# Patient Record
Sex: FEMALE | Race: WHITE | NOT HISPANIC OR LATINO | Employment: OTHER | URBAN - METROPOLITAN AREA
[De-identification: names, ages, dates, MRNs, and addresses within clinical notes are randomized per-mention and may not be internally consistent; named-entity substitution may affect disease eponyms.]

---

## 2018-03-21 PROBLEM — M70.72 BILATERAL HIP BURSITIS: Status: ACTIVE | Noted: 2018-03-21

## 2018-03-21 PROBLEM — M50.30 DDD (DEGENERATIVE DISC DISEASE), CERVICAL: Status: ACTIVE | Noted: 2018-03-21

## 2018-03-21 PROBLEM — M47.812 CERVICAL SPONDYLOSIS: Status: ACTIVE | Noted: 2018-03-21

## 2018-03-21 PROBLEM — M47.816 LUMBAR SPONDYLOSIS: Status: ACTIVE | Noted: 2018-03-21

## 2018-03-21 PROBLEM — M70.71 BILATERAL HIP BURSITIS: Status: ACTIVE | Noted: 2018-03-21

## 2018-03-21 PROBLEM — M54.16 LUMBAR RADICULOPATHY: Status: ACTIVE | Noted: 2018-03-21

## 2018-03-21 PROBLEM — M54.12 CERVICAL RADICULOPATHY: Status: ACTIVE | Noted: 2018-03-21

## 2018-03-21 PROBLEM — M75.52 BILATERAL SHOULDER BURSITIS: Status: ACTIVE | Noted: 2018-03-21

## 2018-03-21 PROBLEM — M51.36 DDD (DEGENERATIVE DISC DISEASE), LUMBAR: Status: ACTIVE | Noted: 2018-03-21

## 2018-03-21 PROBLEM — M75.51 BILATERAL SHOULDER BURSITIS: Status: ACTIVE | Noted: 2018-03-21

## 2018-03-21 PROBLEM — M48.061 LUMBAR SPINAL STENOSIS: Status: ACTIVE | Noted: 2018-03-21

## 2018-03-21 PROBLEM — M48.02 CERVICAL STENOSIS OF SPINAL CANAL: Status: ACTIVE | Noted: 2018-03-21

## 2018-03-21 PROBLEM — M51.26 LUMBAR HERNIATED DISC: Status: ACTIVE | Noted: 2018-03-21

## 2018-03-21 PROBLEM — M50.20 HERNIATED CERVICAL DISC: Status: ACTIVE | Noted: 2018-03-21

## 2018-11-12 LAB
ALBUMIN/GLOB SERPL ELPH: 0.6 {RATIO}
ALBUMIN: 2.6
ALP SERPL-CCNC: 195 U/L (ref 25–125)
ALT SERPL W P-5'-P-CCNC: 21 U/L (ref 7–35)
ANION GAP SERPL CALC-SCNC: 11 MMOL/L (ref ?–30)
AST SERPL-CCNC: 42 U/L (ref 13–35)
BILIRUB SERPL-MCNC: 0.7 MG/DL (ref 0.1–1.4)
BILIRUBIN DIRECT+TOT PNL SERPL-MCNC: 0.3 MG/DL (ref 0.01–0.4)
BUN SERPL-MCNC: 11 MG/DL
CALCIUM SERPL-MCNC: 9.7 MG/DL (ref 8.7–10.7)
CHLORIDE SERPL-SCNC: 96 MMOL/L (ref 99–108)
CO2 SERPL-SCNC: 28 MMOL/L (ref 13–22)
CREAT SERPL-MCNC: 0.7 MG/DL (ref 0.5–1.1)
EAG (MG/DL): 209
GFR MDRD AF AMER: >60
GFR MDRD NON AF AMER: >60
GLOBULIN: 4.2
GLUCOSE SERPL-MCNC: 312 MG/DL
HBA1C MFR BLD: 8.9 % (ref 4–6)
NT-PRO-BNP (RIVER PARISHES): 542
OSMOLALITY SERPL CALC.SUM OF ELEC: 280 MOSM/KG
POTASSIUM SERPL-SCNC: 4 MMOL/L (ref 3.4–5.3)
SODIUM BLD-SCNC: 134 MMOL/L (ref 137–147)
TOTAL PROTEIN: 6.8 G/DL (ref 6.4–8.2)

## 2018-11-29 ENCOUNTER — TELEPHONE (OUTPATIENT)
Dept: TRANSPLANT | Facility: CLINIC | Age: 68
End: 2018-11-29

## 2018-11-29 LAB
AFP-TUMOR MARKER: 3.1
ALBUMIN/GLOBULIN RATIO: 0.7
ALBUMIN: 2.6
ALP SERPL-CCNC: 161 U/L (ref 25–125)
ALT SERPL W P-5'-P-CCNC: 21 U/L (ref 7–35)
AST SERPL-CCNC: 50 U/L (ref 13–35)
BILIRUB SERPL-MCNC: 0.8 MG/DL (ref 0.1–1.4)
BUN SERPL-MCNC: 14 MG/DL
CALCIUM SERPL-MCNC: 8.6 MG/DL (ref 8.7–10.7)
CHLORIDE SERPL-SCNC: 100 MMOL/L (ref 99–108)
CO2 SERPL-SCNC: 28 MMOL/L (ref 13–22)
CREAT SERPL-MCNC: 0.7 MG/DL (ref 0.5–1.1)
EGFR IF AFRICAN AMERICAN: 100
ERYTHROCYTE [DISTWIDTH] IN BLOOD BY AUTOMATED COUNT: 14.1 %
EST. GFR  (NON AFRICAN AMERICAN): 86 MG/DL
GLOBULIN: 4
GLUCOSE SERPL-MCNC: 266 MG/DL
HCT VFR BLD AUTO: 33 % (ref 36–46)
HGB BLD-MCNC: 11.6 G/DL (ref 12–16)
MCH RBC QN AUTO: 33.6 PG
MCHC RBC AUTO-ENTMCNC: 35 G/DL
MCV RBC AUTO: 95.9 FL
PLATELET # BLD AUTO: 197 K/ΜL (ref 150–399)
PMV BLD AUTO: 11.3 FL (ref 7.5–11.5)
POTASSIUM SERPL-SCNC: 4.7 MMOL/L (ref 3.4–5.3)
RBC: 3.45
SODIUM BLD-SCNC: 138 MMOL/L (ref 137–147)
TOTAL PROTEIN: 6.6 G/DL (ref 6.4–8.2)
WBC # BLD: 5.4 10*3/UL

## 2018-11-29 NOTE — TELEPHONE ENCOUNTER
----- Message from Nany Arredondo sent at 11/29/2018  9:38 AM CST -----  Rec'jannet phone call from ref md office about ref saadia on pt. Told them we have the pt ref and will give the pt call today to [facility]. They asked that we call the pt daughter Danitza  at [phone] to [facility] the appt.

## 2018-11-29 NOTE — TELEPHONE ENCOUNTER
----- Message from Nany Arredondo sent at 11/29/2018  3:02 PM CST -----  Called pt daughter and tamy pt for 12/6 in gen hepat. Will e-mail appt info and map of [facility] [place]

## 2018-12-06 ENCOUNTER — LAB VISIT (OUTPATIENT)
Dept: LAB | Facility: HOSPITAL | Age: 68
End: 2018-12-06
Payer: MEDICARE

## 2018-12-06 ENCOUNTER — OFFICE VISIT (OUTPATIENT)
Dept: HEPATOLOGY | Facility: CLINIC | Age: 68
End: 2018-12-06
Payer: MEDICARE

## 2018-12-06 ENCOUNTER — TELEPHONE (OUTPATIENT)
Dept: HEPATOLOGY | Facility: CLINIC | Age: 68
End: 2018-12-06

## 2018-12-06 VITALS
DIASTOLIC BLOOD PRESSURE: 63 MMHG | SYSTOLIC BLOOD PRESSURE: 134 MMHG | HEIGHT: 62 IN | HEART RATE: 78 BPM | WEIGHT: 259.94 LBS | BODY MASS INDEX: 47.84 KG/M2 | OXYGEN SATURATION: 97 % | RESPIRATION RATE: 18 BRPM

## 2018-12-06 DIAGNOSIS — R18.8 CIRRHOSIS OF LIVER WITH ASCITES, UNSPECIFIED HEPATIC CIRRHOSIS TYPE: ICD-10-CM

## 2018-12-06 DIAGNOSIS — E78.2 MIXED HYPERLIPIDEMIA: ICD-10-CM

## 2018-12-06 DIAGNOSIS — K74.60 CIRRHOSIS OF LIVER WITH ASCITES, UNSPECIFIED HEPATIC CIRRHOSIS TYPE: Primary | ICD-10-CM

## 2018-12-06 DIAGNOSIS — E11.65 TYPE 2 DIABETES MELLITUS WITH HYPERGLYCEMIA, WITH LONG-TERM CURRENT USE OF INSULIN: ICD-10-CM

## 2018-12-06 DIAGNOSIS — K76.82 HEPATIC ENCEPHALOPATHY: ICD-10-CM

## 2018-12-06 DIAGNOSIS — R18.8 CIRRHOSIS OF LIVER WITH ASCITES, UNSPECIFIED HEPATIC CIRRHOSIS TYPE: Primary | ICD-10-CM

## 2018-12-06 DIAGNOSIS — I10 ESSENTIAL HYPERTENSION: ICD-10-CM

## 2018-12-06 DIAGNOSIS — Z79.4 TYPE 2 DIABETES MELLITUS WITH HYPERGLYCEMIA, WITH LONG-TERM CURRENT USE OF INSULIN: ICD-10-CM

## 2018-12-06 DIAGNOSIS — K74.60 CIRRHOSIS OF LIVER WITH ASCITES, UNSPECIFIED HEPATIC CIRRHOSIS TYPE: ICD-10-CM

## 2018-12-06 DIAGNOSIS — E66.01 MORBID OBESITY WITH BMI OF 45.0-49.9, ADULT: ICD-10-CM

## 2018-12-06 LAB
A1AT SERPL-MCNC: 195 MG/DL
AFP SERPL-MCNC: 3 NG/ML
ALBUMIN SERPL BCP-MCNC: 2.3 G/DL
ALP SERPL-CCNC: 177 U/L
ALT SERPL W/O P-5'-P-CCNC: 23 U/L
ANION GAP SERPL CALC-SCNC: 10 MMOL/L
AST SERPL-CCNC: 52 U/L
BASOPHILS # BLD AUTO: 0.04 K/UL
BASOPHILS NFR BLD: 0.7 %
BILIRUB SERPL-MCNC: 0.8 MG/DL
BUN SERPL-MCNC: 10 MG/DL
CALCIUM SERPL-MCNC: 9.1 MG/DL
CERULOPLASMIN SERPL-MCNC: 36 MG/DL
CHLORIDE SERPL-SCNC: 101 MMOL/L
CO2 SERPL-SCNC: 28 MMOL/L
CREAT SERPL-MCNC: 0.9 MG/DL
DIFFERENTIAL METHOD: ABNORMAL
EOSINOPHIL # BLD AUTO: 0.1 K/UL
EOSINOPHIL NFR BLD: 1.5 %
ERYTHROCYTE [DISTWIDTH] IN BLOOD BY AUTOMATED COUNT: 16 %
EST. GFR  (AFRICAN AMERICAN): >60 ML/MIN/1.73 M^2
EST. GFR  (NON AFRICAN AMERICAN): >60 ML/MIN/1.73 M^2
FERRITIN SERPL-MCNC: 49 NG/ML
GLUCOSE SERPL-MCNC: 80 MG/DL
HCT VFR BLD AUTO: 35.3 %
HGB BLD-MCNC: 11.5 G/DL
IGG SERPL-MCNC: 1778 MG/DL
IGM SERPL-MCNC: 480 MG/DL
IMM GRANULOCYTES # BLD AUTO: 0.01 K/UL
IMM GRANULOCYTES NFR BLD AUTO: 0.2 %
INR PPP: 1.2
IRON SERPL-MCNC: 51 UG/DL
LYMPHOCYTES # BLD AUTO: 2 K/UL
LYMPHOCYTES NFR BLD: 32.2 %
MCH RBC QN AUTO: 31.5 PG
MCHC RBC AUTO-ENTMCNC: 32.6 G/DL
MCV RBC AUTO: 97 FL
MONOCYTES # BLD AUTO: 0.5 K/UL
MONOCYTES NFR BLD: 8.1 %
NEUTROPHILS # BLD AUTO: 3.5 K/UL
NEUTROPHILS NFR BLD: 57.3 %
NRBC BLD-RTO: 0 /100 WBC
PLATELET # BLD AUTO: 246 K/UL
PMV BLD AUTO: 10.6 FL
POTASSIUM SERPL-SCNC: 3.8 MMOL/L
PROT SERPL-MCNC: 7.3 G/DL
PROTHROMBIN TIME: 12.5 SEC
RBC # BLD AUTO: 3.65 M/UL
SATURATED IRON: 15 %
SODIUM SERPL-SCNC: 139 MMOL/L
TOTAL IRON BINDING CAPACITY: 348 UG/DL
TRANSFERRIN SERPL-MCNC: 235 MG/DL
WBC # BLD AUTO: 6.08 K/UL

## 2018-12-06 PROCEDURE — 1101F PT FALLS ASSESS-DOCD LE1/YR: CPT | Mod: CPTII,S$GLB,, | Performed by: NURSE PRACTITIONER

## 2018-12-06 PROCEDURE — 86235 NUCLEAR ANTIGEN ANTIBODY: CPT

## 2018-12-06 PROCEDURE — 83540 ASSAY OF IRON: CPT

## 2018-12-06 PROCEDURE — 82105 ALPHA-FETOPROTEIN SERUM: CPT

## 2018-12-06 PROCEDURE — 99999 PR PBB SHADOW E&M-EST. PATIENT-LVL V: CPT | Mod: PBBFAC,,, | Performed by: NURSE PRACTITIONER

## 2018-12-06 PROCEDURE — 82784 ASSAY IGA/IGD/IGG/IGM EACH: CPT | Mod: 59

## 2018-12-06 PROCEDURE — 86706 HEP B SURFACE ANTIBODY: CPT

## 2018-12-06 PROCEDURE — 86038 ANTINUCLEAR ANTIBODIES: CPT

## 2018-12-06 PROCEDURE — 82103 ALPHA-1-ANTITRYPSIN TOTAL: CPT

## 2018-12-06 PROCEDURE — 36415 COLL VENOUS BLD VENIPUNCTURE: CPT

## 2018-12-06 PROCEDURE — 82784 ASSAY IGA/IGD/IGG/IGM EACH: CPT

## 2018-12-06 PROCEDURE — 85025 COMPLETE CBC W/AUTO DIFF WBC: CPT

## 2018-12-06 PROCEDURE — 80053 COMPREHEN METABOLIC PANEL: CPT

## 2018-12-06 PROCEDURE — 82390 ASSAY OF CERULOPLASMIN: CPT

## 2018-12-06 PROCEDURE — 86704 HEP B CORE ANTIBODY TOTAL: CPT

## 2018-12-06 PROCEDURE — 82728 ASSAY OF FERRITIN: CPT

## 2018-12-06 PROCEDURE — 80074 ACUTE HEPATITIS PANEL: CPT

## 2018-12-06 PROCEDURE — 86790 VIRUS ANTIBODY NOS: CPT

## 2018-12-06 PROCEDURE — 85610 PROTHROMBIN TIME: CPT

## 2018-12-06 PROCEDURE — 99205 OFFICE O/P NEW HI 60 MIN: CPT | Mod: S$GLB,,, | Performed by: NURSE PRACTITIONER

## 2018-12-06 PROCEDURE — 80321 ALCOHOLS BIOMARKERS 1OR 2: CPT

## 2018-12-06 PROCEDURE — 86256 FLUORESCENT ANTIBODY TITER: CPT | Mod: 91

## 2018-12-06 RX ORDER — OMEPRAZOLE 40 MG/1
40 CAPSULE, DELAYED RELEASE ORAL DAILY
COMMUNITY
Start: 2018-10-17

## 2018-12-06 RX ORDER — SPIRONOLACTONE 100 MG/1
100 TABLET, FILM COATED ORAL DAILY
Qty: 30 TABLET | Refills: 2 | Status: SHIPPED | OUTPATIENT
Start: 2018-12-06 | End: 2019-02-08 | Stop reason: SDUPTHER

## 2018-12-06 RX ORDER — RIVAROXABAN 20 MG/1
20 TABLET, FILM COATED ORAL DAILY
COMMUNITY
Start: 2018-11-30 | End: 2020-01-14 | Stop reason: CLARIF

## 2018-12-06 RX ORDER — FUROSEMIDE 40 MG/1
40 TABLET ORAL DAILY
COMMUNITY
Start: 2018-11-15 | End: 2018-12-26

## 2018-12-06 RX ORDER — GABAPENTIN 600 MG/1
300 TABLET ORAL 2 TIMES DAILY
COMMUNITY

## 2018-12-06 RX ORDER — ASPIRIN 81 MG/1
81 TABLET ORAL DAILY
Status: ON HOLD | COMMUNITY
End: 2021-01-01 | Stop reason: HOSPADM

## 2018-12-06 RX ORDER — ROSUVASTATIN CALCIUM 10 MG/1
10 TABLET, COATED ORAL DAILY
COMMUNITY
Start: 2018-08-13

## 2018-12-06 RX ORDER — ESCITALOPRAM OXALATE 20 MG/1
10 TABLET ORAL 2 TIMES DAILY
COMMUNITY
Start: 2018-10-31

## 2018-12-06 RX ORDER — METOPROLOL SUCCINATE 25 MG/1
25 TABLET, EXTENDED RELEASE ORAL
COMMUNITY
Start: 2018-10-03 | End: 2020-01-31

## 2018-12-06 RX ORDER — SPIRONOLACTONE 50 MG/1
50 TABLET, FILM COATED ORAL 2 TIMES DAILY
COMMUNITY
Start: 2018-11-18 | End: 2018-12-06

## 2018-12-06 NOTE — LETTER
December 7, 2018      Jim Jolley MD  [address]  [facility] [address]           [facility]at[facility]  [address]  [facility] [address]  Phone: [phone]  Fax: [phone]          Patient: Sejal Matamoros   MR Number: [id number]   YOB: 1950   Date of Visit: 12/6/2018       Dear Dr. Jim Jolley:    Thank you for referring Sejal Matamoros to me for evaluation. Attached you will find relevant portions of my assessment and plan of care.    If you have questions, please do not hesitate to call me. I look forward to following Sejal Matamoros along with you.    Sincerely,    Deepika Plunkett, TARUN    Enclosure  CC:  No Recipients    If you would like to receive this communication electronically, please contact [email or web address] or [phone] to request more information on [employer] access.    For providers and/or their staff who would like to refer a patient to [facility], please contact us through our one-stop-shop provider referral line, [facility], at [phone].    If you feel you have received this communication in error or would no longer like to receive these types of communications, please e-mail [email or web address]

## 2018-12-06 NOTE — PROGRESS NOTES
I have reviewed and concur with the TITO's history, physical, assessment, and plan.  I have personally interviewed and examined the patient at bedside.  See below addendum for my evaluation and additional findings.     68 y.o. female that presents for recent diagnosis of RHOADES cirrhosis.  Patient has risk factors for RHOADES.  Signs of portal hypertension.    Previously with significant LE edema but improving with low dose diuretics.    Provided information regarding low sodium diet.  Titrate diuretics based on labs.   No indication for fibroscan or liver biopsy based on obvious signs of cirrhosis with portal hypertension.    No indication for liver transplant evaluation based on low MELD       Patient will return to clinic with Deepika Plunkett NP

## 2018-12-06 NOTE — PATIENT INSTRUCTIONS
1. Start Xifaxan 550 mg twice daily , call [facility] mail order dalia to check on the prescription  [phone]    Because you have cirrhosis, it is important to attend clinic visits every 6 months with an Ultrasound and blood tests every 6 months to screen for liver cancer (you are at risk of developing liver cancer due to scar tissue in the liver)    Signs and symptoms of worsening liver disease include jaundice, fluid in the belly (ascites), and confusion/disorientation/slowed thought processes due to hepatic encephalopathy (toxins building up because of liver problems).   You should seek medical attention if any of these things occur.    Also, possible bleeding from esophageal varices (blood vessels in the stomach and foodpipe can burst and cause fatal bleeding).  Therefore, if you have symptoms of vomiting blood, blood in your stool, dark or black stools or vomiting coffee ground vomit, YOU SHOULD GO TO THE EMERGENCY ROOM IMMEDIATELY.     Cirrhosis can increase the risk of liver cancer, liver failure, and death. However, we will watch your liver function score (MELD score) closely with each clinic visit. A normal MELD score is 6, highest is 40. Your MELD score will be calculated today. We will check this with every clinic visit.  A MELD 15 or higher is when we start to consider transplant because MELD 15 or higher indicates that the liver is not functioning as well     Cirrhosis Counseling  - NO alcohol use (includes beer, wine, and/or liquor)  --  take your rifaximin twice daily for treatment of hepatic encephalopathy (confusion due to high ammonia level)  -- continue your Lasix 40 mg daily and your Spironolactone 50  mg daily to control swelling related to your liver disease  - avoid non-steroidal anti-inflammatory drugs (NSAIDs) such as ibuprofen, Motrin, naprosyn, Alleve due to the risk of kidney damage  - can take acetaminophen (Tylenol), no more than 2000 mg per day  - low sodium (salt) 2 gram per day  diet  - high protein diet: 150 grams per day to prevent muscle mass loss. Drink at least 1 protein shake daily (Premier Protein is best option because it is very high protein and low sugar). Ok to use this as nighttime snack to fit it in   - resistance exercises for muscle strength  - avoid raw seafoods due to the risk of fatal Vibrio vulnificus infection  - ultrasound of the liver every 6 months for liver cancer screening (you are at risk of developing liver cancer due to scar tissue in the liver)  - Upper endoscopy every 1-2 years to screen for varices in the stomach and foodpipe which can burst and cause fatal bleeding

## 2018-12-06 NOTE — Clinical Note
Can you please arrange for MsGisele Matamoros to see Mary in [place] for DM management? She would also benefit from a visit with diabetes education. Referrals placed. Thanks!Deepika

## 2018-12-06 NOTE — TELEPHONE ENCOUNTER
Called pt and daughter, kidney function stable, will increase Savanna to 100 mg daily. Continue Lasix 40 mg daily.     Please contact pt or daughter or son to schedule pt for CMP at [facility] next Thursday (12/13)    Thanks

## 2018-12-06 NOTE — PROGRESS NOTES
[facility] VISIT NEW PT NOTE    REFERRING PROVIDER:  Dr. Jim Jolley    CHIEF COMPLAINT: ascites, decompensated cirrhosis    HPI: This is a 68 y.o. White female with PMH noted below, presenting for evaluation of  of decompensated cryptogenic cirrhosis  with ascites, hepatic encephalopathy    No  history of variceal bleeding     + LE edema    Presents today with son and daugther    Diagnosis of cirrhosis per GI Dr. Muñiz approx ~ 9/2017, had ascites and BLE edema at that time.     Diagnosis of cirrhosis based on  Imaging, US done 11/12/18 shows hepatic coarse echotexture, liver shrunken with nodular contour. No focal lesions. Hepatopedal flow within the portal vein. Moderate amount of ascites    MRI abd done 8/23/18 (outside records) shows nodular hepatic parenchamal contour with atrophy of right love and hypertrophy of left lobe, suggseting cirrhosis. No PVT. No intrahepatic mass. No ductal dilation. Trace ascites. Splenorenal shunting and cysts small perigastric varices    No h/o liver biopsy    Outside labs done 11/12/18 show  Na 134  Creat 0.7  Alb 2.6  AST 42  ALT 21  Alk phos 195  Tbili 0.7  AFP 8/2018 3.6 (WNL)  Plts 153 8/2018      BG >300    Computed MELD-Na score unavailable. Necessary lab results were not found in the last year.  Computed MELD score unavailable. Necessary lab results were not found in the last year.    No Previous serologic w/u     Complications:   1. Ascites :  + continued ascites on exam, reports much improved since starting current diuretics: Lasix 40 mg daily, Lawrence 50 mg daily. NOT following  Low Na diet   2. HE : + Grade 1-2 with fatigue, altered sleep/wake cycle, + memory loss, intermittent disorientation. Denies confusion. Never tried Lactulose or Xifaxan. Currently having ~5 small, soft, non watery bowel movements daily    No h/o variceal bleeding     Cirrhosis Health Maintenance:   -- Last EGD : 9/2017 per pt report, will request report at next  "visit    -- HCC screening   U/S - outside report 11/2018 notes no lesions   AFP - WNL 8/2018, repeat due 2/2019  -- Immunity to Hep A and B - will check today     Risk factors for NAFLD include morbid obesity, HTN, HLD, uncontrolled T2DM. Reports poor Diet " I eat anything I want", family reports heavy carb intake. No Exercise      + family history of liver disease : RHOADES cirrhosis in niece and brother. Denies current alcohol consumption or history of significant alcohol consumption in past     Denies jaundice, dark urine, hematemesis, melena, slowed mentation. No abnormal skin rashes. No generalized joint or muscle pain. + abdominal distention     Review of patient's allergies indicates:   Allergen Reactions    Iodinated contrast- oral and iv dye Hives    Zoloft [sertraline]        Current Outpatient Medications on File Prior to Visit   Medication Sig Dispense Refill    GABAPENTIN ORAL Take by mouth.      insulin glargine (LANTUS) 100 unit/mL injection Inject into the skin every evening.      metformin (GLUCOPHAGE) 1000 MG tablet Take 1,000 mg by mouth 2 (two) times daily with meals.       No current facility-administered medications on file prior to visit.        PMHX:  has a past medical history of A-fib, Anxiety, Cirrhosis, DDD (degenerative disc disease), cervical (3/21/2018), DDD (degenerative disc disease), lumbar (3/21/2018), Depression, Diabetes mellitus, type 2, HLD (hyperlipidemia), HTN (hypertension), Morbid obesity with BMI of 45.0-49.9, adult (12/7/2018), Obesity, and Type 2 diabetes mellitus with hyperglycemia, with long-term current use of insulin (12/6/2018).    PSHX:  has a past surgical history that includes Rotator cuff repair; Total knee arthroplasty; Hysterectomy; Carpal tunnel release; arm surgery; and Cholecystectomy.    FAMILY HISTORY: + for liver disease, see above, reviewed in EPIC    SOCIAL HISTORY:   Social History     Tobacco Use   Smoking Status Never Smoker   Smokeless Tobacco " "Never Used       Social History     Substance and Sexual Activity   Alcohol Use No    Frequency: Never       Social History     Substance and Sexual Activity   Drug Use No       ROS:   GENERAL: Denies fever, chills, weight loss/gain, + fatigue  HEENT: Denies headaches, dizziness, vision/hearing changes  CARDIOVASCULAR: Denies chest pain, palpitations, + edema  RESPIRATORY: Denies dyspnea, cough  GI: Denies abdominal pain, rectal bleeding, nausea, vomiting. No change in bowel pattern or color  : Denies dysuria, hematuria   SKIN: Denies rash, itching   NEURO: + Grade 1-2 with fatigue, altered sleep/wake cycle, + memory loss, intermittent disorientation. Denies confusion.   PSYCH: Denies depression or anxiety  HEME/LYMPH: + easy bruising, denies bleeding    PHYSICAL EXAM:   Friendly White female, in no acute distress; alert and oriented to person, place and time  VITALS: /63 (BP Location: Left arm, Patient Position: Sitting)   Pulse 78   Resp 18   Ht 5' 2" (1.575 m)   Wt 117.9 kg (259 lb 14.8 oz)   SpO2 97%   BMI 47.54 kg/m²   HENT: Normocephalic, without obvious abnormality. Oral mucosa pink and moist. Dentition good.  EYES: Sclerae anicteric. No conjunctival pallor.   NECK: Supple. No masses or cervical adenopathy.  CARDIOVASCULAR: Regular rate and rhythm. No murmurs.  RESPIRATORY: Normal respiratory effort. BBS CTA. No wheezes or crackles.  GI: Soft, non-tender, + distended, Ascites with shifting dullness and fluid wave, not-tense.  + splenomegaly. No masses palpable.   EXTREMITIES:  No clubbing, cyanosis, + 1 BLE edema.  SKIN: Warm and dry. No jaundice. No rashes noted to exposed skin.+ telangectasias noted. + palmar erythema.  NEURO:  Normal gait. No asterixis.  PSYCH:  Memory intact. Thought and speech pattern appropriate. Behavior normal. No depression or anxiety noted.    RECENT LABS:    Hepatitis A and B immunity markers:    No results found for: HEPAIGG    No results found for: HEPBSAG  No " "results found for: HEPBCAB  No results found for: HEPBSAB    There is no immunization history on file for this patient.    Labs:  No results found for: WBC, HGB, HCT, PLT, CHOL, TRIG, HDL, LDLDIRECT, NA, K, CREATININE, ALT, AST, ALKPHOS, BILITOT, ALBUMIN, INR, AFP    DIAGNOSTIC STUDIES:  EGD-  Reports outside EGD done 9/2017, will request report at next visit   COLONOSCOPY-  ABD. U/S-  Outside records US done 11/12/18 shows hepatic coarse echotexture, liver shrunken with nodular contour. No focal lesions. Hepatopedal flow within the portal vein. Moderate amount of ascites    CT SCAN-  MRI-  LIVER BIOPSY-  None   FIBROSCAN -   None, not needed given imaging findings    ASSESSMENT:  68 y.o. White female with:  1.  Cirrhosis, decompensated, etiology unknown but likely due to RHOADES.   Cirrhosis illustrated on imaging.   -- MELD-Na score: 8 at 12/6/2018  4:08 PM  MELD score: 8 at 12/6/2018  4:08 PM  Calculated from:  Serum Creatinine: 0.9 mg/dL (Rounded to 1 mg/dL) at 12/6/2018  4:08 PM  Serum Sodium: 139 mmol/L (Rounded to 137 mmol/L) at 12/6/2018  4:08 PM  Total Bilirubin: 0.8 mg/dL (Rounded to 1 mg/dL) at 12/6/2018  4:08 PM  INR(ratio): 1.2 at 12/6/2018  4:08 PM  Age: 68 years  -- HCC screening: AFP and abd. U/S.. U/S showed no lesion, AFP WNL - both next due 2/2019  -- Immunity to Hep A and B - will check today   -- EGD in past : reports outside EGD 9/2017, will request report at next visit  ---No Serological workup, to be done today.      2. Morbid obesity, HTN, HLD, uncontrolled T2DM  -- Body mass index is 47.54 kg/m².   -- increases risk of NAFLD/RHOADES  -- Reports poor Diet " I eat anything I want", family reports heavy carb intake. No Exercise     3. Portal hypertension without bleeding varices   -- EGD on 9/2017, need outside report, pt recalls no varices  -- Ascites and ankle edema - diuretic dosing Lasix 40 mg daily, [place] 50 mg daily. NOT following Low Na diet   -- Splenomegaly, thrombocytopenia     4. Hepatic " encephalopathy   -- + Grade 1-2 with fatigue, altered sleep/wake cycle, + memory loss, intermittent disorientation. Denies confusion. Never tried Lactulose or Xifaxan. Currently having ~5 small, soft, non watery bowel movements daily  -- NOT driving     5. BLE edema, +1 on exam  -- diuretic dosing: Lasix 40 mg daily, Spironolactone 50 mg daily     6. Hypoalbuminemia   -- encouraged high protein diet       EDUCATION:     The disease process and manifestations of cirrhosis were discussed.    Discussed implications of a cirrhosis diagnosis with HCC screenings and EGD and why it is important for us to properly monitor for potential complications.     Signs and symptoms of hepatic decompensation were reviewed, including jaundice, ascites, and slowed mentation due to hepatic encephalopathy. The patient should seek medical attention if any of these things occur.  We discussed the potential for bleeding from esophageal varices with symptoms of hematemesis and melena. The patient should report to the Emergency Department for these symptoms.    We discussed the increased risk of hepatocellular carcinoma due to cirrhosis. Continued screening every six months with ultrasound and AFP is recommended, discussed with patient.     Cirrhosis Counseling  - strict abstinence of alcohol use (includes beer, wine, and/or liquor)  - compliance with rifaximin for treatment of hepatic encephalopathy  - avoid non-steroidal anti-inflammatory drugs (NSAIDs) such as ibuprofen, Motrin, naprosyn, Alleve due to the risk of kidney damage  - can take acetaminophen (Tylenol), no more than 2000 mg per day  - low sodium (salt) 2 gram per day diet  - high protein diet: 150 grams per day to prevent muscle mass loss. Recommended at least 1 protein shake daily using Premier Protein shakes    - resistance exercises for muscle strength  - avoid raw seafoods due to the risk of fatal Vibrio vulnificus infection  - ultrasound of the liver every 6 months for  liver cancer screening  - Upper endoscopy every 1-2 years to screen for varices in the stomach and esophagus    We discussed the manifestations of non-alcoholic fatty liver disease. At this time, there are no FDA approved therapy for non-alcoholic fatty liver disease.  The patient and I discussed the importance of diet, exercise, and weight loss for management of NAFLD. We discussed a low fat, low carb/sugar, high fiber diet and a goal of 30 minutes of exercise 5 times per week as tolerated     Discussed with pt need for significant lifestyle changes, including improvement in diabetes control and eating habits      PLAN:  1. Labs today for full serological workup and  MELD labs  Orders Placed This Encounter   Procedures    AFP tumor marker    Alpha-1-antitrypsin    TRACIE    Antimitochondrial antibody    Anti-smooth muscle antibody    CBC auto differential    Ceruloplasmin    Comprehensive metabolic panel    Ferritin    Hepatitis panel, acute    IgG    IgM    Iron and TIBC    Phosphatidylethanol (PETH)    Protime-INR    Hepatitis A antibody, IgG    Hepatitis B core antibody, total    Hepatitis B surface antibody    Ambulatory Referral to Endocrinology    Ambulatory Referral to Diabetes Education    Ambulatory Referral to Endocrinology    Ambulatory Referral to Diabetes Education     2. Based on today's labs: increase Lawrence to 100 mg daily, continue Lasix 40 mg daily. Can continue to increase at next visit if ascites remains. Stressed importance of low Na diet  3. Start Xifaxan 550 mg BID. Will consider starting low dose Lactulose at next visit if no frequent BM or diarrhea  . Instructed NOT to drive due to hepatic encephalopathy   4. HCC screening Q 6 months with AFP and abd. U/S - both next due 2/2019  5. EGD to screen for varices, need to request report from 9/2017  6. Will check immunity markers for HBV/HAV and arrange for vaccination if needed.  7. Cirrhosis counseling as noted above and  discussed with patient  8. Will refer for transplant evaluation when MELD score is consistently 13-15   9. Referral to endocrinology in [place] and diabetes education  10. Follow-up in about 2 weeks (around 12/20/2018). with me to reiterated above, titrate diuretics and possibly add lactulose     Thank you for allowing me to participate in the care of Sejal Matamoros    JONG Love    ADDENDUM 12/16/18: Serological workup was negative for Nigel's, alpha-1 antitrypsin deficiency, hemochromatosis,  and viral hepatitis.     Negative TRACIE, AMA. + IgM (480), IgG (1778), ASMA 1:40  Normal AFP      CC'ed note to:   Dr. Samreen Lundberg, NP

## 2018-12-07 PROBLEM — E66.01 MORBID OBESITY WITH BMI OF 45.0-49.9, ADULT: Status: ACTIVE | Noted: 2018-12-07

## 2018-12-07 LAB
ANA SER QL IF: NORMAL
HAV IGM SERPL QL IA: NEGATIVE
HBV CORE AB SERPL QL IA: NEGATIVE
HBV CORE IGM SERPL QL IA: NEGATIVE
HBV SURFACE AB SER-ACNC: POSITIVE M[IU]/ML
HBV SURFACE AG SERPL QL IA: NEGATIVE
HCV AB SERPL QL IA: NEGATIVE
HEPATITIS A ANTIBODY, IGG: NEGATIVE
MITOCHONDRIA AB TITR SER IF: NORMAL {TITER}

## 2018-12-07 NOTE — TELEPHONE ENCOUNTER
MA called patient spoke to patient daughter schedule her labs at [place] on 12/13. Mailed appt reminder to patient.

## 2018-12-10 LAB — SMOOTH MUSCLE AB TITR SER IF: ABNORMAL {TITER}

## 2018-12-13 ENCOUNTER — TELEPHONE (OUTPATIENT)
Dept: HEPATOLOGY | Facility: CLINIC | Age: 68
End: 2018-12-13

## 2018-12-13 NOTE — TELEPHONE ENCOUNTER
----- Message from Octavia Ferguson sent at 12/13/2018  3:50 PM CST -----  Contact: Danitza garry's daughter  Patient Returning Call from [facility]    Who Left Message for Patient: Unkn    Communication Preference: [phone]    Additional Information: N/A

## 2018-12-13 NOTE — TELEPHONE ENCOUNTER
----- Message from Octavia Ferguson sent at 12/13/2018  3:50 PM CST -----  Contact: Danitza pt's daughter  Patient Returning Call from [facility]     Who Left Message for Patient: Unkn     Communication Preference: [phone]     Additional Information: N/A    Spoke to pts daughter ( Danitza) & rescheduled lab appt..............

## 2018-12-14 ENCOUNTER — LAB VISIT (OUTPATIENT)
Dept: LAB | Facility: HOSPITAL | Age: 68
End: 2018-12-14
Attending: NURSE PRACTITIONER
Payer: MEDICARE

## 2018-12-14 DIAGNOSIS — R18.8 CIRRHOSIS OF LIVER WITH ASCITES, UNSPECIFIED HEPATIC CIRRHOSIS TYPE: ICD-10-CM

## 2018-12-14 DIAGNOSIS — K74.60 CIRRHOSIS OF LIVER WITH ASCITES, UNSPECIFIED HEPATIC CIRRHOSIS TYPE: ICD-10-CM

## 2018-12-14 LAB
ALBUMIN SERPL BCP-MCNC: 2.4 G/DL
ALP SERPL-CCNC: 184 U/L
ALT SERPL W/O P-5'-P-CCNC: 26 U/L
ANION GAP SERPL CALC-SCNC: 11 MMOL/L
AST SERPL-CCNC: 59 U/L
BILIRUB SERPL-MCNC: 0.7 MG/DL
BUN SERPL-MCNC: 15 MG/DL
CALCIUM SERPL-MCNC: 8.9 MG/DL
CHLORIDE SERPL-SCNC: 98 MMOL/L
CO2 SERPL-SCNC: 28 MMOL/L
CREAT SERPL-MCNC: 0.8 MG/DL
EST. GFR  (AFRICAN AMERICAN): >60 ML/MIN/1.73 M^2
EST. GFR  (NON AFRICAN AMERICAN): >60 ML/MIN/1.73 M^2
GLUCOSE SERPL-MCNC: 267 MG/DL
PHOSPHATIDYLETHANOL (PETH): NEGATIVE NG/ML
POTASSIUM SERPL-SCNC: 4.6 MMOL/L
PROT SERPL-MCNC: 7 G/DL
SODIUM SERPL-SCNC: 137 MMOL/L

## 2018-12-14 PROCEDURE — 80053 COMPREHEN METABOLIC PANEL: CPT

## 2018-12-14 PROCEDURE — 36415 COLL VENOUS BLD VENIPUNCTURE: CPT

## 2018-12-26 ENCOUNTER — TELEPHONE (OUTPATIENT)
Dept: HEPATOLOGY | Facility: CLINIC | Age: 68
End: 2018-12-26

## 2018-12-26 ENCOUNTER — OFFICE VISIT (OUTPATIENT)
Dept: HEPATOLOGY | Facility: CLINIC | Age: 68
End: 2018-12-26
Payer: MEDICARE

## 2018-12-26 ENCOUNTER — LAB VISIT (OUTPATIENT)
Dept: LAB | Facility: HOSPITAL | Age: 68
End: 2018-12-26
Payer: MEDICARE

## 2018-12-26 VITALS
HEART RATE: 71 BPM | RESPIRATION RATE: 18 BRPM | OXYGEN SATURATION: 97 % | WEIGHT: 258.38 LBS | HEIGHT: 62 IN | DIASTOLIC BLOOD PRESSURE: 69 MMHG | TEMPERATURE: 98 F | BODY MASS INDEX: 47.55 KG/M2 | SYSTOLIC BLOOD PRESSURE: 150 MMHG

## 2018-12-26 DIAGNOSIS — R18.8 CIRRHOSIS OF LIVER WITH ASCITES, UNSPECIFIED HEPATIC CIRRHOSIS TYPE: Primary | ICD-10-CM

## 2018-12-26 DIAGNOSIS — E11.649 HYPOGLYCEMIA ASSOCIATED WITH TYPE 2 DIABETES MELLITUS: ICD-10-CM

## 2018-12-26 DIAGNOSIS — K74.60 DECOMPENSATED HEPATIC CIRRHOSIS: ICD-10-CM

## 2018-12-26 DIAGNOSIS — E88.09 HYPOALBUMINEMIA: ICD-10-CM

## 2018-12-26 DIAGNOSIS — E66.01 MORBID OBESITY WITH BMI OF 45.0-49.9, ADULT: ICD-10-CM

## 2018-12-26 DIAGNOSIS — K76.82 HEPATIC ENCEPHALOPATHY: ICD-10-CM

## 2018-12-26 DIAGNOSIS — K72.90 DECOMPENSATED HEPATIC CIRRHOSIS: Primary | ICD-10-CM

## 2018-12-26 DIAGNOSIS — K72.90 DECOMPENSATED HEPATIC CIRRHOSIS: ICD-10-CM

## 2018-12-26 DIAGNOSIS — K76.6 PORTAL HYPERTENSION: ICD-10-CM

## 2018-12-26 DIAGNOSIS — K74.60 CIRRHOSIS OF LIVER WITH ASCITES, UNSPECIFIED HEPATIC CIRRHOSIS TYPE: ICD-10-CM

## 2018-12-26 DIAGNOSIS — K74.60 DECOMPENSATED HEPATIC CIRRHOSIS: Primary | ICD-10-CM

## 2018-12-26 DIAGNOSIS — R18.8 CIRRHOSIS OF LIVER WITH ASCITES, UNSPECIFIED HEPATIC CIRRHOSIS TYPE: ICD-10-CM

## 2018-12-26 DIAGNOSIS — K74.60 CIRRHOSIS OF LIVER WITH ASCITES, UNSPECIFIED HEPATIC CIRRHOSIS TYPE: Primary | ICD-10-CM

## 2018-12-26 LAB
ALBUMIN SERPL BCP-MCNC: 2.2 G/DL
ALP SERPL-CCNC: 173 U/L
ALT SERPL W/O P-5'-P-CCNC: 28 U/L
ANION GAP SERPL CALC-SCNC: 8 MMOL/L
AST SERPL-CCNC: 44 U/L
BILIRUB SERPL-MCNC: 1.1 MG/DL
BUN SERPL-MCNC: 14 MG/DL
CALCIUM SERPL-MCNC: 8.9 MG/DL
CHLORIDE SERPL-SCNC: 99 MMOL/L
CO2 SERPL-SCNC: 29 MMOL/L
CREAT SERPL-MCNC: 0.9 MG/DL
EST. GFR  (AFRICAN AMERICAN): >60 ML/MIN/1.73 M^2
EST. GFR  (NON AFRICAN AMERICAN): >60 ML/MIN/1.73 M^2
GLUCOSE SERPL-MCNC: 267 MG/DL
INR PPP: 1.3
POTASSIUM SERPL-SCNC: 4.9 MMOL/L
PROT SERPL-MCNC: 7.1 G/DL
PROTHROMBIN TIME: 13.3 SEC
SODIUM SERPL-SCNC: 136 MMOL/L

## 2018-12-26 PROCEDURE — 99999 PR PBB SHADOW E&M-EST. PATIENT-LVL V: CPT | Mod: PBBFAC,,, | Performed by: NURSE PRACTITIONER

## 2018-12-26 PROCEDURE — 99214 OFFICE O/P EST MOD 30 MIN: CPT | Mod: S$GLB,,, | Performed by: NURSE PRACTITIONER

## 2018-12-26 PROCEDURE — 85610 PROTHROMBIN TIME: CPT

## 2018-12-26 PROCEDURE — 3288F FALL RISK ASSESSMENT DOCD: CPT | Mod: CPTII,S$GLB,, | Performed by: NURSE PRACTITIONER

## 2018-12-26 PROCEDURE — 1100F PTFALLS ASSESS-DOCD GE2>/YR: CPT | Mod: CPTII,S$GLB,, | Performed by: NURSE PRACTITIONER

## 2018-12-26 PROCEDURE — 80053 COMPREHEN METABOLIC PANEL: CPT

## 2018-12-26 PROCEDURE — 3045F PR MOST RECENT HEMOGLOBIN A1C LEVEL 7.0-9.0%: CPT | Mod: CPTII,S$GLB,, | Performed by: NURSE PRACTITIONER

## 2018-12-26 PROCEDURE — 36415 COLL VENOUS BLD VENIPUNCTURE: CPT

## 2018-12-26 RX ORDER — FUROSEMIDE 40 MG/1
60 TABLET ORAL DAILY
Qty: 135 TABLET | Refills: 1 | Status: SHIPPED | OUTPATIENT
Start: 2018-12-26 | End: 2019-02-08

## 2018-12-26 NOTE — PATIENT INSTRUCTIONS
1. Go buy glucose tablet tubes  2. If your blood sugar is ever less than 70, eat 4 glucose tablets and check your blood sugar in 15 minutes. Repeat this every 15 minutes until you are >70  3. Decrease your Lantus to 100 units daily. Endocrinology appt asap   4. Check your blood sugar twice daily before you eat anything in the morning and before dinner OR before bedtime and write the readings down and bring it to your diabetes visits       Because you have cirrhosis, it is important to attend clinic visits every 6 months with an Ultrasound and blood tests every 6 months to screen for liver cancer (you are at risk of developing liver cancer due to scar tissue in the liver)    Signs and symptoms of worsening liver disease include jaundice, fluid in the belly (ascites), and confusion/disorientation/slowed thought processes due to hepatic encephalopathy (toxins building up because of liver problems).   You should seek medical attention if any of these things occur.    Also, possible bleeding from esophageal varices (blood vessels in the stomach and foodpipe can burst and cause fatal bleeding).  Therefore, if you have symptoms of vomiting blood, blood in your stool, dark or black stools or vomiting coffee ground vomit, YOU SHOULD GO TO THE EMERGENCY ROOM IMMEDIATELY.     Cirrhosis can increase the risk of liver cancer, liver failure, and death. However, we will watch your liver function score (MELD score) closely with each clinic visit. A normal MELD score is 6, highest is 40. Your last one was an 8. We will check this with every clinic visit.  A MELD 15 or higher is when we start to consider transplant because MELD 15 or higher indicates that the liver is not functioning as well     Cirrhosis Counseling  - NO alcohol use (includes beer, wine, and/or liquor)  --  take your rifaximin twice daily for treatment of hepatic encephalopathy (confusion due to high ammonia level)  -- continue your Lasix 40 mg daily and your  Spironolactone 100 mg daily to control swelling related to your liver disease. I will adjust these doses after your labs today  - avoid non-steroidal anti-inflammatory drugs (NSAIDs) such as ibuprofen, Motrin, naprosyn, Alleve due to the risk of kidney damage  - can take acetaminophen (Tylenol), no more than 2000 mg per day  - low sodium (salt) 2 gram per day diet  - high protein diet: 150 grams per day to prevent muscle mass loss. Drink at least 1 protein shake daily (Premier Protein is best option because it is very high protein and low sugar). Ok to use this as nighttime snack to fit it in   - resistance exercises for muscle strength  - avoid raw seafoods due to the risk of fatal Vibrio vulnificus infection  - ultrasound of the liver every 6 months for liver cancer screening (you are at risk of developing liver cancer due to scar tissue in the liver)  - Upper endoscopy every 1-2 years to screen for varices in the stomach and foodpipe which can burst and cause fatal bleeding

## 2018-12-26 NOTE — TELEPHONE ENCOUNTER
Called daughter, instructed to increase Lasix to 60 mg daily, continue Lawrence 100 mg daily.    Repeat labs as scheduled next week, can potentially increase Lasix to 80 mg daily at that time if labs stable    Also notified that pt needs Hep A vaccine, daughter reports pt will contact PCP to obtain through PCP office. Discussed outbreak of Hep A, highly recommend vaccine soon     Daughter verbalized understanding.

## 2018-12-26 NOTE — PROGRESS NOTES
[facility] Established Patient Visit    Reason for Visit:  Decompensated cryptogenic cirrhosis     PCP: Deandre Lundberg    HPI:  This is a 68 y.o. female with PMH noted below, here for follow up of decompensated cryptogenic cirrhosis  with ascites, hepatic encephalopathy     No history of variceal bleeding      + LE edema, improving since last visit     Interval HPI: Presents today with son. Last seen 12/6/18. Increased E[place] to 100 mg daily, Lasix 40 mg daily, repeat labs 12/14 with normal kidney function and Na. Also started Xifaxan at last visit, started taking Xifaxan ~4 days ago after insurance approval.      Diagnosis of cirrhosis per GI Dr. Jolley approx ~ 9/2017, had ascites and BLE edema at that time.      Diagnosis of cirrhosis based on  Imaging, US done 11/12/18 shows hepatic coarse echotexture, liver shrunken with nodular contour. No focal lesions. Hepatopedal flow within the portal vein. Moderate amount of ascites     MRI abd done 8/23/18 (outside records) shows nodular hepatic parenchamal contour with atrophy of right love and hypertrophy of left lobe, suggseting cirrhosis. No PVT. No intrahepatic mass. No ductal dilation. Trace ascites. Splenorenal shunting and cysts small perigastric varices     No h/o liver biopsy     Lab Results   Component Value Date    ALT 26 12/14/2018    AST 59 (H) 12/14/2018    ALKPHOS 184 (H) 12/14/2018    BILITOT 0.7 12/14/2018    ALBUMIN 2.4 (L) 12/14/2018    INR 1.2 12/06/2018     12/06/2018     MELD-Na score: 8 at 12/6/2018  4:08 PM  MELD score: 8 at 12/6/2018  4:08 PM  Calculated from:  Serum Creatinine: 0.9 mg/dL (Rounded to 1 mg/dL) at 12/6/2018  4:08 PM  Serum Sodium: 139 mmol/L (Rounded to 137 mmol/L) at 12/6/2018  4:08 PM  Total Bilirubin: 0.8 mg/dL (Rounded to 1 mg/dL) at 12/6/2018  4:08 PM  INR(ratio): 1.2 at 12/6/2018  4:08 PM  Age: 68 years  ** MELD low, indicating no benefit to transplant currently **     Serological workup was negative  for Nigel's, alpha-1 antitrypsin deficiency, hemochromatosis, and viral hepatitis. Negative AMA, TRACIE. Mildly + ASMA (1:40, can be seen in fatty liver), + IgM and IgG  PETH negative    Autoimmune markers:     12/6/2018 16:08   Smooth Muscle Ab Positive 1:40 (A)   Anti-Mitochon Ab IFA Negative 1:40   TRACIE Screen Negative <1:160   IgG - Serum 1778 (H)   IgM 480 (H)        Complications:   1. Ascites : on Lasix 40 mg daily, Truman 100 mg daily. Now is following Low Na diet (was not previously)  2. HE : + Grade 1-2 with fatigue, altered sleep/wake cycle, + memory loss, intermittent disorientation. Denies confusion. Taking Xifaxan as of ~4 days ago. Currently having ~5-6 small, soft, non watery bowel movements daily so unable to start Lactulose     No h/o variceal bleeding      Cirrhosis Health Maintenance:   -- Last EGD : 9/2017 per pt report, requested report today     -- HCC screening              U/S - outside report 11/2018 notes no lesions              AFP - WNL 8/2018, repeat due 2/2019  -- Immunity to Hep A and B - needs Hep A vaccine, + immunity to Hep B     Risk factors for NAFLD include morbid obesity, HTN, HLD, uncontrolled T2DM. Trying to improve diet and carb intake.  No exercise, sedentary lifestyle      + family history of liver disease : RHOADES cirrhosis in niece and brother. Denies current alcohol consumption or history of significant alcohol consumption in past      Denies jaundice, dark urine, hematemesis, melena, slowed mentation. No abnormal skin rashes. No generalized joint or muscle pain. + abdominal distention    PMHX:  has a past medical history of A-fib, Anxiety, Cirrhosis, DDD (degenerative disc disease), cervical (3/21/2018), DDD (degenerative disc disease), lumbar (3/21/2018), Decompensated hepatic cirrhosis (12/26/2018), Depression, Diabetes mellitus, type 2, Hepatic encephalopathy (12/26/2018), HLD (hyperlipidemia), HTN (hypertension), Hypoglycemia associated with type 2 diabetes mellitus  (12/26/2018), Morbid obesity with BMI of 45.0-49.9, adult (12/7/2018), Obesity, and Type 2 diabetes mellitus with hyperglycemia, with long-term current use of insulin (12/6/2018).    PSHX:  has a past surgical history that includes Rotator cuff repair; Total knee arthroplasty; Hysterectomy; Carpal tunnel release; arm surgery; and Cholecystectomy.    The patient's social and family histories were reviewed by me and updated in the appropriate section of the electronic medical record.    Review of patient's allergies indicates:   Allergen Reactions    Iodinated contrast- oral and iv dye Hives    Zoloft [sertraline]        Current Outpatient Medications on File Prior to Visit   Medication Sig Dispense Refill    aspirin (ECOTRIN) 81 MG EC tablet Take 81 mg by mouth once daily.      escitalopram oxalate (LEXAPRO) 20 MG tablet Take 20 mg by mouth once daily.      furosemide (LASIX) 40 MG tablet Take 40 mg by mouth once daily.      gabapentin (NEURONTIN) 600 MG tablet Take 600 mg by mouth 2 (two) times daily.      insulin glargine (LANTUS) 100 unit/mL injection Inject into the skin every evening.      metformin (GLUCOPHAGE) 1000 MG tablet Take 1,000 mg by mouth 2 (two) times daily with meals.      metoprolol succinate (TOPROL XL) 25 MG 24 hr tablet Take 25 mg by mouth once daily.      omeprazole (PRILOSEC) 40 MG capsule Take 40 mg by mouth once daily.      rifAXIMin (XIFAXAN) 550 mg Tab Take 1 tablet (550 mg total) by mouth 2 (two) times daily. 60 tablet 5    rosuvastatin (CRESTOR) 10 MG tablet Take 10 mg by mouth once daily.      spironolactone (ALDACTONE) 100 MG tablet Take 1 tablet (100 mg total) by mouth once daily. DOSE CHANGE 30 tablet 2    XARELTO 20 mg Tab Take 20 mg by mouth once daily.       No current facility-administered medications on file prior to visit.        SOCIAL HISTORY:   Social History     Tobacco Use   Smoking Status Never Smoker   Smokeless Tobacco Never Used       Social History  "    Substance and Sexual Activity   Alcohol Use No    Frequency: Never       Social History     Substance and Sexual Activity   Drug Use No       ROS:   GENERAL: Denies fever, chills, weight loss/gain, + fatigue  HEENT: Denies headaches, dizziness, vision/hearing changes  CARDIOVASCULAR: Denies chest pain, palpitations, + edema  RESPIRATORY: Denies dyspnea, cough  GI: Denies abdominal pain, rectal bleeding, nausea, vomiting. No change in bowel pattern or color  : Denies dysuria, hematuria   SKIN: Denies rash, itching   NEURO: + Grade 1-2 with fatigue, altered sleep/wake cycle, + memory loss, intermittent disorientation. Denies confusion.   PSYCH: Denies depression or anxiety  HEME/LYMPH: + easy bruising, denies bleeding  Objective Findings:    PHYSICAL EXAM:   Friendly White female, in no acute distress; alert and oriented to person, place and time  VITALS: BP (!) 150/69 (BP Location: Left arm, Patient Position: Sitting, BP Method: Large (Automatic))   Pulse 71   Temp 98 °F (36.7 °C) (Oral)   Resp 18   Ht 5' 2" (1.575 m)   Wt 117.2 kg (258 lb 6.1 oz)   SpO2 97%   BMI 47.26 kg/m²   HENT: Normocephalic, without obvious abnormality. Oral mucosa pink and moist. Dentition good.  EYES: Sclerae anicteric. No conjunctival pallor.   NECK: Supple. No masses or cervical adenopathy.  CARDIOVASCULAR: Regular rate and rhythm. No murmurs.  RESPIRATORY: Normal respiratory effort. BBS CTA. No wheezes or crackles.  GI: Soft, non-tender, + distended (improving but remains distended), Ascites with shifting dullness and fluid wave, not-tense. + splenomegaly. No masses palpable.   EXTREMITIES:  No clubbing, cyanosis, + 1 BLE edema.  SKIN: Warm and dry. No jaundice. No rashes noted to exposed skin.+ telangectasias noted. + palmar erythema.  NEURO:  Normal gait. No asterixis.  PSYCH:  Memory intact. Thought and speech pattern appropriate. Behavior normal. No depression or anxiety noted.      Labs:    Hepatitis A and B immunity " markers:    Hepatitis A Antibody IgG   Date Value Ref Range Status   12/06/2018 Negative  Final       Hepatitis B Surface Ag   Date Value Ref Range Status   12/06/2018 Negative  Final     Hep B Core Total Ab   Date Value Ref Range Status   12/06/2018 Negative  Final     Hep B S Ab   Date Value Ref Range Status   12/06/2018 Positive (A)  Final       There is no immunization history on file for this patient.      Lab Results   Component Value Date    WBC 6.08 12/06/2018    HGB 11.5 (L) 12/06/2018    HCT 35.3 (L) 12/06/2018     12/06/2018     12/14/2018    K 4.6 12/14/2018    CREATININE 0.8 12/14/2018    ALT 26 12/14/2018    AST 59 (H) 12/14/2018    ALKPHOS 184 (H) 12/14/2018    BILITOT 0.7 12/14/2018    ALBUMIN 2.4 (L) 12/14/2018    INR 1.2 12/06/2018    AFP 3.0 12/06/2018       DIAGNOSTIC STUDIES:  EGD-  Reports outside EGD done 9/2017, will request report at next visit     COLONOSCOPY-    ABD. U/S-  Outside records US done 11/12/18 shows hepatic coarse echotexture, liver shrunken with nodular contour. No focal lesions. Hepatopedal flow within the portal vein. Moderate amount of ascites     MRI- MRI abd done 8/23/18 (outside records) shows nodular hepatic parenchamal contour with atrophy of right love and hypertrophy of left lobe, suggseting cirrhosis. No PVT. No intrahepatic mass. No ductal dilation. Trace ascites. Splenorenal shunting and cysts small perigastric varices    LIVER BIOPSY-  None   FIBROSCAN -   None, not needed given imaging findings      ASSESSMENT:  68 y.o. White female with:  1.  Cirrhosis, decompensated, etiology unknown but likely due to RHOADES.   Cirrhosis illustrated on imaging.   MELD-Na score: 8 at 12/6/2018  4:08 PM  MELD score: 8 at 12/6/2018  4:08 PM  Calculated from:  Serum Creatinine: 0.9 mg/dL (Rounded to 1 mg/dL) at 12/6/2018  4:08 PM  Serum Sodium: 139 mmol/L (Rounded to 137 mmol/L) at 12/6/2018  4:08 PM  Total Bilirubin: 0.8 mg/dL (Rounded to 1 mg/dL) at 12/6/2018  4:08  PM  INR(ratio): 1.2 at 12/6/2018  4:08 PM  Age: 68 years  -- HCC screening: AFP and abd. U/S.. U/S showed no lesion, AFP WNL - both next due 2/2019  -- Immunity to Hep A and B - +  Hep B immunity, needs Hep A vaccine   -- EGD in past : reports outside EGD 9/2017, requested report today  ---Serological workup was negative for Nigel's, alpha-1 antitrypsin deficiency, hemochromatosis, and viral hepatitis. Negative AMA, TRACIE. Mildly + ASMA (1:40, can be seen in fatty liver), + IgM and IgG  PETH negative     2. Morbid obesity, HTN, HLD, uncontrolled T2DM  -- Body mass index is 47.26 kg/m².   -- increases risk of NAFLD/RHOADES  -- Trying to decrease carb intake, significant decreased food intake recently, No Exercise      3. Hypoglycemia  -- reports BG <50 multiple times since last visit. On Lantus 120 units daily. Not followed by endocrine. Likely needs much less basal insulin and possibly addition of prandial insulin   -- also, given decompensated cirrhosis, would likely benefit from elimination of Metformin. However, will defer to endocrine, as BG may rise with discontinuation and require frequent titration of insulin   -- has diabetes education appt upcoming, referral to endo provider for medication management    4. Portal hypertension without bleeding varices   -- EGD on 9/2017, need outside report, pt recalls no varices  -- Ascites and ankle edema - diuretic dosing Lasix 40 mg daily, Lawrence 100 mg daily. Now  following Low Na diet   -- Splenomegaly, thrombocytopenia      5. Hepatic encephalopathy   -- + Grade 1-2 with fatigue, altered sleep/wake cycle, + memory loss, intermittent disorientation. Denies confusion.  Currently having ~5-6 small, soft, non watery bowel movements daily  -- NOT driving   -- recently started Xifaxan ~4 days ago   -- will defer use of Lactulose due to frequent BM, can revisit at each visit      6. BLE edema, +1 on exam  -- diuretic dosing: Lasix 40 mg daily, Spironolactone 100 mg daily      7.  Hypoalbuminemia   -- encouraged high protein diet         EDUCATION:      The disease process and manifestations of cirrhosis were discussed.     Discussed implications of a cirrhosis diagnosis with HCC screenings and EGD and why it is important for us to properly monitor for potential complications.      Signs and symptoms of hepatic decompensation were reviewed, including jaundice, ascites, and slowed mentation due to hepatic encephalopathy. The patient should seek medical attention if any of these things occur.  We discussed the potential for bleeding from esophageal varices with symptoms of hematemesis and melena. The patient should report to the Emergency Department for these symptoms.     We discussed the increased risk of hepatocellular carcinoma due to cirrhosis. Continued screening every six months with ultrasound and AFP is recommended, discussed with patient.      Cirrhosis Counseling  - strict abstinence of alcohol use (includes beer, wine, and/or liquor)  - compliance with rifaximin for treatment of hepatic encephalopathy  - avoid non-steroidal anti-inflammatory drugs (NSAIDs) such as ibuprofen, Motrin, naprosyn, Alleve due to the risk of kidney damage  - can take acetaminophen (Tylenol), no more than 2000 mg per day  - low sodium (salt) 2 gram per day diet  - high protein diet: 150 grams per day to prevent muscle mass loss. Recommended at least 1 protein shake daily using Premier Protein shakes    - resistance exercises for muscle strength  - avoid raw seafoods due to the risk of fatal Vibrio vulnificus infection  - ultrasound of the liver every 6 months for liver cancer screening  - Upper endoscopy every 1-2 years to screen for varices in the stomach and esophagus     We discussed the manifestations of non-alcoholic fatty liver disease. At this time, there are no FDA approved therapy for non-alcoholic fatty liver disease.  The patient and I discussed the importance of diet, exercise, and weight loss  for management of NAFLD. We discussed a low fat, low carb/sugar, high fiber diet and a goal of 30 minutes of exercise 5 times per week as tolerated         PLAN:  1. STOP adding sugar to drinks (coffee)  2. Message sent to endo staff to schedule appt with RUSSELL Agudelo for chronic DM management  3. Decrease Lantus to 100 units daily to prevent repeat hypoglycemia until can be seen by endo. Instructed to check BG twice daily before breakfast and before bedtime. Given BG logs to document BG readings and instructed to bring to educator and provider visits  4. Labs today (CMP and INR)  5. For now, continue Lawrence 100 mg daily,  Lasix 40 mg daily. Will adjust doses after lab results. Stressed importance of low Na diet  6. Continue Xifaxan 550 mg BID. Will defer addition of Lactulose currently given frequent BM, can revisit at each f/u visit.  Instructed NOT to drive due to hepatic encephalopathy   7. HCC screening Q 6 months with AFP and abd. U/S - both next due 2/2019  8. EGD to screen for varices, requested report from 9/2017 Dr. Muñiz  9. Recommend Hep A vaccine   10. Cirrhosis counseling as noted above and discussed with patient and son   11. Will refer for transplant evaluation when MELD score is consistently 13-15   12. Referral to endocrinology in [place], already has  diabetes education appt in January 13. Follow-up in about 7 weeks (around 2/13/2019). with labs (CBC, CMP, INR, AFP, IgM, IgG) and US on same day as f/u     Thank you for allowing me to participate in the care of Sejal Puente JONG Delong    ADDENDUM 12/26/18: Labs stable, kidney function stable, increase lasix to 60 mg daily. Continue Lawrence 100 mg daily. Recheck labs next week, If remains stable, can increase to 80 mg daily. Will assess at that time. Pts daughter notified via phone     CC'ed note to:   Dr. Samreen Lundberg NP

## 2019-01-02 ENCOUNTER — LAB VISIT (OUTPATIENT)
Dept: LAB | Facility: HOSPITAL | Age: 69
End: 2019-01-02
Attending: NURSE PRACTITIONER
Payer: MEDICARE

## 2019-01-02 DIAGNOSIS — K74.60 CIRRHOSIS OF LIVER WITH ASCITES, UNSPECIFIED HEPATIC CIRRHOSIS TYPE: ICD-10-CM

## 2019-01-02 DIAGNOSIS — R18.8 CIRRHOSIS OF LIVER WITH ASCITES, UNSPECIFIED HEPATIC CIRRHOSIS TYPE: ICD-10-CM

## 2019-01-02 LAB
ALBUMIN SERPL BCP-MCNC: 2.4 G/DL
ALP SERPL-CCNC: 180 U/L
ALT SERPL W/O P-5'-P-CCNC: 27 U/L
ANION GAP SERPL CALC-SCNC: 10 MMOL/L
AST SERPL-CCNC: 42 U/L
BILIRUB SERPL-MCNC: 0.9 MG/DL
BUN SERPL-MCNC: 22 MG/DL
CALCIUM SERPL-MCNC: 9.3 MG/DL
CHLORIDE SERPL-SCNC: 98 MMOL/L
CO2 SERPL-SCNC: 28 MMOL/L
CREAT SERPL-MCNC: 0.9 MG/DL
EST. GFR  (AFRICAN AMERICAN): >60 ML/MIN/1.73 M^2
EST. GFR  (NON AFRICAN AMERICAN): >60 ML/MIN/1.73 M^2
GLUCOSE SERPL-MCNC: 224 MG/DL
POTASSIUM SERPL-SCNC: 4.3 MMOL/L
PROT SERPL-MCNC: 7.5 G/DL
SODIUM SERPL-SCNC: 136 MMOL/L

## 2019-01-02 PROCEDURE — 36415 COLL VENOUS BLD VENIPUNCTURE: CPT

## 2019-01-02 PROCEDURE — 80053 COMPREHEN METABOLIC PANEL: CPT

## 2019-01-04 ENCOUNTER — TELEPHONE (OUTPATIENT)
Dept: HEPATOLOGY | Facility: CLINIC | Age: 69
End: 2019-01-04

## 2019-01-04 NOTE — TELEPHONE ENCOUNTER
Daughter returned call, reports fluid retention much improved. Labs stable. Will continue Lasix 60 mg daily, Lawrence 100 mg daily. Will f/u with planned f/u visit in Feb. Daughter verbalized understanding

## 2019-01-04 NOTE — TELEPHONE ENCOUNTER
Attempted to call daughter to discuss lab results, potentially adjust diuretic dosing. Did not answer, left message for pt or daughter to return my call

## 2019-01-04 NOTE — TELEPHONE ENCOUNTER
Faxed request to Dr. Jolley and Dr. Lundberg requesting EGD and MRI MRCP report to be faxed to us for review.

## 2019-01-09 ENCOUNTER — CLINICAL SUPPORT (OUTPATIENT)
Dept: DIABETES | Facility: CLINIC | Age: 69
End: 2019-01-09
Payer: MEDICARE

## 2019-01-09 VITALS — HEIGHT: 62 IN | BODY MASS INDEX: 47.55 KG/M2 | WEIGHT: 258.38 LBS

## 2019-01-09 DIAGNOSIS — E11.65 TYPE 2 DIABETES MELLITUS WITH HYPERGLYCEMIA, WITH LONG-TERM CURRENT USE OF INSULIN: ICD-10-CM

## 2019-01-09 DIAGNOSIS — Z79.4 TYPE 2 DIABETES MELLITUS WITH HYPERGLYCEMIA, WITH LONG-TERM CURRENT USE OF INSULIN: ICD-10-CM

## 2019-01-09 PROCEDURE — 99999 PR PBB SHADOW E&M-EST. PATIENT-LVL III: CPT | Mod: PBBFAC,,,

## 2019-01-09 PROCEDURE — 99999 PR PBB SHADOW E&M-EST. PATIENT-LVL III: ICD-10-PCS | Mod: PBBFAC,,,

## 2019-01-09 PROCEDURE — G0108 PR DIAB MANAGE TRN  PER INDIV: ICD-10-PCS | Mod: S$GLB,,, | Performed by: DIETITIAN, REGISTERED

## 2019-01-09 PROCEDURE — G0108 DIAB MANAGE TRN  PER INDIV: HCPCS | Mod: S$GLB,,, | Performed by: DIETITIAN, REGISTERED

## 2019-01-09 NOTE — PROGRESS NOTES
Diabetes Education  Author: Cordelia Martinez RD, CDE  Date: 1/9/2019    Diabetes Care Management Summary  Diabetes Education Record Assessment/Progress: Initial  Current Diabetes Risk Level: Moderate     Last A1c:   Lab Results   Component Value Date    HGBA1C 8.9 (A) 11/12/2018     Last visit with Diabetes Educator: : 01/09/2019      Diabetes Type  Diabetes Type : Type II    Diabetes History  Diabetes Diagnosis: >10 years  Current Treatment: Oral Medication, Insulin(100 Units of Lantus at 9am with 1000mg Metformin BID)  Reviewed Problem List with Patient: Yes    Health Maintenance was reviewed today with patient. Discussed with patient importance of routine eye exams, foot exams/foot care, blood work (i.e.: A1c, microalbumin, and lipid), dental visits, yearly flu vaccine, and pneumonia vaccine as indicated by PCP. Patient verbalized understanding.     Health Maintenance Topics with due status: Not Due       Topic Last Completion Date    Hemoglobin A1c 11/12/2018    Mammogram 12/12/2018     Health Maintenance Due   Topic Date Due    Lipid Panel  1950    Foot Exam  08/05/1960    Eye Exam  08/05/1960    Urine Microalbumin  08/05/1960    TETANUS VACCINE  08/05/1968    DEXA SCAN  08/05/1990    Colonoscopy  08/05/2000    Zoster Vaccine  08/05/2010    Pneumococcal Vaccine (65+ Low/Medium Risk) (2 of 2 - PCV13) 11/06/2016    Influenza Vaccine  08/01/2018       Nutrition  Meal Planning: skipping meals, water, diet drinks, snacks between meal  What type of sweetener do you use?: none  What type of beverages do you drink?: diet soda/tea, water, juice  Meal Plan 24 Hour Recall - Breakfast: (This am she had PB and OJ because blood sugar 56.  She then consumed a sausage biscuit on way to clinic and did not take her Lantus this am.)  Meal Plan 24 Hour Recall - Lunch: (Skips often reports since being on new medicine for her liver she is nauseated and not as hungry)  Meal Plan 24 Hour Recall - Dinner: (Last night  she had red beans and rice with a deviled egg and water to drink)    Monitoring   Monitoring: Other  Self Monitoring : (Reports fasting 40-88 sometimes 150-170)  Blood Glucose Logs: No  Do you use a personal continuous glucose monitor?: No  In the last month, how often have you had a low blood sugar reaction?: more than once a week  What are your symptoms of low blood sugar?: (Shaky )  How do you treat low blood sugar?: (Orange juice and peanut butter, will not take Lantus shot)  Can you tell when your blood sugar is too high?: no    Exercise   Exercise Type: none    Current Diabetes Treatment   Current Treatment: Oral Medication, Insulin(100 Units of Lantus at 9am with 1000mg Metformin BID)    Social History  Preferred Learning Method: Face to Face  Primary Support: Self, Daughter(Daughter Danitza present at visit)  Smoking Status: Never a Smoker  Alcohol Use: Never    Barriers to Change  Barriers to Change: None  Learning Challenges : None    Readiness to Learn   Readiness to Learn : Eager    Cultural Influences  Cultural Influences: None    Diabetes Education Assessment/Progress  Diabetes Disease Process (diabetes disease process and treatment options): Discussion  Nutrition (Incorporating nutritional management into one's lifestyle): Discussion, Instructed, Demonstrates Understanding/Competency (verbalizes/demonstrates), Comprehends Key Points, Written Materials Provided(Educated patient and daugher on importance of 3 meals per day and timing of meals)  Physical Activity (incorporating physical activity into one's lifestyle): Discussion  Medications (states correct name, dose, onset, peak, duration, side effects & timing of meds): Discussion, Instructed, Demonstrates Understanding/Competency(verbalizes/demonstrates), Comprehends Key Points, Written Materials Provided(Per Mary Agudelo NP instructed pateint to decrease Lantus to 40 Units daily and continue with 1000mg BID Metformin)  Monitoring (monitoring blood  glucose/other parameters & using results): Discussion, Instructed, Demonstrates Understanding/Competency (verbalizes/demonstrates), Comprehends Key Points, Written Materials Provided(Provided log sheet for patient to test before breakfast and before dinner and report back in 2 days at patient may need pradial coverage per Mrs. Agudelo.)  Acute Complications (preventing, detecting, and treating acute complications): Discussion  Chronic Complications (preventing, detecting, and treating chronic complications): Discussion  Clinical (diabetes, other pertinent medical history, and relevant comorbidities reviewed during visit): Discussion  Cognitive (knowledge of self-management skills, functional health literacy): Discussion  Psychosocial (emotional response to diabetes): Discussion  Diabetes Distress and Support Systems: Discussion  Behavioral (readiness for change, lifestyle practices, self-care behaviors): Discussion    Goals  Patient has selected/evaluated goals during today's session: Yes, selected  Healthy Eating: Set  Start Date: 01/09/19  Target Date: 04/09/19  Physical Activity: In Progress  Monitoring: Set  Start Date: 01/09/19  Target Date: 04/09/19  Medications: Set  Start Date: 01/09/19  Target Date: 04/09/19  Problem Solving: In Progress  Healthy Coping: In Progress  Reducing Risks: In Progress    Diabetes Care Plan/Intervention  Education Plan/Intervention: Individual Follow-Up DSMT    Diabetes Meal Plan  Restrictions: Restricted Carbohydrate, Low Fat, Other  Calories: 1400  Carbohydrate Per Meal: 20-30g  Carbohydrate Per Snack : 7-15g  Fat: (Reduce intake of saturated fats)  Protein: (Lean protein with meals and snacks)    Today's Self-Management Care Plan was developed with the patient's input and is based on barriers identified during today's assessment.    The long and short-term goals in the care plan were written with the patient/caregiver's input. The patient has agreed to work toward these goals to  improve her overall diabetes control.      The patient received a copy of today's self-management plan and verbalized understanding of the care plan, goals, and all of today's instructions.      The patient was encouraged to communicate with her physician and care team regarding her condition(s) and treatment.  I provided the patient with my contact information today and encouraged her to contact me via phone or patient portal as needed.     Education Units of Time   Time Spent: 60 min

## 2019-01-11 ENCOUNTER — PATIENT OUTREACH (OUTPATIENT)
Dept: DIABETES | Facility: CLINIC | Age: 69
End: 2019-01-11

## 2019-01-11 NOTE — PROGRESS NOTES
Patient reports she decreased Lantus to 40 units at bedtime with 1000mg Metformin BID    1/10 fasting 169 and before dinner 159  1/11 fasting 130    Patient reports she is feeling better and hasn't had low blood sugar.  Instructed her to keep appointment with Mary Agudelo NP.

## 2019-01-18 ENCOUNTER — DOCUMENTATION ONLY (OUTPATIENT)
Dept: HEPATOLOGY | Facility: CLINIC | Age: 69
End: 2019-01-18

## 2019-01-18 NOTE — PROGRESS NOTES
Outside records received, see below:    Colonoscopy 8/27/18  -- polyps x5, diverticulosis   -- repeat in 3 years    EGD 10/25/2017  -- normal esophagus, no varices  -- scattered moderate inflammation characterized by erosions, erythema and linear erosions in gastric fundus    Documents sent for upload into [facility]

## 2019-02-06 ENCOUNTER — TELEPHONE (OUTPATIENT)
Dept: HEPATOLOGY | Facility: CLINIC | Age: 69
End: 2019-02-06

## 2019-02-06 ENCOUNTER — LAB VISIT (OUTPATIENT)
Dept: LAB | Facility: HOSPITAL | Age: 69
End: 2019-02-06
Attending: NURSE PRACTITIONER
Payer: MEDICARE

## 2019-02-06 ENCOUNTER — OFFICE VISIT (OUTPATIENT)
Dept: ENDOCRINOLOGY | Facility: CLINIC | Age: 69
End: 2019-02-06
Payer: MEDICARE

## 2019-02-06 VITALS
HEIGHT: 62 IN | DIASTOLIC BLOOD PRESSURE: 77 MMHG | BODY MASS INDEX: 47.87 KG/M2 | SYSTOLIC BLOOD PRESSURE: 133 MMHG | TEMPERATURE: 98 F | HEART RATE: 78 BPM | WEIGHT: 260.13 LBS

## 2019-02-06 DIAGNOSIS — K74.60 CIRRHOSIS OF LIVER WITH ASCITES, UNSPECIFIED HEPATIC CIRRHOSIS TYPE: ICD-10-CM

## 2019-02-06 DIAGNOSIS — K74.60 DECOMPENSATED HEPATIC CIRRHOSIS: Primary | ICD-10-CM

## 2019-02-06 DIAGNOSIS — K74.60 DECOMPENSATED HEPATIC CIRRHOSIS: ICD-10-CM

## 2019-02-06 DIAGNOSIS — K72.90 DECOMPENSATED HEPATIC CIRRHOSIS: ICD-10-CM

## 2019-02-06 DIAGNOSIS — Z79.4 TYPE 2 DIABETES MELLITUS WITH DIABETIC POLYNEUROPATHY, WITH LONG-TERM CURRENT USE OF INSULIN: Primary | ICD-10-CM

## 2019-02-06 DIAGNOSIS — I10 ESSENTIAL HYPERTENSION: ICD-10-CM

## 2019-02-06 DIAGNOSIS — E78.2 MIXED HYPERLIPIDEMIA: ICD-10-CM

## 2019-02-06 DIAGNOSIS — E66.01 MORBID OBESITY WITH BMI OF 45.0-49.9, ADULT: ICD-10-CM

## 2019-02-06 DIAGNOSIS — Z79.4 TYPE 2 DIABETES MELLITUS WITH HYPERGLYCEMIA, WITH LONG-TERM CURRENT USE OF INSULIN: ICD-10-CM

## 2019-02-06 DIAGNOSIS — E11.65 TYPE 2 DIABETES MELLITUS WITH HYPERGLYCEMIA, WITH LONG-TERM CURRENT USE OF INSULIN: ICD-10-CM

## 2019-02-06 DIAGNOSIS — K72.90 DECOMPENSATED HEPATIC CIRRHOSIS: Primary | ICD-10-CM

## 2019-02-06 DIAGNOSIS — E11.42 TYPE 2 DIABETES MELLITUS WITH DIABETIC POLYNEUROPATHY, WITH LONG-TERM CURRENT USE OF INSULIN: Primary | ICD-10-CM

## 2019-02-06 DIAGNOSIS — R18.8 CIRRHOSIS OF LIVER WITH ASCITES, UNSPECIFIED HEPATIC CIRRHOSIS TYPE: ICD-10-CM

## 2019-02-06 PROBLEM — E11.649 HYPOGLYCEMIA ASSOCIATED WITH TYPE 2 DIABETES MELLITUS: Status: RESOLVED | Noted: 2018-12-26 | Resolved: 2019-02-06

## 2019-02-06 LAB
ALBUMIN SERPL BCP-MCNC: 2.3 G/DL
ALP SERPL-CCNC: 174 U/L
ALT SERPL W/O P-5'-P-CCNC: 21 U/L
ANION GAP SERPL CALC-SCNC: 13 MMOL/L
AST SERPL-CCNC: 42 U/L
BASOPHILS # BLD AUTO: 0.04 K/UL
BASOPHILS NFR BLD: 0.8 %
BILIRUB SERPL-MCNC: 0.7 MG/DL
BUN SERPL-MCNC: 24 MG/DL
CALCIUM SERPL-MCNC: 9 MG/DL
CHLORIDE SERPL-SCNC: 98 MMOL/L
CO2 SERPL-SCNC: 24 MMOL/L
CREAT SERPL-MCNC: 0.9 MG/DL
DIFFERENTIAL METHOD: ABNORMAL
EOSINOPHIL # BLD AUTO: 0.1 K/UL
EOSINOPHIL NFR BLD: 1.1 %
ERYTHROCYTE [DISTWIDTH] IN BLOOD BY AUTOMATED COUNT: 16.1 %
EST. GFR  (AFRICAN AMERICAN): >60 ML/MIN/1.73 M^2
EST. GFR  (NON AFRICAN AMERICAN): >60 ML/MIN/1.73 M^2
GLUCOSE SERPL-MCNC: 196 MG/DL
HCT VFR BLD AUTO: 31.4 %
HGB BLD-MCNC: 10.3 G/DL
IGG SERPL-MCNC: 1514 MG/DL
IGM SERPL-MCNC: 394 MG/DL
IMM GRANULOCYTES # BLD AUTO: 0.01 K/UL
IMM GRANULOCYTES NFR BLD AUTO: 0.2 %
INR PPP: 1.2
LYMPHOCYTES # BLD AUTO: 1.7 K/UL
LYMPHOCYTES NFR BLD: 32.3 %
MCH RBC QN AUTO: 33.2 PG
MCHC RBC AUTO-ENTMCNC: 32.8 G/DL
MCV RBC AUTO: 101 FL
MONOCYTES # BLD AUTO: 0.5 K/UL
MONOCYTES NFR BLD: 9.4 %
NEUTROPHILS # BLD AUTO: 3 K/UL
NEUTROPHILS NFR BLD: 56.2 %
NRBC BLD-RTO: 0 /100 WBC
PLATELET # BLD AUTO: 199 K/UL
PMV BLD AUTO: 12.3 FL
POTASSIUM SERPL-SCNC: 4.3 MMOL/L
PROT SERPL-MCNC: 7.2 G/DL
PROTHROMBIN TIME: 11.9 SEC
RBC # BLD AUTO: 3.1 M/UL
SODIUM SERPL-SCNC: 135 MMOL/L
WBC # BLD AUTO: 5.33 K/UL

## 2019-02-06 PROCEDURE — 82784 ASSAY IGA/IGD/IGG/IGM EACH: CPT

## 2019-02-06 PROCEDURE — 36415 COLL VENOUS BLD VENIPUNCTURE: CPT | Mod: PO

## 2019-02-06 PROCEDURE — 99214 PR OFFICE/OUTPT VISIT, EST, LEVL IV, 30-39 MIN: ICD-10-PCS | Mod: S$GLB,,, | Performed by: NURSE PRACTITIONER

## 2019-02-06 PROCEDURE — 80053 COMPREHEN METABOLIC PANEL: CPT

## 2019-02-06 PROCEDURE — 82784 ASSAY IGA/IGD/IGG/IGM EACH: CPT | Mod: 59

## 2019-02-06 PROCEDURE — 99999 PR PBB SHADOW E&M-EST. PATIENT-LVL IV: ICD-10-PCS | Mod: PBBFAC,,, | Performed by: NURSE PRACTITIONER

## 2019-02-06 PROCEDURE — 85610 PROTHROMBIN TIME: CPT

## 2019-02-06 PROCEDURE — 99214 OFFICE O/P EST MOD 30 MIN: CPT | Mod: S$GLB,,, | Performed by: NURSE PRACTITIONER

## 2019-02-06 PROCEDURE — 82105 ALPHA-FETOPROTEIN SERUM: CPT

## 2019-02-06 PROCEDURE — 99999 PR PBB SHADOW E&M-EST. PATIENT-LVL IV: CPT | Mod: PBBFAC,,, | Performed by: NURSE PRACTITIONER

## 2019-02-06 PROCEDURE — 85025 COMPLETE CBC W/AUTO DIFF WBC: CPT

## 2019-02-06 RX ORDER — CLONAZEPAM 0.5 MG/1
TABLET ORAL
Status: ON HOLD | COMMUNITY
End: 2019-04-07 | Stop reason: SDUPTHER

## 2019-02-06 NOTE — PROGRESS NOTES
CC: This 68 y.o. female presents for management of diabetes and chronic conditions pending review including HTN, HLP, fatty liver w cirrhosis     HPI: She was diagnosed with gestational diabetes during her 4th pregnancy in 1975. Resolved and then in the 1980s was diagnosed with type 2 diabetes.  Has never been hospitalized r/t DM.  Family hx of DM: father, brothers x 3, sister, daughter    First visit with endocrine, was seen by DM educator, DAYO Martinez RD recently, frequent hypoglycemia- I reduced her insulin form 120 u qd to 40 u qd  Most hypos have now resolved  Brings log  monitoring BG at home:  Fasting   Dinner: 140-200    Following w WHIT Plunkett NP in hepatology for cirrhosis, MELD 8, not currently listed for liver TXP  C/o weight gain, bloating, poor appetite     Diet: Eats 1  Meals a day, snacks  Eating 2 eggs and protein shake in am  Otherwise no appetite, craving ice, no desire to eat candy (her favorite food)  Exercise: none but she is doing housework   CURRENT DM MEDS: 40 u lantus qam, 1000 mg metformin bid  Vial/pen:  Uses pen  Glucometer type:  Accu check Liz     Standards of Care:  Eye exam: Dr. Hadley > 1 year- wlil schedule     ROS:   Gen: Appetite good, + weight gain, + fatigue   Skin: Skin is intact and heals well, no rashes, no hair changes  Eyes: Denies visual disturbances  Resp: no SOB or SANTOS, no cough  Cardiac: No palpitations, chest pain, no edema   GI: +nausea, no vomiting, diarrhea, constipation, or +abdominal pain. + bloating  /GYN: 0-1+ nocturia, burning or pain.   MS/Neuro: +numbness/ tingling in BLE; Gait steady, speech clear  Psych: Denies drug/ETOH abuse, +depression.  Other systems: negative.    PE:  GENERAL: Well developed, well nourished.  PSYCH: AAOx3, appropriate mood and affect, pleasant expression, conversant, appears relaxed, well groomed.   EYES: Conjunctiva, corneas clear  NECK: Supple, trachea midline,no thyromegaly or nodules  CHEST: Resp even and unlabored, CTA  bilateral.  CARDIAC: RRR, S1, S2 heard, no murmurs  ABDOMEN: Soft, non-tender, non-distended   VASCULAR: DP pulses +2/4 bilaterally, no edema.  NEURO: Gait steady  SKIN: Skin warm and dry no acanthosis nigracans.  FOOT EXAMINATION: 2/6/19  No foot deformity, corns or callus formation, Onychomycosis, nails in good condition and well trimmed, no interspace maceration or ulceration noted.  Decreased hair growth present over toes/feet.  Positive vibratory response to 128 Hz tuning fork bilaterally.  Protective sensation intact with 10 gram monofilament.  +2 dorsalis pedis and posterior pulses noted.      No results found for: MICALBCREAT    Hemoglobin A1C   Date Value Ref Range Status   11/12/2018 8.9 (A) 4.0 - 6.0 % Final        ASSESSMENT and PLAN:    1. T2DM with hyperglycemia, DM PN-  Decrease lantus to 35 u qd; Trial metformin stop for next 3 days (want to see how much bg trend up)  Check bg bid- log in 1 week  May need prandial coverage  Discussed A1c and BG goals.  - takes ASA,  statin    2. HTN - controlled, continue meds as previously prescribed and monitor.     3. HLP -  on statin therapy, LFTs WNL    4. Cirrhosis A Scroggs, NP notified of patient weight gain, dark urine- new orders placed by her office     5. Morbid obesity- malnourished, craving ice... Pica? encouraged to drink protein shakes    Follow-up: in 3 months with lab prior

## 2019-02-06 NOTE — TELEPHONE ENCOUNTER
----- Message from Mary Agudelo, WINSTON, NP sent at 2/6/2019  1:51 PM CST -----  Saw patient today, stated weight up 12 lbs. Only urinating 3 times a day, dark yellow urine, small volume

## 2019-02-06 NOTE — TELEPHONE ENCOUNTER
Message received about pt weight gain and decreased urination.     Please contact pt, schedule/instruct pt to have labs done ASAP (CBC, CMP, INR), either today or tomorrow, at closest [facility] facility.     Also, if pt having any concerns from a fluid overload standpoint, please offer sooner appt with me     Thanks

## 2019-02-07 ENCOUNTER — HOSPITAL ENCOUNTER (OUTPATIENT)
Dept: RADIOLOGY | Facility: CLINIC | Age: 69
Discharge: HOME | End: 2019-02-07
Attending: NURSE PRACTITIONER
Payer: MEDICARE

## 2019-02-07 ENCOUNTER — TELEPHONE (OUTPATIENT)
Dept: HEPATOLOGY | Facility: CLINIC | Age: 69
End: 2019-02-07

## 2019-02-07 ENCOUNTER — OFFICE VISIT (OUTPATIENT)
Dept: PODIATRY | Facility: CLINIC | Age: 69
End: 2019-02-07
Payer: MEDICARE

## 2019-02-07 VITALS
BODY MASS INDEX: 47.47 KG/M2 | HEART RATE: 77 BPM | HEIGHT: 62 IN | WEIGHT: 257.94 LBS | SYSTOLIC BLOOD PRESSURE: 140 MMHG | DIASTOLIC BLOOD PRESSURE: 75 MMHG

## 2019-02-07 DIAGNOSIS — K74.60 DECOMPENSATED HEPATIC CIRRHOSIS: Primary | ICD-10-CM

## 2019-02-07 DIAGNOSIS — K72.90 DECOMPENSATED HEPATIC CIRRHOSIS: Primary | ICD-10-CM

## 2019-02-07 DIAGNOSIS — E11.51 TYPE II DIABETES MELLITUS WITH PERIPHERAL CIRCULATORY DISORDER: ICD-10-CM

## 2019-02-07 DIAGNOSIS — K72.90 DECOMPENSATED HEPATIC CIRRHOSIS: ICD-10-CM

## 2019-02-07 DIAGNOSIS — B35.3 TINEA PEDIS OF RIGHT FOOT: Primary | ICD-10-CM

## 2019-02-07 DIAGNOSIS — K74.60 DECOMPENSATED HEPATIC CIRRHOSIS: ICD-10-CM

## 2019-02-07 LAB — AFP SERPL-MCNC: 2.3 NG/ML

## 2019-02-07 PROCEDURE — 1101F PR PT FALLS ASSESS DOC 0-1 FALLS W/OUT INJ PAST YR: ICD-10-PCS | Mod: CPTII,S$GLB,, | Performed by: PODIATRIST

## 2019-02-07 PROCEDURE — 93975 US ABDOMEN COMP WITH DOPPLER (XPD): ICD-10-PCS | Mod: 26,,, | Performed by: RADIOLOGY

## 2019-02-07 PROCEDURE — 3045F PR MOST RECENT HEMOGLOBIN A1C LEVEL 7.0-9.0%: ICD-10-PCS | Mod: CPTII,S$GLB,, | Performed by: PODIATRIST

## 2019-02-07 PROCEDURE — 3078F PR MOST RECENT DIASTOLIC BLOOD PRESSURE < 80 MM HG: ICD-10-PCS | Mod: CPTII,S$GLB,, | Performed by: PODIATRIST

## 2019-02-07 PROCEDURE — 99999 PR PBB SHADOW E&M-EST. PATIENT-LVL III: ICD-10-PCS | Mod: PBBFAC,,, | Performed by: PODIATRIST

## 2019-02-07 PROCEDURE — 99203 PR OFFICE/OUTPT VISIT, NEW, LEVL III, 30-44 MIN: ICD-10-PCS | Mod: S$GLB,,, | Performed by: PODIATRIST

## 2019-02-07 PROCEDURE — 99999 PR PBB SHADOW E&M-EST. PATIENT-LVL III: CPT | Mod: PBBFAC,,, | Performed by: PODIATRIST

## 2019-02-07 PROCEDURE — 93975 VASCULAR STUDY: CPT | Mod: 26,,, | Performed by: RADIOLOGY

## 2019-02-07 PROCEDURE — 3077F PR MOST RECENT SYSTOLIC BLOOD PRESSURE >= 140 MM HG: ICD-10-PCS | Mod: CPTII,S$GLB,, | Performed by: PODIATRIST

## 2019-02-07 PROCEDURE — 3078F DIAST BP <80 MM HG: CPT | Mod: CPTII,S$GLB,, | Performed by: PODIATRIST

## 2019-02-07 PROCEDURE — 3045F PR MOST RECENT HEMOGLOBIN A1C LEVEL 7.0-9.0%: CPT | Mod: CPTII,S$GLB,, | Performed by: PODIATRIST

## 2019-02-07 PROCEDURE — 93975 VASCULAR STUDY: CPT | Mod: TC,PO

## 2019-02-07 PROCEDURE — 99203 OFFICE O/P NEW LOW 30 MIN: CPT | Mod: S$GLB,,, | Performed by: PODIATRIST

## 2019-02-07 PROCEDURE — 3077F SYST BP >= 140 MM HG: CPT | Mod: CPTII,S$GLB,, | Performed by: PODIATRIST

## 2019-02-07 PROCEDURE — 1101F PT FALLS ASSESS-DOCD LE1/YR: CPT | Mod: CPTII,S$GLB,, | Performed by: PODIATRIST

## 2019-02-07 RX ORDER — KETOCONAZOLE 20 MG/G
CREAM TOPICAL DAILY
Qty: 60 TUBE | Refills: 3 | Status: SHIPPED | OUTPATIENT
Start: 2019-02-07 | End: 2020-01-14 | Stop reason: CLARIF

## 2019-02-07 NOTE — TELEPHONE ENCOUNTER
Kidney function WNL, liver labs stable.     Called and spoke to daughter, reports abdomen distended, no worsening BLE edema but worsening abdominal distension.     Please contact daughter and move up US doppler to be done in [place] today or tomorrow (instead of on the 20th)    Thanks

## 2019-02-07 NOTE — LETTER
February 15, 2019      Mary Agudelo, DNP, NP  [facility] [address]           [facility]  [address]  [place] [address]  Phone: [phone]          Patient: Sejal Matamoros   MR Number: [id number]   YOB: 1950   Date of Visit: 2/7/2019       Dear Mary Agudelo:    Thank you for referring Sejal Matamoros to me for evaluation. Attached you will find relevant portions of my assessment and plan of care.    If you have questions, please do not hesitate to call me. I look forward to following Sejal Matamoros along with you.    Sincerely,    Eliecer Sung  CC:  No Recipients    If you would like to receive this communication electronically, please contact [email or web address] or [phone] to request more information on [facility] access.    For providers and/or their staff who would like to refer a patient to [facility], please contact us through our one-stop-shop provider referral line, Rajan Mae, at [phone].    If you feel you have received this communication in error or would no longer like to receive these types of communications, please e-mail [email or web address]

## 2019-02-07 NOTE — TELEPHONE ENCOUNTER
Please notify pt/daughter large amount of fluid in abdomen, needs paracentesis asap. Please contact to schedule wherever is its available ([facility] would be best if can get soon appt).    Please arrange for ascitic fluid to be sent for cell count and diff, albumin, total protein, culture, and cytology. Please send all bottles to be spun down for cytology. Orders in     Also, please arrange/schedule for labs (CMP and INR) to be done same day as para    Thanks

## 2019-02-08 ENCOUNTER — TELEPHONE (OUTPATIENT)
Dept: HEPATOLOGY | Facility: CLINIC | Age: 69
End: 2019-02-08

## 2019-02-08 DIAGNOSIS — R18.8 CIRRHOSIS OF LIVER WITH ASCITES, UNSPECIFIED HEPATIC CIRRHOSIS TYPE: ICD-10-CM

## 2019-02-08 DIAGNOSIS — K74.60 DECOMPENSATED HEPATIC CIRRHOSIS: ICD-10-CM

## 2019-02-08 DIAGNOSIS — K72.90 DECOMPENSATED HEPATIC CIRRHOSIS: ICD-10-CM

## 2019-02-08 DIAGNOSIS — K74.60 CIRRHOSIS OF LIVER WITH ASCITES, UNSPECIFIED HEPATIC CIRRHOSIS TYPE: ICD-10-CM

## 2019-02-08 LAB
25(OH)D3 SERPL-MCNC: 20 NG/ML (ref 30–100)
ALBUMIN SERPL-MCNC: 2.7 G/DL (ref 3.6–5.1)
ALBUMIN/CREAT UR: 2 MCG/MG CREAT
ALBUMIN/GLOB SERPL: 0.7 (CALC) (ref 1–2.5)
ALP SERPL-CCNC: 169 U/L (ref 33–130)
ALT SERPL-CCNC: 17 U/L (ref 6–29)
AST SERPL-CCNC: 37 U/L (ref 10–35)
BILIRUB SERPL-MCNC: 0.9 MG/DL (ref 0.2–1.2)
BUN SERPL-MCNC: 18 MG/DL (ref 7–25)
BUN/CREAT SERPL: ABNORMAL (CALC) (ref 6–22)
CALCIUM SERPL-MCNC: 8.7 MG/DL (ref 8.6–10.4)
CHLORIDE SERPL-SCNC: 100 MMOL/L (ref 98–110)
CO2 SERPL-SCNC: 30 MMOL/L (ref 20–32)
CREAT SERPL-MCNC: 0.71 MG/DL (ref 0.5–0.99)
CREAT UR-MCNC: 206 MG/DL (ref 20–275)
GFRSERPLBLD MDRD-ARVRAT: 88 ML/MIN/1.73M2
GLOBULIN SER CALC-MCNC: 3.9 G/DL (CALC) (ref 1.9–3.7)
GLUCOSE SERPL-MCNC: 142 MG/DL (ref 65–99)
HBA1C MFR BLD: 7.7 % OF TOTAL HGB
MICROALBUMIN UR-MCNC: 0.5 MG/DL
POTASSIUM SERPL-SCNC: 4.2 MMOL/L (ref 3.5–5.3)
PROT SERPL-MCNC: 6.6 G/DL (ref 6.1–8.1)
SODIUM SERPL-SCNC: 137 MMOL/L (ref 135–146)
TSH SERPL-ACNC: 1.37 MIU/L (ref 0.4–4.5)

## 2019-02-08 RX ORDER — SPIRONOLACTONE 100 MG/1
200 TABLET, FILM COATED ORAL DAILY
Qty: 180 TABLET | Refills: 1 | Status: SHIPPED | OUTPATIENT
Start: 2019-02-08 | End: 2019-02-20 | Stop reason: SDUPTHER

## 2019-02-08 RX ORDER — FUROSEMIDE 40 MG/1
80 TABLET ORAL DAILY
Qty: 180 TABLET | Refills: 1 | Status: SHIPPED | OUTPATIENT
Start: 2019-02-08 | End: 2019-05-16

## 2019-02-08 NOTE — TELEPHONE ENCOUNTER
Called and spoke with daughter, discussed US findings of significant ascites. Will increase Washburn to 200 mg daily    Also reports unintentional weight loss so will arrange for MRI scan of liver after para to assess for any liver lesions undetected by US, given worsening ascites also     Please contact daughter or pt, schedule her for para in the AM before f/u visit with me then MRI at some time either before or after visit with me (para needs to be attempted before MRI can be done)    Thanks

## 2019-02-08 NOTE — TELEPHONE ENCOUNTER
Call placed to Danitza, daughter of the patient.  Message relayed that the appt for the stomach tap (Para) is for Wed 3/13/19 at 11 am.  Deepika would like for her to get Labs done before the Para.   I have set the Labs up for 10 am on 3/13/19.  You will check in on the 2nd floor desk for the Labs and Para.  Voiced understanding. Stated I will write all of this down.

## 2019-02-12 ENCOUNTER — TELEPHONE (OUTPATIENT)
Dept: INTERVENTIONAL RADIOLOGY/VASCULAR | Facility: HOSPITAL | Age: 69
End: 2019-02-12

## 2019-02-13 ENCOUNTER — HOSPITAL ENCOUNTER (OUTPATIENT)
Dept: INTERVENTIONAL RADIOLOGY/VASCULAR | Facility: HOSPITAL | Age: 69
Discharge: HOME | End: 2019-02-13
Attending: NURSE PRACTITIONER
Payer: MEDICARE

## 2019-02-13 VITALS
SYSTOLIC BLOOD PRESSURE: 148 MMHG | HEART RATE: 73 BPM | DIASTOLIC BLOOD PRESSURE: 62 MMHG | OXYGEN SATURATION: 99 % | RESPIRATION RATE: 16 BRPM

## 2019-02-13 DIAGNOSIS — K74.60 DECOMPENSATED HEPATIC CIRRHOSIS: ICD-10-CM

## 2019-02-13 DIAGNOSIS — K74.60 DECOMPENSATED HEPATIC CIRRHOSIS: Primary | ICD-10-CM

## 2019-02-13 DIAGNOSIS — R93.2 ABNORMAL FINDINGS ON DIAGNOSTIC IMAGING OF LIVER AND BILIARY TRACT: ICD-10-CM

## 2019-02-13 DIAGNOSIS — K72.90 DECOMPENSATED HEPATIC CIRRHOSIS: Primary | ICD-10-CM

## 2019-02-13 DIAGNOSIS — K72.90 DECOMPENSATED HEPATIC CIRRHOSIS: ICD-10-CM

## 2019-02-13 LAB
ALBUMIN FLD-MCNC: 1.1 G/DL
APPEARANCE FLD: NORMAL
BODY FLD TYPE: NORMAL
COLOR FLD: YELLOW
LYMPHOCYTES NFR FLD MANUAL: 85 %
MESOTHL CELL NFR FLD MANUAL: 2 %
MONOS+MACROS NFR FLD MANUAL: 5 %
NEUTROPHILS NFR FLD MANUAL: 8 %
PROT FLD-MCNC: 2.4 G/DL
SPECIMEN SOURCE: NORMAL
SPECIMEN SOURCE: NORMAL
WBC # FLD: 217 /CU MM

## 2019-02-13 PROCEDURE — 88112 CYTOLOGY SPECIMEN- MEDICAL CYTOLOGY (FLUID/WASH/BRUSH): ICD-10-PCS | Mod: 26,,, | Performed by: PATHOLOGY

## 2019-02-13 PROCEDURE — 87070 CULTURE OTHR SPECIMN AEROBIC: CPT

## 2019-02-13 PROCEDURE — 88305 TISSUE EXAM BY PATHOLOGIST: CPT | Performed by: PATHOLOGY

## 2019-02-13 PROCEDURE — 49083 IR PARACENTESIS WITH IMAGING: ICD-10-PCS | Mod: ,,, | Performed by: RADIOLOGY

## 2019-02-13 PROCEDURE — 88305 CYTOLOGY SPECIMEN- MEDICAL CYTOLOGY (FLUID/WASH/BRUSH): ICD-10-PCS | Mod: 26,,, | Performed by: PATHOLOGY

## 2019-02-13 PROCEDURE — 82042 OTHER SOURCE ALBUMIN QUAN EA: CPT

## 2019-02-13 PROCEDURE — P9047 ALBUMIN (HUMAN), 25%, 50ML: HCPCS | Mod: JG | Performed by: NURSE PRACTITIONER

## 2019-02-13 PROCEDURE — 63600175 PHARM REV CODE 636 W HCPCS: Mod: JG | Performed by: NURSE PRACTITIONER

## 2019-02-13 PROCEDURE — 87075 CULTR BACTERIA EXCEPT BLOOD: CPT

## 2019-02-13 PROCEDURE — 84157 ASSAY OF PROTEIN OTHER: CPT

## 2019-02-13 PROCEDURE — 89051 BODY FLUID CELL COUNT: CPT

## 2019-02-13 PROCEDURE — 49083 ABD PARACENTESIS W/IMAGING: CPT

## 2019-02-13 PROCEDURE — 88112 CYTOPATH CELL ENHANCE TECH: CPT | Mod: 26,,, | Performed by: PATHOLOGY

## 2019-02-13 PROCEDURE — 88305 TISSUE EXAM BY PATHOLOGIST: CPT | Mod: 26,,, | Performed by: PATHOLOGY

## 2019-02-13 PROCEDURE — 49083 ABD PARACENTESIS W/IMAGING: CPT | Mod: ,,, | Performed by: RADIOLOGY

## 2019-02-13 RX ORDER — ALBUMIN HUMAN 250 G/1000ML
12.5 SOLUTION INTRAVENOUS ONCE
Status: COMPLETED | OUTPATIENT
Start: 2019-02-13 | End: 2019-02-13

## 2019-02-13 RX ORDER — ALBUMIN HUMAN 250 G/1000ML
25 SOLUTION INTRAVENOUS ONCE
Status: COMPLETED | OUTPATIENT
Start: 2019-02-13 | End: 2019-02-13

## 2019-02-13 RX ADMIN — ALBUMIN (HUMAN) 25 G: 25 SOLUTION INTRAVENOUS at 12:02

## 2019-02-13 RX ADMIN — ALBUMIN (HUMAN) 12.5 G: 25 SOLUTION INTRAVENOUS at 12:02

## 2019-02-13 NOTE — H&P
Radiology History & Physical      SUBJECTIVE:     Chief Complaint: abdominal distention    History of Present Illness:  Sejal Matamoros is a 68 y.o. female who presents for evaluation of ascites  Past Medical History:   Diagnosis Date    A-fib     Anxiety     Cirrhosis     DDD (degenerative disc disease), cervical 3/21/2018    DDD (degenerative disc disease), lumbar 3/21/2018    Decompensated hepatic cirrhosis 12/26/2018    Depression     Diabetes mellitus, type 2     Hepatic encephalopathy 12/26/2018    HLD (hyperlipidemia)     HTN (hypertension)     Hypoalbuminemia 12/26/2018    Hypoglycemia associated with type 2 diabetes mellitus 12/26/2018    Morbid obesity with BMI of 45.0-49.9, adult 12/7/2018    Obesity     Portal hypertension 12/26/2018    Type 2 diabetes mellitus with hyperglycemia, with long-term current use of insulin 12/6/2018     Past Surgical History:   Procedure Laterality Date    arm surgery      left    CARPAL TUNNEL RELEASE      left    CHOLECYSTECTOMY      HYSTERECTOMY      ROTATOR CUFF REPAIR      TOTAL KNEE ARTHROPLASTY         Home Meds:   Prior to Admission medications    Medication Sig Start Date End Date Taking? Authorizing Provider   ACCU-CHEK SOFTCLIX LANCETS MISC Accu-Chek Softclix Lancets    Historical Provider, MD   aspirin (ECOTRIN) 81 MG EC tablet Take 81 mg by mouth once daily.    Historical Provider, MD   clonazePAM (KLONOPIN) 0.5 MG tablet clonazepam 0.5 mg tablet   Take 1 tablet twice a day by oral route as needed for 30 days.    Historical Provider, MD   escitalopram oxalate (LEXAPRO) 20 MG tablet Take 20 mg by mouth once daily. 10/31/18   Historical Provider, MD   furosemide (LASIX) 40 MG tablet Take 2 tablets (80 mg total) by mouth once daily. DOSE CHANGE 2/8/19   Deepika Plunkett, TARUN   gabapentin (NEURONTIN) 600 MG tablet Take 600 mg by mouth 2 (two) times daily.    Historical Provider, MD   insulin glargine (LANTUS) 100 unit/mL injection Inject  35 Units into the skin once daily.    Historical Provider, MD   ketoconazole (NIZORAL) 2 % cream Apply topically once daily. 2/7/19   Carlos Eduardo Ley DPM   metformin (GLUCOPHAGE) 1000 MG tablet Take 1,000 mg by mouth 2 (two) times daily with meals.    Historical Provider, MD   metoprolol succinate (TOPROL XL) 25 MG 24 hr tablet Take 25 mg by mouth once daily. 10/3/18 10/3/19  Historical Provider, MD   omeprazole (PRILOSEC) 40 MG capsule Take 40 mg by mouth once daily. 10/17/18   Historical Provider, MD   rifAXIMin (XIFAXAN) 550 mg Tab Take 1 tablet (550 mg total) by mouth 2 (two) times daily. 12/6/18   Deepika Plunkett NP   rosuvastatin (CRESTOR) 10 MG tablet Take 10 mg by mouth once daily. 8/13/18   Historical Provider, MD   spironolactone (ALDACTONE) 100 MG tablet Take 2 tablets (200 mg total) by mouth once daily. DOSE CHANGE 2/8/19   Deepika Plunkett NP   XARELTO 20 mg Tab Take 20 mg by mouth once daily. 11/30/18   Historical Provider, MD     Anticoagulants/Antiplatelets: xeralto (has been held for 5 days prior to today)    Allergies:   Review of patient's allergies indicates:   Allergen Reactions    Iodinated contrast- oral and iv dye Hives    Zoloft [sertraline]      Sedation History:  no adverse reactions    Review of Systems:   Hematological: no known coagulopathies  Respiratory: no shortness of breath  Cardiovascular: no chest pain  Gastrointestinal: no abdominal pain  Genito-Urinary: no dysuria  Musculoskeletal: negative  Neurological: no TIA or stroke symptoms         OBJECTIVE:     Vital Signs (Most Recent)  Pulse: 63 (02/13/19 1152)  Resp: 16 (02/13/19 1152)  BP: (!) 148/62 (02/13/19 1152)  SpO2: 98 % (02/13/19 1152)    Physical Exam:  ASA: 2  Mallampati: na    General: no acute distress  Mental Status: alert and oriented to person, place and time  HEENT: normocephalic, atraumatic  Chest: unlabored breathing  Heart: regular heart rate  Abdomen: distended  Extremity: moves all  extremities    Laboratory  Lab Results   Component Value Date    INR 1.0 02/13/2019       Lab Results   Component Value Date    WBC 5.33 02/06/2019    HGB 10.3 (L) 02/06/2019    HCT 31.4 (L) 02/06/2019     (H) 02/06/2019     02/06/2019      Lab Results   Component Value Date     (H) 02/13/2019     02/13/2019    K 4.7 02/13/2019     02/13/2019    CO2 26 02/13/2019    BUN 22 02/13/2019    CREATININE 0.8 02/13/2019    CALCIUM 9.1 02/13/2019    ALT 19 02/13/2019    AST 40 02/13/2019    ALBUMIN 2.4 (L) 02/13/2019    BILITOT 0.8 02/13/2019    BILIDIR 0.3 11/12/2018       ASSESSMENT/PLAN:     Sedation Plan: local anesthesia  Patient will undergo US guided paracentesis      Patient to result xeralto within 24-48 hours post procedure

## 2019-02-13 NOTE — PROGRESS NOTES
Paracentesis complete. Pt tolerated well. 5000cc removed from left abdomen. 150cc of albumin given IV. Bandage applied to left abdomen clean, dry, and intact. Patient given discharge paperwork and verbalized understanding of at home care. Patient ambulated to lobby with son by side.

## 2019-02-13 NOTE — PROCEDURES
Radiology Post-Procedure Note    Pre Op Diagnosis: Ascites  Post Op Diagnosis: Same    Procedure: Ultrasound Guided Paracentesis    Procedure performed by: DAYO Quezada DNP  Written Informed Consent Obtained: Yes  Specimen Removed: YES clear yellow  Estimated Blood Loss: Minimal    Findings:   Successful paracentesis.  Albumin administered PRN per protocol.    Patient tolerated procedure well.

## 2019-02-13 NOTE — DISCHARGE INSTRUCTIONS
For scheduling: Call Sri at [phone]    For questions or concerns call: LIZZY MON-FRI 8 AM- 5PM [phone]. Radiology resident on call [phone].    For immediate concerns that are not emergent, you may call our radiology clinic at: [phone]

## 2019-02-16 LAB — BACTERIA SPEC AEROBE CULT: NO GROWTH

## 2019-02-18 NOTE — PROGRESS NOTES
Subjective:      Patient ID: Sejal Matamoros is a 68 y.o. female.    Chief Complaint: Diabetic Foot Exam (PCP Deandre Lundberg   12/27/18) and Diabetes Mellitus (A!C  8.9   11/12/18)    Sejal is a 68 y.o. female who presents to the clinic upon referral from Dr. Agudelo  for evaluation and treatment of diabetic feet. Sejal has a past medical history of A-fib, Anxiety, Cirrhosis, DDD (degenerative disc disease), cervical (3/21/2018), DDD (degenerative disc disease), lumbar (3/21/2018), Decompensated hepatic cirrhosis (12/26/2018), Depression, Diabetes mellitus, type 2, Hepatic encephalopathy (12/26/2018), HLD (hyperlipidemia), HTN (hypertension), Hypoalbuminemia (12/26/2018), Hypoglycemia associated with type 2 diabetes mellitus (12/26/2018), Morbid obesity with BMI of 45.0-49.9, adult (12/7/2018), Obesity, Portal hypertension (12/26/2018), and Type 2 diabetes mellitus with hyperglycemia, with long-term current use of insulin (12/6/2018). Patient relates no major problem with feet. Only complaints today consist of right foot rash that sometimes itches.    PCP: Deandre Lundberg NP    Date Last Seen by PCP: 12/27/18    Current shoe gear: Casual shoes    Hemoglobin A1C   Date Value Ref Range Status   11/12/2018 8.9 (A) 4.0 - 6.0 % Final           Review of Systems   Constitution: Negative for chills and fever.   Cardiovascular: Negative for claudication and leg swelling.   Respiratory: Negative for shortness of breath.    Skin: Positive for itching, nail changes and rash.   Musculoskeletal: Negative for muscle cramps, muscle weakness and myalgias.   Gastrointestinal: Negative for nausea and vomiting.   Neurological: Negative for focal weakness, loss of balance, numbness and paresthesias.           Objective:      Physical Exam   Constitutional: She is oriented to person, place, and time. She appears well-developed and well-nourished. No distress.   Cardiovascular:   Pulses:       Dorsalis pedis pulses  are 1+ on the right side, and 1+ on the left side.        Posterior tibial pulses are 2+ on the right side, and 2+ on the left side.   < 3 sec capillary refill time to toes 1-5 bilateral. Feet are warm to touch proximally with distal cooling b/l. There is no hair growth on the feet and toes b/l. There is edema b/l. No spider veins or varicosities present b/l.      Musculoskeletal:   Equinus noted b/l ankles with < 10 deg DF noted. MMT 5/5 in DF/PF/Inv/Ev resistance with no reproduction of pain in any direction. Passive range of motion of ankle and pedal joints is painless b/l.     Neurological: She is alert and oriented to person, place, and time. She has normal strength. She displays no atrophy and no tremor. A sensory deficit is present. She exhibits normal muscle tone.   Negative tinel sign bilateral. Diminished vibratory sensation bilateral   Skin: Skin is warm, dry and intact. No abrasion, no bruising, no burn, no ecchymosis, no laceration, no lesion, no petechiae and no rash noted. She is not diaphoretic. No cyanosis. No pallor. Nails show no clubbing.   Skin is thin and atrophic bilateral    Dry scale with superficial flakes over an erythematous base in a moccasin pattern without ulceration, drainage, pus, tracking, fluctuance, malodor, or cardinal signs infection.     Psychiatric: She has a normal mood and affect. Her behavior is normal.             Assessment:       Encounter Diagnosis   Name Primary?    Tinea pedis of right foot Yes         Plan:       Sejal was seen today for diabetic foot exam and diabetes mellitus.    Diagnoses and all orders for this visit:    Tinea pedis of right foot  -     ketoconazole (NIZORAL) 2 % cream; Apply topically once daily.        Shoe inspection. Diabetic Foot Education. Patient reminded of the importance of good nutrition and blood sugar control to help prevent podiatric complications of diabetes. Patient instructed on proper foot hygeine. We discussed wearing  proper shoe gear, daily foot inspections, never walking without protective shoe gear, never putting sharp instruments to feet      Ketoconazole cream once daily to the left foot to clear up the rash. Discussed wearing fresh socks daily and using lysol to the shoes to prevent re ulceration    Return in 6 weeks to ensure resolution of tinea    Carlos Eduardo Ley DPM

## 2019-02-19 ENCOUNTER — TELEPHONE (OUTPATIENT)
Dept: ENDOCRINOLOGY | Facility: CLINIC | Age: 69
End: 2019-02-19

## 2019-02-19 DIAGNOSIS — Z79.4 TYPE 2 DIABETES MELLITUS WITH HYPERGLYCEMIA, WITH LONG-TERM CURRENT USE OF INSULIN: Primary | ICD-10-CM

## 2019-02-19 DIAGNOSIS — E11.65 TYPE 2 DIABETES MELLITUS WITH HYPERGLYCEMIA, WITH LONG-TERM CURRENT USE OF INSULIN: Primary | ICD-10-CM

## 2019-02-19 RX ORDER — ERGOCALCIFEROL 1.25 MG/1
50000 CAPSULE ORAL
Qty: 12 CAPSULE | Refills: 4 | Status: SHIPPED | OUTPATIENT
Start: 2019-02-19 | End: 2019-05-22

## 2019-02-19 NOTE — TELEPHONE ENCOUNTER
Vitamin d level 20. Please notify patient to start ergocalciferol 84305 IU q week, will recheck w labs prior to RTC- please schedule   Also please ask patient to send bg log for review

## 2019-02-20 ENCOUNTER — HOSPITAL ENCOUNTER (OUTPATIENT)
Dept: RADIOLOGY | Facility: HOSPITAL | Age: 69
Discharge: HOME | End: 2019-02-20
Attending: NURSE PRACTITIONER
Payer: MEDICARE

## 2019-02-20 ENCOUNTER — HOSPITAL ENCOUNTER (OUTPATIENT)
Dept: INTERVENTIONAL RADIOLOGY/VASCULAR | Facility: HOSPITAL | Age: 69
Discharge: HOME | End: 2019-02-20
Attending: NURSE PRACTITIONER
Payer: MEDICARE

## 2019-02-20 ENCOUNTER — OFFICE VISIT (OUTPATIENT)
Dept: HEPATOLOGY | Facility: CLINIC | Age: 69
End: 2019-02-20
Payer: MEDICARE

## 2019-02-20 VITALS
SYSTOLIC BLOOD PRESSURE: 145 MMHG | TEMPERATURE: 96 F | OXYGEN SATURATION: 98 % | RESPIRATION RATE: 16 BRPM | HEART RATE: 67 BPM | DIASTOLIC BLOOD PRESSURE: 71 MMHG | BODY MASS INDEX: 42.84 KG/M2 | HEART RATE: 92 BPM | HEIGHT: 62 IN | WEIGHT: 232.81 LBS | DIASTOLIC BLOOD PRESSURE: 58 MMHG | OXYGEN SATURATION: 95 % | SYSTOLIC BLOOD PRESSURE: 120 MMHG

## 2019-02-20 DIAGNOSIS — E66.01 MORBID OBESITY WITH BMI OF 45.0-49.9, ADULT: ICD-10-CM

## 2019-02-20 DIAGNOSIS — R18.8 OTHER ASCITES: ICD-10-CM

## 2019-02-20 DIAGNOSIS — R18.8 CIRRHOSIS OF LIVER WITH ASCITES, UNSPECIFIED HEPATIC CIRRHOSIS TYPE: ICD-10-CM

## 2019-02-20 DIAGNOSIS — Z79.4 TYPE 2 DIABETES MELLITUS WITH HYPERGLYCEMIA, WITH LONG-TERM CURRENT USE OF INSULIN: ICD-10-CM

## 2019-02-20 DIAGNOSIS — K72.90 DECOMPENSATED HEPATIC CIRRHOSIS: Primary | ICD-10-CM

## 2019-02-20 DIAGNOSIS — K74.60 CIRRHOSIS OF LIVER WITH ASCITES, UNSPECIFIED HEPATIC CIRRHOSIS TYPE: ICD-10-CM

## 2019-02-20 DIAGNOSIS — K76.82 HEPATIC ENCEPHALOPATHY: ICD-10-CM

## 2019-02-20 DIAGNOSIS — E78.2 MIXED HYPERLIPIDEMIA: ICD-10-CM

## 2019-02-20 DIAGNOSIS — K74.60 DECOMPENSATED HEPATIC CIRRHOSIS: Primary | ICD-10-CM

## 2019-02-20 DIAGNOSIS — E11.65 TYPE 2 DIABETES MELLITUS WITH HYPERGLYCEMIA, WITH LONG-TERM CURRENT USE OF INSULIN: ICD-10-CM

## 2019-02-20 DIAGNOSIS — K76.6 PORTAL HYPERTENSION: ICD-10-CM

## 2019-02-20 DIAGNOSIS — E88.09 HYPOALBUMINEMIA: ICD-10-CM

## 2019-02-20 LAB
ALBUMIN FLD-MCNC: 1.2 G/DL
APPEARANCE FLD: NORMAL
BACTERIA SPEC ANAEROBE CULT: NORMAL
BODY FLD TYPE: NORMAL
COLOR FLD: YELLOW
LYMPHOCYTES NFR FLD MANUAL: 72 %
MONOS+MACROS NFR FLD MANUAL: 22 %
NEUTROPHILS NFR FLD MANUAL: 6 %
PROT FLD-MCNC: 2.6 G/DL
SPECIMEN SOURCE: NORMAL
SPECIMEN SOURCE: NORMAL
WBC # FLD: 319 /CU MM

## 2019-02-20 PROCEDURE — 49083 ABD PARACENTESIS W/IMAGING: CPT

## 2019-02-20 PROCEDURE — A9585 GADOBUTROL INJECTION: HCPCS | Performed by: NURSE PRACTITIONER

## 2019-02-20 PROCEDURE — 84157 ASSAY OF PROTEIN OTHER: CPT

## 2019-02-20 PROCEDURE — 3045F PR MOST RECENT HEMOGLOBIN A1C LEVEL 7.0-9.0%: CPT | Mod: CPTII,S$GLB,, | Performed by: NURSE PRACTITIONER

## 2019-02-20 PROCEDURE — 74183 MRI ABDOMEN W WO CONTRAST: ICD-10-PCS | Mod: 26,,, | Performed by: RADIOLOGY

## 2019-02-20 PROCEDURE — 87070 CULTURE OTHR SPECIMN AEROBIC: CPT

## 2019-02-20 PROCEDURE — 63600175 PHARM REV CODE 636 W HCPCS: Mod: JG | Performed by: NURSE PRACTITIONER

## 2019-02-20 PROCEDURE — 74183 MRI ABD W/O CNTR FLWD CNTR: CPT | Mod: 26,,, | Performed by: RADIOLOGY

## 2019-02-20 PROCEDURE — 1101F PT FALLS ASSESS-DOCD LE1/YR: CPT | Mod: CPTII,S$GLB,, | Performed by: NURSE PRACTITIONER

## 2019-02-20 PROCEDURE — 3078F DIAST BP <80 MM HG: CPT | Mod: CPTII,S$GLB,, | Performed by: NURSE PRACTITIONER

## 2019-02-20 PROCEDURE — 99999 PR PBB SHADOW E&M-EST. PATIENT-LVL V: ICD-10-PCS | Mod: PBBFAC,,, | Performed by: NURSE PRACTITIONER

## 2019-02-20 PROCEDURE — 49083 IR PARACENTESIS WITH IMAGING: ICD-10-PCS | Mod: ,,, | Performed by: NURSE PRACTITIONER

## 2019-02-20 PROCEDURE — 3074F SYST BP LT 130 MM HG: CPT | Mod: CPTII,S$GLB,, | Performed by: NURSE PRACTITIONER

## 2019-02-20 PROCEDURE — 25500020 PHARM REV CODE 255: Performed by: NURSE PRACTITIONER

## 2019-02-20 PROCEDURE — 88112 CYTOLOGY SPECIMEN- MEDICAL CYTOLOGY (FLUID/WASH/BRUSH): ICD-10-PCS | Mod: 26,,, | Performed by: PATHOLOGY

## 2019-02-20 PROCEDURE — 99214 PR OFFICE/OUTPT VISIT, EST, LEVL IV, 30-39 MIN: ICD-10-PCS | Mod: S$GLB,,, | Performed by: NURSE PRACTITIONER

## 2019-02-20 PROCEDURE — 88112 CYTOPATH CELL ENHANCE TECH: CPT | Mod: 26,,, | Performed by: PATHOLOGY

## 2019-02-20 PROCEDURE — 89051 BODY FLUID CELL COUNT: CPT

## 2019-02-20 PROCEDURE — 3078F PR MOST RECENT DIASTOLIC BLOOD PRESSURE < 80 MM HG: ICD-10-PCS | Mod: CPTII,S$GLB,, | Performed by: NURSE PRACTITIONER

## 2019-02-20 PROCEDURE — 82042 OTHER SOURCE ALBUMIN QUAN EA: CPT

## 2019-02-20 PROCEDURE — 49083 ABD PARACENTESIS W/IMAGING: CPT | Mod: ,,, | Performed by: NURSE PRACTITIONER

## 2019-02-20 PROCEDURE — 99214 OFFICE O/P EST MOD 30 MIN: CPT | Mod: S$GLB,,, | Performed by: NURSE PRACTITIONER

## 2019-02-20 PROCEDURE — 3045F PR MOST RECENT HEMOGLOBIN A1C LEVEL 7.0-9.0%: ICD-10-PCS | Mod: CPTII,S$GLB,, | Performed by: NURSE PRACTITIONER

## 2019-02-20 PROCEDURE — 3074F PR MOST RECENT SYSTOLIC BLOOD PRESSURE < 130 MM HG: ICD-10-PCS | Mod: CPTII,S$GLB,, | Performed by: NURSE PRACTITIONER

## 2019-02-20 PROCEDURE — 99999 PR PBB SHADOW E&M-EST. PATIENT-LVL V: CPT | Mod: PBBFAC,,, | Performed by: NURSE PRACTITIONER

## 2019-02-20 PROCEDURE — P9047 ALBUMIN (HUMAN), 25%, 50ML: HCPCS | Mod: JG | Performed by: NURSE PRACTITIONER

## 2019-02-20 PROCEDURE — 74183 MRI ABD W/O CNTR FLWD CNTR: CPT | Mod: TC

## 2019-02-20 PROCEDURE — 1101F PR PT FALLS ASSESS DOC 0-1 FALLS W/OUT INJ PAST YR: ICD-10-PCS | Mod: CPTII,S$GLB,, | Performed by: NURSE PRACTITIONER

## 2019-02-20 PROCEDURE — 87075 CULTR BACTERIA EXCEPT BLOOD: CPT

## 2019-02-20 PROCEDURE — 88112 CYTOPATH CELL ENHANCE TECH: CPT | Performed by: PATHOLOGY

## 2019-02-20 RX ORDER — GADOBUTROL 604.72 MG/ML
10 INJECTION INTRAVENOUS
Status: COMPLETED | OUTPATIENT
Start: 2019-02-20 | End: 2019-02-20

## 2019-02-20 RX ORDER — SPIRONOLACTONE 100 MG/1
300 TABLET, FILM COATED ORAL DAILY
Qty: 180 TABLET | Refills: 1 | Status: SHIPPED | OUTPATIENT
Start: 2019-02-20 | End: 2019-03-21

## 2019-02-20 RX ORDER — ALBUMIN HUMAN 250 G/1000ML
37.5 SOLUTION INTRAVENOUS ONCE
Status: COMPLETED | OUTPATIENT
Start: 2019-02-20 | End: 2019-02-20

## 2019-02-20 RX ADMIN — GADOBUTROL 10 ML: 604.72 INJECTION INTRAVENOUS at 01:02

## 2019-02-20 RX ADMIN — ALBUMIN (HUMAN) 37.5 G: 25 SOLUTION INTRAVENOUS at 11:02

## 2019-02-20 NOTE — Clinical Note
Nicholas Peterson,Do you think Ms. Matamoros may be able to use something in place of her Metformin for her DM? I worry about her being on Metformin since her cirrhosis is now decompensated and her symptoms worsening. Thanks!Deepika

## 2019-02-20 NOTE — PROGRESS NOTES
[facility] Established Patient Visit    Reason for Visit:  Decompensated cryptogenic cirrhosis     PCP: Deandre Lundberg    HPI:  This is a 68 y.o. female with PMH noted below, here for follow up of decompensated cryptogenic cirrhosis  with ascites, hepatic encephalopathy     No history of variceal bleeding      + LE edema, stable     Interval HPI: Presents today with daughter. Last seen 12/26/18.  Since last visit, was notified of pts abdominal distension worsening, US with worsening ascites requiring LVP despite previous increase in Lawrence and Lasix.   Increased further to Lawrence to 200 mg daily, Lasix 80 mg daily  Last para with ascitic analysis 2/13/19: SAAG 1.3, protein 2.4 - may be multifactorial   Para scheduled for this AM  MRI after para this AM to r/o malignancy as etiology of worsening ascites, cytology 2/13/19 negative for malignancy      Diagnosis of cirrhosis per GI Dr. Jolley approx ~ 9/2017, had ascites and BLE edema at that time. Diagnosis of cirrhosis based on imaging showing hepatic coarse echotexture, liver shrunken with nodular contour. Hepatopedal flow within the portal vein, ascites. No h/o liver biopsy     Lab Results   Component Value Date    ALT 19 02/13/2019    AST 40 02/13/2019    ALKPHOS 189 (H) 02/13/2019    BILITOT 0.8 02/13/2019    ALBUMIN 2.4 (L) 02/13/2019    INR 1.0 02/13/2019     02/06/2019     MELD-Na score: 6 at 2/13/2019 10:06 AM  MELD score: 6 at 2/13/2019 10:06 AM  Calculated from:  Serum Creatinine: 0.8 mg/dL (Rounded to 1 mg/dL) at 2/13/2019 10:06 AM  Serum Sodium: 137 mmol/L at 2/13/2019 10:06 AM  Total Bilirubin: 0.8 mg/dL (Rounded to 1 mg/dL) at 2/13/2019 10:06 AM  INR(ratio): 1 at 2/13/2019 10:06 AM  Age: 68 years  ** MELD low, no current indication for transplant, although may consider in future if ascites refractory and/or HE worsens **     Serological workup was negative for Nigel's, alpha-1 antitrypsin deficiency, hemochromatosis, and viral  hepatitis. Negative AMA, TRACIE, IgG. Mildly + ASMA (1:40, can be seen in fatty liver), + IgM. PETH negative      Complications:   1. Ascites : on Lasix 80 mg daily, [place] 200 mg daily. Following Low Na diet   2. HE : + Grade 1-2 with fatigue, altered sleep/wake cycle, + memory loss, intermittent disorientation. Denies confusion. Taking Xifaxan BID. Continues to have 3-4 + BM daily, so unable to start Lactulose     No h/o variceal bleeding      Cirrhosis Health Maintenance:   -- Last EGD : 10/2017, no varices, needs repeat 10/2019     -- HCC screening              U/S - 2/7/19  No focal hepatic lesions, MRI today               AFP - WNL 2/2019, repeat due 8/2019  -- Immunity to Hep A and B - needs Hep A vaccine, + immunity to Hep B     Risk factors for NAFLD include morbid obesity, HTN, HLD, uncontrolled T2DM.  No exercise, sedentary lifestyle      + family history of liver disease : RHOADES cirrhosis in niece and brother. Denies current alcohol consumption or history of significant alcohol consumption in past      Denies jaundice, dark urine, hematemesis, melena, slowed mentation. No abnormal skin rashes. No generalized joint or muscle pain. + abdominal distention    PMHX:  has a past medical history of A-fib, Anxiety, Cirrhosis, DDD (degenerative disc disease), cervical (3/21/2018), DDD (degenerative disc disease), lumbar (3/21/2018), Decompensated hepatic cirrhosis (12/26/2018), Depression, Diabetes mellitus, type 2, Hepatic encephalopathy (12/26/2018), HLD (hyperlipidemia), HTN (hypertension), Hypoalbuminemia (12/26/2018), Hypoglycemia associated with type 2 diabetes mellitus (12/26/2018), Morbid obesity with BMI of 45.0-49.9, adult (12/7/2018), Obesity, Other ascites (2/20/2019), Portal hypertension (12/26/2018), and Type 2 diabetes mellitus with hyperglycemia, with long-term current use of insulin (12/6/2018).    PSHX:  has a past surgical history that includes Rotator cuff repair; Total knee arthroplasty;  Hysterectomy; Carpal tunnel release; arm surgery; and Cholecystectomy.    The patient's social and family histories were reviewed by me and updated in the appropriate section of the electronic medical record.    Review of patient's allergies indicates:   Allergen Reactions    Iodinated contrast- oral and iv dye Hives    Zoloft [sertraline]        Current Outpatient Medications on File Prior to Visit   Medication Sig Dispense Refill    ACCU-CHEK SOFTCLIX LANCETS MISC Accu-Chek Softclix Lancets      aspirin (ECOTRIN) 81 MG EC tablet Take 81 mg by mouth once daily.      clonazePAM (KLONOPIN) 0.5 MG tablet clonazepam 0.5 mg tablet   Take 1 tablet twice a day by oral route as needed for 30 days.      ergocalciferol (ERGOCALCIFEROL) 50,000 unit Cap Take 1 capsule (50,000 Units total) by mouth every 7 days. 12 capsule 4    escitalopram oxalate (LEXAPRO) 20 MG tablet Take 20 mg by mouth once daily.      furosemide (LASIX) 40 MG tablet Take 2 tablets (80 mg total) by mouth once daily. DOSE CHANGE 180 tablet 1    gabapentin (NEURONTIN) 600 MG tablet Take 600 mg by mouth 2 (two) times daily.      insulin glargine (LANTUS) 100 unit/mL injection Inject 35 Units into the skin once daily.      ketoconazole (NIZORAL) 2 % cream Apply topically once daily. 60 Tube 3    metformin (GLUCOPHAGE) 1000 MG tablet Take 1,000 mg by mouth 2 (two) times daily with meals.      metoprolol succinate (TOPROL XL) 25 MG 24 hr tablet Take 25 mg by mouth once daily.      omeprazole (PRILOSEC) 40 MG capsule Take 40 mg by mouth once daily.      rifAXIMin (XIFAXAN) 550 mg Tab Take 1 tablet (550 mg total) by mouth 2 (two) times daily. 60 tablet 5    rosuvastatin (CRESTOR) 10 MG tablet Take 10 mg by mouth once daily.      spironolactone (ALDACTONE) 100 MG tablet Take 2 tablets (200 mg total) by mouth once daily. DOSE CHANGE 180 tablet 1    XARELTO 20 mg Tab Take 20 mg by mouth once daily.       No current facility-administered medications  "on file prior to visit.        SOCIAL HISTORY:   Social History     Tobacco Use   Smoking Status Never Smoker   Smokeless Tobacco Never Used       Social History     Substance and Sexual Activity   Alcohol Use No    Frequency: Never       Social History     Substance and Sexual Activity   Drug Use No       ROS:   GENERAL: Denies fever, chills, weight loss/gain, + fatigue  HEENT: Denies headaches, dizziness, vision/hearing changes  CARDIOVASCULAR: Denies chest pain, palpitations, + edema  RESPIRATORY: Denies dyspnea, cough  GI: Denies abdominal pain, rectal bleeding, nausea, vomiting. No change in bowel pattern or color  : Denies dysuria, hematuria   SKIN: Denies rash, itching   NEURO: + Grade 1-2 with fatigue, altered sleep/wake cycle, + memory loss, intermittent disorientation. Denies confusion.   PSYCH: Denies depression or anxiety  HEME/LYMPH: + easy bruising, denies bleeding    Objective Findings:    PHYSICAL EXAM:   Friendly White female, in no acute distress; alert and oriented to person, place and time  VITALS: BP (!) 145/71 (BP Location: Right arm, Patient Position: Sitting, BP Method: Large (Automatic))   Pulse 92   Temp 96.4 °F (35.8 °C) (Oral)   Ht 5' 2" (1.575 m)   Wt 105.6 kg (232 lb 12.9 oz)   SpO2 95%   BMI 42.58 kg/m²   HENT: Normocephalic, without obvious abnormality. Oral mucosa pink and moist. Dentition good.  EYES: Sclerae anicteric. No conjunctival pallor.   NECK: Supple. No masses or cervical adenopathy.  CARDIOVASCULAR: Regular rate and rhythm. No murmurs.  RESPIRATORY: Normal respiratory effort. BBS CTA. No wheezes or crackles.  GI: Soft, non-tender, + distended (improving after para today), mild Ascites, not-tense. + splenomegaly. No masses palpable.   EXTREMITIES:  No clubbing, cyanosis, + 1 BLE edema.  SKIN: Warm and dry. No jaundice. No rashes noted to exposed skin.+ telangectasias noted. + palmar erythema.  NEURO:  Normal gait. No asterixis.  PSYCH:  Memory intact. Thought and " speech pattern appropriate. Behavior normal. No depression or anxiety noted.      Labs:    Hepatitis A and B immunity markers:    Hepatitis A Antibody IgG   Date Value Ref Range Status   12/06/2018 Negative  Final       Hepatitis B Surface Ag   Date Value Ref Range Status   12/06/2018 Negative  Final     Hep B Core Total Ab   Date Value Ref Range Status   12/06/2018 Negative  Final     Hep B S Ab   Date Value Ref Range Status   12/06/2018 Positive (A)  Final       There is no immunization history on file for this patient.      Lab Results   Component Value Date    WBC 5.33 02/06/2019    HGB 10.3 (L) 02/06/2019    HCT 31.4 (L) 02/06/2019     02/06/2019     02/13/2019    K 4.7 02/13/2019    CREATININE 0.8 02/13/2019    ALT 19 02/13/2019    AST 40 02/13/2019    ALKPHOS 189 (H) 02/13/2019    BILITOT 0.8 02/13/2019    ALBUMIN 2.4 (L) 02/13/2019    INR 1.0 02/13/2019    AFP 2.3 02/06/2019       DIAGNOSTIC STUDIES:  EGD-   outside reports 10/25/2017  -- normal esophagus, no varices  -- scattered moderate inflammation characterized by erosions, erythema and linear erosions in gastric fundus     COLONOSCOPY-   Outside records Colonoscopy 8/27/18, repeat 2021  -- polyps x5, diverticulosis   -- repeat in 3 years    ABD. U/S-  2/7/19  FINDINGS:  Liver: Atrophic with a nodular contour consistent with chronic cirrhosis.  Portal vein is patent.  No focal hepatic lesion.    Gallbladder: Previous cholecystectomy.    Biliary system: The common duct is not dilated, measuring 5.0 mm.  No intrahepatic ductal dilatation.    Spleen: Mildly enlarged measuring 14.1 cm.    Pancreas: Visualized portions of the pancreas are normal in size and echogenicity.    Right kidney: Normal in size with no hydronephrosis, measuring 10.3 cm.    Left kidney: Normal in size with no hydronephrosis, measuring 10.8 cm.    Aorta: Obscured by overlying bowel gas.    Inferior vena cava: Normal in appearance.    Miscellaneous: Extensive ascites  throughout the abdomen.    Main hepatic artery: Patent.    Left hepatic artery: Patent.    Anterior and posterior right hepatic artery: Patent.    Main portal vein: Patent with proper directional flow.    Left portal vein:  Patent with proper directional flow.    Right portal vein:  Patent with proper directional flow.    SMV:  Patent with proper directional flow.    IVC: Patent    Left, middle, and right hepatic veins: Patent.    Umbilical vein: Not patent.    Celiac artery: Normal on expiration.      Impression       1. Chronic cirrhosis.  2. Extensive ascites.  3. Previous cholecystectomy.  4. Mild splenomegaly.  5. Normal abdominal Doppler exam.       MRI- to be done today 2/20/19  FINDINGS:  The liver demonstrates morphologic changes of cirrhosis.  Hepatic parenchyma demonstrates homogeneous enhancement without focal lesions.  No intrahepatic bile duct dilatation.  Portal vasculature is patent.  No intrahepatic bile duct dilatation.    Gallbladder is surgically absent.  Common bile duct is normal in caliber.    Stomach, duodenum and adrenal glands are normal.    Spleen is enlarged.  No focal splenic abnormalities.    Kidneys are normal in size and location.  No cortical enhancing lesions.  No hydronephrosis or hydroureter.    There is a small volume of abdominal ascites.  There is mild diffuse mesenteric edema.    The abdominal aorta tapers normally.  Celiac artery, SMA and bilateral renal arteries demonstrate appropriate contrast opacification.    Visualized bowel is normal in caliber.  No obstruction.    No marrow signal abnormality to suggest an infiltrative process.      Impression       1. Cirrhosis without focal parenchymal lesions.  2. Small volume of ascites and splenomegaly, presumably related to portal hypertension.  3. Right renal cyst.       LIVER BIOPSY-  None   FIBROSCAN -   None, not needed given imaging findings      ASSESSMENT:  68 y.o. White female with:  1.  Cirrhosis, decompensated, etiology  unknown but likely due to RHOADES.   Cirrhosis illustrated on imaging.   MELD-Na score: 6 at 2/13/2019 10:06 AM  MELD score: 6 at 2/13/2019 10:06 AM  Calculated from:  Serum Creatinine: 0.8 mg/dL (Rounded to 1 mg/dL) at 2/13/2019 10:06 AM  Serum Sodium: 137 mmol/L at 2/13/2019 10:06 AM  Total Bilirubin: 0.8 mg/dL (Rounded to 1 mg/dL) at 2/13/2019 10:06 AM  INR(ratio): 1 at 2/13/2019 10:06 AM  Age: 68 years  -- HCC screening: AFP and abd. U/S.. U/S showed no lesion, AFP WNL - MRI today given worsening ascites, then next 8/2019  -- Immunity to Hep A and B - +  Hep B immunity, needs Hep A vaccine   -- EGD in past : reports outside EGD 10/2017, repeat 10/2019  ---Serological workup was negative for Nigel's, alpha-1 antitrypsin deficiency, hemochromatosis, and viral hepatitis. Negative AMA, TRACIE, IgG. Mildly + ASMA (1:40, can be seen in fatty liver), + IgM   PETH negative     2. Morbid obesity, HTN, HLD, uncontrolled T2DM  -- Body mass index is 42.58 kg/m².   -- increases risk of NAFLD/RHOADES  -- Trying to decrease carb intake, significant decreased food intake recently, No Exercise      3. T2DM, use of Metformin   -- message sent to endocrine RUSSELL Agudelo NP to consider other medications, given decompensated cirrhosis    4. Portal hypertension without bleeding varices   -- EGD on 10/2017, no varices, repeat 10/2019  -- Ascites and ankle edema - diuretic dosing Lasix 80 mg daily, [place] 200 mg daily. Now  following Low Na diet   -- Splenomegaly, thrombocytopenia      5. Hepatic encephalopathy   -- + Grade 1-2 with fatigue, altered sleep/wake cycle, + memory loss, intermittent disorientation. Denies confusion.  Currently having ~3-4+ BM daily, will defer Lactulose use at this time   -- NOT driving   -- On Xifaxan 550 mg BID  -- will defer use of Lactulose due to frequent BM, can revisit at each visit      6. BLE edema, +1 on exam  -- diuretic dosing: Lasix 80 mg daily, Spironolactone 200 mg daily      7. Hypoalbuminemia   --  encouraged high protein diet         EDUCATION:      The disease process and manifestations of cirrhosis were discussed.     Discussed implications of a cirrhosis diagnosis with HCC screenings and EGD and why it is important for us to properly monitor for potential complications.      Signs and symptoms of hepatic decompensation were reviewed, including jaundice, ascites, and slowed mentation due to hepatic encephalopathy. The patient should seek medical attention if any of these things occur.  We discussed the potential for bleeding from esophageal varices with symptoms of hematemesis and melena. The patient should report to the Emergency Department for these symptoms.     We discussed the increased risk of hepatocellular carcinoma due to cirrhosis. Continued screening every six months with ultrasound and AFP is recommended, discussed with patient.      Cirrhosis Counseling  - strict abstinence of alcohol use (includes beer, wine, and/or liquor)  - compliance with rifaximin for treatment of hepatic encephalopathy  - avoid non-steroidal anti-inflammatory drugs (NSAIDs) such as ibuprofen, Motrin, naprosyn, Alleve due to the risk of kidney damage  - can take acetaminophen (Tylenol), no more than 2000 mg per day  - low sodium (salt) 2 gram per day diet  - high protein diet: 150 grams per day to prevent muscle mass loss. Recommended at least 1 protein shake daily using Premier Protein shakes    - resistance exercises for muscle strength  - avoid raw seafoods due to the risk of fatal Vibrio vulnificus infection  - ultrasound of the liver every 6 months for liver cancer screening  - Upper endoscopy every 1-2 years to screen for varices in the stomach and esophagus     We discussed the manifestations of non-alcoholic fatty liver disease. At this time, there are no FDA approved therapy for non-alcoholic fatty liver disease.  The patient and I discussed the importance of diet, exercise, and weight loss for management of  NAFLD. We discussed a low fat, low carb/sugar, high fiber diet and a goal of 30 minutes of exercise 5 times per week as tolerated         PLAN:  1. Message sent to RUSSELL Jammie to discuss stopping Metformin given decompensated cirrhosis  2. 2D echo to assess for HF given elevated protein on ascitic fluid   3. Paracentesis as often as needed, every week if needed. Given contact phone number to IR to call to schedule   4. Increase Lawrence to 300 mg daily,  Continue Lasix 80 mg daily. Labs in 1 week after dose adjustments. Stressed importance of low Na diet (currently following)  5. Continue Xifaxan 550 mg BID. Will defer addition of Lactulose currently given frequent BM, can revisit at each f/u visit.  Instructed NOT to drive due to hepatic encephalopathy. Add Zinc  mg daily  6. HCC screening Q 6 months with AFP and abd. U/S - both next due 8/2019  7. EGD to screen for varices, next due 10/2019  8. Recommend Hep A vaccine   9. Cirrhosis counseling as noted above and discussed with patient  10. Will refer for transplant evaluation when MELD score is consistently 13-15   11. Follow-up in about 4 weeks (around 3/20/2019). with labs (CBC, CMP, INR, AFP, IgM, IgG) and US on same day as f/u     ADDENDUM 2/20/18 520pm: Called pt, discussed results of MRI, including no concerns for HCC. F/u in 1 month as above    Thank you for allowing me to participate in the care of Sejal Puente JONG Delong    CC'ed note to:   Dr. Samreen Lundberg NP

## 2019-02-20 NOTE — PATIENT INSTRUCTIONS
1. MRI today to rule out any liver lesions not seen on ultrasound  2. Paracentesis as often as needed, every week if needed  3. Increase Spironolactone to 300 mg daily, continue Lasix 80 mg daily  4. Labs in 1 week after above dose changes  5. Low salt diet and high protein diet is VERY important  6. Discuss with RUSSELL Agudelo NP about eliminating Metformin. I will discuss with her also    7. Start the Hepatitis A vaccine series at local pharmacy if you did not start already  8. Add Zinc 220 mg daily (or as close to this dose as you can get)  9. To schedule next paracentesis phone number [phone] in about 10 days    Because you have cirrhosis, it is important to attend clinic visits every 6 months with an Ultrasound and blood tests every 6 months to screen for liver cancer (you are at risk of developing liver cancer due to scar tissue in the liver)    Signs and symptoms of worsening liver disease include jaundice, fluid in the belly (ascites), and confusion/disorientation/slowed thought processes due to hepatic encephalopathy (toxins building up because of liver problems).   You should seek medical attention if any of these things occur.    Also, possible bleeding from esophageal varices (blood vessels in the stomach and foodpipe can burst and cause fatal bleeding).  Therefore, if you have symptoms of vomiting blood, blood in your stool, dark or black stools or vomiting coffee ground vomit, YOU SHOULD GO TO THE EMERGENCY ROOM IMMEDIATELY.     Cirrhosis can increase the risk of liver cancer, liver failure, and death. However, we will watch your liver function score (MELD score) closely with each clinic visit. A normal MELD score is 6, highest is 40. Your last one was an 6. We will check this with every clinic visit. A MELD 15 or higher is when we start to consider transplant because MELD 15 or higher indicates that the liver is not functioning as well     Cirrhosis Counseling  - NO alcohol use (includes beer, wine,  and/or liquor)  --  take your rifaximin twice daily for treatment of hepatic encephalopathy (confusion due to high ammonia level)  - avoid non-steroidal anti-inflammatory drugs (NSAIDs) such as ibuprofen, Motrin, naprosyn, Alleve due to the risk of kidney damage  - can take acetaminophen (Tylenol), no more than 2000 mg per day  - low sodium (salt) 2 gram per day diet  - high protein diet: 150 grams per day to prevent muscle mass loss. Drink at least 1 protein shake daily (Premier Protein is best option because it is very high protein and low sugar). Ok to use this as nighttime snack to fit it in   - resistance exercises for muscle strength  - avoid raw seafoods due to the risk of fatal Vibrio vulnificus infection  - ultrasound of the liver every 6 months for liver cancer screening (you are at risk of developing liver cancer due to scar tissue in the liver)  - Upper endoscopy every 1-2 years to screen for varices in the stomach and foodpipe which can burst and cause fatal bleeding, next due 10/2019

## 2019-02-20 NOTE — H&P
Radiology History & Physical      SUBJECTIVE:     Chief Complaint: abdominal distentino    History of Present Illness:  Sejal Matamoros is a 68 y.o. female who presents for evaluation of ascites  Past Medical History:   Diagnosis Date    A-fib     Anxiety     Cirrhosis     DDD (degenerative disc disease), cervical 3/21/2018    DDD (degenerative disc disease), lumbar 3/21/2018    Decompensated hepatic cirrhosis 12/26/2018    Depression     Diabetes mellitus, type 2     Hepatic encephalopathy 12/26/2018    HLD (hyperlipidemia)     HTN (hypertension)     Hypoalbuminemia 12/26/2018    Hypoglycemia associated with type 2 diabetes mellitus 12/26/2018    Morbid obesity with BMI of 45.0-49.9, adult 12/7/2018    Obesity     Other ascites 2/20/2019    Portal hypertension 12/26/2018    Type 2 diabetes mellitus with hyperglycemia, with long-term current use of insulin 12/6/2018     Past Surgical History:   Procedure Laterality Date    arm surgery      left    CARPAL TUNNEL RELEASE      left    CHOLECYSTECTOMY      HYSTERECTOMY      ROTATOR CUFF REPAIR      TOTAL KNEE ARTHROPLASTY         Home Meds:   Prior to Admission medications    Medication Sig Start Date End Date Taking? Authorizing Provider   ACCU-CHEK SOFTCLIX LANCETS MISC Accu-Chek Softclix Lancets    Historical Provider, MD   aspirin (ECOTRIN) 81 MG EC tablet Take 81 mg by mouth once daily.    Historical Provider, MD   clonazePAM (KLONOPIN) 0.5 MG tablet clonazepam 0.5 mg tablet   Take 1 tablet twice a day by oral route as needed for 30 days.    Historical Provider, MD   ergocalciferol (ERGOCALCIFEROL) 50,000 unit Cap Take 1 capsule (50,000 Units total) by mouth every 7 days. 2/19/19   Mary Agudelo, WINSTON, NP   escitalopram oxalate (LEXAPRO) 20 MG tablet Take 20 mg by mouth once daily. 10/31/18   Historical Provider, MD   furosemide (LASIX) 40 MG tablet Take 2 tablets (80 mg total) by mouth once daily. DOSE CHANGE 2/8/19   Deepika WELLS  Scroggs, NP   gabapentin (NEURONTIN) 600 MG tablet Take 600 mg by mouth 2 (two) times daily.    Historical Provider, MD   insulin glargine (LANTUS) 100 unit/mL injection Inject 35 Units into the skin once daily.    Historical Provider, MD   ketoconazole (NIZORAL) 2 % cream Apply topically once daily. 2/7/19   Carlos Eduardo Ley DPM   metformin (GLUCOPHAGE) 1000 MG tablet Take 1,000 mg by mouth 2 (two) times daily with meals.    Historical Provider, MD   metoprolol succinate (TOPROL XL) 25 MG 24 hr tablet Take 25 mg by mouth once daily. 10/3/18 10/3/19  Historical Provider, MD   omeprazole (PRILOSEC) 40 MG capsule Take 40 mg by mouth once daily. 10/17/18   Historical Provider, MD   rifAXIMin (XIFAXAN) 550 mg Tab Take 1 tablet (550 mg total) by mouth 2 (two) times daily. 12/6/18   Deepika Plunkett NP   rosuvastatin (CRESTOR) 10 MG tablet Take 10 mg by mouth once daily. 8/13/18   Historical Provider, MD   spironolactone (ALDACTONE) 100 MG tablet Take 2 tablets (200 mg total) by mouth once daily. DOSE CHANGE 2/8/19   Deepika Plunkett NP   XARELTO 20 mg Tab Take 20 mg by mouth once daily. 11/30/18   Historical Provider, MD     Anticoagulants/Antiplatelets: aspirin and xeralto    Allergies:   Review of patient's allergies indicates:   Allergen Reactions    Iodinated contrast- oral and iv dye Hives    Zoloft [sertraline]      Sedation History:  no adverse reactions    Review of Systems:   Hematological: no known coagulopathies  Respiratory: no shortness of breath  Cardiovascular: no chest pain  Gastrointestinal: no abdominal pain  Genito-Urinary: no dysuria  Musculoskeletal: negative  Neurological: no TIA or stroke symptoms         OBJECTIVE:     Vital Signs (Most Recent)  Pulse: 96 (02/20/19 0952)  Resp: 16 (02/20/19 0952)  BP: (!) 143/67 (02/20/19 0952)  SpO2: 97 % (02/20/19 0952)    Physical Exam:  ASA: 2  Mallampati: na    General: no acute distress  Mental Status: alert and oriented to person, place and  time  HEENT: normocephalic, atraumatic  Chest: unlabored breathing  Heart: regular heart rate  Abdomen: distended  Extremity: moves all extremities    Laboratory  Lab Results   Component Value Date    INR 1.0 02/20/2019       Lab Results   Component Value Date    WBC 4.91 02/20/2019    HGB 10.9 (L) 02/20/2019    HCT 33.2 (L) 02/20/2019    MCV 93 02/20/2019     02/20/2019      Lab Results   Component Value Date     (H) 02/20/2019     (L) 02/20/2019    K 3.6 02/20/2019    CL 93 (L) 02/20/2019    CO2 32 (H) 02/20/2019    BUN 31 (H) 02/20/2019    CREATININE 1.0 02/20/2019    CALCIUM 9.5 02/20/2019    ALT 23 02/20/2019    AST 41 (H) 02/20/2019    ALBUMIN 2.6 (L) 02/20/2019    BILITOT 0.9 02/20/2019    BILIDIR 0.3 11/12/2018       ASSESSMENT/PLAN:     Sedation Plan: local anesthesia  Patient will undergo US guided paracentesis

## 2019-02-20 NOTE — DISCHARGE INSTRUCTIONS
[facility] [phone] (MON-FRI 8 AM- 5PM). Radiology Resident on call [phone].

## 2019-02-20 NOTE — PROGRESS NOTES
Para completed, pt tolerated well. No apparent distress noted. 5.9 Liters removed from right abd, mepore applied CDI. Labs collected and sent. Albumin 150 ml given per protocol.  Discharge instructions reviewed and acknowledged. Pt discharged via ambulation, steady gait observed and private vehicle.

## 2019-02-21 ENCOUNTER — TELEPHONE (OUTPATIENT)
Dept: ENDOCRINOLOGY | Facility: CLINIC | Age: 69
End: 2019-02-21

## 2019-02-21 RX ORDER — GLIMEPIRIDE 2 MG/1
2 TABLET ORAL
Qty: 90 TABLET | Refills: 3 | Status: SHIPPED | OUTPATIENT
Start: 2019-02-21 | End: 2019-02-27

## 2019-02-21 NOTE — DISCHARGE SUMMARY
Radiology Discharge Summary      Hospital Course: No complications    Admit Date: 2/20/2019  Discharge Date: 02/21/2019     Instructions Given to Patient: Yes  Diet: Resume prior diet  Activity: activity as tolerated    Description of Condition on Discharge: Stable  Vital Signs (Most Recent): Pulse: 67 (02/20/19 1118)  Resp: 16 (02/20/19 1118)  BP: (!) 120/58 (02/20/19 1118)  SpO2: 98 % (02/20/19 1118)    Discharge Disposition: Home    Discharge Diagnosis: Ascites s/p paracentesis. Follow up as scheduled.    Sam Asif M.D.  Diagnostic and Interventional Radiologist  Department of Radiology  Pager: [phone]

## 2019-02-21 NOTE — TELEPHONE ENCOUNTER
Called and spoke to Danitza benson and informed her of the message from Jammie MONTERO and scheduled patient follow up appointment with Jammie MONTERO. Danitza verbalized understanding.

## 2019-02-21 NOTE — TELEPHONE ENCOUNTER
----- Message from Fermin Pederson sent at 2/21/2019  3:51 PM CST -----  Type:  Patient Returning Call    Who Called:  Patient  Who Left Message for Patient:  Na  Does the patient know what this is regarding?:  No  Best Call Back Number:  [phone]

## 2019-02-21 NOTE — TELEPHONE ENCOUNTER
D/c metformin  Start glimeperide 2 mg with breakfast, hold if not eating  F/u in 1-2 weeks w me w log prior

## 2019-02-23 ENCOUNTER — HOSPITAL ENCOUNTER (EMERGENCY)
Facility: HOSPITAL | Age: 69
Discharge: HOME | End: 2019-02-23
Attending: EMERGENCY MEDICINE
Payer: MEDICARE

## 2019-02-23 VITALS
RESPIRATION RATE: 16 BRPM | HEIGHT: 62 IN | SYSTOLIC BLOOD PRESSURE: 117 MMHG | OXYGEN SATURATION: 98 % | HEART RATE: 71 BPM | TEMPERATURE: 98 F | WEIGHT: 231 LBS | BODY MASS INDEX: 42.51 KG/M2 | DIASTOLIC BLOOD PRESSURE: 58 MMHG

## 2019-02-23 DIAGNOSIS — E86.0 DEHYDRATION: Primary | ICD-10-CM

## 2019-02-23 LAB
ALBUMIN SERPL BCP-MCNC: 2.7 G/DL
ALP SERPL-CCNC: 209 U/L
ALT SERPL W/O P-5'-P-CCNC: 28 U/L
ANION GAP SERPL CALC-SCNC: 13 MMOL/L
AST SERPL-CCNC: 58 U/L
BACTERIA SPEC AEROBE CULT: NO GROWTH
BASOPHILS # BLD AUTO: 0 K/UL
BASOPHILS NFR BLD: 0.4 %
BILIRUB SERPL-MCNC: 0.8 MG/DL
BUN SERPL-MCNC: 28 MG/DL
CALCIUM SERPL-MCNC: 9 MG/DL
CHLORIDE SERPL-SCNC: 92 MMOL/L
CO2 SERPL-SCNC: 30 MMOL/L
CREAT SERPL-MCNC: 1.1 MG/DL
DIFFERENTIAL METHOD: ABNORMAL
EOSINOPHIL # BLD AUTO: 0.1 K/UL
EOSINOPHIL NFR BLD: 1.3 %
ERYTHROCYTE [DISTWIDTH] IN BLOOD BY AUTOMATED COUNT: 15.4 %
EST. GFR  (AFRICAN AMERICAN): 60 ML/MIN/1.73 M^2
EST. GFR  (NON AFRICAN AMERICAN): 52 ML/MIN/1.73 M^2
GLUCOSE SERPL-MCNC: 250 MG/DL
HCT VFR BLD AUTO: 32.3 %
HGB BLD-MCNC: 10.4 G/DL
LYMPHOCYTES # BLD AUTO: 1.7 K/UL
LYMPHOCYTES NFR BLD: 31.2 %
MCH RBC QN AUTO: 29.3 PG
MCHC RBC AUTO-ENTMCNC: 32.3 G/DL
MCV RBC AUTO: 91 FL
MONOCYTES # BLD AUTO: 0.6 K/UL
MONOCYTES NFR BLD: 10.1 %
NEUTROPHILS # BLD AUTO: 3.2 K/UL
NEUTROPHILS NFR BLD: 57 %
PLATELET # BLD AUTO: 296 K/UL
PMV BLD AUTO: 8.8 FL
POCT GLUCOSE: 319 MG/DL (ref 70–110)
POTASSIUM SERPL-SCNC: 3 MMOL/L
PROT SERPL-MCNC: 7.1 G/DL
RBC # BLD AUTO: 3.56 M/UL
SODIUM SERPL-SCNC: 135 MMOL/L
WBC # BLD AUTO: 5.6 K/UL

## 2019-02-23 PROCEDURE — 36415 COLL VENOUS BLD VENIPUNCTURE: CPT

## 2019-02-23 PROCEDURE — 85025 COMPLETE CBC W/AUTO DIFF WBC: CPT

## 2019-02-23 PROCEDURE — 93005 ELECTROCARDIOGRAM TRACING: CPT

## 2019-02-23 PROCEDURE — 82962 GLUCOSE BLOOD TEST: CPT

## 2019-02-23 PROCEDURE — 99285 EMERGENCY DEPT VISIT HI MDM: CPT | Mod: 25

## 2019-02-23 PROCEDURE — 80053 COMPREHEN METABOLIC PANEL: CPT

## 2019-02-23 PROCEDURE — 25000003 PHARM REV CODE 250: Performed by: EMERGENCY MEDICINE

## 2019-02-23 RX ORDER — POTASSIUM CHLORIDE 750 MG/1
50 TABLET, EXTENDED RELEASE ORAL
Status: COMPLETED | OUTPATIENT
Start: 2019-02-23 | End: 2019-02-23

## 2019-02-23 RX ADMIN — SODIUM CHLORIDE, SODIUM LACTATE, POTASSIUM CHLORIDE, AND CALCIUM CHLORIDE 500 ML: .6; .31; .03; .02 INJECTION, SOLUTION INTRAVENOUS at 09:02

## 2019-02-23 RX ADMIN — POTASSIUM CHLORIDE 50 MEQ: 750 TABLET, EXTENDED RELEASE ORAL at 09:02

## 2019-02-24 NOTE — ED PROVIDER NOTES
"Encounter Date: 2/23/2019    SCRIBE #1 NOTE: I, Eliecer Johnson, am scribing for, and in the presence of, Dr. Fraser.       History     Chief Complaint   Patient presents with    Weakness       Time seen by provider: 8:30 PM on 02/23/2019    Sejal Matamoros is a 68 y.o. female with a hx of decompensating hepatic cirrhosis, A-fib (on Xarelto) who presents to the ED with c/o worsened chronic weakness since yesterday. She was "fine" yesterday and was weaker than usual today. Pt also reports feeling lightheaded when standing. Relative notes pt "did not eat much today." She reportedly ate an apple and a biscuit. Pt also has not been drinking today, drank a glass of water only. She also c/o dry mouth. Pt has a paracentesis 3 days ago. Pt usually drinks a supplemental protein shake but did not yesterday due to running out. Relative also notes pt's medications were recently altered. Pt denies blood in stool, fever and confusion. Allergens include Iodinated contrast and Zoloft.       The history is provided by a relative.     Review of patient's allergies indicates:   Allergen Reactions    Iodinated contrast- oral and iv dye Hives    Zoloft [sertraline]      Past Medical History:   Diagnosis Date    A-fib     Anxiety     Cirrhosis     DDD (degenerative disc disease), cervical 3/21/2018    DDD (degenerative disc disease), lumbar 3/21/2018    Decompensated hepatic cirrhosis 12/26/2018    Depression     Diabetes mellitus, type 2     Hepatic encephalopathy 12/26/2018    HLD (hyperlipidemia)     HTN (hypertension)     Hypoalbuminemia 12/26/2018    Hypoglycemia associated with type 2 diabetes mellitus 12/26/2018    Morbid obesity with BMI of 45.0-49.9, adult 12/7/2018    Obesity     Other ascites 2/20/2019    Portal hypertension 12/26/2018    Type 2 diabetes mellitus with hyperglycemia, with long-term current use of insulin 12/6/2018     Past Surgical History:   Procedure Laterality Date    arm " surgery      left    CARPAL TUNNEL RELEASE      left    CHOLECYSTECTOMY      HYSTERECTOMY      ROTATOR CUFF REPAIR      TOTAL KNEE ARTHROPLASTY       Family History   Problem Relation Age of Onset    Cirrhosis Brother         RHOADES cirrhosis     Social History     Tobacco Use    Smoking status: Never Smoker    Smokeless tobacco: Never Used   Substance Use Topics    Alcohol use: No     Frequency: Never    Drug use: No     Review of Systems   Constitutional: Positive for appetite change. Negative for fever.   HENT: Negative for sore throat.    Eyes: Negative for redness.   Respiratory: Negative for shortness of breath.    Cardiovascular: Negative for chest pain.   Gastrointestinal: Negative for blood in stool and nausea.   Genitourinary: Negative for dysuria.   Musculoskeletal: Negative for back pain.   Skin: Negative for rash.   Neurological: Positive for weakness (chronic, worsened) and light-headedness.   Hematological: Does not bruise/bleed easily.   Psychiatric/Behavioral: Negative for confusion.       Physical Exam     Initial Vitals [02/23/19 1920]   BP Pulse Resp Temp SpO2   132/72 71 16 97.6 °F (36.4 °C) 99 %      MAP       --         Physical Exam    Nursing note and vitals reviewed.  Constitutional: She appears well-developed and well-nourished. She is not diaphoretic. No distress.   Large body habitus.   HENT:   Head: Normocephalic and atraumatic.   Mouth/Throat: Oropharynx is clear and moist.   Eyes: Conjunctivae are normal.   Neck: Neck supple.   Cardiovascular: Normal rate, regular rhythm, normal heart sounds and intact distal pulses. Exam reveals no gallop and no friction rub.    No murmur heard.  Pulmonary/Chest: Breath sounds normal. She has no wheezes. She has no rhonchi. She has no rales.   Abdominal: Soft. She exhibits no distension. There is no tenderness.   Musculoskeletal: Normal range of motion.   1+ pitting pedal edema.   Neurological: She is alert and oriented to person, place, and  time.   Cranial nerves III-XII intact. 5/5 strength and sensation.    Skin: No rash noted. No erythema.   Psychiatric: Her speech is normal.         ED Course   Procedures  Labs Reviewed   CBC W/ AUTO DIFFERENTIAL - Abnormal; Notable for the following components:       Result Value    RBC 3.56 (*)     Hemoglobin 10.4 (*)     Hematocrit 32.3 (*)     RDW 15.4 (*)     MPV 8.8 (*)     All other components within normal limits   COMPREHENSIVE METABOLIC PANEL - Abnormal; Notable for the following components:    Sodium 135 (*)     Potassium 3.0 (*)     Chloride 92 (*)     CO2 30 (*)     Glucose 250 (*)     BUN, Bld 28 (*)     Albumin 2.7 (*)     Alkaline Phosphatase 209 (*)     AST 58 (*)     eGFR if non  52 (*)     All other components within normal limits   POCT GLUCOSE - Abnormal; Notable for the following components:    POCT Glucose 319 (*)     All other components within normal limits          Imaging Results          X-Ray Chest 1 View (Final result)  Result time 02/23/19 21:41:56    Final result by Mervin Payan MD (02/23/19 21:41:56)                 Impression:      No airspace opacity.    Increased attenuation of the pulmonary interstitium and mild prominence of the pulmonary interstitial markings.  The findings are suggestive of early pulmonary edema.      Electronically signed by: Mervin Payan MD  Date:    02/23/2019  Time:    21:41             Narrative:    EXAMINATION:  XR CHEST 1 VIEW    CLINICAL HISTORY:  Weakness, r/o pna/pulmonary edema;    TECHNIQUE:  Single frontal view of the chest was performed.    COMPARISON:  None    FINDINGS:  Monitoring EKG leads are present.  The trachea is unremarkable.  The cardiomediastinal silhouette is within normal limits.  The hemidiaphragms are unremarkable.  There is no evidence of free air beneath the hemidiaphragms.  There are no pleural effusions.  There is no evidence of a pneumothorax.  There is no evidence of pneumomediastinum.  No airspace opacities  are present.  There is mild increased attenuation of the pulmonary interstitium.  There is prominence of the pulmonary interstitial markings.  There are degenerative changes in the osseous structures.                                 Medical Decision Making:   History:   Old Medical Records: I decided to obtain old medical records.  Clinical Tests:   Lab Tests: Ordered and Reviewed  Radiological Study: Ordered and Reviewed  Medical Tests: Ordered and Reviewed            Scribe Attestation:   Scribe #1: I performed the above scribed service and the documentation accurately describes the services I performed. I attest to the accuracy of the note.    I, Dr. Kayode Fraser, personally performed the services described in this documentation. All medical record entries made by the scribe were at my direction and in my presence.  I have reviewed the chart and agree that the record reflects my personal performance and is accurate and complete. aKyode Fraser MD.  2:16 AM 02/24/2019    Sejal Matamoros is a 68 y.o. female presenting with acute on chronic feeling of weakness.  Patient adds mild lightheadedness positional when standing and son as very poor oral intake over the last 1-2 days.  This is consistent with likely mild hypovolemia secondary to poor intake.  I doubt acute cardiac process.  EKG reviewed.  I do not think further cardiac biomarker is indicated.  I doubt ACS.  I have very low suspicion for emergent, life-threatening intracranial process such as CVA.  She has a nonfocal neurological exam with normal mental status.  I do not think further brain imaging is indicated.  No sign of other infectious or metabolic etiology of her symptoms.  Hypokalemia initiated or repletion here with extensive instructions for adequate oral diet and fluid intake.  Chest x-ray reviewed showing no sign of pneumonia.  I do not think this is representative of pulmonary edema or volume overload, nor do I think she  warrants admission for respiratory monitoring.  She is appropriate for outpatient PCP follow-up.  Mental status is normal and I have very low suspicion for panic encephalopathy.  Detailed return precautions reviewed with patient and son present.        ED Course as of Feb 24 0218   Sat Feb 23, 2019 2112 EKG:  Atrial fibrillation, rate of 87, normal intervals and axis.  Mild motion artifact.  No significant ST elevation or depression or consecutive pathologic T-wave inversions.  No sign of acute ischemia or infarction.    [MR]   2135 CXR:  Borderline cardmiomegaly, NAD. (my read)  [MR]      ED Course User Index  [MR] Kayode Fraser MD     Clinical Impression:     1. Dehydration                                 Kayode Fraser MD  02/24/19 0218

## 2019-02-27 ENCOUNTER — OFFICE VISIT (OUTPATIENT)
Dept: ENDOCRINOLOGY | Facility: CLINIC | Age: 69
End: 2019-02-27
Payer: MEDICARE

## 2019-02-27 ENCOUNTER — LAB VISIT (OUTPATIENT)
Dept: LAB | Facility: HOSPITAL | Age: 69
End: 2019-02-27
Attending: PHYSICIAN ASSISTANT
Payer: MEDICARE

## 2019-02-27 ENCOUNTER — HOSPITAL ENCOUNTER (OUTPATIENT)
Dept: CARDIOLOGY | Facility: HOSPITAL | Age: 69
Discharge: HOME | End: 2019-02-27
Attending: NURSE PRACTITIONER
Payer: MEDICARE

## 2019-02-27 VITALS
WEIGHT: 241.06 LBS | DIASTOLIC BLOOD PRESSURE: 62 MMHG | RESPIRATION RATE: 18 BRPM | HEIGHT: 62 IN | HEART RATE: 68 BPM | BODY MASS INDEX: 44.36 KG/M2 | TEMPERATURE: 98 F | SYSTOLIC BLOOD PRESSURE: 117 MMHG

## 2019-02-27 VITALS
HEIGHT: 62 IN | SYSTOLIC BLOOD PRESSURE: 127 MMHG | BODY MASS INDEX: 44.35 KG/M2 | WEIGHT: 241 LBS | DIASTOLIC BLOOD PRESSURE: 49 MMHG

## 2019-02-27 DIAGNOSIS — Z79.4 TYPE 2 DIABETES MELLITUS WITH HYPERGLYCEMIA, WITH LONG-TERM CURRENT USE OF INSULIN: ICD-10-CM

## 2019-02-27 DIAGNOSIS — E66.01 MORBID OBESITY WITH BMI OF 45.0-49.9, ADULT: ICD-10-CM

## 2019-02-27 DIAGNOSIS — E11.65 TYPE 2 DIABETES MELLITUS WITH HYPERGLYCEMIA, WITH LONG-TERM CURRENT USE OF INSULIN: Primary | ICD-10-CM

## 2019-02-27 DIAGNOSIS — I10 ESSENTIAL HYPERTENSION: ICD-10-CM

## 2019-02-27 DIAGNOSIS — R18.8 OTHER ASCITES: ICD-10-CM

## 2019-02-27 DIAGNOSIS — E11.42 TYPE 2 DIABETES MELLITUS WITH DIABETIC POLYNEUROPATHY, WITH LONG-TERM CURRENT USE OF INSULIN: ICD-10-CM

## 2019-02-27 DIAGNOSIS — E78.2 MIXED HYPERLIPIDEMIA: ICD-10-CM

## 2019-02-27 DIAGNOSIS — Z79.4 TYPE 2 DIABETES MELLITUS WITH DIABETIC POLYNEUROPATHY, WITH LONG-TERM CURRENT USE OF INSULIN: ICD-10-CM

## 2019-02-27 DIAGNOSIS — Z79.4 TYPE 2 DIABETES MELLITUS WITH HYPERGLYCEMIA, WITH LONG-TERM CURRENT USE OF INSULIN: Primary | ICD-10-CM

## 2019-02-27 DIAGNOSIS — E11.65 TYPE 2 DIABETES MELLITUS WITH HYPERGLYCEMIA, WITH LONG-TERM CURRENT USE OF INSULIN: ICD-10-CM

## 2019-02-27 DIAGNOSIS — K74.60 CIRRHOSIS OF LIVER WITH ASCITES, UNSPECIFIED HEPATIC CIRRHOSIS TYPE: ICD-10-CM

## 2019-02-27 DIAGNOSIS — R18.8 CIRRHOSIS OF LIVER WITH ASCITES, UNSPECIFIED HEPATIC CIRRHOSIS TYPE: ICD-10-CM

## 2019-02-27 LAB
25(OH)D3+25(OH)D2 SERPL-MCNC: 13 NG/ML
ALBUMIN SERPL BCP-MCNC: 2.5 G/DL
ALP SERPL-CCNC: 224 U/L
ALT SERPL W/O P-5'-P-CCNC: 35 U/L
ANION GAP SERPL CALC-SCNC: 7 MMOL/L
AST SERPL-CCNC: 72 U/L
BACTERIA SPEC ANAEROBE CULT: NORMAL
BILIRUB SERPL-MCNC: 1 MG/DL
BUN SERPL-MCNC: 25 MG/DL
CALCIUM SERPL-MCNC: 8.8 MG/DL
CHLORIDE SERPL-SCNC: 97 MMOL/L
CO2 SERPL-SCNC: 29 MMOL/L
CREAT SERPL-MCNC: 1 MG/DL
EST. GFR  (AFRICAN AMERICAN): >60 ML/MIN/1.73 M^2
EST. GFR  (NON AFRICAN AMERICAN): 58 ML/MIN/1.73 M^2
GLUCOSE SERPL-MCNC: 384 MG/DL
POTASSIUM SERPL-SCNC: 4.5 MMOL/L
PROT SERPL-MCNC: 6.8 G/DL
SODIUM SERPL-SCNC: 133 MMOL/L

## 2019-02-27 PROCEDURE — 80053 COMPREHEN METABOLIC PANEL: CPT

## 2019-02-27 PROCEDURE — 99214 OFFICE O/P EST MOD 30 MIN: CPT | Mod: S$GLB,,, | Performed by: NURSE PRACTITIONER

## 2019-02-27 PROCEDURE — 82306 VITAMIN D 25 HYDROXY: CPT

## 2019-02-27 PROCEDURE — 36415 COLL VENOUS BLD VENIPUNCTURE: CPT | Mod: PO

## 2019-02-27 PROCEDURE — 99999 PR PBB SHADOW E&M-EST. PATIENT-LVL V: CPT | Mod: PBBFAC,,, | Performed by: NURSE PRACTITIONER

## 2019-02-27 PROCEDURE — 99999 PR PBB SHADOW E&M-EST. PATIENT-LVL V: ICD-10-PCS | Mod: PBBFAC,,, | Performed by: NURSE PRACTITIONER

## 2019-02-27 PROCEDURE — 93306 TTE W/DOPPLER COMPLETE: CPT

## 2019-02-27 PROCEDURE — 99214 PR OFFICE/OUTPT VISIT, EST, LEVL IV, 30-39 MIN: ICD-10-PCS | Mod: S$GLB,,, | Performed by: NURSE PRACTITIONER

## 2019-02-27 RX ORDER — INSULIN ASPART 100 [IU]/ML
INJECTION, SOLUTION INTRAVENOUS; SUBCUTANEOUS
Qty: 45 ML | Refills: 3 | Status: SHIPPED | OUTPATIENT
Start: 2019-02-27 | End: 2019-03-13

## 2019-02-27 NOTE — PATIENT INSTRUCTIONS
Per Primary on B12 injection:  - This is a muscular injection:  Take 1000 mcg everyday x1week, then every week  [id number], then monthly thereafter.      Vitamin D:  - Take 1 tablet weekly of ergocalciferol

## 2019-02-27 NOTE — PROGRESS NOTES
CC: This 68 y.o. female presents for management of diabetes and chronic conditions pending review including HTN, HLP, fatty liver w cirrhosis     HPI: She was diagnosed with gestational diabetes during her 4th pregnancy in 1975. Resolved and then in the 1980s was diagnosed with type 2 diabetes.  Has never been hospitalized r/t DM.  Family hx of DM: father, brothers x 3, sister, daughter    First visit with endocrine, was seen by DM educator, DAYO Martinez RD recently, frequent hypoglycemia- I reduced her insulin form 120 u qd to 40 u qd  Most hypos have now resolved  Metformin stopped r/t worsening, symptomatic  Bg now grossly elevated      Brings log  monitoring BG at home:              Diet: Eats 1-2  Meals a day, snacks- rarely, drinking 1-2 Premier Protein shakes daily  Exercise: none but she is doing housework   CURRENT DM MEDS: 40 u lantus qam, glimerperide 2 mg qam  Vial/pen:  Uses pen  Glucometer type:  Accu check Liz     Standards of Care:  Eye exam: Dr. Hadley > 1 year- will schedule     Following w WHIT Plunkett NP in hepatology        ROS:   Gen: poor appetite, + weight loss 21lbs, + fatigue   Skin: Skin is intact and heals well, no rashes, no hair changes  Eyes: Denies visual disturbances  Resp: no SOB or SANTOS, no cough  Cardiac: No palpitations, chest pain, no edema   GI: nonausea, no vomiting, diarrhea, constipation, or   + bloating  /GYN: 0-1+ nocturia, burning or pain.   MS/Neuro: +numbness/ tingling in BLE; Gait steady, speech clear  Psych: Denies drug/ETOH abuse, +depression.  Other systems: negative.    PE:  GENERAL: Well developed, well nourished.  PSYCH: AAOx3, appropriate mood and affect, pleasant expression, conversant, appears relaxed, well groomed.   EYES: Conjunctiva, corneas clear  NECK: Supple, trachea midline,no thyromegaly or nodules  VASCULAR: DP pulses +2/4 bilaterally, no edema.  NEURO: Gait steady  SKIN: Skin warm and dry no acanthosis nigracans.  FOOT EXAMINATION: 2/6/19  No foot  deformity, corns or callus formation, Onychomycosis, nails in good condition and well trimmed, no interspace maceration or ulceration noted.  Decreased hair growth present over toes/feet.  Positive vibratory response to 128 Hz tuning fork bilaterally.  Protective sensation intact with 10 gram monofilament.  +2 dorsalis pedis and posterior pulses noted.      Lab Results   Component Value Date    MICALBCREAT 2 02/07/2019       Hemoglobin A1C   Date Value Ref Range Status   02/07/2019 7.7 (H) <5.7 % of total Hgb Final     Comment:     For someone without known diabetes, a hemoglobin A1c  value of 6.5% or greater indicates that they may have   diabetes and this should be confirmed with a follow-up   test.     For someone with known diabetes, a value <7% indicates   that their diabetes is well controlled and a value   greater than or equal to 7% indicates suboptimal   control. A1c targets should be individualized based on   duration of diabetes, age, comorbid conditions, and   other considerations.     Currently, no consensus exists regarding use of  hemoglobin A1c for diagnosis of diabetes for children.         11/12/2018 8.9 (A) 4.0 - 6.0 % Final        ASSESSMENT and PLAN:    1. T2DM with hyperglycemia, DM PN-  Change lantus to 40 u qd;  Start Novolog 10 u AC + correction 150/25  D/c glimeperide   Check bg times a day- log in 1 week  Discussed A1c and BG goals.  - takes ASA,  statin    2. HTN - controlled, continue meds as previously prescribed and monitor.     3. HLP -  on statin therapy, LFTs WNL    4. Cirrhosis- following closely brody Plunkett NP      5. Morbid obesity-  Body mass index is 44.09 kg/m².   encouraged to drink protein shakes    6. Vitamin D deficiency - continue ergo weekly    Follow-up: in 3 months with lab prior

## 2019-02-28 ENCOUNTER — TELEPHONE (OUTPATIENT)
Dept: HEPATOLOGY | Facility: CLINIC | Age: 69
End: 2019-02-28

## 2019-02-28 LAB
AORTIC ROOT ANNULUS: 2.69 CM
AORTIC VALVE CUSP SEPERATION: 2 CM
AV INDEX (PROSTH): 0.9
AV MEAN GRADIENT: 4.9 MMHG
AV PEAK GRADIENT: 8.07 MMHG
AV VALVE AREA: 2.84 CM2
AV VELOCITY RATIO: 0.97
BSA FOR ECHO PROCEDURE: 2.19 M2
CV ECHO LV RWT: 0.28 CM
DOP CALC AO PEAK VEL: 1.42 M/S
DOP CALC AO VTI: 32.62 CM
DOP CALC LVOT AREA: 3.14 CM2
DOP CALC LVOT DIAMETER: 2 CM
DOP CALC LVOT PEAK VEL: 1.38 M/S
DOP CALC LVOT STROKE VOLUME: 92.69 CM3
DOP CALCLVOT PEAK VEL VTI: 29.52 CM
E WAVE DECELERATION TIME: 151.28 MSEC
E/A RATIO: 2.34
E/E' RATIO: 13
ECHO LV POSTERIOR WALL: 0.78 CM (ref 0.6–1.1)
FRACTIONAL SHORTENING: 36 % (ref 28–44)
INTERVENTRICULAR SEPTUM: 0.62 CM (ref 0.6–1.1)
IVRT: 0.05 MSEC
LEFT ATRIUM SIZE: 4.64 CM
LEFT INTERNAL DIMENSION IN SYSTOLE: 3.51 CM (ref 2.1–4)
LEFT VENTRICLE DIASTOLIC VOLUME INDEX: 71.41 ML/M2
LEFT VENTRICLE DIASTOLIC VOLUME: 147.76 ML
LEFT VENTRICLE MASS INDEX: 65.7 G/M2
LEFT VENTRICLE SYSTOLIC VOLUME INDEX: 24.8 ML/M2
LEFT VENTRICLE SYSTOLIC VOLUME: 51.36 ML
LEFT VENTRICULAR INTERNAL DIMENSION IN DIASTOLE: 5.51 CM (ref 3.5–6)
LEFT VENTRICULAR MASS: 135.93 G
LV LATERAL E/E' RATIO: 20.43
LV SEPTAL E/E' RATIO: 9.53
MV PEAK A VEL: 0.61 M/S
MV PEAK E VEL: 1.43 M/S
PISA TR MAX VEL: 2.45 M/S
PULM VEIN A" WAVE DURATION": 88 MSEC
PV PEAK VELOCITY: 0.85 CM/S
RA PRESSURE: 3 MMHG
RIGHT VENTRICULAR END-DIASTOLIC DIMENSION: 2.56 CM
TDI LATERAL: 0.07
TDI SEPTAL: 0.15
TDI: 0.11
TR MAX PG: 24.01 MMHG
TRICUSPID ANNULAR PLANE SYSTOLIC EXCURSION: 3.52 CM
TV REST PULMONARY ARTERY PRESSURE: 27 MMHG

## 2019-02-28 NOTE — TELEPHONE ENCOUNTER
Please notify pt or daugther labs stable (kidney function stable, potassium normal). BG markedly elevated so remind pt to send BG logs to endocrine NP next week as discussed at endo visit per note    Thanks

## 2019-03-01 ENCOUNTER — HOSPITAL ENCOUNTER (EMERGENCY)
Facility: HOSPITAL | Age: 69
Discharge: HOME | End: 2019-03-01
Attending: EMERGENCY MEDICINE
Payer: MEDICARE

## 2019-03-01 VITALS
BODY MASS INDEX: 44.34 KG/M2 | DIASTOLIC BLOOD PRESSURE: 66 MMHG | OXYGEN SATURATION: 96 % | HEIGHT: 62 IN | TEMPERATURE: 98 F | SYSTOLIC BLOOD PRESSURE: 142 MMHG | HEART RATE: 70 BPM | RESPIRATION RATE: 16 BRPM | WEIGHT: 240.94 LBS

## 2019-03-01 DIAGNOSIS — L29.9 ITCHING: Primary | ICD-10-CM

## 2019-03-01 PROCEDURE — 96372 THER/PROPH/DIAG INJ SC/IM: CPT

## 2019-03-01 PROCEDURE — 63600175 PHARM REV CODE 636 W HCPCS: Performed by: EMERGENCY MEDICINE

## 2019-03-01 PROCEDURE — 99284 EMERGENCY DEPT VISIT MOD MDM: CPT

## 2019-03-01 RX ORDER — HYDROXYZINE PAMOATE 100 MG/1
100 CAPSULE ORAL 3 TIMES DAILY PRN
Qty: 30 CAPSULE | Refills: 0 | Status: SHIPPED | OUTPATIENT
Start: 2019-03-01 | End: 2019-04-26

## 2019-03-01 RX ORDER — HYDROXYZINE 50 MG/ML
100 INJECTION, SOLUTION INTRAMUSCULAR
Status: COMPLETED | OUTPATIENT
Start: 2019-03-01 | End: 2019-03-01

## 2019-03-01 RX ADMIN — HYDROXYZINE HYDROCHLORIDE 100 MG: 50 INJECTION, SOLUTION INTRAMUSCULAR at 12:03

## 2019-03-01 NOTE — TELEPHONE ENCOUNTER
Patient daughter called stated that patient been to ED again twice due to Dehydration, low vit D, low potassium, low vit B12 and itching. They gave her medication for these.     Patient daughter stated that the itching medications doesn't seem to help. She want Ms. Poe know and she wants to know the results of her echo.

## 2019-03-01 NOTE — ED PROVIDER NOTES
"Encounter Date: 3/1/2019    SCRIBE #1 NOTE: I, Savannah Taylor, am scribing for, and in the presence of, Fabrizio Rodriguez MD.       History     Chief Complaint   Patient presents with    Itching     all over        Time seen by provider: 11:51 AM on 03/01/2019    Sejal Matamoros is a 68 y.o. female with pmhx of HTN, A-fib, DM2, Cirrhosis, Portal HTN, Hepatic Encephalopathy, DDD,  who presents to the ED for evaluation of generalized itching. The patient has been experiencing intermittent itching for the past year. Last night, the patient began feeling itching "worse than ever before." She itches all over, but it is worse along her back, arms, and abdomen. She reports starting Zinc Oxide a few days ago for her liver. She has not taken anything to relieve her symptoms.   The patient denies any other symptoms at this time. SHx: Rotator Cuff Repair, Total Knee Arthroplasty, Hysterectomy, Carpal Tunnel Release, Cholecystectomy. Never Smoker. Allergies: Iodinated Contrast, Zoloft.      The history is provided by the patient.     Review of patient's allergies indicates:   Allergen Reactions    Iodinated contrast- oral and iv dye Hives    Zoloft [sertraline]      Past Medical History:   Diagnosis Date    A-fib     Anxiety     Cirrhosis     DDD (degenerative disc disease), cervical 3/21/2018    DDD (degenerative disc disease), lumbar 3/21/2018    Decompensated hepatic cirrhosis 12/26/2018    Depression     Diabetes mellitus, type 2     Hepatic encephalopathy 12/26/2018    HLD (hyperlipidemia)     HTN (hypertension)     Hypoalbuminemia 12/26/2018    Hypoglycemia associated with type 2 diabetes mellitus 12/26/2018    Morbid obesity with BMI of 45.0-49.9, adult 12/7/2018    Obesity     Other ascites 2/20/2019    Portal hypertension 12/26/2018    Type 2 diabetes mellitus with hyperglycemia, with long-term current use of insulin 12/6/2018     Past Surgical History:   Procedure Laterality Date    arm " surgery      left    CARPAL TUNNEL RELEASE      left    CHOLECYSTECTOMY      HYSTERECTOMY      ROTATOR CUFF REPAIR      TOTAL KNEE ARTHROPLASTY       Family History   Problem Relation Age of Onset    Cirrhosis Brother         RHOADES cirrhosis     Social History     Tobacco Use    Smoking status: Never Smoker    Smokeless tobacco: Never Used   Substance Use Topics    Alcohol use: No     Frequency: Never    Drug use: No     Review of Systems   Constitutional: Negative for fever.   HENT: Negative for sore throat.    Respiratory: Negative for shortness of breath.    Cardiovascular: Negative for chest pain.   Gastrointestinal: Negative for nausea.   Genitourinary: Negative for dysuria.   Musculoskeletal: Negative for back pain.   Skin: Negative for rash.        Endorses generalized itching.    Neurological: Negative for weakness.   Hematological: Does not bruise/bleed easily.       Physical Exam     Initial Vitals [03/01/19 0945]   BP Pulse Resp Temp SpO2   (!) 142/66 70 16 98 °F (36.7 °C) 96 %      MAP       --         Physical Exam    Nursing note and vitals reviewed.  Constitutional: She appears well-developed and well-nourished.  Non-toxic appearance. No distress.   HENT:   Head: Normocephalic and atraumatic.   Eyes: EOM are normal. Pupils are equal, round, and reactive to light.   Neck: Normal range of motion. Neck supple. No neck rigidity. No JVD present.   Cardiovascular: Normal rate, regular rhythm, normal heart sounds and intact distal pulses. Exam reveals no gallop and no friction rub.    No murmur heard.  Pulmonary/Chest: Breath sounds normal. She has no wheezes. She has no rhonchi. She has no rales.   Abdominal: Soft. Bowel sounds are normal. She exhibits distension. There is no tenderness. There is no rigidity, no rebound and no guarding.   Distended abdomen, non-tender.    Musculoskeletal: Normal range of motion.   Neurological: She is alert and oriented to person, place, and time. She has normal  strength and normal reflexes. No cranial nerve deficit or sensory deficit. She exhibits normal muscle tone. Coordination normal. GCS eye subscore is 4. GCS verbal subscore is 5. GCS motor subscore is 6.   Skin: Skin is warm and dry. Bruising noted.   Diffuse bruising noted.    Psychiatric: She has a normal mood and affect. Her speech is normal and behavior is normal. She is not actively hallucinating.         ED Course   Procedures  Labs Reviewed - No data to display       Imaging Results    None          Medical Decision Making:   History:   Old Medical Records: I decided to obtain old medical records.  Initial Assessment:   68-year-old woman who presents to the ER for evaluation of pruritus for the past year, worse over the past few days.  Patient has a history of chronic liver disease.  There are no hives/urticaria noted on examination. I doubt this is secondary to anaphylactic reaction.  She has no wheezing or evidence of respiratory distress. I see no rash on her body.  Evaluation recent labs done 2 days ago showed normal bilirubin mildly elevated liver enzymes.  Patient given injection of Vistaril.  She experienced significant improvement.  She will be given oral Vistaril for home use and is to follow up with her hepatologist and primary care physician.  Return precautions discussed.  She is discharged improved in no acute distress.            Scribe Attestation:   Scribe #1: I performed the above scribed service and the documentation accurately describes the services I performed. I attest to the accuracy of the note.     I, Michael Wolf, personally performed the services described in this documentation. All medical record entries made by the scribe were at my direction and in my presence.  I have reviewed the chart and agree that the record reflects my personal performance and is accurate and complete. Fabrizio Rodriguez MD.           Clinical Impression:     1. Itching                                  Fabrizio Rodriguez MD  03/01/19 1948

## 2019-03-01 NOTE — ED NOTES
"C/o all over itching x1 year "after I started with this liver problem" on and off itching that lasts a week at a time pt does not know anything that has made it better. Even non labored respirations spouse at bedside aware to notify nurse of needs or concerns.   "

## 2019-03-04 NOTE — TELEPHONE ENCOUNTER
"Please contact pt/family to schedule pt for 1st available appt with Dr. Cerrato for "decompensated cirrhosis, f/u [facility]"    Also, please have pt repeat labs this week (CMP, INR)    Please notify pt/family that I return to the office on Wednesday and will call with echo results once I get her repeat lab results this week     "

## 2019-03-06 DIAGNOSIS — K74.60 DECOMPENSATED HEPATIC CIRRHOSIS: Primary | ICD-10-CM

## 2019-03-06 DIAGNOSIS — K72.90 DECOMPENSATED HEPATIC CIRRHOSIS: Primary | ICD-10-CM

## 2019-03-06 NOTE — TELEPHONE ENCOUNTER
MA called patient daughter schedule her labs tomorrow 3/7 in [place] and schedule follow up visit to see Dr. Cerrato on 4/26. Mailed appt reminder to patient.

## 2019-03-07 ENCOUNTER — TELEPHONE (OUTPATIENT)
Dept: ENDOCRINOLOGY | Facility: CLINIC | Age: 69
End: 2019-03-07

## 2019-03-07 ENCOUNTER — TELEPHONE (OUTPATIENT)
Dept: HEPATOLOGY | Facility: CLINIC | Age: 69
End: 2019-03-07

## 2019-03-07 ENCOUNTER — HOSPITAL ENCOUNTER (EMERGENCY)
Facility: HOSPITAL | Age: 69
Discharge: HOME | End: 2019-03-07
Attending: FAMILY MEDICINE
Payer: MEDICARE

## 2019-03-07 VITALS
DIASTOLIC BLOOD PRESSURE: 59 MMHG | OXYGEN SATURATION: 99 % | BODY MASS INDEX: 43.06 KG/M2 | RESPIRATION RATE: 20 BRPM | WEIGHT: 234 LBS | HEART RATE: 74 BPM | HEIGHT: 62 IN | SYSTOLIC BLOOD PRESSURE: 101 MMHG | TEMPERATURE: 99 F

## 2019-03-07 DIAGNOSIS — K72.90 DECOMPENSATED HEPATIC CIRRHOSIS: Primary | ICD-10-CM

## 2019-03-07 DIAGNOSIS — K76.9 HEPATIC DISEASE: ICD-10-CM

## 2019-03-07 DIAGNOSIS — K74.60 DECOMPENSATED HEPATIC CIRRHOSIS: Primary | ICD-10-CM

## 2019-03-07 DIAGNOSIS — M79.10 MYALGIA: Primary | ICD-10-CM

## 2019-03-07 LAB
AMMONIA PLAS-SCNC: 38 UMOL/L
CK SERPL-CCNC: 68 U/L
EXT A-SMA: NORMAL
EXT A1AT LEVEL: NORMAL
EXT A1AT PHENOTYPE: NORMAL
EXT AFP: NORMAL
EXT ALBUMIN: NORMAL
EXT ALP: NORMAL
EXT ALT: NORMAL
EXT AMA: NORMAL
EXT AMYLASE: NORMAL
EXT ANA: NORMAL
EXT ANCA/MPO/PR3 ANTIBODIES: NORMAL
EXT ANTI THYROID AB: NORMAL
EXT ANTI-E.HISTOLYTICA AB: NORMAL
EXT AST: NORMAL
EXT BASOPHIL%: NORMAL
EXT BILIRUBIN DIRECT: NORMAL MG/DL
EXT BILIRUBIN DIRECT: NORMAL MG/DL
EXT BILIRUBIN TOTAL: NORMAL
EXT BILIRUBIN TOTAL: NORMAL
EXT BUN: NORMAL
EXT C-REACTIVE PROTEIN: NORMAL
EXT CA 125: NORMAL
EXT CA 19-9: NORMAL
EXT CALCIUM: NORMAL
EXT CEA: NORMAL
EXT CERULOPLASMIN: NORMAL
EXT CHLORIDE: NORMAL
EXT CHOLESTEROL: NORMAL
EXT CO2: NORMAL
EXT CREATININE: NORMAL MG/DL
EXT CRYOGLOBULIN: NORMAL
EXT EBV DNA BY PCR: NORMAL
EXT ECHINOCOCCUS AB: NORMAL
EXT EOSINOPHIL%: NORMAL
EXT FERRITIN: NORMAL
EXT FOLATE: NORMAL
EXT GGT: NORMAL
EXT GLUCOSE UA: NORMAL
EXT GLUCOSE: NORMAL
EXT HA AB: NORMAL
EXT HBC AB: NORMAL
EXT HBE AB: NORMAL
EXT HBS AB: NORMAL
EXT HBS AG: NORMAL
EXT HBV DNA PCR QUANT: NORMAL
EXT HCV AB: NORMAL
EXT HCV FIBROSURE: NORMAL
EXT HCV GENOTYPE: NORMAL
EXT HCV QUALITATIVE: NORMAL
EXT HCV RNA QUANT PCR: NORMAL
EXT HDL: NORMAL
EXT HEMATOCRIT: NORMAL
EXT HEMOCHROMATOSIS GENE ANALYSIS: NORMAL
EXT HEMOGLOBIN A1C: NORMAL
EXT HEMOGLOBIN A1C: NORMAL
EXT HEMOGLOBIN: NORMAL
EXT HEP B MUTATION, ANALYSIS: NORMAL
EXT HIV: NORMAL
EXT IGG: NORMAL
EXT IGM: NORMAL
EXT INR: 1.8
EXT INSULIN: NORMAL
EXT IRON SATURATION: NORMAL
EXT LDH, TOTAL: NORMAL
EXT LDH, TOTAL: NORMAL
EXT LDL CHOLESTEROL: NORMAL
EXT LIPASE: NORMAL
EXT LYMPH%: NORMAL
EXT MCH: NORMAL
EXT MCHC: NORMAL
EXT MCV: NORMAL
EXT MONOCYTES%: NORMAL
EXT MPV: NORMAL
EXT NEUT%: NORMAL
EXT PLATELETS: NORMAL
EXT POTASSIUM: NORMAL
EXT PROTEIN TOTAL: NORMAL
EXT PROTEIN TOTAL: NORMAL
EXT PROTHROMBIN TIME, INR (MECH HEART VALVE/ANTIPHOSPHOLIPID): NORMAL
EXT PT: NORMAL
EXT PT: NORMAL
EXT RBC UA: NORMAL
EXT RDW: NORMAL
EXT RETICULOCYTE COUNT: NORMAL
EXT RIBA RESULT: NORMAL
EXT SODIUM: NORMAL MMOL/L
EXT TACROLIMUS LVL: NORMAL
EXT TPMT ENZYME: NORMAL
EXT TPMT GENETICS: NORMAL
EXT TRANSGLUTAMINASE: NORMAL
EXT TRIGLYCERIDES: NORMAL
EXT TRIGLYCERIDES: NORMAL
EXT TSH: NORMAL
EXT VITAMIN B12: NORMAL
EXT WBC: NORMAL
HBE AG: NORMAL

## 2019-03-07 PROCEDURE — 99283 EMERGENCY DEPT VISIT LOW MDM: CPT

## 2019-03-07 PROCEDURE — 82550 ASSAY OF CK (CPK): CPT

## 2019-03-07 PROCEDURE — 82140 ASSAY OF AMMONIA: CPT

## 2019-03-07 PROCEDURE — 25000003 PHARM REV CODE 250: Performed by: FAMILY MEDICINE

## 2019-03-07 RX ORDER — HYDROCODONE BITARTRATE AND ACETAMINOPHEN 5; 325 MG/1; MG/1
1 TABLET ORAL
Status: COMPLETED | OUTPATIENT
Start: 2019-03-07 | End: 2019-03-07

## 2019-03-07 RX ORDER — HYDROCODONE BITARTRATE AND ACETAMINOPHEN 5; 325 MG/1; MG/1
1 TABLET ORAL EVERY 8 HOURS PRN
Qty: 12 TABLET | Refills: 0 | Status: SHIPPED | OUTPATIENT
Start: 2019-03-07 | End: 2019-07-08 | Stop reason: CLARIF

## 2019-03-07 RX ADMIN — HYDROCODONE BITARTRATE AND ACETAMINOPHEN 1 TABLET: 5; 325 TABLET ORAL at 02:03

## 2019-03-07 NOTE — TELEPHONE ENCOUNTER
Called pt post ED visit, spoke to daughter. Pt c/o myalgias in upper body. Denies fever.   Denies weight gain, PO intake improving since last ED visit of reported dehydration. Has not needed paracentesis in last couple of weeks since diuretic increase    Daughter reports that pt was discharged from ED and given prescription pain medication (Norco) for generalized upper body myopathy.    Instructed daughter NOT to take prescription pain medication, as it could worsen encephalopathy and unclear etiology of myopathy. Recommended for pt to f/u with local PCP today for evaluation to determine etiology. Recommended to daughter for pt not to take narcotic pain medication, muscle relaxants, or anything that has potential for sedating effects because of encephalopathy and high potential for worsening encephalopathy that could require hospital admission. Daughter verbalized understanding    Also discussed lab results, including stable CMP. However, INR elevated (uncharacteristic based on previous labs, stable CMP, suspect lab error)    Pt to repeat INR at local lab near her home this afternoon, gave daughter personal fax number for repeat INR to be faxed to office after repeat.     Reiterated high protein, low Na intake from a liver disease standpoint.     Also, BG markedly elevated on labs, recommended contacting [facility] today for further med dosing instructions

## 2019-03-07 NOTE — TELEPHONE ENCOUNTER
Called patient and received her blood sugar logs verbally over the phone. Blood sugar numbers reviewed by Jammie MONTERO. Called and spoke to Danitza and informed her to have the patient increase her lantus to 45 units daily and increase her Novolog to 15 units as instructed by Jammie MONTERO. Danitza verbalized understanding, patient is to call next Wednesday with logs and update.

## 2019-03-07 NOTE — TELEPHONE ENCOUNTER
Called daughter to discuss repeat lab, INR remains elevated on outside lab done locally (INR 1.8). Increase in INR increases MELD to 17 and also with other s/s decompensation in past few months (ascites, HE). Discussed results of Echo, which are stable.     Discussed case with Dr. Cerrato, will continue to follow closely and will consider transplant evaluation if MELD remains >15. . Will repeat labs Wednesday (date chosen by daughter). Discussed with daughter if INR remains elevated or increases, may be discussing transplant evaluation, daughter verbalized understanding.     Reiterated, as in past visits, to proceed to local ER for any s/s of bleeding, currently none.

## 2019-03-07 NOTE — LETTER
March 7, 2019        Deandre Lundberg NP  [address]  [place] [address]             [facility]  [address]  [facility] [address]  Phone: [phone]  Fax: [phone]   Patient: Sejal Matamoros   MR Number: [id number]   YOB: 1950   Date of Visit: 3/7/2019     Thanks for sending the labs. See attached note. May consider transplant eval if INR remains elevated or worsens, MELD now 17 because of increase in INR.     Sincerely,      Deepika Plunkett, TARUN            CC  No Recipients    Enclosure

## 2019-03-07 NOTE — ED TRIAGE NOTES
PT TO ED WITH C/O GENERALIZED PAIN, WORSE IN NECK & LEFT SHOULDER. PT STATES PAIN WOKE HER UP FROM SLEEP X 2 HOURS PTA, PT ALSO C/O NAUSEA. ALMA,RN

## 2019-03-07 NOTE — TELEPHONE ENCOUNTER
Danitza called and says that her liver provider Coral Plunkett NP called and says that she did labs yesterday and the patients blood sugars were in the 400's and informed Danitza to contact Jammie MONTERO and see what can be done to get the patients blood sugar down. If we need to call them back with instructions Danitza would like us to call her.

## 2019-03-07 NOTE — ED PROVIDER NOTES
Encounter Date: 3/7/2019       History     Chief Complaint   Patient presents with    Neck Pain    Generalized Body Aches     pt c/o pain all over but worse in neck & left shoulder     68-year-old female presents complaining feeling achy and sore all over she is visiting her daughter and [place], the patient lives in [place], [place], she is currently under active evaluation for liver disease by hepatologist at [facility], she is scheduled for routine lab work today          Review of patient's allergies indicates:   Allergen Reactions    Iodinated contrast- oral and iv dye Hives    Zoloft [sertraline]      Past Medical History:   Diagnosis Date    A-fib     Anxiety     Cirrhosis     DDD (degenerative disc disease), cervical 3/21/2018    DDD (degenerative disc disease), lumbar 3/21/2018    Decompensated hepatic cirrhosis 12/26/2018    Depression     Diabetes mellitus, type 2     Hepatic encephalopathy 12/26/2018    HLD (hyperlipidemia)     HTN (hypertension)     Hypoalbuminemia 12/26/2018    Hypoglycemia associated with type 2 diabetes mellitus 12/26/2018    Morbid obesity with BMI of 45.0-49.9, adult 12/7/2018    Obesity     Other ascites 2/20/2019    Portal hypertension 12/26/2018    Type 2 diabetes mellitus with hyperglycemia, with long-term current use of insulin 12/6/2018     Past Surgical History:   Procedure Laterality Date    arm surgery      left    CARPAL TUNNEL RELEASE      left    CHOLECYSTECTOMY      HYSTERECTOMY      ROTATOR CUFF REPAIR      TOTAL KNEE ARTHROPLASTY       Family History   Problem Relation Age of Onset    Cirrhosis Brother         RHOADES cirrhosis     Social History     Tobacco Use    Smoking status: Never Smoker    Smokeless tobacco: Never Used   Substance Use Topics    Alcohol use: No     Frequency: Never    Drug use: No     Review of Systems   Constitutional: Negative for  fever.   HENT: Negative for sore throat.    Respiratory: Negative for shortness of breath.    Cardiovascular: Negative for chest pain.   Gastrointestinal: Negative for nausea.   Genitourinary: Negative for dysuria.   Musculoskeletal: Positive for arthralgias and myalgias. Negative for back pain, gait problem and joint swelling.   Skin: Negative for rash.   Neurological: Positive for weakness. Negative for seizures, numbness and headaches.   Hematological: Does not bruise/bleed easily.       Physical Exam     Initial Vitals [03/07/19 0143]   BP Pulse Resp Temp SpO2   (!) 133/52 74 20 98.5 °F (36.9 °C) 99 %      MAP       --         Physical Exam    Nursing note and vitals reviewed.  Constitutional: She appears well-developed and well-nourished. She is not diaphoretic. No distress.   HENT:   Head: Normocephalic and atraumatic.   Right Ear: External ear normal.   Left Ear: External ear normal.   Eyes: Pupils are equal, round, and reactive to light. Right eye exhibits no discharge. Left eye exhibits no discharge.   Neck: No tracheal deviation present. No JVD present.   Cardiovascular: Exam reveals no friction rub.    No murmur heard.  Pulmonary/Chest: No stridor. No respiratory distress. She has no wheezes. She has no rales.   Abdominal: Bowel sounds are normal. She exhibits no distension.   Musculoskeletal: Normal range of motion. She exhibits tenderness.   Positive tenderness to her muscles generally and her legs back arm and neck   Neurological: She is alert.   Skin: Skin is warm.   Psychiatric: She has a normal mood and affect.         ED Course   Procedures  Labs Reviewed - No data to display       Imaging Results    None                               Clinical Impression:       ICD-10-CM ICD-9-CM   1. Myalgia M79.10 729.1   2. Hepatic disease K76.9 573.9                                Catracho Dawson MD  03/07/19 0336

## 2019-03-13 ENCOUNTER — LAB VISIT (OUTPATIENT)
Dept: LAB | Facility: HOSPITAL | Age: 69
End: 2019-03-13
Attending: NURSE PRACTITIONER
Payer: MEDICARE

## 2019-03-13 ENCOUNTER — TELEPHONE (OUTPATIENT)
Dept: HEPATOLOGY | Facility: CLINIC | Age: 69
End: 2019-03-13

## 2019-03-13 ENCOUNTER — TELEPHONE (OUTPATIENT)
Dept: ENDOCRINOLOGY | Facility: CLINIC | Age: 69
End: 2019-03-13

## 2019-03-13 DIAGNOSIS — K74.60 DECOMPENSATED HEPATIC CIRRHOSIS: ICD-10-CM

## 2019-03-13 DIAGNOSIS — K72.90 DECOMPENSATED HEPATIC CIRRHOSIS: ICD-10-CM

## 2019-03-13 LAB
ALBUMIN SERPL BCP-MCNC: 2.4 G/DL
ALP SERPL-CCNC: 277 U/L
ALT SERPL W/O P-5'-P-CCNC: 32 U/L
ANION GAP SERPL CALC-SCNC: 4 MMOL/L
AST SERPL-CCNC: 54 U/L
BASOPHILS # BLD AUTO: 0.04 K/UL
BASOPHILS NFR BLD: 0.9 %
BILIRUB SERPL-MCNC: 0.9 MG/DL
BUN SERPL-MCNC: 21 MG/DL
CALCIUM SERPL-MCNC: 9.2 MG/DL
CHLORIDE SERPL-SCNC: 99 MMOL/L
CO2 SERPL-SCNC: 30 MMOL/L
CREAT SERPL-MCNC: 1 MG/DL
DIFFERENTIAL METHOD: ABNORMAL
EOSINOPHIL # BLD AUTO: 0.1 K/UL
EOSINOPHIL NFR BLD: 1.3 %
ERYTHROCYTE [DISTWIDTH] IN BLOOD BY AUTOMATED COUNT: 15.9 %
EST. GFR  (AFRICAN AMERICAN): >60 ML/MIN/1.73 M^2
EST. GFR  (NON AFRICAN AMERICAN): 58 ML/MIN/1.73 M^2
GLUCOSE SERPL-MCNC: 304 MG/DL
HCT VFR BLD AUTO: 30.4 %
HGB BLD-MCNC: 9.9 G/DL
IMM GRANULOCYTES # BLD AUTO: 0.01 K/UL
IMM GRANULOCYTES NFR BLD AUTO: 0.2 %
INR PPP: 1.2
LYMPHOCYTES # BLD AUTO: 1.3 K/UL
LYMPHOCYTES NFR BLD: 29.1 %
MCH RBC QN AUTO: 31.1 PG
MCHC RBC AUTO-ENTMCNC: 32.6 G/DL
MCV RBC AUTO: 96 FL
MONOCYTES # BLD AUTO: 0.5 K/UL
MONOCYTES NFR BLD: 10.2 %
NEUTROPHILS # BLD AUTO: 2.7 K/UL
NEUTROPHILS NFR BLD: 58.3 %
NRBC BLD-RTO: 0 /100 WBC
PLATELET # BLD AUTO: 218 K/UL
PMV BLD AUTO: 11.7 FL
POTASSIUM SERPL-SCNC: 4.2 MMOL/L
PROT SERPL-MCNC: 7.1 G/DL
PROTHROMBIN TIME: 11.8 SEC
RBC # BLD AUTO: 3.18 M/UL
SODIUM SERPL-SCNC: 133 MMOL/L
WBC # BLD AUTO: 4.61 K/UL

## 2019-03-13 PROCEDURE — 80053 COMPREHEN METABOLIC PANEL: CPT

## 2019-03-13 PROCEDURE — 36415 COLL VENOUS BLD VENIPUNCTURE: CPT | Mod: PO

## 2019-03-13 PROCEDURE — 85610 PROTHROMBIN TIME: CPT

## 2019-03-13 PROCEDURE — 85025 COMPLETE CBC W/AUTO DIFF WBC: CPT

## 2019-03-13 RX ORDER — INSULIN ASPART 100 [IU]/ML
INJECTION, SOLUTION INTRAVENOUS; SUBCUTANEOUS
Qty: 45 ML | Refills: 3 | Status: ON HOLD | OUTPATIENT
Start: 2019-03-13 | End: 2019-04-07 | Stop reason: SDUPTHER

## 2019-03-13 NOTE — TELEPHONE ENCOUNTER
MELD-Na score: 8 at 3/13/2019  9:30 AM  MELD score: 8 at 3/13/2019  9:30 AM  Calculated from:  Serum Creatinine: 1 mg/dL at 3/13/2019  9:30 AM  Serum Sodium: 133 mmol/L at 3/13/2019  9:30 AM  Total Bilirubin: 0.9 mg/dL (Rounded to 1 mg/dL) at 3/13/2019  9:30 AM  INR(ratio): 1.2 at 3/13/2019  9:30 AM  Age: 68 years    Called daughter Danitza, notified repeat labs stable, MELD improved back to 8. F/u as scheduled next week with labs and visit, daughter verbalized understanding

## 2019-03-13 NOTE — TELEPHONE ENCOUNTER
Bg log for review    Increase Lantus to 50 u qam, Increase Novolog to 15 u AC  Check bg ac/hs  Log in 1 week!

## 2019-03-13 NOTE — TELEPHONE ENCOUNTER
Called and spoke to the patient and informed her of the instructions from Jammie MONTERO regarding the patients blood sugar logs. Patient verbalized understanding.

## 2019-03-21 ENCOUNTER — TELEPHONE (OUTPATIENT)
Dept: HEPATOLOGY | Facility: CLINIC | Age: 69
End: 2019-03-21

## 2019-03-21 ENCOUNTER — OFFICE VISIT (OUTPATIENT)
Dept: HEPATOLOGY | Facility: CLINIC | Age: 69
End: 2019-03-21
Payer: MEDICARE

## 2019-03-21 VITALS
DIASTOLIC BLOOD PRESSURE: 63 MMHG | BODY MASS INDEX: 44.83 KG/M2 | SYSTOLIC BLOOD PRESSURE: 134 MMHG | RESPIRATION RATE: 20 BRPM | HEIGHT: 62 IN | HEART RATE: 78 BPM | WEIGHT: 243.63 LBS | OXYGEN SATURATION: 97 % | TEMPERATURE: 97 F

## 2019-03-21 DIAGNOSIS — E88.09 HYPOALBUMINEMIA: ICD-10-CM

## 2019-03-21 DIAGNOSIS — L29.9 ITCHING: ICD-10-CM

## 2019-03-21 DIAGNOSIS — Z79.4 TYPE 2 DIABETES MELLITUS WITH HYPERGLYCEMIA, WITH LONG-TERM CURRENT USE OF INSULIN: ICD-10-CM

## 2019-03-21 DIAGNOSIS — K72.90 DECOMPENSATED HEPATIC CIRRHOSIS: Primary | ICD-10-CM

## 2019-03-21 DIAGNOSIS — E11.65 TYPE 2 DIABETES MELLITUS WITH HYPERGLYCEMIA, WITH LONG-TERM CURRENT USE OF INSULIN: ICD-10-CM

## 2019-03-21 DIAGNOSIS — R93.2 ABNORMAL FINDINGS ON DIAGNOSTIC IMAGING OF LIVER AND BILIARY TRACT: ICD-10-CM

## 2019-03-21 DIAGNOSIS — K76.82 HEPATIC ENCEPHALOPATHY: ICD-10-CM

## 2019-03-21 DIAGNOSIS — R10.11 RUQ PAIN: ICD-10-CM

## 2019-03-21 DIAGNOSIS — R18.8 CIRRHOSIS OF LIVER WITH ASCITES, UNSPECIFIED HEPATIC CIRRHOSIS TYPE: ICD-10-CM

## 2019-03-21 DIAGNOSIS — K74.60 DECOMPENSATED HEPATIC CIRRHOSIS: Primary | ICD-10-CM

## 2019-03-21 DIAGNOSIS — R18.8 OTHER ASCITES: ICD-10-CM

## 2019-03-21 DIAGNOSIS — I10 ESSENTIAL HYPERTENSION: ICD-10-CM

## 2019-03-21 DIAGNOSIS — R74.8 ELEVATED ALKALINE PHOSPHATASE LEVEL: ICD-10-CM

## 2019-03-21 DIAGNOSIS — K76.6 PORTAL HYPERTENSION: ICD-10-CM

## 2019-03-21 DIAGNOSIS — E66.01 MORBID OBESITY WITH BMI OF 40.0-44.9, ADULT: ICD-10-CM

## 2019-03-21 DIAGNOSIS — K74.60 CIRRHOSIS OF LIVER WITH ASCITES, UNSPECIFIED HEPATIC CIRRHOSIS TYPE: ICD-10-CM

## 2019-03-21 DIAGNOSIS — E78.2 MIXED HYPERLIPIDEMIA: ICD-10-CM

## 2019-03-21 PROCEDURE — 99999 PR PBB SHADOW E&M-EST. PATIENT-LVL V: ICD-10-PCS | Mod: PBBFAC,,, | Performed by: NURSE PRACTITIONER

## 2019-03-21 PROCEDURE — 3045F PR MOST RECENT HEMOGLOBIN A1C LEVEL 7.0-9.0%: CPT | Mod: CPTII,S$GLB,, | Performed by: NURSE PRACTITIONER

## 2019-03-21 PROCEDURE — 3045F PR MOST RECENT HEMOGLOBIN A1C LEVEL 7.0-9.0%: ICD-10-PCS | Mod: CPTII,S$GLB,, | Performed by: NURSE PRACTITIONER

## 2019-03-21 PROCEDURE — 3075F SYST BP GE 130 - 139MM HG: CPT | Mod: CPTII,S$GLB,, | Performed by: NURSE PRACTITIONER

## 2019-03-21 PROCEDURE — 99999 PR PBB SHADOW E&M-EST. PATIENT-LVL V: CPT | Mod: PBBFAC,,, | Performed by: NURSE PRACTITIONER

## 2019-03-21 PROCEDURE — 3078F DIAST BP <80 MM HG: CPT | Mod: CPTII,S$GLB,, | Performed by: NURSE PRACTITIONER

## 2019-03-21 PROCEDURE — 3075F PR MOST RECENT SYSTOLIC BLOOD PRESS GE 130-139MM HG: ICD-10-PCS | Mod: CPTII,S$GLB,, | Performed by: NURSE PRACTITIONER

## 2019-03-21 PROCEDURE — 99214 OFFICE O/P EST MOD 30 MIN: CPT | Mod: S$GLB,,, | Performed by: NURSE PRACTITIONER

## 2019-03-21 PROCEDURE — 1101F PR PT FALLS ASSESS DOC 0-1 FALLS W/OUT INJ PAST YR: ICD-10-PCS | Mod: CPTII,S$GLB,, | Performed by: NURSE PRACTITIONER

## 2019-03-21 PROCEDURE — 99214 PR OFFICE/OUTPT VISIT, EST, LEVL IV, 30-39 MIN: ICD-10-PCS | Mod: S$GLB,,, | Performed by: NURSE PRACTITIONER

## 2019-03-21 PROCEDURE — 3078F PR MOST RECENT DIASTOLIC BLOOD PRESSURE < 80 MM HG: ICD-10-PCS | Mod: CPTII,S$GLB,, | Performed by: NURSE PRACTITIONER

## 2019-03-21 PROCEDURE — 1101F PT FALLS ASSESS-DOCD LE1/YR: CPT | Mod: CPTII,S$GLB,, | Performed by: NURSE PRACTITIONER

## 2019-03-21 RX ORDER — LACTULOSE 10 G/15ML
20 SOLUTION ORAL 3 TIMES DAILY
Qty: 5000 ML | Refills: 5 | Status: SHIPPED | OUTPATIENT
Start: 2019-03-21 | End: 2020-01-14 | Stop reason: SDUPTHER

## 2019-03-21 RX ORDER — SPIRONOLACTONE 100 MG/1
200 TABLET, FILM COATED ORAL DAILY
Qty: 180 TABLET | Refills: 2 | Status: SHIPPED | OUTPATIENT
Start: 2019-03-21 | End: 2019-05-16

## 2019-03-21 NOTE — Clinical Note
Bety,If ok with you, I scheduled Ms. Matamoros for f/u with you in ~6 weeks. We [facility] her in [facility]. Continues to decompensate (ascites, HE) in past few months, MELD acutely increased to 17 few weeks ago but today back down to 10. Had previously set up f/u when MELD higher but wonder long term if transplant may be in her future given her continued decompensation without much improvement with treatment. Michael

## 2019-03-21 NOTE — PROGRESS NOTES
[facility] Established Patient Visit    Reason for Visit:  Decompensated cryptogenic cirrhosis     PCP: Deandre Lundberg    HPI:  This is a 68 y.o. female with PMH noted below, here for follow up of decompensated cryptogenic cirrhosis  with ascites, hepatic encephalopathy     No history of variceal bleeding      + LE edema, stable     Interval HPI: Presents today with daughter. Last seen 2/20/19.  MELD significantly increased a couple of weeks ago to 17, now 10 today (previously baseline 8)  HE worsening, + intermittent somnolence, worsening memory loss, intermittent disorientation. Reports significant insomnia, does not sleep much at all   Also reports significant pruritis, started approx 3 weeks ago, attempted Hydrozyzine 100 mg prescribed by PCP without much relief  Ascites stable since last visit, has not required para since 2/20, on Lawrence 200 mg daily, Lasix 80 mg daily with some mild abd distension   Last para with ascitic analysis 2/20/19: SAAG 1.4, protein 2.6 - may be multifactorial, protein continues to increase. 2D echo without HF  MRI to r/o malignancy as etiology of worsening ascites, negative for malignancy, cytology 2/13/19 negative for malignancy. Abundant lymphocytes present     Diagnosis of cirrhosis per GI Dr. Jolley approx ~ 9/2017, had ascites and BLE edema at that time. Diagnosis of cirrhosis based on imaging showing hepatic coarse echotexture, liver shrunken with nodular contour. Hepatopedal flow within the portal vein, ascites. No h/o liver biopsy    Lab Results   Component Value Date    ALT 38 03/21/2019    AST 59 (H) 03/21/2019    ALKPHOS 300 (H) 03/21/2019    BILITOT 1.2 (H) 03/21/2019    ALBUMIN 2.5 (L) 03/21/2019    INR 1.3 (H) 03/21/2019     03/21/2019     MELD-Na score: 10 at 3/21/2019  8:55 AM  MELD score: 10 at 3/21/2019  8:55 AM  Calculated from:  Serum Creatinine: 1 mg/dL at 3/21/2019  8:55 AM  Serum Sodium: 132 mmol/L at 3/21/2019  8:55 AM  Total Bilirubin:  1.2 mg/dL at 3/21/2019  8:55 AM  INR(ratio): 1.3 at 3/21/2019  8:55 AM  Age: 68 years  ** MELD low, no current indication for transplant, although may consider in future if ascites refractory and/or HE continues to worsen **     Serological workup was negative for Nigel's, alpha-1 antitrypsin deficiency, hemochromatosis, and viral hepatitis. Negative AMA, TRACIE, IgG. Mildly + ASMA (1:40, can be seen in fatty liver), + IgM. PETH negative    Complications:   1. Ascites : on Lasix 80 mg daily, Lawrence 200 mg daily. Following Low Na diet   2. HE : + Grade 1-2 with fatigue, altered sleep/wake cycle, + memory loss, intermittent disorientation. Denies confusion. Taking Xifaxan BID. Continues to have 3-4 + BM daily, but will try low dose Lactulose due to worsening HE symptoms. Has never tried Lactulose before. On Zinc 220 mg daily      No h/o variceal bleeding      Cirrhosis Health Maintenance:   -- Last EGD : 10/2017, no varices, needs repeat 10/2019     -- HCC screening              U/S - 2/7/19  No focal hepatic lesions, MRI today               AFP - WNL 2/2019, repeat due 8/2019  -- Immunity to Hep A and B - started Hep A vaccine with PCP, + immunity to Hep B     Risk factors for NAFLD include morbid obesity, HTN, HLD, uncontrolled T2DM.       + family history of liver disease : RHOADES cirrhosis in niece and brother. Denies current alcohol consumption or history of significant alcohol consumption in past      Denies jaundice, dark urine, hematemesis, melena. No abnormal skin rashes. No generalized joint or muscle pain. + abdominal distention    PMHX:  has a past medical history of A-fib, Anxiety, Cirrhosis, DDD (degenerative disc disease), cervical (3/21/2018), DDD (degenerative disc disease), lumbar (3/21/2018), Decompensated hepatic cirrhosis (12/26/2018), Depression, Diabetes mellitus, type 2, Hepatic encephalopathy (12/26/2018), HLD (hyperlipidemia), HTN (hypertension), Hypoalbuminemia (12/26/2018), Hypoglycemia  associated with type 2 diabetes mellitus (12/26/2018), Morbid obesity with BMI of 45.0-49.9, adult (12/7/2018), Obesity, Other ascites (2/20/2019), Portal hypertension (12/26/2018), and Type 2 diabetes mellitus with hyperglycemia, with long-term current use of insulin (12/6/2018).    PSHX:  has a past surgical history that includes Rotator cuff repair; Total knee arthroplasty; Hysterectomy; Carpal tunnel release; arm surgery; and Cholecystectomy.    The patient's social and family histories were reviewed by me and updated in the appropriate section of the electronic medical record.    Review of patient's allergies indicates:   Allergen Reactions    Iodinated contrast- oral and iv dye Hives    Zoloft [sertraline]        Current Outpatient Medications on File Prior to Visit   Medication Sig Dispense Refill    ACCU-CHEK SOFTCLIX LANCETS MISC Accu-Chek Softclix Lancets      aspirin (ECOTRIN) 81 MG EC tablet Take 81 mg by mouth once daily.      clonazePAM (KLONOPIN) 0.5 MG tablet clonazepam 0.5 mg tablet   Take 1 tablet twice a day by oral route as needed for 30 days.      ergocalciferol (ERGOCALCIFEROL) 50,000 unit Cap Take 1 capsule (50,000 Units total) by mouth every 7 days. 12 capsule 4    escitalopram oxalate (LEXAPRO) 20 MG tablet Take 20 mg by mouth once daily.      furosemide (LASIX) 40 MG tablet Take 2 tablets (80 mg total) by mouth once daily. DOSE CHANGE 180 tablet 1    gabapentin (NEURONTIN) 600 MG tablet Take 600 mg by mouth 2 (two) times daily.      HYDROcodone-acetaminophen (NORCO) 5-325 mg per tablet Take 1 tablet by mouth every 8 (eight) hours as needed for Pain. 12 tablet 0    hydrOXYzine (VISTARIL) 100 MG capsule Take 1 capsule (100 mg total) by mouth 3 (three) times daily as needed for Itching. 30 capsule 0    insulin aspart U-100 (NOVOLOG FLEXPEN U-100 INSULIN) 100 unit/mL InPn pen 15 units AC + correction Max TDD 60 u 45 mL 3    insulin glargine (LANTUS) 100 unit/mL injection Inject 50  Units into the skin once daily.      ketoconazole (NIZORAL) 2 % cream Apply topically once daily. 60 Tube 3    metoprolol succinate (TOPROL XL) 25 MG 24 hr tablet Take 25 mg by mouth once daily.      omeprazole (PRILOSEC) 40 MG capsule Take 40 mg by mouth once daily.      rifAXIMin (XIFAXAN) 550 mg Tab Take 1 tablet (550 mg total) by mouth 2 (two) times daily. 60 tablet 5    rosuvastatin (CRESTOR) 10 MG tablet Take 10 mg by mouth once daily.      spironolactone (ALDACTONE) 100 MG tablet Take 3 tablets (300 mg total) by mouth once daily. DOSE INCREASE 180 tablet 1    XARELTO 20 mg Tab Take 20 mg by mouth once daily.      zinc 50 mg Tab Take 200 mg by mouth once daily.  0     No current facility-administered medications on file prior to visit.        SOCIAL HISTORY:   Social History     Tobacco Use   Smoking Status Never Smoker   Smokeless Tobacco Never Used       Social History     Substance and Sexual Activity   Alcohol Use No    Frequency: Never       Social History     Substance and Sexual Activity   Drug Use No       ROS:   GENERAL: Denies fever, chills, weight loss/gain, + fatigue  HEENT: Denies headaches, dizziness, vision/hearing changes  CARDIOVASCULAR: Denies chest pain, palpitations, + edema  RESPIRATORY: Denies dyspnea, cough  GI: intermittent RUQ abdominal pain, denies rectal bleeding, nausea, vomiting. No change in bowel pattern or color  : Denies dysuria, hematuria   SKIN: Denies rash, + itching   NEURO: + Grade 1-2 with fatigue, altered sleep/wake cycle, + memory loss, intermittent disorientation. Denies confusion.   PSYCH: Denies depression or anxiety  HEME/LYMPH: + easy bruising, denies bleeding    Objective Findings:    PHYSICAL EXAM:   Friendly White female, Chronically ill-appearing. in no acute distress; alert and oriented to person, place and time  VITALS: /63 (BP Location: Right arm, Patient Position: Sitting, BP Method: Large (Automatic))   Pulse 78   Temp 97.1 °F (36.2 °C)  "(Oral)   Resp 20   Ht 5' 2" (1.575 m)   Wt 110.5 kg (243 lb 9.7 oz)   SpO2 97%   BMI 44.56 kg/m²   HENT: Normocephalic, without obvious abnormality. Oral mucosa pink and moist. Dentition good.  EYES: Sclerae anicteric. No conjunctival pallor.   NECK: Supple. No masses or cervical adenopathy.  CARDIOVASCULAR: Regular rate and rhythm. No murmurs.  RESPIRATORY: Normal respiratory effort. BBS CTA. No wheezes or crackles.  GI: Soft, non-tender, + mildly distended, Ascites, not-tense. + splenomegaly. No masses palpable.   EXTREMITIES:  No clubbing, cyanosis, + 1 BLE edema.  SKIN: Warm and dry. No jaundice. No rashes noted to exposed skin.+ telangectasias noted. + palmar erythema.  NEURO:  Normal gait. No asterixis.  PSYCH:  Memory intact. Thought and speech pattern appropriate. Behavior normal. No depression or anxiety noted.      Labs:    Hepatitis A and B immunity markers:    Hepatitis A Antibody IgG   Date Value Ref Range Status   12/06/2018 Negative  Final       Hepatitis B Surface Ag   Date Value Ref Range Status   12/06/2018 Negative  Final     Hep B Core Total Ab   Date Value Ref Range Status   12/06/2018 Negative  Final     Hep B S Ab   Date Value Ref Range Status   12/06/2018 Positive (A)  Final       There is no immunization history on file for this patient.      Lab Results   Component Value Date    WBC 4.61 03/13/2019    HGB 9.9 (L) 03/13/2019    HCT 30.4 (L) 03/13/2019     03/13/2019     (L) 03/13/2019    K 4.2 03/13/2019    CREATININE 1.0 03/13/2019    ALT 32 03/13/2019    AST 54 (H) 03/13/2019    ALKPHOS 277 (H) 03/13/2019    BILITOT 0.9 03/13/2019    ALBUMIN 2.4 (L) 03/13/2019    INR 1.2 03/13/2019    AFP 2.3 02/20/2019       DIAGNOSTIC STUDIES:  EGD-   outside reports 10/25/2017, repeat due 10/2019  -- normal esophagus, no varices  -- scattered moderate inflammation characterized by erosions, erythema and linear erosions in gastric fundus     COLONOSCOPY-   Outside records Colonoscopy " 8/27/18, repeat 2021  -- polyps x5, diverticulosis   -- repeat in 3 years    ABD. U/S-  2/7/19  FINDINGS:  Liver: Atrophic with a nodular contour consistent with chronic cirrhosis.  Portal vein is patent.  No focal hepatic lesion.    Gallbladder: Previous cholecystectomy.    Biliary system: The common duct is not dilated, measuring 5.0 mm.  No intrahepatic ductal dilatation.    Spleen: Mildly enlarged measuring 14.1 cm.    Pancreas: Visualized portions of the pancreas are normal in size and echogenicity.    Right kidney: Normal in size with no hydronephrosis, measuring 10.3 cm.    Left kidney: Normal in size with no hydronephrosis, measuring 10.8 cm.    Aorta: Obscured by overlying bowel gas.    Inferior vena cava: Normal in appearance.    Miscellaneous: Extensive ascites throughout the abdomen.    Main hepatic artery: Patent.    Left hepatic artery: Patent.    Anterior and posterior right hepatic artery: Patent.    Main portal vein: Patent with proper directional flow.    Left portal vein:  Patent with proper directional flow.    Right portal vein:  Patent with proper directional flow.    SMV:  Patent with proper directional flow.    IVC: Patent    Left, middle, and right hepatic veins: Patent.    Umbilical vein: Not patent.    Celiac artery: Normal on expiration.      Impression       1. Chronic cirrhosis.  2. Extensive ascites.  3. Previous cholecystectomy.  4. Mild splenomegaly.  5. Normal abdominal Doppler exam.       MRI- to be done today 2/20/19  FINDINGS:  The liver demonstrates morphologic changes of cirrhosis.  Hepatic parenchyma demonstrates homogeneous enhancement without focal lesions.  No intrahepatic bile duct dilatation.  Portal vasculature is patent.  No intrahepatic bile duct dilatation.    Gallbladder is surgically absent.  Common bile duct is normal in caliber.    Stomach, duodenum and adrenal glands are normal.    Spleen is enlarged.  No focal splenic abnormalities.    Kidneys are normal in size  and location.  No cortical enhancing lesions.  No hydronephrosis or hydroureter.    There is a small volume of abdominal ascites.  There is mild diffuse mesenteric edema.    The abdominal aorta tapers normally.  Celiac artery, SMA and bilateral renal arteries demonstrate appropriate contrast opacification.    Visualized bowel is normal in caliber.  No obstruction.    No marrow signal abnormality to suggest an infiltrative process.      Impression       1. Cirrhosis without focal parenchymal lesions.  2. Small volume of ascites and splenomegaly, presumably related to portal hypertension.  3. Right renal cyst.     MRI -  Done 2/20/19  FINDINGS:  The liver demonstrates morphologic changes of cirrhosis.  Hepatic parenchyma demonstrates homogeneous enhancement without focal lesions.  No intrahepatic bile duct dilatation.  Portal vasculature is patent.  No intrahepatic bile duct dilatation.    Gallbladder is surgically absent.  Common bile duct is normal in caliber.    Stomach, duodenum and adrenal glands are normal.    Spleen is enlarged.  No focal splenic abnormalities.    Kidneys are normal in size and location.  No cortical enhancing lesions.  No hydronephrosis or hydroureter.    There is a small volume of abdominal ascites.  There is mild diffuse mesenteric edema.    The abdominal aorta tapers normally.  Celiac artery, SMA and bilateral renal arteries demonstrate appropriate contrast opacification.    Visualized bowel is normal in caliber.  No obstruction.    No marrow signal abnormality to suggest an infiltrative process.      Impression       1. Cirrhosis without focal parenchymal lesions.  2. Small volume of ascites and splenomegaly, presumably related to portal hypertension.  3. Right renal cyst.         ASSESSMENT:  68 y.o. White female with:  1.  Cirrhosis, decompensated, etiology unknown but may be due to RHOADES (multiple risk factors).   Cirrhosis illustrated on imaging.   MELD-Na score: 8 at 3/13/2019  9:30  AM  MELD score: 8 at 3/13/2019  9:30 AM  Calculated from:  Serum Creatinine: 1 mg/dL at 3/13/2019  9:30 AM  Serum Sodium: 133 mmol/L at 3/13/2019  9:30 AM  Total Bilirubin: 0.9 mg/dL (Rounded to 1 mg/dL) at 3/13/2019  9:30 AM  INR(ratio): 1.2 at 3/13/2019  9:30 AM  Age: 68 years  -- HCC screening: AFP and abd. U/S.. U/S showed no lesion, AFP WNL - MRI negative. Next HCC screening 8/2019  -- Immunity to Hep A and B - +  Hep B immunity, started Hep A vaccine with PCP  -- EGD in past : reports outside EGD 10/2017, repeat 10/2019  ---Serological workup was negative for Nigel's, alpha-1 antitrypsin deficiency, hemochromatosis, and viral hepatitis. Negative AMA, TRACIE, IgG. Mildly + ASMA (1:40, can be seen in fatty liver), + IgM   PETH negative  -- MELD significantly increased ~ 2 weeks ago to 17. Improved to 10 today (baseline ~8)    2. Morbid obesity, HTN, HLD, uncontrolled T2DM  -- Body mass index is 44.56 kg/m².   -- increases risk of NAFLD/RHOADES     3. Portal hypertension without bleeding varices   -- EGD on 10/2017, no varices, repeat 10/2019  -- Ascites and ankle edema - diuretic dosing Lasix 80 mg daily, Lawrence 200 mg daily. Following Low Na diet   -- Splenomegaly, thrombocytopenia      4. Hepatic encephalopathy   -- + Grade 1-2 with fatigue, altered sleep/wake cycle, + memory loss, intermittent disorientation. Denies confusion.  Currently having ~3-4+ BM daily, will attempt Lactulose starting with low dose given worsening hE  -- NOT driving   -- On Xifaxan 550 mg BID     5. BLE edema, +1 on exam  -- diuretic dosing: Lasix 80 mg daily, Spironolactone 200 mg daily      6. Hypoalbuminemia   -- encouraged high protein diet    7. Pruritis, RUQ pain, elevated alk phos  -- significant, started ~3 weeks ago, attempted Hydroxyzine 100 mg given by PCP, reports minimal relief  -- rising alk phos on labs, pt does report intermittent vague RUQ (unchanged)        EDUCATION:      The disease process and manifestations of cirrhosis  were discussed.     Discussed implications of a cirrhosis diagnosis with HCC screenings and EGD and why it is important for us to properly monitor for potential complications.      Signs and symptoms of hepatic decompensation were reviewed, including jaundice, ascites, and slowed mentation due to hepatic encephalopathy. The patient should seek medical attention if any of these things occur.  We discussed the potential for bleeding from esophageal varices with symptoms of hematemesis and melena. The patient should report to the Emergency Department for these symptoms.     We discussed the increased risk of hepatocellular carcinoma due to cirrhosis. Continued screening every six months with ultrasound and AFP is recommended, discussed with patient.      Cirrhosis Counseling  - strict abstinence of alcohol use (includes beer, wine, and/or liquor)  - compliance with rifaximin for treatment of hepatic encephalopathy  - avoid non-steroidal anti-inflammatory drugs (NSAIDs) such as ibuprofen, Motrin, naprosyn, Alleve due to the risk of kidney damage  - can take acetaminophen (Tylenol), no more than 2000 mg per day  - low sodium (salt) 2 gram per day diet  - high protein diet: 150 grams per day to prevent muscle mass loss. Recommended at least 1 protein shake daily using Premier Protein shakes    - resistance exercises for muscle strength  - avoid raw seafoods due to the risk of fatal Vibrio vulnificus infection  - ultrasound of the liver every 6 months for liver cancer screening  - Upper endoscopy every 1-2 years to screen for varices in the stomach and esophagus     We discussed the manifestations of non-alcoholic fatty liver disease. At this time, there are no FDA approved therapy for non-alcoholic fatty liver disease.  The patient and I discussed the importance of diet, exercise, and weight loss for management of NAFLD. We discussed a low fat, low carb/sugar, high fiber diet and a goal of 30 minutes of exercise 5 times  per week as tolerated         PLAN:  1. Paracentesis prn, currently no indication for para  2. MRCP to assess biliary system, given sudden significant pruritis, elevated alk phos, intermittent RUQ pain   3. Continue [place] 200 mg daily, Lasix 80 mg daily. Continue low Na diet  4. Continue Xifaxan 550 mg BID. Start Lactulose, will start with low dose 10 ml BID, instructed to then adjust dose to obtain 3-4 BM daily. Continue Zinc 220 mg daily   5. HCC screening Q 6 months with AFP and abd. U/S - both next due 8/2019  6. EGD to screen for varices, next due 10/2019  7. Continue with Hep A vaccine per PCP (last dose due ~6/2019 per pt report)  8. Cirrhosis counseling as noted above and discussed with patient  9. Will refer for transplant evaluation when MELD score is consistently 13-15. However, given continued decompensation with ascites, HE, MELD increased above baseline (with recent significant increase), will establish care with staff hepatologist for assessment and possible chronic care  10. Follow-up in 5 weeks (on 4/26/2019). with Dr. Cerrato as scheduled     Thank you for allowing me to participate in the care of Sejal Matamoros    Deepika Plunkett NP-C    CC'ed note to:   Dr. Samreen Lundberg, NP   Dr. Cerrato

## 2019-03-21 NOTE — TELEPHONE ENCOUNTER
Worsening pruritis, alk phos increasing    Does report some intermittent RUQ abd pain (chronic, unchanged, but continues)    Alk phos continues to increase    Added Dbili, GGT to labs today. Will do MRCP to r/o biliary abnormality causing pruritis and increased alk phos. Called and discussed above with daughter, verbalized understanding    Please call daughter Danitza, schedule MRCP (in [place] or as close to [place] as possible)

## 2019-03-21 NOTE — TELEPHONE ENCOUNTER
MA called patient daughter schedule patient MRI MRCP 3/27 slide location. Mailed appt reminder to patient.

## 2019-03-27 ENCOUNTER — HOSPITAL ENCOUNTER (OUTPATIENT)
Dept: RADIOLOGY | Facility: HOSPITAL | Age: 69
Discharge: HOME | End: 2019-03-27
Attending: NURSE PRACTITIONER
Payer: MEDICARE

## 2019-03-27 ENCOUNTER — HOSPITAL ENCOUNTER (EMERGENCY)
Facility: HOSPITAL | Age: 69
Discharge: HOME | End: 2019-03-27
Attending: EMERGENCY MEDICINE
Payer: MEDICARE

## 2019-03-27 VITALS
HEIGHT: 62 IN | HEART RATE: 71 BPM | BODY MASS INDEX: 44.16 KG/M2 | WEIGHT: 240 LBS | TEMPERATURE: 99 F | SYSTOLIC BLOOD PRESSURE: 106 MMHG | OXYGEN SATURATION: 96 % | RESPIRATION RATE: 20 BRPM | DIASTOLIC BLOOD PRESSURE: 59 MMHG

## 2019-03-27 DIAGNOSIS — R10.9 ABDOMINAL PAIN: ICD-10-CM

## 2019-03-27 DIAGNOSIS — R10.11 RUQ PAIN: ICD-10-CM

## 2019-03-27 DIAGNOSIS — N39.0 URINARY TRACT INFECTION WITHOUT HEMATURIA, SITE UNSPECIFIED: ICD-10-CM

## 2019-03-27 DIAGNOSIS — M62.830 BACK SPASM: Primary | ICD-10-CM

## 2019-03-27 DIAGNOSIS — R74.8 ELEVATED ALKALINE PHOSPHATASE LEVEL: ICD-10-CM

## 2019-03-27 DIAGNOSIS — R93.2 ABNORMAL FINDINGS ON DIAGNOSTIC IMAGING OF LIVER AND BILIARY TRACT: ICD-10-CM

## 2019-03-27 LAB
ALBUMIN SERPL BCP-MCNC: 2.3 G/DL (ref 3.5–5.2)
ALP SERPL-CCNC: 285 U/L (ref 55–135)
ALT SERPL W/O P-5'-P-CCNC: 37 U/L (ref 10–44)
AMMONIA PLAS-SCNC: 45 UMOL/L (ref 10–50)
ANION GAP SERPL CALC-SCNC: 4 MMOL/L (ref 8–16)
AST SERPL-CCNC: 60 U/L (ref 10–40)
BACTERIA #/AREA URNS HPF: ABNORMAL /HPF
BASOPHILS # BLD AUTO: 0 K/UL (ref 0–0.2)
BASOPHILS NFR BLD: 0.5 % (ref 0–1.9)
BILIRUB SERPL-MCNC: 0.8 MG/DL (ref 0.1–1)
BILIRUB UR QL STRIP: NEGATIVE
BUN SERPL-MCNC: 23 MG/DL (ref 8–23)
CALCIUM SERPL-MCNC: 8.9 MG/DL (ref 8.7–10.5)
CHLORIDE SERPL-SCNC: 98 MMOL/L (ref 95–110)
CLARITY UR: CLEAR
CO2 SERPL-SCNC: 30 MMOL/L (ref 23–29)
COLOR UR: YELLOW
CREAT SERPL-MCNC: 1 MG/DL (ref 0.5–1.4)
DIFFERENTIAL METHOD: ABNORMAL
EOSINOPHIL # BLD AUTO: 0 K/UL (ref 0–0.5)
EOSINOPHIL NFR BLD: 0.8 % (ref 0–8)
ERYTHROCYTE [DISTWIDTH] IN BLOOD BY AUTOMATED COUNT: 15.7 % (ref 11.5–14.5)
EST. GFR  (AFRICAN AMERICAN): >60 ML/MIN/1.73 M^2
EST. GFR  (NON AFRICAN AMERICAN): 58 ML/MIN/1.73 M^2
GLUCOSE SERPL-MCNC: 263 MG/DL (ref 70–110)
GLUCOSE UR QL STRIP: NEGATIVE
HCT VFR BLD AUTO: 29.5 % (ref 37–48.5)
HGB BLD-MCNC: 9.5 G/DL (ref 12–16)
HGB UR QL STRIP: NEGATIVE
INR PPP: 1.1 (ref 0.8–1.2)
KETONES UR QL STRIP: NEGATIVE
LEUKOCYTE ESTERASE UR QL STRIP: ABNORMAL
LIPASE SERPL-CCNC: 12 U/L (ref 4–60)
LYMPHOCYTES # BLD AUTO: 1.4 K/UL (ref 1–4.8)
LYMPHOCYTES NFR BLD: 30 % (ref 18–48)
MAGNESIUM SERPL-MCNC: 1.7 MG/DL (ref 1.6–2.6)
MCH RBC QN AUTO: 28.7 PG (ref 27–31)
MCHC RBC AUTO-ENTMCNC: 32.3 G/DL (ref 32–36)
MCV RBC AUTO: 89 FL (ref 82–98)
MICROSCOPIC COMMENT: ABNORMAL
MONOCYTES # BLD AUTO: 0.4 K/UL (ref 0.3–1)
MONOCYTES NFR BLD: 9.3 % (ref 4–15)
NEUTROPHILS # BLD AUTO: 2.8 K/UL (ref 1.8–7.7)
NEUTROPHILS NFR BLD: 59.4 % (ref 38–73)
NITRITE UR QL STRIP: NEGATIVE
PH UR STRIP: 6 [PH] (ref 5–8)
PHOSPHATE SERPL-MCNC: 2.8 MG/DL (ref 2.7–4.5)
PLATELET # BLD AUTO: 263 K/UL (ref 150–350)
PMV BLD AUTO: 8.4 FL (ref 9.2–12.9)
POTASSIUM SERPL-SCNC: 3.9 MMOL/L (ref 3.5–5.1)
PROT SERPL-MCNC: 7 G/DL (ref 6–8.4)
PROT UR QL STRIP: NEGATIVE
PROTHROMBIN TIME: 11.6 SEC (ref 9–12.5)
RBC # BLD AUTO: 3.31 M/UL (ref 4–5.4)
RBC #/AREA URNS HPF: 4 /HPF (ref 0–4)
SODIUM SERPL-SCNC: 132 MMOL/L (ref 136–145)
SP GR UR STRIP: 1.01 (ref 1–1.03)
SQUAMOUS #/AREA URNS HPF: 7 /HPF
URATE CRY URNS QL MICRO: ABNORMAL
URN SPEC COLLECT METH UR: ABNORMAL
UROBILINOGEN UR STRIP-ACNC: NEGATIVE EU/DL
WBC # BLD AUTO: 4.8 K/UL (ref 3.9–12.7)
WBC #/AREA URNS HPF: 22 /HPF (ref 0–5)

## 2019-03-27 PROCEDURE — 80053 COMPREHEN METABOLIC PANEL: CPT

## 2019-03-27 PROCEDURE — 76376 MRI ABDOMEN WITHOUT CONTRAST MRCP: ICD-10-PCS | Mod: 26,,, | Performed by: RADIOLOGY

## 2019-03-27 PROCEDURE — 83735 ASSAY OF MAGNESIUM: CPT

## 2019-03-27 PROCEDURE — 82140 ASSAY OF AMMONIA: CPT

## 2019-03-27 PROCEDURE — 36415 COLL VENOUS BLD VENIPUNCTURE: CPT

## 2019-03-27 PROCEDURE — 87086 URINE CULTURE/COLONY COUNT: CPT

## 2019-03-27 PROCEDURE — 85025 COMPLETE CBC W/AUTO DIFF WBC: CPT

## 2019-03-27 PROCEDURE — 76376 3D RENDER W/INTRP POSTPROCES: CPT | Mod: 26,,, | Performed by: RADIOLOGY

## 2019-03-27 PROCEDURE — 81000 URINALYSIS NONAUTO W/SCOPE: CPT

## 2019-03-27 PROCEDURE — 63600175 PHARM REV CODE 636 W HCPCS: Performed by: EMERGENCY MEDICINE

## 2019-03-27 PROCEDURE — 83690 ASSAY OF LIPASE: CPT

## 2019-03-27 PROCEDURE — 84100 ASSAY OF PHOSPHORUS: CPT

## 2019-03-27 PROCEDURE — 76376 3D RENDER W/INTRP POSTPROCES: CPT | Mod: TC

## 2019-03-27 PROCEDURE — 96374 THER/PROPH/DIAG INJ IV PUSH: CPT

## 2019-03-27 PROCEDURE — 85610 PROTHROMBIN TIME: CPT

## 2019-03-27 PROCEDURE — 74181 MRI ABDOMEN W/O CONTRAST: CPT | Mod: TC

## 2019-03-27 PROCEDURE — 74181 MRI ABDOMEN W/O CONTRAST: CPT | Mod: 26,,, | Performed by: RADIOLOGY

## 2019-03-27 PROCEDURE — 93005 ELECTROCARDIOGRAM TRACING: CPT

## 2019-03-27 PROCEDURE — 99285 EMERGENCY DEPT VISIT HI MDM: CPT | Mod: 25

## 2019-03-27 PROCEDURE — 74181 MRI ABDOMEN WITHOUT CONTRAST MRCP: ICD-10-PCS | Mod: 26,,, | Performed by: RADIOLOGY

## 2019-03-27 PROCEDURE — 96375 TX/PRO/DX INJ NEW DRUG ADDON: CPT

## 2019-03-27 RX ORDER — CEPHALEXIN 500 MG/1
500 CAPSULE ORAL 4 TIMES DAILY
Qty: 20 CAPSULE | Refills: 0 | Status: SHIPPED | OUTPATIENT
Start: 2019-03-27 | End: 2019-04-01

## 2019-03-27 RX ORDER — KETOROLAC TROMETHAMINE 30 MG/ML
10 INJECTION, SOLUTION INTRAMUSCULAR; INTRAVENOUS
Status: COMPLETED | OUTPATIENT
Start: 2019-03-27 | End: 2019-03-27

## 2019-03-27 RX ORDER — METHOCARBAMOL 500 MG/1
1000 TABLET, FILM COATED ORAL 2 TIMES DAILY
Qty: 8 TABLET | Refills: 0 | Status: SHIPPED | OUTPATIENT
Start: 2019-03-27 | End: 2019-03-29

## 2019-03-27 RX ORDER — ONDANSETRON 4 MG/1
4 TABLET, FILM COATED ORAL EVERY 6 HOURS
Qty: 12 TABLET | Refills: 0 | Status: SHIPPED | OUTPATIENT
Start: 2019-03-27 | End: 2019-07-08 | Stop reason: CLARIF

## 2019-03-27 RX ORDER — ONDANSETRON 2 MG/ML
4 INJECTION INTRAMUSCULAR; INTRAVENOUS
Status: COMPLETED | OUTPATIENT
Start: 2019-03-27 | End: 2019-03-27

## 2019-03-27 RX ADMIN — KETOROLAC TROMETHAMINE 10 MG: 30 INJECTION, SOLUTION INTRAMUSCULAR at 08:03

## 2019-03-27 RX ADMIN — ONDANSETRON 4 MG: 2 INJECTION INTRAMUSCULAR; INTRAVENOUS at 09:03

## 2019-03-27 NOTE — PROGRESS NOTES
I have evaluated and performed a medical screening assessment on this patient while awaiting a thorough evaluation and treatment. All of the emergency department beds are occupied at this time. When appropriate, laboratory studies will be ordered from triage. The patient has been advised of this process and care plan. Patient complains of right sided abdominal pain and chest pain worsening today.  She's on liver transplant list.     Orders pending:  CBC, CMP, Lipase, UA, EKG  Disposition: stable

## 2019-03-28 ENCOUNTER — TELEPHONE (OUTPATIENT)
Dept: HEPATOLOGY | Facility: CLINIC | Age: 69
End: 2019-03-28

## 2019-03-28 DIAGNOSIS — R10.11 RUQ PAIN: Primary | ICD-10-CM

## 2019-03-28 NOTE — TELEPHONE ENCOUNTER
Please notify pt no bile duct disorder or abnormality of the liver on MRI to explain her abdominal pain.     Please offer GI referral/ appt to pt since abd pain continues and no bile duct cause     Thanks

## 2019-03-28 NOTE — ED NOTES
Assumed care:  Patient awake, alert and oriented x 3, skin warm and dry, in NAD with family at bedside.    Patient identifiers for Sejal Matamoros checked and correct.  LOC:  Patient is awake, alert, and aware of environment with an appropriate affect. Patient is oriented x 3 and speaking appropriately.  APPEARANCE:  Patient resting comfortably and in no acute distress. Patient is clean and well groomed, patient's clothing is properly fastened.  SKIN:  The skin is warm and dry. Patient has normal skin turgor and moist mucus membranes. Bruises and psoriasis noted to BUE  MUSCULOSKELETAL:  Patient is moving all extremities well, no obvious deformities noted. Pulses intact.   RESPIRATORY:  Airway is open and patent. Respirations are spontaneous and non-labored with normal effort and rate.  CARDIAC:  Patient has a normal rate and rhythm. No peripheral edema noted. Capillary refill < 3 seconds.  ABDOMEN:  No distention noted.  Soft and non-tender upon palpation.  RUQ and RLQ pain  NEUROLOGICAL:  PERRL. Facial expression is symmetrical. Hand grasps are equal bilaterally. Normal sensation in all extremities when touched with finger.  Allergies reported:    Review of patient's allergies indicates:   Allergen Reactions    Iodinated contrast- oral and iv dye Hives    Zoloft [sertraline]      OTHER NOTES:  Patient CO RUQ pain radiating to right flank and RLQ pain x 1 week progressively getting worse.  Also CO weakness, states that she is unable to walk.

## 2019-03-28 NOTE — ED PROVIDER NOTES
Encounter Date: 3/27/2019    SCRIBE #1 NOTE: Cassidy SMITH, veda scribing for, and in the presence of, Dr. Rodriguez.       History     Chief Complaint   Patient presents with    Abdominal Pain     rt. side     Nausea       Time seen by provider: 7:42 PM on 03/27/2019    Sejal Matamoros is a 68 y.o. female with Atrial fibrillation, HLD, obesity, cirrhosis and portal hypertension, who presents to the ED with back pain. Patient states she is having pain to her right side insinuating her back more so than her flank. Patient complains of generalized weakness for the past few days and right sided back pain onset after the MRI she received today. She has not started taking lactulose prescribed by . She has started Klonopin 0.5 mg. Her family reports that she seems more confused and is talking slower than usual. Patient denies any nausea, vomiting, diarrhea, fever, changes in vision, numbness, weakness, or urinary issues. Hepatologist is Dr. Plunkett.    The history is provided by the patient. No  was used.     Review of patient's allergies indicates:   Allergen Reactions    Iodinated contrast- oral and iv dye Hives    Zoloft [sertraline]      Past Medical History:   Diagnosis Date    A-fib     Anxiety     Cirrhosis     DDD (degenerative disc disease), cervical 3/21/2018    DDD (degenerative disc disease), lumbar 3/21/2018    Decompensated hepatic cirrhosis 12/26/2018    Depression     Diabetes mellitus, type 2     Hepatic encephalopathy 12/26/2018    HLD (hyperlipidemia)     HTN (hypertension)     Hypoalbuminemia 12/26/2018    Hypoglycemia associated with type 2 diabetes mellitus 12/26/2018    Morbid obesity with BMI of 45.0-49.9, adult 12/7/2018    Obesity     Other ascites 2/20/2019    Portal hypertension 12/26/2018    Type 2 diabetes mellitus with hyperglycemia, with long-term current use of insulin 12/6/2018     Past Surgical History:   Procedure Laterality Date     arm surgery      left    CARPAL TUNNEL RELEASE      left    CHOLECYSTECTOMY      HYSTERECTOMY      ROTATOR CUFF REPAIR      TOTAL KNEE ARTHROPLASTY       Family History   Problem Relation Age of Onset    Cirrhosis Brother         RHOADES cirrhosis     Social History     Tobacco Use    Smoking status: Never Smoker    Smokeless tobacco: Never Used   Substance Use Topics    Alcohol use: No     Frequency: Never    Drug use: No     Review of Systems   Constitutional: Negative for chills and fever.   HENT: Negative for congestion, drooling and sore throat.    Eyes: Negative for photophobia and visual disturbance.   Respiratory: Negative for cough, shortness of breath and wheezing.    Cardiovascular: Negative for chest pain.   Gastrointestinal: Negative for abdominal pain, constipation, diarrhea and vomiting.   Genitourinary: Negative for dysuria and hematuria.   Musculoskeletal: Positive for back pain. Negative for joint swelling and neck pain.   Skin: Negative for rash.   Neurological: Negative for syncope, weakness and numbness.   Hematological: Does not bruise/bleed easily.   Psychiatric/Behavioral: Positive for confusion (per family ).       Physical Exam     Initial Vitals [03/27/19 1750]   BP Pulse Resp Temp SpO2   130/63 71 20 99.2 °F (37.3 °C) 95 %      MAP       --         Physical Exam    Nursing note and vitals reviewed.  Constitutional: She appears well-developed and well-nourished.  Non-toxic appearance. No distress.   HENT:   Head: Normocephalic and atraumatic.   Eyes: EOM are normal. Pupils are equal, round, and reactive to light.   Neck: Normal range of motion. Neck supple. No neck rigidity. No JVD present.   Cardiovascular: Normal rate, regular rhythm, normal heart sounds and intact distal pulses. Exam reveals no gallop and no friction rub.    No murmur heard.  Pulmonary/Chest: Breath sounds normal. She has no wheezes. She has no rhonchi. She has no rales.   Abdominal: Soft. Bowel sounds are  normal. She exhibits distension. There is no tenderness. There is no rigidity, no rebound and no guarding.   Musculoskeletal: Normal range of motion. She exhibits tenderness.        Lumbar back: She exhibits spasm.   No midline spinal tenderness.    Neurological: She is alert and oriented to person, place, and time. She has normal strength and normal reflexes. No cranial nerve deficit or sensory deficit. She exhibits normal muscle tone. Coordination normal. GCS eye subscore is 4. GCS verbal subscore is 5. GCS motor subscore is 6.   Skin: Skin is warm and dry.   Psychiatric: She has a normal mood and affect. Her speech is normal and behavior is normal. She is not actively hallucinating.         ED Course   Procedures  Labs Reviewed   CBC W/ AUTO DIFFERENTIAL - Abnormal; Notable for the following components:       Result Value    RBC 3.31 (*)     Hemoglobin 9.5 (*)     Hematocrit 29.5 (*)     RDW 15.7 (*)     MPV 8.4 (*)     All other components within normal limits   COMPREHENSIVE METABOLIC PANEL - Abnormal; Notable for the following components:    Sodium 132 (*)     CO2 30 (*)     Glucose 263 (*)     Albumin 2.3 (*)     Alkaline Phosphatase 285 (*)     AST 60 (*)     Anion Gap 4 (*)     eGFR if non  58 (*)     All other components within normal limits   URINALYSIS, REFLEX TO URINE CULTURE - Abnormal; Notable for the following components:    Leukocytes, UA Trace (*)     All other components within normal limits    Narrative:     Preferred Collection Type->Urine, Clean Catch   URINALYSIS MICROSCOPIC - Abnormal; Notable for the following components:    WBC, UA 22 (*)     Bacteria, UA Few (*)     All other components within normal limits    Narrative:     Preferred Collection Type->Urine, Clean Catch   CULTURE, URINE   LIPASE   PROTIME-INR   AMMONIA   MAGNESIUM   PHOSPHORUS          Imaging Results    None       MELD-Na score: 7 at 3/27/2019  8:09 PM  MELD score: 7 at 3/27/2019  8:09 PM  Calculated  from:  Serum Creatinine: 1.0 mg/dL at 3/27/2019  6:10 PM  Serum Sodium: 132 mmol/L at 3/27/2019  6:10 PM  Total Bilirubin: 0.8 mg/dL (Rounded to 1 mg/dL) at 3/27/2019  6:10 PM  INR(ratio): 1.1 at 3/27/2019  8:09 PM  Age: 68 years         Medical Decision Making:   History:   Old Medical Records: I decided to obtain old medical records.  Initial Assessment:   68-year-old woman with a history of cirrhosis presents to the ER for evaluation of right lower back pain and nausea.  Patient had outpatient MRI today and developed back pain while laying down in the MRI.  She has tenderness over the lumbar paraspinal muscles.  Abdomen is mildly distended but soft and has no tenderness. I doubt spontaneous bacterial peritonitis or need for emergent paracentesis.  Patient has no fever.  Family members report recent intermittent altered mental status and concern for hepatic encephalopathy.  Her mental status is not altered and her ammonia level is 45.  No evidence of hepatic encephalopathy.  Meld score is 7, down from 10.  Urinalysis has few bacteria with 22 white blood cells but 7 squamous epithelia nitrite negative.  Patient will be prophylactically placed on antibiotics while pending urine culture.  Low back pain is likely secondary to laying down in the MRI machine although may be secondary to UTI as well. No CVA tenderness. I doubt pyelonephritis.  I believe she is appropriate for outpatient follow-up with hepatology in her PCP.  Return precautions discussed.  Symptoms are improved.  She is discharged in no acute distress.  Clinical Tests:   Lab Tests: Ordered and Reviewed  Medical Tests: Ordered and Reviewed            Scribe Attestation:   Scribe #1: I performed the above scribed service and the documentation accurately describes the services I performed. I attest to the accuracy of the note.     I, Michael Wolf, personally performed the services described in this documentation. All medical record entries made by the  edna were at my direction and in my presence.  I have reviewed the chart and agree that the record reflects my personal performance and is accurate and complete. Fabrizio Rodriguez MD.           Clinical Impression:       ICD-10-CM ICD-9-CM   1. Back spasm M62.830 724.8   2. Abdominal pain R10.9 789.00   3. Urinary tract infection without hematuria, site unspecified N39.0 599.0         Disposition:   Disposition: Discharged  Condition: Stable                        Fabrizio Rodriguez MD  03/28/19 0347

## 2019-03-29 LAB — BACTERIA UR CULT: NORMAL

## 2019-03-29 NOTE — TELEPHONE ENCOUNTER
Called patient and spoke with patients daughter told her the results and also got her paracentesis scheduled for her.

## 2019-03-29 NOTE — TELEPHONE ENCOUNTER
Called talked to patients daughter and explained to her that there were no abnormality of the liver or no bile duct disorder. Scheduled patient for the next GI appt available. Patient stated that she needs a paracentesis.

## 2019-04-02 ENCOUNTER — TELEPHONE (OUTPATIENT)
Dept: INTERVENTIONAL RADIOLOGY/VASCULAR | Facility: HOSPITAL | Age: 69
End: 2019-04-02

## 2019-04-03 ENCOUNTER — TELEPHONE (OUTPATIENT)
Dept: HEPATOLOGY | Facility: CLINIC | Age: 69
End: 2019-04-03

## 2019-04-03 ENCOUNTER — HOSPITAL ENCOUNTER (OUTPATIENT)
Dept: INTERVENTIONAL RADIOLOGY/VASCULAR | Facility: HOSPITAL | Age: 69
Discharge: HOME | End: 2019-04-03
Attending: NURSE PRACTITIONER
Payer: MEDICARE

## 2019-04-03 VITALS
OXYGEN SATURATION: 100 % | HEART RATE: 73 BPM | SYSTOLIC BLOOD PRESSURE: 126 MMHG | RESPIRATION RATE: 16 BRPM | DIASTOLIC BLOOD PRESSURE: 70 MMHG

## 2019-04-03 DIAGNOSIS — K72.90 DECOMPENSATED HEPATIC CIRRHOSIS: ICD-10-CM

## 2019-04-03 DIAGNOSIS — K74.60 DECOMPENSATED HEPATIC CIRRHOSIS: ICD-10-CM

## 2019-04-03 LAB
ALBUMIN FLD-MCNC: 1.1 G/DL
APPEARANCE FLD: NORMAL
BODY FLD TYPE: NORMAL
COLOR FLD: YELLOW
LYMPHOCYTES NFR FLD MANUAL: 90 %
MESOTHL CELL NFR FLD MANUAL: 8 %
MONOS+MACROS NFR FLD MANUAL: 1 %
NEUTROPHILS NFR FLD MANUAL: 1 %
PROT FLD-MCNC: 2.6 G/DL
SPECIMEN SOURCE: NORMAL
SPECIMEN SOURCE: NORMAL
WBC # FLD: 337 /CU MM

## 2019-04-03 PROCEDURE — 88305 CYTOLOGY SPECIMEN- MEDICAL CYTOLOGY (FLUID/WASH/BRUSH): ICD-10-PCS | Mod: 26,,, | Performed by: PATHOLOGY

## 2019-04-03 PROCEDURE — 89051 BODY FLUID CELL COUNT: CPT

## 2019-04-03 PROCEDURE — 87075 CULTR BACTERIA EXCEPT BLOOD: CPT

## 2019-04-03 PROCEDURE — 88112 CYTOLOGY SPECIMEN- MEDICAL CYTOLOGY (FLUID/WASH/BRUSH): ICD-10-PCS | Mod: 26,,, | Performed by: PATHOLOGY

## 2019-04-03 PROCEDURE — 88305 TISSUE EXAM BY PATHOLOGIST: CPT | Performed by: PATHOLOGY

## 2019-04-03 PROCEDURE — 87070 CULTURE OTHR SPECIMN AEROBIC: CPT

## 2019-04-03 PROCEDURE — 63600175 PHARM REV CODE 636 W HCPCS: Mod: JG | Performed by: NURSE PRACTITIONER

## 2019-04-03 PROCEDURE — 88112 CYTOPATH CELL ENHANCE TECH: CPT | Mod: 26,,, | Performed by: PATHOLOGY

## 2019-04-03 PROCEDURE — P9047 ALBUMIN (HUMAN), 25%, 50ML: HCPCS | Mod: JG | Performed by: NURSE PRACTITIONER

## 2019-04-03 PROCEDURE — 84157 ASSAY OF PROTEIN OTHER: CPT

## 2019-04-03 PROCEDURE — 49083 IR PARACENTESIS WITH IMAGING: ICD-10-PCS | Mod: ,,, | Performed by: RADIOLOGY

## 2019-04-03 PROCEDURE — 82042 OTHER SOURCE ALBUMIN QUAN EA: CPT

## 2019-04-03 PROCEDURE — 49083 ABD PARACENTESIS W/IMAGING: CPT

## 2019-04-03 PROCEDURE — 49083 ABD PARACENTESIS W/IMAGING: CPT | Mod: ,,, | Performed by: RADIOLOGY

## 2019-04-03 RX ORDER — ALBUMIN HUMAN 250 G/1000ML
12.5 SOLUTION INTRAVENOUS ONCE
Status: COMPLETED | OUTPATIENT
Start: 2019-04-03 | End: 2019-04-03

## 2019-04-03 RX ADMIN — ALBUMIN (HUMAN) 12.5 G: 25 SOLUTION INTRAVENOUS at 02:04

## 2019-04-03 NOTE — DISCHARGE INSTRUCTIONS
"For scheduling: Call Sri at [phone]    For questions or concerns call: LIZZY MON-FRI 8 AM- 5PM: [phone].   **After hours and weekends: Call [phone] and ask for "Radiology Resident on call".    For immediate concerns that are not emergent, you may call our radiology clinic at: [phone]    "

## 2019-04-03 NOTE — H&P
Radiology History & Physical      SUBJECTIVE:     Chief Complaint: abdominal distention    History of Present Illness:  Sejal Matamoros is a 68 y.o. female who presents for evaluation of ascites  Past Medical History:   Diagnosis Date    A-fib     Anxiety     Cirrhosis     DDD (degenerative disc disease), cervical 3/21/2018    DDD (degenerative disc disease), lumbar 3/21/2018    Decompensated hepatic cirrhosis 12/26/2018    Depression     Diabetes mellitus, type 2     Hepatic encephalopathy 12/26/2018    HLD (hyperlipidemia)     HTN (hypertension)     Hypoalbuminemia 12/26/2018    Hypoglycemia associated with type 2 diabetes mellitus 12/26/2018    Morbid obesity with BMI of 45.0-49.9, adult 12/7/2018    Obesity     Other ascites 2/20/2019    Portal hypertension 12/26/2018    Type 2 diabetes mellitus with hyperglycemia, with long-term current use of insulin 12/6/2018     Past Surgical History:   Procedure Laterality Date    arm surgery      left    CARPAL TUNNEL RELEASE      left    CHOLECYSTECTOMY      HYSTERECTOMY      ROTATOR CUFF REPAIR      TOTAL KNEE ARTHROPLASTY         Home Meds:   Prior to Admission medications    Medication Sig Start Date End Date Taking? Authorizing Provider   ACCU-CHEK SOFTCLIX LANCETS MISC Accu-Chek Softclix Lancets    Historical Provider, MD   aspirin (ECOTRIN) 81 MG EC tablet Take 81 mg by mouth once daily.    Historical Provider, MD   clonazePAM (KLONOPIN) 0.5 MG tablet clonazepam 0.5 mg tablet   Take 1 tablet twice a day by oral route as needed for 30 days.    Historical Provider, MD   ergocalciferol (ERGOCALCIFEROL) 50,000 unit Cap Take 1 capsule (50,000 Units total) by mouth every 7 days. 2/19/19   Mary Agudelo, DNP, NP   escitalopram oxalate (LEXAPRO) 20 MG tablet Take 20 mg by mouth once daily. 10/31/18   Historical Provider, MD   furosemide (LASIX) 40 MG tablet Take 2 tablets (80 mg total) by mouth once daily. DOSE CHANGE 2/8/19   Deepika WELLS  Scroggs, NP   gabapentin (NEURONTIN) 600 MG tablet Take 600 mg by mouth 2 (two) times daily.    Historical Provider, MD   HYDROcodone-acetaminophen (NORCO) 5-325 mg per tablet Take 1 tablet by mouth every 8 (eight) hours as needed for Pain. 3/7/19   Catracho Dawson MD   hydrOXYzine (VISTARIL) 100 MG capsule Take 1 capsule (100 mg total) by mouth 3 (three) times daily as needed for Itching. 3/1/19   Fabrizio Rodriguez MD   insulin aspart U-100 (NOVOLOG FLEXPEN U-100 INSULIN) 100 unit/mL InPn pen 15 units AC + correction Max TDD 60 u 3/13/19   Mary Agudelo DNP, NP   insulin glargine (LANTUS) 100 unit/mL injection Inject 50 Units into the skin once daily.    Historical Provider, MD   ketoconazole (NIZORAL) 2 % cream Apply topically once daily. 2/7/19   Carlos Eduardo Ley DPM   lactulose (CHRONULAC) 20 gram/30 mL Soln Take 30 mLs (20 g total) by mouth 3 (three) times daily. Adjust dose to have  3-4 BM's daily 3/21/19   Deepika Plunkett NP   metoprolol succinate (TOPROL XL) 25 MG 24 hr tablet Take 25 mg by mouth once daily. 10/3/18 10/3/19  Historical Provider, MD   omeprazole (PRILOSEC) 40 MG capsule Take 40 mg by mouth once daily. 10/17/18   Historical Provider, MD   ondansetron (ZOFRAN) 4 MG tablet Take 1 tablet (4 mg total) by mouth every 6 (six) hours. 3/27/19   Fabrizio Rodriguez MD   rifAXIMin (XIFAXAN) 550 mg Tab Take 1 tablet (550 mg total) by mouth 2 (two) times daily. 12/6/18   Deepika Plunkett NP   rosuvastatin (CRESTOR) 10 MG tablet Take 10 mg by mouth once daily. 8/13/18   Historical Provider, MD   spironolactone (ALDACTONE) 100 MG tablet Take 2 tablets (200 mg total) by mouth once daily. DOSE INCREASE 3/21/19   Deepika Plunkett NP   XARELTO 20 mg Tab Take 20 mg by mouth once daily. 11/30/18   Historical Provider, MD   zinc 50 mg Tab Take 200 mg by mouth once daily. 2/20/19   Deepika Plunkett NP     Anticoagulants/Antiplatelets: xeralto    Allergies:   Review of patient's allergies indicates:    Allergen Reactions    Iodinated contrast- oral and iv dye Hives    Zoloft [sertraline]      Sedation History:  no adverse reactions    Review of Systems:   Hematological: no known coagulopathies  Respiratory: no shortness of breath  Cardiovascular: no chest pain  Gastrointestinal: no abdominal pain  Genito-Urinary: no dysuria  Musculoskeletal: negative  Neurological: no TIA or stroke symptoms         OBJECTIVE:     Vital Signs (Most Recent)  Pulse: 71 (04/03/19 1307)  Resp: 16 (04/03/19 1307)  BP: 128/73 (04/03/19 1307)  SpO2: 99 % (04/03/19 1307)    Physical Exam:  ASA: 2  Mallampati: na    General: no acute distress  Mental Status: alert and oriented to person, place and time  HEENT: normocephalic, atraumatic  Chest: unlabored breathing  Heart: regular heart rate  Abdomen: distended  Extremity: moves all extremities    Laboratory  Lab Results   Component Value Date    INR 1.1 04/03/2019       Lab Results   Component Value Date    WBC 4.80 03/27/2019    HGB 9.5 (L) 03/27/2019    HCT 29.5 (L) 03/27/2019    MCV 89 03/27/2019     03/27/2019      Lab Results   Component Value Date     (H) 03/27/2019     (L) 03/27/2019    K 3.9 03/27/2019    CL 98 03/27/2019    CO2 30 (H) 03/27/2019    BUN 23 03/27/2019    CREATININE 1.0 03/27/2019    CALCIUM 8.9 03/27/2019    MG 1.7 03/27/2019    ALT 37 03/27/2019    AST 60 (H) 03/27/2019    ALBUMIN 2.3 (L) 03/27/2019    BILITOT 0.8 03/27/2019    BILIDIR 0.6 (H) 03/21/2019       ASSESSMENT/PLAN:     Sedation Plan: local anesthesia  Patient will undergo US guided paracentesis

## 2019-04-03 NOTE — PROGRESS NOTES
Patient stable, tolerated paracentesis well, 4 L removed, labs sent,  100 mL albumin given per protocol.  Discharge instruction, and follow up care reviewed.  Patient verbalized understanding, and denies further questions.  Provided with ROCU and after hours number.  Patient transported via wheelchair.

## 2019-04-06 ENCOUNTER — HOSPITAL ENCOUNTER (OUTPATIENT)
Facility: HOSPITAL | Age: 69
Discharge: HOME | DRG: 918 | End: 2019-04-07
Attending: EMERGENCY MEDICINE | Admitting: HOSPITALIST
Payer: MEDICARE

## 2019-04-06 DIAGNOSIS — T38.3X1A INSULIN OVERDOSE, ACCIDENTAL OR UNINTENTIONAL, INITIAL ENCOUNTER: Primary | ICD-10-CM

## 2019-04-06 PROBLEM — E11.649 HYPOGLYCEMIA DUE TO TYPE 2 DIABETES MELLITUS: Status: ACTIVE | Noted: 2019-04-06

## 2019-04-06 LAB
ALBUMIN SERPL BCP-MCNC: 2.4 G/DL (ref 3.5–5.2)
ALP SERPL-CCNC: 304 U/L (ref 55–135)
ALT SERPL W/O P-5'-P-CCNC: 42 U/L (ref 10–44)
AMMONIA PLAS-SCNC: 31 UMOL/L (ref 10–50)
ANION GAP SERPL CALC-SCNC: 6 MMOL/L (ref 8–16)
AST SERPL-CCNC: 60 U/L (ref 10–40)
BACTERIA SPEC AEROBE CULT: NO GROWTH
BASOPHILS # BLD AUTO: 0 K/UL (ref 0–0.2)
BASOPHILS NFR BLD: 0.6 % (ref 0–1.9)
BILIRUB SERPL-MCNC: 0.8 MG/DL (ref 0.1–1)
BUN SERPL-MCNC: 19 MG/DL (ref 8–23)
CALCIUM SERPL-MCNC: 9.2 MG/DL (ref 8.7–10.5)
CHLORIDE SERPL-SCNC: 99 MMOL/L (ref 95–110)
CO2 SERPL-SCNC: 28 MMOL/L (ref 23–29)
CREAT SERPL-MCNC: 0.9 MG/DL (ref 0.5–1.4)
DIFFERENTIAL METHOD: ABNORMAL
EOSINOPHIL # BLD AUTO: 0.1 K/UL (ref 0–0.5)
EOSINOPHIL NFR BLD: 1.1 % (ref 0–8)
ERYTHROCYTE [DISTWIDTH] IN BLOOD BY AUTOMATED COUNT: 15.2 % (ref 11.5–14.5)
EST. GFR  (AFRICAN AMERICAN): >60 ML/MIN/1.73 M^2
EST. GFR  (NON AFRICAN AMERICAN): >60 ML/MIN/1.73 M^2
GLUCOSE SERPL-MCNC: 192 MG/DL (ref 70–110)
HCT VFR BLD AUTO: 27.1 % (ref 37–48.5)
HGB BLD-MCNC: 9 G/DL (ref 12–16)
INR PPP: 1.2 (ref 0.8–1.2)
LYMPHOCYTES # BLD AUTO: 2 K/UL (ref 1–4.8)
LYMPHOCYTES NFR BLD: 32.4 % (ref 18–48)
MCH RBC QN AUTO: 28.6 PG (ref 27–31)
MCHC RBC AUTO-ENTMCNC: 33 G/DL (ref 32–36)
MCV RBC AUTO: 87 FL (ref 82–98)
MONOCYTES # BLD AUTO: 0.8 K/UL (ref 0.3–1)
MONOCYTES NFR BLD: 12.8 % (ref 4–15)
NEUTROPHILS # BLD AUTO: 3.3 K/UL (ref 1.8–7.7)
NEUTROPHILS NFR BLD: 53.1 % (ref 38–73)
PLATELET # BLD AUTO: 285 K/UL (ref 150–350)
PMV BLD AUTO: 7.8 FL (ref 9.2–12.9)
POCT GLUCOSE: 167 MG/DL (ref 70–110)
POCT GLUCOSE: 178 MG/DL (ref 70–110)
POCT GLUCOSE: 254 MG/DL (ref 70–110)
POCT GLUCOSE: 280 MG/DL (ref 70–110)
POCT GLUCOSE: 290 MG/DL (ref 70–110)
POCT GLUCOSE: 66 MG/DL (ref 70–110)
POTASSIUM SERPL-SCNC: 3.6 MMOL/L (ref 3.5–5.1)
PROT SERPL-MCNC: 6.5 G/DL (ref 6–8.4)
PROTHROMBIN TIME: 12.5 SEC (ref 9–12.5)
RBC # BLD AUTO: 3.13 M/UL (ref 4–5.4)
SODIUM SERPL-SCNC: 133 MMOL/L (ref 136–145)
WBC # BLD AUTO: 6.3 K/UL (ref 3.9–12.7)

## 2019-04-06 PROCEDURE — G0378 HOSPITAL OBSERVATION PER HR: HCPCS

## 2019-04-06 PROCEDURE — 82962 GLUCOSE BLOOD TEST: CPT

## 2019-04-06 PROCEDURE — 25000003 PHARM REV CODE 250: Performed by: HOSPITALIST

## 2019-04-06 PROCEDURE — 94761 N-INVAS EAR/PLS OXIMETRY MLT: CPT

## 2019-04-06 PROCEDURE — 25000003 PHARM REV CODE 250: Performed by: EMERGENCY MEDICINE

## 2019-04-06 PROCEDURE — 63600175 PHARM REV CODE 636 W HCPCS: Performed by: EMERGENCY MEDICINE

## 2019-04-06 PROCEDURE — 85610 PROTHROMBIN TIME: CPT

## 2019-04-06 PROCEDURE — 85025 COMPLETE CBC W/AUTO DIFF WBC: CPT

## 2019-04-06 PROCEDURE — 80053 COMPREHEN METABOLIC PANEL: CPT

## 2019-04-06 PROCEDURE — 99285 EMERGENCY DEPT VISIT HI MDM: CPT | Mod: 25

## 2019-04-06 PROCEDURE — 12000002 HC ACUTE/MED SURGE SEMI-PRIVATE ROOM

## 2019-04-06 PROCEDURE — 25000003 PHARM REV CODE 250

## 2019-04-06 PROCEDURE — 36415 COLL VENOUS BLD VENIPUNCTURE: CPT

## 2019-04-06 PROCEDURE — 96374 THER/PROPH/DIAG INJ IV PUSH: CPT

## 2019-04-06 PROCEDURE — 82140 ASSAY OF AMMONIA: CPT

## 2019-04-06 RX ORDER — GABAPENTIN 300 MG/1
600 CAPSULE ORAL 2 TIMES DAILY
Status: DISCONTINUED | OUTPATIENT
Start: 2019-04-06 | End: 2019-04-07 | Stop reason: HOSPADM

## 2019-04-06 RX ORDER — ONDANSETRON 2 MG/ML
4 INJECTION INTRAMUSCULAR; INTRAVENOUS EVERY 8 HOURS PRN
Status: DISCONTINUED | OUTPATIENT
Start: 2019-04-06 | End: 2019-04-06

## 2019-04-06 RX ORDER — ONDANSETRON 4 MG/1
8 TABLET, FILM COATED ORAL EVERY 6 HOURS PRN
Status: DISCONTINUED | OUTPATIENT
Start: 2019-04-06 | End: 2019-04-06

## 2019-04-06 RX ORDER — CLONAZEPAM 0.5 MG/1
0.5 TABLET ORAL 2 TIMES DAILY PRN
Status: DISCONTINUED | OUTPATIENT
Start: 2019-04-06 | End: 2019-04-07 | Stop reason: HOSPADM

## 2019-04-06 RX ORDER — HYDROMORPHONE HYDROCHLORIDE 2 MG/1
1 TABLET ORAL
Status: COMPLETED | OUTPATIENT
Start: 2019-04-06 | End: 2019-04-06

## 2019-04-06 RX ORDER — ONDANSETRON 4 MG/1
8 TABLET, ORALLY DISINTEGRATING ORAL EVERY 6 HOURS PRN
Status: DISCONTINUED | OUTPATIENT
Start: 2019-04-06 | End: 2019-04-07 | Stop reason: HOSPADM

## 2019-04-06 RX ORDER — INSULIN ASPART 100 [IU]/ML
0-5 INJECTION, SOLUTION INTRAVENOUS; SUBCUTANEOUS
Status: DISCONTINUED | OUTPATIENT
Start: 2019-04-06 | End: 2019-04-07 | Stop reason: HOSPADM

## 2019-04-06 RX ORDER — PANTOPRAZOLE SODIUM 40 MG/1
40 TABLET, DELAYED RELEASE ORAL DAILY
Status: DISCONTINUED | OUTPATIENT
Start: 2019-04-07 | End: 2019-04-07 | Stop reason: HOSPADM

## 2019-04-06 RX ORDER — SPIRONOLACTONE 25 MG/1
200 TABLET ORAL DAILY
Status: DISCONTINUED | OUTPATIENT
Start: 2019-04-07 | End: 2019-04-07 | Stop reason: HOSPADM

## 2019-04-06 RX ORDER — HYDROMORPHONE HYDROCHLORIDE 5 MG/5ML
1 SOLUTION ORAL ONCE
Status: DISCONTINUED | OUTPATIENT
Start: 2019-04-06 | End: 2019-04-06

## 2019-04-06 RX ORDER — LACTULOSE 10 G/15ML
20 SOLUTION ORAL 2 TIMES DAILY
Status: DISCONTINUED | OUTPATIENT
Start: 2019-04-06 | End: 2019-04-07 | Stop reason: HOSPADM

## 2019-04-06 RX ORDER — ASPIRIN 81 MG/1
81 TABLET ORAL DAILY
Status: DISCONTINUED | OUTPATIENT
Start: 2019-04-07 | End: 2019-04-07 | Stop reason: HOSPADM

## 2019-04-06 RX ORDER — FUROSEMIDE 40 MG/1
80 TABLET ORAL DAILY
Status: DISCONTINUED | OUTPATIENT
Start: 2019-04-07 | End: 2019-04-07 | Stop reason: HOSPADM

## 2019-04-06 RX ORDER — SODIUM CHLORIDE 0.9 % (FLUSH) 0.9 %
10 SYRINGE (ML) INJECTION
Status: DISCONTINUED | OUTPATIENT
Start: 2019-04-06 | End: 2019-04-07 | Stop reason: HOSPADM

## 2019-04-06 RX ORDER — IBUPROFEN 200 MG
16 TABLET ORAL
Status: DISCONTINUED | OUTPATIENT
Start: 2019-04-06 | End: 2019-04-07 | Stop reason: HOSPADM

## 2019-04-06 RX ORDER — GLUCAGON 1 MG
1 KIT INJECTION
Status: DISCONTINUED | OUTPATIENT
Start: 2019-04-06 | End: 2019-04-07 | Stop reason: HOSPADM

## 2019-04-06 RX ORDER — IBUPROFEN 200 MG
24 TABLET ORAL
Status: DISCONTINUED | OUTPATIENT
Start: 2019-04-06 | End: 2019-04-07 | Stop reason: HOSPADM

## 2019-04-06 RX ORDER — DEXTROSE 50 % IN WATER (D50W) INTRAVENOUS SYRINGE
Status: COMPLETED
Start: 2019-04-06 | End: 2019-04-06

## 2019-04-06 RX ORDER — ACETAMINOPHEN 325 MG/1
650 TABLET ORAL EVERY 6 HOURS PRN
Status: CANCELLED | OUTPATIENT
Start: 2019-04-06

## 2019-04-06 RX ORDER — AMOXICILLIN 250 MG
1 CAPSULE ORAL DAILY PRN
Status: DISCONTINUED | OUTPATIENT
Start: 2019-04-06 | End: 2019-04-07 | Stop reason: HOSPADM

## 2019-04-06 RX ORDER — METOPROLOL SUCCINATE 25 MG/1
25 TABLET, EXTENDED RELEASE ORAL DAILY
Status: DISCONTINUED | OUTPATIENT
Start: 2019-04-07 | End: 2019-04-07 | Stop reason: HOSPADM

## 2019-04-06 RX ORDER — ESCITALOPRAM OXALATE 10 MG/1
20 TABLET ORAL DAILY
Status: DISCONTINUED | OUTPATIENT
Start: 2019-04-07 | End: 2019-04-07 | Stop reason: HOSPADM

## 2019-04-06 RX ORDER — HYDROXYZINE PAMOATE 25 MG/1
100 CAPSULE ORAL 3 TIMES DAILY PRN
Status: DISCONTINUED | OUTPATIENT
Start: 2019-04-06 | End: 2019-04-07 | Stop reason: HOSPADM

## 2019-04-06 RX ADMIN — LACTULOSE 20 G: 20 SOLUTION ORAL at 09:04

## 2019-04-06 RX ADMIN — RIFAXIMIN 550 MG: 550 TABLET ORAL at 09:04

## 2019-04-06 RX ADMIN — GABAPENTIN 600 MG: 300 CAPSULE ORAL at 09:04

## 2019-04-06 RX ADMIN — HYDROMORPHONE HYDROCHLORIDE 1 MG: 2 TABLET ORAL at 09:04

## 2019-04-06 RX ADMIN — DEXTROSE MONOHYDRATE 50 ML: 25 INJECTION, SOLUTION INTRAVENOUS at 08:04

## 2019-04-06 RX ADMIN — INSULIN ASPART 1 UNITS: 100 INJECTION, SOLUTION INTRAVENOUS; SUBCUTANEOUS at 09:04

## 2019-04-06 RX ADMIN — INSULIN ASPART 3 UNITS: 100 INJECTION, SOLUTION INTRAVENOUS; SUBCUTANEOUS at 12:04

## 2019-04-06 RX ADMIN — INSULIN ASPART 3 UNITS: 100 INJECTION, SOLUTION INTRAVENOUS; SUBCUTANEOUS at 04:04

## 2019-04-06 NOTE — ED PROVIDER NOTES
Encounter Date: 4/6/2019    SCRIBE #1 NOTE: I, Fabby Cerrato, am scribing for, and in the presence of, Ken Palacios MD.       History     Chief Complaint   Patient presents with    Blood Sugar Problem       Time seen by provider: 8:15 AM on 04/06/2019    Sejal Matamoros is a 68 y.o. female with DMII, HTN, Afib, and cirrhosis with ascites secondary to RHOADES who presents to the ED with an onset of chronic neck and back pain. Per son, the patient endorses chronic neck pain for the past couple of years and has had an MRI in 2015/2016 that revealed DDD in C5/C6. Upon arriving to the ER, her BS dropped to 66. She states she took insulin this morning several times but doesn't know how much or what kind. The patient was recently taken off of her Metformin ~1.5 months ago by her Transplant Hepatologist NP Deepika Plunkett, who works under Dr. Cerrato, and was advised to stop taking any type of pain medication due to her hepatis cirrhosis. She had a paracentesis performed 3 days ago on 4/3. The patient denies seeing a Pain Management specialist or any other symptoms at this time. No spine SHx noted. No SHx noted. Zoloft and oral/IV iodinated dye drug allergies noted. History from the patient is very limited.    The history is provided by the patient and a relative (son). The history is limited by the condition of the patient (Poor historian, very anxious).     Review of patient's allergies indicates:   Allergen Reactions    Iodinated contrast- oral and iv dye Hives    Zoloft [sertraline]      Past Medical History:   Diagnosis Date    A-fib     Anxiety     Cirrhosis     DDD (degenerative disc disease), cervical 3/21/2018    DDD (degenerative disc disease), lumbar 3/21/2018    Decompensated hepatic cirrhosis 12/26/2018    Depression     Diabetes mellitus, type 2     Hepatic encephalopathy 12/26/2018    HLD (hyperlipidemia)     HTN (hypertension)     Hypoalbuminemia 12/26/2018    Hypoglycemia associated with type 2  diabetes mellitus 12/26/2018    Morbid obesity with BMI of 45.0-49.9, adult 12/7/2018    Obesity     Other ascites 2/20/2019    Portal hypertension 12/26/2018    Type 2 diabetes mellitus with hyperglycemia, with long-term current use of insulin 12/6/2018     Past Surgical History:   Procedure Laterality Date    arm surgery      left    CARPAL TUNNEL RELEASE      left    CHOLECYSTECTOMY      HYSTERECTOMY      ROTATOR CUFF REPAIR      TOTAL KNEE ARTHROPLASTY       Family History   Problem Relation Age of Onset    Cirrhosis Brother         RHOADES cirrhosis     Social History     Tobacco Use    Smoking status: Never Smoker    Smokeless tobacco: Never Used   Substance Use Topics    Alcohol use: No     Frequency: Never    Drug use: No     Review of Systems   Unable to perform ROS: Other (Very anxious,, family provides all history)   Musculoskeletal: Positive for back pain and neck pain.     Physical Exam     Initial Vitals [04/06/19 0808]   BP Pulse Resp Temp SpO2   (!) 161/66 104 (!) 24 97 °F (36.1 °C) 98 %      MAP       --         Physical Exam    Nursing note and vitals reviewed.  Constitutional: She appears well-developed and well-nourished. She is not diaphoretic.  Non-toxic appearance. She does not have a sickly appearance. She does not appear ill. No distress.   Anxious   HENT:   Head: Normocephalic and atraumatic.   Eyes: EOM are normal.   Neck: Normal range of motion. Neck supple.   Cardiovascular: Normal rate, regular rhythm and normal heart sounds. Exam reveals no gallop and no friction rub.    No murmur heard.  Pulmonary/Chest: Breath sounds normal. No respiratory distress. She has no wheezes. She has no rhonchi. She has no rales.   Abdominal: Soft. She exhibits no distension. There is no tenderness.   No abdominal tenderness   Musculoskeletal: Normal range of motion.   Neurological: She is alert and oriented to person, place, and time.   Skin: Skin is warm and dry. Bruising noted.   Bruising  "noted to bilateral forearms.    Psychiatric: Her behavior is normal. Judgment and thought content normal. Her mood appears anxious.   Patient is very anxious       ED Course   Procedures  Labs Reviewed   CBC W/ AUTO DIFFERENTIAL - Abnormal; Notable for the following components:       Result Value    RBC 3.13 (*)     Hemoglobin 9.0 (*)     Hematocrit 27.1 (*)     RDW 15.2 (*)     MPV 7.8 (*)     All other components within normal limits   COMPREHENSIVE METABOLIC PANEL - Abnormal; Notable for the following components:    Sodium 133 (*)     Glucose 192 (*)     Albumin 2.4 (*)     Alkaline Phosphatase 304 (*)     AST 60 (*)     Anion Gap 6 (*)     All other components within normal limits   POCT GLUCOSE - Abnormal; Notable for the following components:    POCT Glucose 66 (*)     All other components within normal limits   POCT GLUCOSE - Abnormal; Notable for the following components:    POCT Glucose 167 (*)     All other components within normal limits   AMMONIA   PROTIME-INR        Imaging Results    None          Medical Decision Making:   History:   Old Medical Records: I decided to obtain old medical records.  Clinical Tests:   Lab Tests: Ordered and Reviewed            Scribe Attestation:   Scribe #1: I performed the above scribed service and the documentation accurately describes the services I performed. I attest to the accuracy of the note.    I, Dr. Palacios, personally performed the services described in this documentation. All medical record entries made by the scribe were at my direction and in my presence.  I have reviewed the chart and agree that the record reflects my personal performance and is accurate and complete.12:25 PM 04/06/2019            ED Course as of Apr 06 0938   Sat Apr 06, 2019   0829 Patient presents with low blood sugar.  She states however she is primarily here for severe diffuse pain "everywhere."  Patient is extremely anxious.  She is hyperventilating.  History is almost impossible to " obtain from the patient.  All history from the family members.  Patient took insulin repeatedly this morning.  She does not know how much she took and she does not know what kind of insulin she took.    [EF]   0829 BP(!): 161/66 [EF]   0829 Temp: 97 °F (36.1 °C) [EF]   0829 Temp src: Oral [EF]   0829 Pulse: 104 [EF]   0829 Resp(!): 24 [EF]   0829 SpO2: 98 % [EF]   0923 Crook to admit    [EF]      ED Course User Index  [EF] Ken Palacios MD     Clinical Impression:       ICD-10-CM ICD-9-CM   1. Insulin overdose, accidental or unintentional, initial encounter T38.3X1A 962.3     E858.0         Disposition:   Disposition: Admitted       68-year-old female with RHOADES presents to the ER for total body pain. Found to have a blood sugar of 66.  Very hard to obtain any sort of history from the patient.  She is very anxious and tangential.  All meaningful history obtained from the family.  Family reports the patient took insulin multiple times this morning to try and get her blood sugar down from 400.  Patient reports that her metformin was recently discontinued.  Not currently on any pain medication.  Patient is primarily concerned about her total body pain. Family reports this comes and goes.  She does not have pain management at this time.  Patient took insulin multiple times earlier today but does not know how much and does not know for sure how many times she administered insulin.  She will be admitted to Hospital Medicine for blood sugar management and pain control.                 Ken Palacios MD  04/06/19 8254

## 2019-04-06 NOTE — PLAN OF CARE
Problem: Adult Inpatient Plan of Care  Goal: Plan of Care Review  Outcome: Ongoing (interventions implemented as appropriate)  Patient is in bed resting. VSS. No acute distress noted. She has no complaints or concerns at this time. Fall and safety precautions are maintained. She is instructed to call nurse when needing assistance. She verbalized understanding. Bed is in lowest position and call light is within reach. Will continue to monitor.

## 2019-04-06 NOTE — ED NOTES
Pt awake alert anxious reporting generalized fatigue, pt reports glucose was high last night reports she took insulin, co neck pain and bilateral lower leg pain, family at bedside

## 2019-04-07 VITALS
WEIGHT: 242.06 LBS | RESPIRATION RATE: 17 BRPM | DIASTOLIC BLOOD PRESSURE: 56 MMHG | TEMPERATURE: 98 F | HEART RATE: 74 BPM | SYSTOLIC BLOOD PRESSURE: 119 MMHG | HEIGHT: 62 IN | BODY MASS INDEX: 44.55 KG/M2 | OXYGEN SATURATION: 93 %

## 2019-04-07 PROBLEM — E11.649 HYPOGLYCEMIA DUE TO TYPE 2 DIABETES MELLITUS: Status: RESOLVED | Noted: 2019-04-06 | Resolved: 2019-04-07

## 2019-04-07 PROBLEM — T38.3X1A INSULIN OVERDOSE: Status: RESOLVED | Noted: 2019-04-06 | Resolved: 2019-04-07

## 2019-04-07 LAB
ANION GAP SERPL CALC-SCNC: 5 MMOL/L (ref 8–16)
BASOPHILS # BLD AUTO: 0 K/UL (ref 0–0.2)
BASOPHILS NFR BLD: 1.3 % (ref 0–1.9)
BUN SERPL-MCNC: 16 MG/DL (ref 8–23)
CALCIUM SERPL-MCNC: 8.8 MG/DL (ref 8.7–10.5)
CHLORIDE SERPL-SCNC: 101 MMOL/L (ref 95–110)
CO2 SERPL-SCNC: 29 MMOL/L (ref 23–29)
CREAT SERPL-MCNC: 0.9 MG/DL (ref 0.5–1.4)
DIFFERENTIAL METHOD: ABNORMAL
EOSINOPHIL # BLD AUTO: 0 K/UL (ref 0–0.5)
EOSINOPHIL NFR BLD: 1 % (ref 0–8)
ERYTHROCYTE [DISTWIDTH] IN BLOOD BY AUTOMATED COUNT: 16 % (ref 11.5–14.5)
EST. GFR  (AFRICAN AMERICAN): >60 ML/MIN/1.73 M^2
EST. GFR  (NON AFRICAN AMERICAN): >60 ML/MIN/1.73 M^2
GLUCOSE SERPL-MCNC: 321 MG/DL (ref 70–110)
HCT VFR BLD AUTO: 26.8 % (ref 37–48.5)
HGB BLD-MCNC: 8.6 G/DL (ref 12–16)
LYMPHOCYTES # BLD AUTO: 1.2 K/UL (ref 1–4.8)
LYMPHOCYTES NFR BLD: 34.5 % (ref 18–48)
MCH RBC QN AUTO: 28.3 PG (ref 27–31)
MCHC RBC AUTO-ENTMCNC: 32.2 G/DL (ref 32–36)
MCV RBC AUTO: 88 FL (ref 82–98)
MONOCYTES # BLD AUTO: 0.4 K/UL (ref 0.3–1)
MONOCYTES NFR BLD: 10.3 % (ref 4–15)
NEUTROPHILS # BLD AUTO: 1.9 K/UL (ref 1.8–7.7)
NEUTROPHILS NFR BLD: 52.9 % (ref 38–73)
PLATELET # BLD AUTO: 243 K/UL (ref 150–350)
PMV BLD AUTO: 8.1 FL (ref 9.2–12.9)
POCT GLUCOSE: 314 MG/DL (ref 70–110)
POCT GLUCOSE: 321 MG/DL (ref 70–110)
POTASSIUM SERPL-SCNC: 4.5 MMOL/L (ref 3.5–5.1)
RBC # BLD AUTO: 3.05 M/UL (ref 4–5.4)
SODIUM SERPL-SCNC: 135 MMOL/L (ref 136–145)
WBC # BLD AUTO: 3.5 K/UL (ref 3.9–12.7)

## 2019-04-07 PROCEDURE — 36415 COLL VENOUS BLD VENIPUNCTURE: CPT

## 2019-04-07 PROCEDURE — 25000003 PHARM REV CODE 250: Performed by: HOSPITALIST

## 2019-04-07 PROCEDURE — 85025 COMPLETE CBC W/AUTO DIFF WBC: CPT

## 2019-04-07 PROCEDURE — 80048 BASIC METABOLIC PNL TOTAL CA: CPT

## 2019-04-07 PROCEDURE — G0378 HOSPITAL OBSERVATION PER HR: HCPCS

## 2019-04-07 RX ORDER — CLONAZEPAM 1 MG/1
1 TABLET ORAL 2 TIMES DAILY PRN
Qty: 30 TABLET | Refills: 0 | Status: SHIPPED | OUTPATIENT
Start: 2019-04-07 | End: 2019-05-30

## 2019-04-07 RX ORDER — CLONAZEPAM 1 MG/1
1 TABLET ORAL 2 TIMES DAILY PRN
Qty: 30 TABLET | Refills: 0 | Status: SHIPPED | OUTPATIENT
Start: 2019-04-07 | End: 2019-04-07 | Stop reason: SDUPTHER

## 2019-04-07 RX ORDER — INSULIN ASPART 100 [IU]/ML
INJECTION, SOLUTION INTRAVENOUS; SUBCUTANEOUS
Qty: 45 ML | Refills: 3
Start: 2019-04-07 | End: 2019-04-17

## 2019-04-07 RX ORDER — INSULIN GLARGINE 100 [IU]/ML
60 INJECTION, SOLUTION SUBCUTANEOUS DAILY
Start: 2019-04-07 | End: 2019-04-17

## 2019-04-07 RX ADMIN — INSULIN ASPART 4 UNITS: 100 INJECTION, SOLUTION INTRAVENOUS; SUBCUTANEOUS at 06:04

## 2019-04-07 RX ADMIN — INSULIN ASPART 4 UNITS: 100 INJECTION, SOLUTION INTRAVENOUS; SUBCUTANEOUS at 11:04

## 2019-04-07 RX ADMIN — GABAPENTIN 600 MG: 300 CAPSULE ORAL at 10:04

## 2019-04-07 RX ADMIN — PANTOPRAZOLE SODIUM 40 MG: 40 TABLET, DELAYED RELEASE ORAL at 10:04

## 2019-04-07 RX ADMIN — SPIRONOLACTONE 200 MG: 25 TABLET ORAL at 10:04

## 2019-04-07 RX ADMIN — FUROSEMIDE 80 MG: 40 TABLET ORAL at 10:04

## 2019-04-07 RX ADMIN — LACTULOSE 20 G: 20 SOLUTION ORAL at 10:04

## 2019-04-07 RX ADMIN — ESCITALOPRAM 20 MG: 10 TABLET, FILM COATED ORAL at 10:04

## 2019-04-07 RX ADMIN — RIFAXIMIN 550 MG: 550 TABLET ORAL at 10:04

## 2019-04-07 RX ADMIN — METOPROLOL SUCCINATE 25 MG: 25 TABLET, EXTENDED RELEASE ORAL at 10:04

## 2019-04-07 RX ADMIN — ASPIRIN 81 MG: 81 TABLET, COATED ORAL at 10:04

## 2019-04-07 RX ADMIN — RIVAROXABAN 20 MG: 20 TABLET, FILM COATED ORAL at 10:04

## 2019-04-07 NOTE — ASSESSMENT & PLAN NOTE
Not intentional.  She was trying to get her glucose level down.  Currently, she's hyperglycemic.  Will monitor glucoses and treat low's and high's when they occur.

## 2019-04-07 NOTE — ASSESSMENT & PLAN NOTE
Patient's FSGs are controlled on current hypoglycemics.   Last A1c reviewed-   Lab Results   Component Value Date    HGBA1C 7.7 (H) 02/07/2019     Most recent fingerstick glucose reviewed-   Recent Labs   Lab 04/06/19  0911 04/06/19  1003 04/06/19  1221 04/06/19  1617   POCTGLUCOSE 167* 178* 254* 280*     Current correctional scale  Low  Maintain anti-hyperglycemic dose as follows-   Antihyperglycemics (From admission, onward)    Start     Stop Route Frequency Ordered    04/06/19 1224  insulin aspart U-100 pen 0-5 Units      -- SubQ Before meals & nightly PRN 04/06/19 1224

## 2019-04-07 NOTE — PLAN OF CARE
SW met w/ pt and daughter prior to pt's d/c.  Pt lives w/ spouse and her brother, daughter lives next door and checks on pt throughout each day.  Pt denies HH or use of DME.  Pt does not indicate any d/c needs at present time.  Dtr to transport pt home.       04/07/19 8399   Discharge Assessment   Assessment Type Discharge Planning Assessment   Confirmed/corrected address and phone number on facesheet? Yes   Assessment information obtained from? Patient;Caregiver   Communicated expected length of stay with patient/caregiver yes   Prior to hospitilization cognitive status: Alert/Oriented   Prior to hospitalization functional status: Independent   Current cognitive status: Alert/Oriented   Current Functional Status: Independent   Facility Arrived From: home   Lives With sibling(s);spouse   Able to Return to Prior Arrangements yes   Is patient able to care for self after discharge? Yes   Who are your caregiver(s) and their phone number(s)? daughter:  Danitza Buchanan  [phone]   Patient's perception of discharge disposition home or selfcare   Readmission Within the Last 30 Days no previous admission in last 30 days   Patient currently being followed by outpatient case management? No   Patient currently receives any other outside agency services? No   Equipment Currently Used at Home none   Do you have any problems affording any of your prescribed medications? No   Is the patient taking medications as prescribed? yes   Does the patient have transportation home? Yes   Transportation Anticipated family or friend will provide   Does the patient receive services at the [facility]? No   Discharge Plan A Home with family   Discharge Plan B Home with family   DME Needed Upon Discharge  none   Patient/Family in Agreement with Plan yes

## 2019-04-07 NOTE — PLAN OF CARE
Problem: Adult Inpatient Plan of Care  Goal: Plan of Care Review  Outcome: Ongoing (interventions implemented as appropriate)  Pt sleeping through the night, in NAD. Fall and safety precautions maintained. Up with standby assist to RR. Evening medications tolerated well. Will continue to monitor.   04/07/19 0732   Plan of Care Review   Plan of Care Reviewed With patient

## 2019-04-07 NOTE — PLAN OF CARE
04/07/19 1401   Final Note   Assessment Type Final Discharge Note   Anticipated Discharge Disposition Home

## 2019-04-07 NOTE — SUBJECTIVE & OBJECTIVE
Past Medical History:   Diagnosis Date    A-fib     Anxiety     Cirrhosis     DDD (degenerative disc disease), cervical 3/21/2018    DDD (degenerative disc disease), lumbar 3/21/2018    Decompensated hepatic cirrhosis 12/26/2018    Depression     Diabetes mellitus, type 2     Hepatic encephalopathy 12/26/2018    HLD (hyperlipidemia)     HTN (hypertension)     Hypoalbuminemia 12/26/2018    Hypoglycemia associated with type 2 diabetes mellitus 12/26/2018    Morbid obesity with BMI of 45.0-49.9, adult 12/7/2018    Obesity     Other ascites 2/20/2019    Portal hypertension 12/26/2018    Type 2 diabetes mellitus with hyperglycemia, with long-term current use of insulin 12/6/2018       Past Surgical History:   Procedure Laterality Date    arm surgery      left    CARPAL TUNNEL RELEASE      left    CHOLECYSTECTOMY      HYSTERECTOMY      ROTATOR CUFF REPAIR      TOTAL KNEE ARTHROPLASTY         Review of patient's allergies indicates:   Allergen Reactions    Iodinated contrast- oral and iv dye Hives    Zoloft [sertraline]        No current facility-administered medications on file prior to encounter.      Current Outpatient Medications on File Prior to Encounter   Medication Sig    ACCU-CHEK SOFTCLIX LANCETS MISC Accu-Chek Softclix Lancets    aspirin (ECOTRIN) 81 MG EC tablet Take 81 mg by mouth once daily.    clonazePAM (KLONOPIN) 0.5 MG tablet clonazepam 0.5 mg tablet   Take 1 tablet twice a day by oral route as needed for 30 days.    ergocalciferol (ERGOCALCIFEROL) 50,000 unit Cap Take 1 capsule (50,000 Units total) by mouth every 7 days.    escitalopram oxalate (LEXAPRO) 20 MG tablet Take 20 mg by mouth once daily.    furosemide (LASIX) 40 MG tablet Take 2 tablets (80 mg total) by mouth once daily. DOSE CHANGE    gabapentin (NEURONTIN) 600 MG tablet Take 600 mg by mouth 2 (two) times daily.    HYDROcodone-acetaminophen (NORCO) 5-325 mg per tablet Take 1 tablet by mouth every 8 (eight)  hours as needed for Pain.    hydrOXYzine (VISTARIL) 100 MG capsule Take 1 capsule (100 mg total) by mouth 3 (three) times daily as needed for Itching.    insulin aspart U-100 (NOVOLOG FLEXPEN U-100 INSULIN) 100 unit/mL InPn pen 15 units AC + correction Max TDD 60 u    insulin glargine (LANTUS) 100 unit/mL injection Inject 50 Units into the skin once daily.    ketoconazole (NIZORAL) 2 % cream Apply topically once daily.    lactulose (CHRONULAC) 20 gram/30 mL Soln Take 30 mLs (20 g total) by mouth 3 (three) times daily. Adjust dose to have  3-4 BM's daily    metoprolol succinate (TOPROL XL) 25 MG 24 hr tablet Take 25 mg by mouth once daily.    omeprazole (PRILOSEC) 40 MG capsule Take 40 mg by mouth once daily.    ondansetron (ZOFRAN) 4 MG tablet Take 1 tablet (4 mg total) by mouth every 6 (six) hours.    rifAXIMin (XIFAXAN) 550 mg Tab Take 1 tablet (550 mg total) by mouth 2 (two) times daily.    rosuvastatin (CRESTOR) 10 MG tablet Take 10 mg by mouth once daily.    spironolactone (ALDACTONE) 100 MG tablet Take 2 tablets (200 mg total) by mouth once daily. DOSE INCREASE    XARELTO 20 mg Tab Take 20 mg by mouth once daily.    zinc 50 mg Tab Take 200 mg by mouth once daily.     Family History     Problem Relation (Age of Onset)    Cirrhosis Brother        Tobacco Use    Smoking status: Never Smoker    Smokeless tobacco: Never Used   Substance and Sexual Activity    Alcohol use: No     Frequency: Never    Drug use: No    Sexual activity: Not on file     Review of Systems   Constitutional: Negative for chills and fever.   Respiratory: Negative for cough and shortness of breath.    Cardiovascular: Negative for chest pain and palpitations.   Gastrointestinal: Negative for abdominal distention, abdominal pain, nausea and vomiting.   Genitourinary: Negative for difficulty urinating, flank pain and hematuria.   Musculoskeletal: Positive for back pain and neck pain.   Neurological:        Neuropathic pain in  legs/feet   Hematological: Bruises/bleeds easily.   Psychiatric/Behavioral: Negative for confusion. The patient is nervous/anxious.    All other systems reviewed and are negative.    Objective:     Vital Signs (Most Recent):  Temp: 98.4 °F (36.9 °C) (04/06/19 1951)  Pulse: 70 (04/06/19 1951)  Resp: 16 (04/06/19 1951)  BP: (!) 104/49 (04/06/19 1951)  SpO2: (!) 94 % (04/06/19 1951) Vital Signs (24h Range):  Temp:  [97 °F (36.1 °C)-98.4 °F (36.9 °C)] 98.4 °F (36.9 °C)  Pulse:  [] 70  Resp:  [16-24] 16  SpO2:  [94 %-100 %] 94 %  BP: (104-161)/(49-66) 104/49     Weight: 109.8 kg (242 lb 1 oz)  Body mass index is 44.27 kg/m².    Physical Exam   Constitutional: She is oriented to person, place, and time. She is cooperative.  Non-toxic appearance. She does not appear ill. No distress.   Morbidly obese   HENT:   Head: Atraumatic.   Mouth/Throat: Oropharynx is clear and moist. No oropharyngeal exudate.   Eyes: Conjunctivae are normal. Right eye exhibits no discharge. Left eye exhibits no discharge. No scleral icterus.   Neck: Normal range of motion. Neck supple. No JVD present. No thyromegaly present.   Cardiovascular: Normal rate and regular rhythm.   Pulmonary/Chest: Effort normal and breath sounds normal. She exhibits no tenderness.   Abdominal: Soft. Bowel sounds are normal. She exhibits no distension and no mass. There is no hepatomegaly. There is no tenderness.   Musculoskeletal: She exhibits no edema or deformity.   Lymphadenopathy:     She has no cervical adenopathy.   Neurological: She is alert and oriented to person, place, and time.   Skin: Skin is warm and dry. Capillary refill takes less than 2 seconds. Bruising (arms) noted. No rash noted.           Significant Labs:   CBC:   Recent Labs   Lab 04/06/19  0833   WBC 6.30   HGB 9.0*   HCT 27.1*        CMP:   Recent Labs   Lab 04/06/19  0833   *   K 3.6   CL 99   CO2 28   *   BUN 19   CREATININE 0.9   CALCIUM 9.2   PROT 6.5   ALBUMIN 2.4*    BILITOT 0.8   ALKPHOS 304*   AST 60*   ALT 42   ANIONGAP 6*   EGFRNONAA >60       Significant Imaging: none

## 2019-04-07 NOTE — HPI
Ms. Matamoros dosed herself this morning with 80 units of glargine and 20 units of rapid-acting insulin twice in consecutive fashion due to hyperglycemia (400 to 500 range).  So a total of 120 units of insulin.  She presented to the ED this morning for diffuse achiness, especially in back and legs.  Glucose level was low after arriving.  Was given treatment for it and then admitted for more monitoring.  She has liver cirrhosis, which currently is considered idiopathic but mostly likely from RHOADES.  Managed by hepatologist at [facility].  She's got IDDM.  Has had multiple changes of dose of her basal insulin due to different recommendations from multiple providers.  She states that she was on 150 units basal at one time, and it was reduced to 20 units without a taper.  From that point on, b/c of hyperglycemia, dose has been steadily increased and, now, she's on 80.  Was taken off metformin recently.  Her pains are chronic but are significant.  She states her hepatologist told her there's nothing that she can take for pain while having liver disease.

## 2019-04-07 NOTE — ASSESSMENT & PLAN NOTE
Chronic, controlled.  Latest blood pressure and vitals reviewed-   Temp:  [97 °F (36.1 °C)-98.4 °F (36.9 °C)]   Pulse:  []   Resp:  [16-24]   BP: (104-161)/(49-66)   SpO2:  [94 %-100 %] .   Current meds for hypertension include   Hypertension Medications prior to admission             furosemide (LASIX) 40 MG tablet Take 2 tablets (80 mg total) by mouth once daily. DOSE CHANGE    metoprolol succinate (TOPROL XL) 25 MG 24 hr tablet Take 25 mg by mouth once daily.    spironolactone (ALDACTONE) 100 MG tablet Take 2 tablets (200 mg total) by mouth once daily. DOSE INCREASE      Hospital Medications             furosemide tablet 80 mg Starting on 4/7/2019. Take 2 tablets (80 mg total) by mouth once daily.    metoprolol succinate (TOPROL-XL) 24 hr tablet 25 mg Starting on 4/7/2019. Take 1 tablet (25 mg total) by mouth once daily.    spironolactone tablet 200 mg Starting on 4/7/2019. Take 2 tablets (200 mg total) by mouth once daily.        .  Will continue BP medications as needed for sustained BP control.

## 2019-04-07 NOTE — H&P
[facility] Hospital Medicine  History & Physical    Patient Name: Sejal Matamoros  MRN: [id number]  Admission Date: 4/6/2019  Attending Physician: Berto Crook MD   Primary Care Provider: Deandre Lundberg NP         Patient information was obtained from patient, past medical records and ER records.     Subjective:     Principal Problem:Hypoglycemia due to type 2 diabetes mellitus    Chief Complaint:   Chief Complaint   Patient presents with    Blood Sugar Problem        HPI: Ms. Matamoros dosed herself this morning with 80 units of glargine and 20 units of rapid-acting insulin twice in consecutive fashion due to hyperglycemia (400 to 500 range).  So a total of 120 units of insulin.  She presented to the ED this morning for diffuse achiness, especially in back and legs.  Glucose level was low after arriving.  Was given treatment for it and then admitted for more monitoring.  She has liver cirrhosis, which currently is considered idiopathic but mostly likely from RHOADES.  Managed by hepatologist at [facility].  She's got IDDM.  Has had multiple changes of dose of her basal insulin due to different recommendations from multiple providers.  She states that she was on 150 units basal at one time, and it was reduced to 20 units without a taper.  From that point on, b/c of hyperglycemia, dose has been steadily increased and, now, she's on 80.  Was taken off metformin recently.  Her pains are chronic but are significant.  She states her hepatologist told her there's nothing that she can take for pain while having liver disease.    Past Medical History:   Diagnosis Date    A-fib     Anxiety     Cirrhosis     DDD (degenerative disc disease), cervical 3/21/2018    DDD (degenerative disc disease), lumbar 3/21/2018    Decompensated hepatic cirrhosis 12/26/2018    Depression     Diabetes mellitus, type 2     Hepatic encephalopathy 12/26/2018    HLD (hyperlipidemia)     HTN (hypertension)      Hypoalbuminemia 12/26/2018    Hypoglycemia associated with type 2 diabetes mellitus 12/26/2018    Morbid obesity with BMI of 45.0-49.9, adult 12/7/2018    Obesity     Other ascites 2/20/2019    Portal hypertension 12/26/2018    Type 2 diabetes mellitus with hyperglycemia, with long-term current use of insulin 12/6/2018       Past Surgical History:   Procedure Laterality Date    arm surgery      left    CARPAL TUNNEL RELEASE      left    CHOLECYSTECTOMY      HYSTERECTOMY      ROTATOR CUFF REPAIR      TOTAL KNEE ARTHROPLASTY         Review of patient's allergies indicates:   Allergen Reactions    Iodinated contrast- oral and iv dye Hives    Zoloft [sertraline]        No current facility-administered medications on file prior to encounter.      Current Outpatient Medications on File Prior to Encounter   Medication Sig    ACCU-CHEK SOFTCLIX LANCETS MISC Accu-Chek Softclix Lancets    aspirin (ECOTRIN) 81 MG EC tablet Take 81 mg by mouth once daily.    clonazePAM (KLONOPIN) 0.5 MG tablet clonazepam 0.5 mg tablet   Take 1 tablet twice a day by oral route as needed for 30 days.    ergocalciferol (ERGOCALCIFEROL) 50,000 unit Cap Take 1 capsule (50,000 Units total) by mouth every 7 days.    escitalopram oxalate (LEXAPRO) 20 MG tablet Take 20 mg by mouth once daily.    furosemide (LASIX) 40 MG tablet Take 2 tablets (80 mg total) by mouth once daily. DOSE CHANGE    gabapentin (NEURONTIN) 600 MG tablet Take 600 mg by mouth 2 (two) times daily.    HYDROcodone-acetaminophen (NORCO) 5-325 mg per tablet Take 1 tablet by mouth every 8 (eight) hours as needed for Pain.    hydrOXYzine (VISTARIL) 100 MG capsule Take 1 capsule (100 mg total) by mouth 3 (three) times daily as needed for Itching.    insulin aspart U-100 (NOVOLOG FLEXPEN U-100 INSULIN) 100 unit/mL InPn pen 15 units AC + correction Max TDD 60 u    insulin glargine (LANTUS) 100 unit/mL injection Inject 50 Units into the skin once daily.     ketoconazole (NIZORAL) 2 % cream Apply topically once daily.    lactulose (CHRONULAC) 20 gram/30 mL Soln Take 30 mLs (20 g total) by mouth 3 (three) times daily. Adjust dose to have  3-4 BM's daily    metoprolol succinate (TOPROL XL) 25 MG 24 hr tablet Take 25 mg by mouth once daily.    omeprazole (PRILOSEC) 40 MG capsule Take 40 mg by mouth once daily.    ondansetron (ZOFRAN) 4 MG tablet Take 1 tablet (4 mg total) by mouth every 6 (six) hours.    rifAXIMin (XIFAXAN) 550 mg Tab Take 1 tablet (550 mg total) by mouth 2 (two) times daily.    rosuvastatin (CRESTOR) 10 MG tablet Take 10 mg by mouth once daily.    spironolactone (ALDACTONE) 100 MG tablet Take 2 tablets (200 mg total) by mouth once daily. DOSE INCREASE    XARELTO 20 mg Tab Take 20 mg by mouth once daily.    zinc 50 mg Tab Take 200 mg by mouth once daily.     Family History     Problem Relation (Age of Onset)    Cirrhosis Brother        Tobacco Use    Smoking status: Never Smoker    Smokeless tobacco: Never Used   Substance and Sexual Activity    Alcohol use: No     Frequency: Never    Drug use: No    Sexual activity: Not on file     Review of Systems   Constitutional: Negative for chills and fever.   Respiratory: Negative for cough and shortness of breath.    Cardiovascular: Negative for chest pain and palpitations.   Gastrointestinal: Negative for abdominal distention, abdominal pain, nausea and vomiting.   Genitourinary: Negative for difficulty urinating, flank pain and hematuria.   Musculoskeletal: Positive for back pain and neck pain.   Neurological:        Neuropathic pain in legs/feet   Hematological: Bruises/bleeds easily.   Psychiatric/Behavioral: Negative for confusion. The patient is nervous/anxious.    All other systems reviewed and are negative.    Objective:     Vital Signs (Most Recent):  Temp: 98.4 °F (36.9 °C) (04/06/19 1951)  Pulse: 70 (04/06/19 1951)  Resp: 16 (04/06/19 1951)  BP: (!) 104/49 (04/06/19 1951)  SpO2: (!) 94 %  (04/06/19 1951) Vital Signs (24h Range):  Temp:  [97 °F (36.1 °C)-98.4 °F (36.9 °C)] 98.4 °F (36.9 °C)  Pulse:  [] 70  Resp:  [16-24] 16  SpO2:  [94 %-100 %] 94 %  BP: (104-161)/(49-66) 104/49     Weight: 109.8 kg (242 lb 1 oz)  Body mass index is 44.27 kg/m².    Physical Exam   Constitutional: She is oriented to person, place, and time. She is cooperative.  Non-toxic appearance. She does not appear ill. No distress.   Morbidly obese   HENT:   Head: Atraumatic.   Mouth/Throat: Oropharynx is clear and moist. No oropharyngeal exudate.   Eyes: Conjunctivae are normal. Right eye exhibits no discharge. Left eye exhibits no discharge. No scleral icterus.   Neck: Normal range of motion. Neck supple. No JVD present. No thyromegaly present.   Cardiovascular: Normal rate and regular rhythm.   Pulmonary/Chest: Effort normal and breath sounds normal. She exhibits no tenderness.   Abdominal: Soft. Bowel sounds are normal. She exhibits no distension and no mass. There is no hepatomegaly. There is no tenderness.   Musculoskeletal: She exhibits no edema or deformity.   Lymphadenopathy:     She has no cervical adenopathy.   Neurological: She is alert and oriented to person, place, and time.   Skin: Skin is warm and dry. Capillary refill takes less than 2 seconds. Bruising (arms) noted. No rash noted.           Significant Labs:   CBC:   Recent Labs   Lab 04/06/19  0833   WBC 6.30   HGB 9.0*   HCT 27.1*        CMP:   Recent Labs   Lab 04/06/19  0833   *   K 3.6   CL 99   CO2 28   *   BUN 19   CREATININE 0.9   CALCIUM 9.2   PROT 6.5   ALBUMIN 2.4*   BILITOT 0.8   ALKPHOS 304*   AST 60*   ALT 42   ANIONGAP 6*   EGFRNONAA >60       Significant Imaging: none    Assessment/Plan:     * Hypoglycemia due to type 2 diabetes mellitus  Patient's FSGs are controlled on current hypoglycemics.   Last A1c reviewed-   Lab Results   Component Value Date    HGBA1C 7.7 (H) 02/07/2019     Most recent fingerstick glucose  reviewed-   Recent Labs   Lab 04/06/19  0911 04/06/19  1003 04/06/19  1221 04/06/19  1617   POCTGLUCOSE 167* 178* 254* 280*     Current correctional scale  Low  Maintain anti-hyperglycemic dose as follows-   Antihyperglycemics (From admission, onward)    Start     Stop Route Frequency Ordered    04/06/19 1224  insulin aspart U-100 pen 0-5 Units      -- SubQ Before meals & nightly PRN 04/06/19 1224              Insulin overdose  Not intentional.  She was trying to get her glucose level down.  Currently, she's hyperglycemic.  Will monitor glucoses and treat low's and high's when they occur.      Decompensated hepatic cirrhosis  No acute issues.  Continue lactulose, Aldactone, and Lasix.      HTN (hypertension)  Chronic, controlled.  Latest blood pressure and vitals reviewed-   Temp:  [97 °F (36.1 °C)-98.4 °F (36.9 °C)]   Pulse:  []   Resp:  [16-24]   BP: (104-161)/(49-66)   SpO2:  [94 %-100 %] .   Current meds for hypertension include   Hypertension Medications prior to admission             furosemide (LASIX) 40 MG tablet Take 2 tablets (80 mg total) by mouth once daily. DOSE CHANGE    metoprolol succinate (TOPROL XL) 25 MG 24 hr tablet Take 25 mg by mouth once daily.    spironolactone (ALDACTONE) 100 MG tablet Take 2 tablets (200 mg total) by mouth once daily. DOSE INCREASE      Hospital Medications             furosemide tablet 80 mg Starting on 4/7/2019. Take 2 tablets (80 mg total) by mouth once daily.    metoprolol succinate (TOPROL-XL) 24 hr tablet 25 mg Starting on 4/7/2019. Take 1 tablet (25 mg total) by mouth once daily.    spironolactone tablet 200 mg Starting on 4/7/2019. Take 2 tablets (200 mg total) by mouth once daily.        .  Will continue BP medications as needed for sustained BP control.          Morbid obesity with BMI of 40.0-44.9, adult  Body mass index is 44.27 kg/m². Morbid obesity complicates all aspects of disease management from diagnostic modalities to treatment. Weight loss  encouraged and health benefits explained to patient.          VTE Risk Mitigation (From admission, onward)        Ordered     rivaroxaban tablet 20 mg  Daily      04/06/19 1453     IP VTE HIGH RISK PATIENT  Once      04/06/19 1224     Place MINOO hose  Until discontinued      04/06/19 1224             Berto Crook MD  Department of Hospital Medicine   [facility]

## 2019-04-07 NOTE — ASSESSMENT & PLAN NOTE
Body mass index is 44.27 kg/m². Morbid obesity complicates all aspects of disease management from diagnostic modalities to treatment. Weight loss encouraged and health benefits explained to patient.

## 2019-04-08 ENCOUNTER — TELEPHONE (OUTPATIENT)
Dept: MEDSURG UNIT | Facility: HOSPITAL | Age: 69
End: 2019-04-08

## 2019-04-08 ENCOUNTER — PATIENT OUTREACH (OUTPATIENT)
Dept: ADMINISTRATIVE | Facility: CLINIC | Age: 69
End: 2019-04-08

## 2019-04-08 NOTE — HOSPITAL COURSE
Pt was admitted with hypoglycemia.  She was given dextrose and glucagon.  Her glucose levels rapidly normalized and stayed elevated.  Her other medical conditions remained stable.  She was discharged after 24 hours in the hospital.  I made adjustments to her basal-bolus regimen and warned her of the dangers of taking more insulin than the amount prescribed.  She was kept off all oral anti-hyperglycemics.    PE:  Constitutional: She is oriented to person, place, and time. She is cooperative.  Non-toxic appearance. She does not appear ill. No distress.   Morbidly obese   Cardiovascular: Normal rate and regular rhythm.   Pulmonary/Chest: Effort normal and breath sounds normal. She exhibits no tenderness.   Abdominal: Soft. Bowel sounds are normal. She exhibits no distension and no mass. There is no hepatomegaly. There is no tenderness.

## 2019-04-08 NOTE — PATIENT INSTRUCTIONS
Hypoglycemia (Low Blood Sugar)  Too little glucose (sugar) in your blood is called hypoglycemia or low blood sugar. Diabetes itself doesnt cause low blood sugar. But some of the treatments for diabetes, such as pills or insulin, may increase your risk for it. Low blood sugar may cause you to lose consciousness or have a seizure. So always treat low blood sugar right away.    Special note: Always carry a source of fast-acting sugar and a snack in case of hypoglycemia     What You May Notice  If you have low blood sugar, you may have these symptoms:  Shakiness or dizziness  Cold, clammy skin or sweating  Feelings of hunger  Headache  Nervousness  A hard, fast heartbeat  Weakness  Confusion or irritability  Blurred vision  What You Should Do  First, check your blood sugar. If it is too low (out of your target range), eat or drink 15 to 20 grams of fast-acting sugar. This may be 3 to 4 glucose tablets, 4 oz (half a cup) fruit juice or regular (non-diet) soda, 8 oz (one cup ) fat-free milk, or 1 tablespoon of honey. Dont take more than this, or your blood sugar may go too high.  Wait 15 minutes. Then recheck your blood sugar if you can.  If your blood sugar is still too low, repeat the steps above and check your blood sugar again. If your blood sugar still has not returned to your target range, contact your healthcare provider or seek emergency care.  Once your blood sugar returns to target range, eat a snack or meal.  Preventing Low Blood Sugar  Eat your meals and snacks at the same times each day. Dont skip meals!  Ask your healthcare provider if it is safe for you to drink alcohol. Never drink on an empty stomach.  Take your medication at the prescribed times.  Always carry a source of fast-acting sugar and a snack when youre away from home.  Other Things to Do  Carry a medical ID card or wear a medical alert bracelet or necklace. It should say that you have diabetes. It should also say what to do if you pass out  or have a seizure.  Make sure your family, friends, and coworkers know the signs of low blood sugar. Tell them what to do if your blood sugar falls very low and you cant treat yourself.  Keep a glucagon emergency kit handy. Be sure your family, friends, and coworkers know how and when to use it. Check it regularly and replace the glucagon before it expires.  Talk to your healthcare team about other things you can do to prevent low blood sugar.    If you experience hypoglycemia several times, call your doctor.   © 0676-7629 [employer], [address] [place] [address], [place] [address]. All rights reserved. This information is not intended as a substitute for professional medical care. Always follow your healthcare professional's instructions.

## 2019-04-08 NOTE — DISCHARGE SUMMARY
[facility] Hospital Medicine  Discharge Summary      Patient Name: Sejal Matamoros  MRN: [id number]  Admission Date: 4/6/2019  Hospital Length of Stay: 1 days  Discharge Date and Time: 4/7/2019  4:00 PM  Attending Physician: No att. providers found   Discharging Provider: Berto Crook MD  Primary Care Provider: Deandre Lundberg NP      HPI:   Ms. Matamoros dosed herself this morning with 80 units of glargine and 20 units of rapid-acting insulin twice in consecutive fashion due to hyperglycemia (400 to 500 range).  So a total of 120 units of insulin.  She presented to the ED this morning for diffuse achiness, especially in back and legs.  Glucose level was low after arriving.  Was given treatment for it and then admitted for more monitoring.  She has liver cirrhosis, which currently is considered idiopathic but mostly likely from RHOADES.  Managed by hepatologist at [facility].  She's got IDDM.  Has had multiple changes of dose of her basal insulin due to different recommendations from multiple providers.  She states that she was on 150 units basal at one time, and it was reduced to 20 units without a taper.  From that point on, b/c of hyperglycemia, dose has been steadily increased and, now, she's on 80.  Was taken off metformin recently.  Her pains are chronic but are significant.  She states her hepatologist told her there's nothing that she can take for pain while having liver disease.    * No surgery found *      Hospital Course:   Pt was admitted with hypoglycemia.  She was given dextrose and glucagon.  Her glucose levels rapidly normalized and stayed elevated.  Her other medical conditions remained stable.  She was discharged after 24 hours in the hospital.  I made adjustments to her basal-bolus regimen and warned her of the dangers of taking more insulin than the amount prescribed.  She was kept off all oral anti-hyperglycemics.    PE:  Constitutional: She is oriented to person, place, and  time. She is cooperative.  Non-toxic appearance. She does not appear ill. No distress.   Morbidly obese   Cardiovascular: Normal rate and regular rhythm.   Pulmonary/Chest: Effort normal and breath sounds normal. She exhibits no tenderness.   Abdominal: Soft. Bowel sounds are normal. She exhibits no distension and no mass. There is no hepatomegaly. There is no tenderness.        Consults:       Final Active Diagnoses:    Diagnosis Date Noted POA    Decompensated hepatic cirrhosis [K72.90] 12/26/2018 Yes    Morbid obesity with BMI of 40.0-44.9, adult [E66.01, Z68.41] 12/07/2018 Not Applicable    HTN (hypertension) [I10]  Yes      Problems Resolved During this Admission:    Diagnosis Date Noted Date Resolved POA    PRINCIPAL PROBLEM:  Hypoglycemia due to type 2 diabetes mellitus [E11.649] 04/06/2019 04/07/2019 Yes    Insulin overdose [T38.3X1A] 04/06/2019 04/07/2019 Yes       Discharged Condition: good    Disposition: Home or Self Care    Follow Up:  Follow-up Information     Sheryl Lundberg NP In 2 weeks.    Specialty:  Family Medicine  Why:  For follow up after being hospitalized  Contact information:  [address] [place] [address]  [phone]             Mary Agudelo DNP, NP. Call in 2 weeks.    Specialty:  Endocrinology  Why:  To review your blood glucose log  Contact information:  [address]  [facility] [address]  [phone]                 Patient Instructions:      Diet diabetic     Activity as tolerated       Significant Diagnostic Studies:   Lab Results   Component Value Date    HGBA1C 7.7 (H) 02/07/2019     Lab Results   Component Value Date    WBC 3.50 (L) 04/07/2019    HGB 8.6 (L) 04/07/2019    HCT 26.8 (L) 04/07/2019    MCV 88 04/07/2019     04/07/2019     BMP  Lab Results   Component Value Date     (L) 04/07/2019    K 4.5 04/07/2019     04/07/2019    CO2 29 04/07/2019    BUN 16 04/07/2019    CREATININE 0.9 04/07/2019    CALCIUM 8.8 04/07/2019    ANIONGAP 5  (L) 04/07/2019    ESTGFRAFRICA >60 04/07/2019    EGFRNONAA >60 04/07/2019         Pending Diagnostic Studies:     None         Medications:  Reconciled Home Medications:      Medication List      START taking these medications    clonazePAM 1 MG tablet  Commonly known as:  KLONOPIN  Take 1 tablet (1 mg total) by mouth 2 (two) times daily as needed for Anxiety.        CHANGE how you take these medications    insulin aspart U-100 100 unit/mL Inpn pen  Commonly known as:  NovoLOG Flexpen U-100 Insulin  20 units before each meal + correction (sliding scale).  What changed:  additional instructions     insulin glargine 100 unit/mL injection  Commonly known as:  LANTUS  Inject 60 Units into the skin once daily.  What changed:  how much to take        CONTINUE taking these medications    ACCU-CHEK SOFTCLIX LANCETS MISC  Accu-Chek Softclix Lancets     aspirin 81 MG EC tablet  Commonly known as:  ECOTRIN  Take 81 mg by mouth once daily.     ergocalciferol 50,000 unit Cap  Commonly known as:  ERGOCALCIFEROL  Take 1 capsule (50,000 Units total) by mouth every 7 days.     escitalopram oxalate 20 MG tablet  Commonly known as:  LEXAPRO  Take 20 mg by mouth once daily.     furosemide 40 MG tablet  Commonly known as:  LASIX  Take 2 tablets (80 mg total) by mouth once daily. DOSE CHANGE     gabapentin 600 MG tablet  Commonly known as:  NEURONTIN  Take 600 mg by mouth 2 (two) times daily.     HYDROcodone-acetaminophen 5-325 mg per tablet  Commonly known as:  NORCO  Take 1 tablet by mouth every 8 (eight) hours as needed for Pain.     hydrOXYzine 100 MG capsule  Commonly known as:  VISTARIL  Take 1 capsule (100 mg total) by mouth 3 (three) times daily as needed for Itching.     ketoconazole 2 % cream  Commonly known as:  NIZORAL  Apply topically once daily.     lactulose 20 gram/30 mL Soln  Commonly known as:  CHRONULAC  Take 30 mLs (20 g total) by mouth 3 (three) times daily. Adjust dose to have  3-4 BM's daily     omeprazole 40 MG  capsule  Commonly known as:  PRILOSEC  Take 40 mg by mouth once daily.     ondansetron 4 MG tablet  Commonly known as:  ZOFRAN  Take 1 tablet (4 mg total) by mouth every 6 (six) hours.     rosuvastatin 10 MG tablet  Commonly known as:  CRESTOR  Take 10 mg by mouth once daily.     spironolactone 100 MG tablet  Commonly known as:  ALDACTONE  Take 2 tablets (200 mg total) by mouth once daily. DOSE INCREASE     TOPROL XL 25 MG 24 hr tablet  Generic drug:  metoprolol succinate  Take 25 mg by mouth once daily.     XARELTO 20 mg Tab  Generic drug:  rivaroxaban  Take 20 mg by mouth once daily.     XIFAXAN 550 mg Tab  Generic drug:  rifAXIMin  Take 1 tablet (550 mg total) by mouth 2 (two) times daily.     zinc 50 mg Tab  Take 200 mg by mouth once daily.            Indwelling Lines/Drains at time of discharge:   Lines/Drains/Airways          None          Time spent on the discharge of patient: 34 minutes  Patient was seen and examined on the date of discharge and determined to be suitable for discharge.         Berto Crook MD  Department of Hospital Medicine  [facility]

## 2019-04-10 LAB — BACTERIA SPEC ANAEROBE CULT: NORMAL

## 2019-04-17 ENCOUNTER — OFFICE VISIT (OUTPATIENT)
Dept: ENDOCRINOLOGY | Facility: CLINIC | Age: 69
End: 2019-04-17
Payer: MEDICARE

## 2019-04-17 VITALS
TEMPERATURE: 98 F | BODY MASS INDEX: 44.1 KG/M2 | WEIGHT: 239.63 LBS | HEART RATE: 64 BPM | RESPIRATION RATE: 18 BRPM | HEIGHT: 62 IN | SYSTOLIC BLOOD PRESSURE: 118 MMHG | DIASTOLIC BLOOD PRESSURE: 58 MMHG

## 2019-04-17 DIAGNOSIS — E55.9 VITAMIN D DEFICIENCY: ICD-10-CM

## 2019-04-17 DIAGNOSIS — Z79.4 TYPE 2 DIABETES MELLITUS WITH HYPERGLYCEMIA, WITH LONG-TERM CURRENT USE OF INSULIN: ICD-10-CM

## 2019-04-17 DIAGNOSIS — K74.60 DECOMPENSATED HEPATIC CIRRHOSIS: ICD-10-CM

## 2019-04-17 DIAGNOSIS — E78.2 MIXED HYPERLIPIDEMIA: ICD-10-CM

## 2019-04-17 DIAGNOSIS — E66.01 MORBID OBESITY WITH BMI OF 40.0-44.9, ADULT: ICD-10-CM

## 2019-04-17 DIAGNOSIS — Z79.4 TYPE 2 DIABETES MELLITUS WITH DIABETIC POLYNEUROPATHY, WITH LONG-TERM CURRENT USE OF INSULIN: Primary | ICD-10-CM

## 2019-04-17 DIAGNOSIS — K72.90 DECOMPENSATED HEPATIC CIRRHOSIS: ICD-10-CM

## 2019-04-17 DIAGNOSIS — I10 ESSENTIAL HYPERTENSION: ICD-10-CM

## 2019-04-17 DIAGNOSIS — E11.65 TYPE 2 DIABETES MELLITUS WITH HYPERGLYCEMIA, WITH LONG-TERM CURRENT USE OF INSULIN: ICD-10-CM

## 2019-04-17 DIAGNOSIS — E11.42 TYPE 2 DIABETES MELLITUS WITH DIABETIC POLYNEUROPATHY, WITH LONG-TERM CURRENT USE OF INSULIN: Primary | ICD-10-CM

## 2019-04-17 PROCEDURE — 99214 OFFICE O/P EST MOD 30 MIN: CPT | Mod: S$GLB,,, | Performed by: NURSE PRACTITIONER

## 2019-04-17 PROCEDURE — 99999 PR PBB SHADOW E&M-EST. PATIENT-LVL V: ICD-10-PCS | Mod: PBBFAC,,, | Performed by: NURSE PRACTITIONER

## 2019-04-17 PROCEDURE — 99999 PR PBB SHADOW E&M-EST. PATIENT-LVL V: CPT | Mod: PBBFAC,,, | Performed by: NURSE PRACTITIONER

## 2019-04-17 PROCEDURE — 99214 PR OFFICE/OUTPT VISIT, EST, LEVL IV, 30-39 MIN: ICD-10-PCS | Mod: S$GLB,,, | Performed by: NURSE PRACTITIONER

## 2019-04-17 RX ORDER — INSULIN GLARGINE 100 [IU]/ML
80 INJECTION, SOLUTION SUBCUTANEOUS DAILY
Qty: 45 ML | Refills: 3 | Status: ON HOLD
Start: 2019-04-17 | End: 2019-07-28 | Stop reason: SDUPTHER

## 2019-04-17 RX ORDER — INSULIN ASPART 100 [IU]/ML
INJECTION, SOLUTION INTRAVENOUS; SUBCUTANEOUS
Qty: 45 ML | Refills: 3
Start: 2019-04-17 | End: 2019-05-22

## 2019-04-23 ENCOUNTER — TELEPHONE (OUTPATIENT)
Dept: HEPATOLOGY | Facility: CLINIC | Age: 69
End: 2019-04-23

## 2019-04-23 NOTE — TELEPHONE ENCOUNTER
----- Message from Sejal Dillon MA sent at 4/23/2019  3:45 PM CDT -----      ----- Message -----  From: Kya Hurley  Sent: 4/23/2019   3:36 PM  To: [facility] Hepat Clinical Staff    Pt has appt on 4/26 and wants to know if she can have a paracentesis on that morning bc she is swollen and uncomfortable    Pt contact [phone]

## 2019-04-23 NOTE — TELEPHONE ENCOUNTER
MA called patient spoke to patient daughter inform her that we are able to schedule her for PARA 4/26 10 am. She accepted the appt.

## 2019-04-25 ENCOUNTER — TELEPHONE (OUTPATIENT)
Dept: INTERVENTIONAL RADIOLOGY/VASCULAR | Facility: HOSPITAL | Age: 69
End: 2019-04-25

## 2019-04-26 ENCOUNTER — OFFICE VISIT (OUTPATIENT)
Dept: HEPATOLOGY | Facility: CLINIC | Age: 69
End: 2019-04-26
Payer: MEDICARE

## 2019-04-26 ENCOUNTER — HOSPITAL ENCOUNTER (OUTPATIENT)
Dept: INTERVENTIONAL RADIOLOGY/VASCULAR | Facility: HOSPITAL | Age: 69
Discharge: HOME | End: 2019-04-26
Attending: NURSE PRACTITIONER
Payer: MEDICARE

## 2019-04-26 VITALS
SYSTOLIC BLOOD PRESSURE: 176 MMHG | DIASTOLIC BLOOD PRESSURE: 74 MMHG | OXYGEN SATURATION: 100 % | HEART RATE: 83 BPM | RESPIRATION RATE: 16 BRPM

## 2019-04-26 VITALS
WEIGHT: 239.63 LBS | SYSTOLIC BLOOD PRESSURE: 132 MMHG | HEART RATE: 83 BPM | RESPIRATION RATE: 18 BRPM | DIASTOLIC BLOOD PRESSURE: 59 MMHG | OXYGEN SATURATION: 100 % | BODY MASS INDEX: 44.1 KG/M2 | HEIGHT: 62 IN

## 2019-04-26 DIAGNOSIS — R18.8 CIRRHOSIS OF LIVER WITH ASCITES, UNSPECIFIED HEPATIC CIRRHOSIS TYPE: ICD-10-CM

## 2019-04-26 DIAGNOSIS — K72.90 DECOMPENSATED HEPATIC CIRRHOSIS: ICD-10-CM

## 2019-04-26 DIAGNOSIS — K74.60 CIRRHOSIS OF LIVER WITH ASCITES, UNSPECIFIED HEPATIC CIRRHOSIS TYPE: ICD-10-CM

## 2019-04-26 DIAGNOSIS — K74.60 DECOMPENSATED HEPATIC CIRRHOSIS: ICD-10-CM

## 2019-04-26 DIAGNOSIS — L29.9 PRURITUS: Primary | ICD-10-CM

## 2019-04-26 DIAGNOSIS — K76.82 HEPATIC ENCEPHALOPATHY: ICD-10-CM

## 2019-04-26 LAB
APPEARANCE FLD: NORMAL
BODY FLD TYPE: NORMAL
COLOR FLD: YELLOW
GRAM STN SPEC: NORMAL
GRAM STN SPEC: NORMAL
LYMPHOCYTES NFR FLD MANUAL: 86 %
MESOTHL CELL NFR FLD MANUAL: 1 %
MONOS+MACROS NFR FLD MANUAL: 8 %
NEUTROPHILS NFR FLD MANUAL: 5 %
PROT FLD-MCNC: 2.1 G/DL
SPECIMEN SOURCE: NORMAL
WBC # FLD: 262 /CU MM

## 2019-04-26 PROCEDURE — 3078F PR MOST RECENT DIASTOLIC BLOOD PRESSURE < 80 MM HG: ICD-10-PCS | Mod: CPTII,S$GLB,, | Performed by: INTERNAL MEDICINE

## 2019-04-26 PROCEDURE — 49083 ABD PARACENTESIS W/IMAGING: CPT

## 2019-04-26 PROCEDURE — 49083 IR PARACENTESIS WITH IMAGING: ICD-10-PCS | Mod: ,,, | Performed by: FAMILY MEDICINE

## 2019-04-26 PROCEDURE — 87070 CULTURE OTHR SPECIMN AEROBIC: CPT

## 2019-04-26 PROCEDURE — 49083 ABD PARACENTESIS W/IMAGING: CPT | Mod: ,,, | Performed by: FAMILY MEDICINE

## 2019-04-26 PROCEDURE — 99215 OFFICE O/P EST HI 40 MIN: CPT | Mod: S$GLB,,, | Performed by: INTERNAL MEDICINE

## 2019-04-26 PROCEDURE — 88305 TISSUE EXAM BY PATHOLOGIST: CPT | Performed by: PATHOLOGY

## 2019-04-26 PROCEDURE — 84157 ASSAY OF PROTEIN OTHER: CPT

## 2019-04-26 PROCEDURE — 1101F PT FALLS ASSESS-DOCD LE1/YR: CPT | Mod: CPTII,S$GLB,, | Performed by: INTERNAL MEDICINE

## 2019-04-26 PROCEDURE — 88112 CYTOPATH CELL ENHANCE TECH: CPT | Mod: 26,,, | Performed by: PATHOLOGY

## 2019-04-26 PROCEDURE — 3075F SYST BP GE 130 - 139MM HG: CPT | Mod: CPTII,S$GLB,, | Performed by: INTERNAL MEDICINE

## 2019-04-26 PROCEDURE — 87205 SMEAR GRAM STAIN: CPT

## 2019-04-26 PROCEDURE — 89051 BODY FLUID CELL COUNT: CPT

## 2019-04-26 PROCEDURE — 1101F PR PT FALLS ASSESS DOC 0-1 FALLS W/OUT INJ PAST YR: ICD-10-PCS | Mod: CPTII,S$GLB,, | Performed by: INTERNAL MEDICINE

## 2019-04-26 PROCEDURE — 99999 PR PBB SHADOW E&M-EST. PATIENT-LVL V: CPT | Mod: PBBFAC,,, | Performed by: INTERNAL MEDICINE

## 2019-04-26 PROCEDURE — 99999 PR PBB SHADOW E&M-EST. PATIENT-LVL V: ICD-10-PCS | Mod: PBBFAC,,, | Performed by: INTERNAL MEDICINE

## 2019-04-26 PROCEDURE — 88112 CYTOLOGY SPECIMEN- MEDICAL CYTOLOGY (FLUID/WASH/BRUSH): ICD-10-PCS | Mod: 26,,, | Performed by: PATHOLOGY

## 2019-04-26 PROCEDURE — 88305 CYTOLOGY SPECIMEN- MEDICAL CYTOLOGY (FLUID/WASH/BRUSH): ICD-10-PCS | Mod: 26,,, | Performed by: PATHOLOGY

## 2019-04-26 PROCEDURE — 99215 PR OFFICE/OUTPT VISIT, EST, LEVL V, 40-54 MIN: ICD-10-PCS | Mod: S$GLB,,, | Performed by: INTERNAL MEDICINE

## 2019-04-26 PROCEDURE — 87075 CULTR BACTERIA EXCEPT BLOOD: CPT

## 2019-04-26 PROCEDURE — 3075F PR MOST RECENT SYSTOLIC BLOOD PRESS GE 130-139MM HG: ICD-10-PCS | Mod: CPTII,S$GLB,, | Performed by: INTERNAL MEDICINE

## 2019-04-26 PROCEDURE — 3078F DIAST BP <80 MM HG: CPT | Mod: CPTII,S$GLB,, | Performed by: INTERNAL MEDICINE

## 2019-04-26 RX ORDER — CHOLESTYRAMINE 4 G/4.8G
4 POWDER, FOR SUSPENSION ORAL 2 TIMES DAILY
Qty: 180 PACKET | Refills: 3 | Status: SHIPPED | OUTPATIENT
Start: 2019-04-26 | End: 2019-07-08 | Stop reason: CLARIF

## 2019-04-26 RX ORDER — ALBUMIN HUMAN 250 G/1000ML
25 SOLUTION INTRAVENOUS ONCE
Status: DISCONTINUED | OUTPATIENT
Start: 2019-04-26 | End: 2019-04-27 | Stop reason: HOSPADM

## 2019-04-26 NOTE — H&P
Radiology Post-Procedure Note    Pre Op Diagnosis: Ascites  Post Op Diagnosis: Same    Procedure: Ultrasound Guided Paracentesis    Procedure performed by: Jerzy MCNEIL, Jane     Written Informed Consent Obtained: Yes  Specimen Removed: YES cloudy yellow  Estimated Blood Loss: Minimal    Findings:   Successful paracentesis.  Albumin administered PRN per protocol.    Patient tolerated procedure well.    Jane Bertrand, APRN, FNP  Interventional Radiology  [phone] [employer]

## 2019-04-26 NOTE — H&P
Radiology History & Physical      SUBJECTIVE:     Chief Complaint: abdominal distention    History of Present Illness:  Sejal Matamoros is a 68 y.o. female who presents for ultrasound guided paracentesis  Past Medical History:   Diagnosis Date    A-fib     Anxiety     Cirrhosis     DDD (degenerative disc disease), cervical 3/21/2018    DDD (degenerative disc disease), lumbar 3/21/2018    Decompensated hepatic cirrhosis 12/26/2018    Depression     Diabetes mellitus, type 2     Hepatic encephalopathy 12/26/2018    HLD (hyperlipidemia)     HTN (hypertension)     Hypoalbuminemia 12/26/2018    Hypoglycemia associated with type 2 diabetes mellitus 12/26/2018    Morbid obesity with BMI of 45.0-49.9, adult 12/7/2018    Obesity     Other ascites 2/20/2019    Portal hypertension 12/26/2018    Type 2 diabetes mellitus with hyperglycemia, with long-term current use of insulin 12/6/2018     Past Surgical History:   Procedure Laterality Date    arm surgery      left    CARPAL TUNNEL RELEASE      left    CHOLECYSTECTOMY      HYSTERECTOMY      ROTATOR CUFF REPAIR      TOTAL KNEE ARTHROPLASTY         Home Meds:   Prior to Admission medications    Medication Sig Start Date End Date Taking? Authorizing Provider   ACCU-CHEK SOFTCLIX LANCETS MISC Accu-Chek Softclix Lancets    Historical Provider, MD   aspirin (ECOTRIN) 81 MG EC tablet Take 81 mg by mouth once daily.    Historical Provider, MD   clonazePAM (KLONOPIN) 1 MG tablet Take 1 tablet (1 mg total) by mouth 2 (two) times daily as needed for Anxiety. 4/7/19   Berto Crook MD   ergocalciferol (ERGOCALCIFEROL) 50,000 unit Cap Take 1 capsule (50,000 Units total) by mouth every 7 days. 2/19/19   Mary Agudelo, WINSTON, NP   escitalopram oxalate (LEXAPRO) 20 MG tablet Take 20 mg by mouth once daily. 10/31/18   Historical Provider, MD   furosemide (LASIX) 40 MG tablet Take 2 tablets (80 mg total) by mouth once daily. DOSE CHANGE 2/8/19   Deepika WELLS  Scroggs, NP   gabapentin (NEURONTIN) 600 MG tablet Take 600 mg by mouth 2 (two) times daily.    Historical Provider, MD   HYDROcodone-acetaminophen (NORCO) 5-325 mg per tablet Take 1 tablet by mouth every 8 (eight) hours as needed for Pain. 3/7/19   Catracho Dawson MD   hydrOXYzine (VISTARIL) 100 MG capsule Take 1 capsule (100 mg total) by mouth 3 (three) times daily as needed for Itching. 3/1/19   Fabrizio Rodriguez MD   insulin aspart U-100 (NOVOLOG FLEXPEN U-100 INSULIN) 100 unit/mL InPn pen 25 units before each meal + correction Max  u 4/17/19   Mary Agudelo DNP, NP   insulin glargine (LANTUS) 100 unit/mL injection Inject 80 Units into the skin once daily. 4/17/19   Mary Agudelo DNP, NP   ketoconazole (NIZORAL) 2 % cream Apply topically once daily. 2/7/19   Carlos Eduardo Ley DPM   lactulose (CHRONULAC) 20 gram/30 mL Soln Take 30 mLs (20 g total) by mouth 3 (three) times daily. Adjust dose to have  3-4 BM's daily 3/21/19   Deepika Plunkett NP   metoprolol succinate (TOPROL XL) 25 MG 24 hr tablet Take 25 mg by mouth once daily. 10/3/18 10/3/19  Historical Provider, MD   omeprazole (PRILOSEC) 40 MG capsule Take 40 mg by mouth once daily. 10/17/18   Historical Provider, MD   ondansetron (ZOFRAN) 4 MG tablet Take 1 tablet (4 mg total) by mouth every 6 (six) hours. 3/27/19   Fabrizio Rodriguez MD   rifAXIMin (XIFAXAN) 550 mg Tab Take 1 tablet (550 mg total) by mouth 2 (two) times daily. 12/6/18   Deepika Plunkett NP   rosuvastatin (CRESTOR) 10 MG tablet Take 10 mg by mouth once daily. 8/13/18   Historical Provider, MD   spironolactone (ALDACTONE) 100 MG tablet Take 2 tablets (200 mg total) by mouth once daily. DOSE INCREASE 3/21/19   Deepika Plunkett NP   XARELTO 20 mg Tab Take 20 mg by mouth once daily. 11/30/18   Historical Provider, MD   zinc 50 mg Tab Take 200 mg by mouth once daily. 2/20/19   Deepika Plunkett NP     Anticoagulants/Antiplatelets: aspirin and xarelto    Allergies:   Review  of patient's allergies indicates:   Allergen Reactions    Iodinated contrast- oral and iv dye Hives    Zoloft [sertraline]      Sedation History:  no adverse reactions    Review of Systems:   Hematological: no known coagulopathies  Respiratory: no shortness of breath  Cardiovascular: no chest pain  Gastrointestinal: no abdominal pain  Genito-Urinary: no dysuria  Musculoskeletal: negative  Neurological: no TIA or stroke symptoms         OBJECTIVE:     Vital Signs (Most Recent)  Pulse: 83 (04/26/19 1031)  Resp: 16 (04/26/19 1031)  BP: (!) 176/74 (04/26/19 1031)  SpO2: 100 % (04/26/19 1031)    Physical Exam:  ASA: 2  Mallampati: n/a    General: no acute distress  Mental Status: alert and oriented to person, place and time  HEENT: normocephalic, atraumatic  Chest: unlabored breathing  Heart: regular heart rate  Abdomen: distended  Extremity: moves all extremities    Laboratory  Lab Results   Component Value Date    INR 1.2 04/06/2019       Lab Results   Component Value Date    WBC 3.50 (L) 04/07/2019    HGB 8.6 (L) 04/07/2019    HCT 26.8 (L) 04/07/2019    MCV 88 04/07/2019     04/07/2019      Lab Results   Component Value Date     (H) 04/07/2019     (L) 04/07/2019    K 4.5 04/07/2019     04/07/2019    CO2 29 04/07/2019    BUN 16 04/07/2019    CREATININE 0.9 04/07/2019    CALCIUM 8.8 04/07/2019    MG 1.7 03/27/2019    ALT 42 04/06/2019    AST 60 (H) 04/06/2019    ALBUMIN 2.4 (L) 04/06/2019    BILITOT 0.8 04/06/2019    BILIDIR 0.6 (H) 03/21/2019       ASSESSMENT/PLAN:     Sedation Plan: local  Patient will undergo ultrasound guided paracentesis.    IBAN Vazquez, FNP  Interventional Radiology  [phone] [employer]

## 2019-04-26 NOTE — PATIENT INSTRUCTIONS
Refill rifaximin, this has been ordered at [facility] and should be mailed to the house.    Restart furosemide 80mg daily.  This helps with fluid control.    Recheck labs locally in 2 weeks.    We will schedule paracentesis as needed.    Nutrition is important.  High protein diet with low sodium.     Cholestyramine for itching.  We will consider sertraline for itching if symptoms do not improve.    Consider if liver transplant is a desire in the future, we will discuss further at next visit.    Return to clinic in 1 month

## 2019-04-26 NOTE — PROGRESS NOTES
"Hepatology Follow-up Note    Chief complaint: cirrhosis    HPI:  Sejal Matamoros is a 68 y.o. female that presents to hepatology clinic for consultation of RHOADES cirrhosis.  She is accompanied by her daughter and daughter in law.    The patient has been followed by Deepika Plunkett and was last seen on 3/21/2019.  Despite aggressive medical management she has ongoing clinical decline.      The patient and her family described many symptoms related to chronic liver disease.   HE:  Slurred speech, insomnia and altered mental status - she is taking lactulose as prescribed  Pruritus: has previously attempted hydroxyzine but caused worsening AMS  Volume overload:  Holding furosemide due to miscommunication, has been off medication for a couple of weeks; requiring LVP approximately every 3 weeks  Malnutrition:  Taking protein supplements 3 times per day but not eating meals well; this is made worse by increasing ascites and early satiety   Muscle cramping    Patient's family expresses concern that the patient is fixated on dying.  At times "talks" to daughter that  of pancreatic cancer a couple of years ago.  Was told that her prognosis was 2 years following her diagnosis of RHOADES cirrhosis in  and therefore feels that she is going to die soon.      Cirrhosis HCM:  Hepatitis A vaccination: non-immune  Hepatitis B vaccination: immune   HCC screening: MRI 2019 no focal lesions   Variceal screening: 10/2017 - no varices  Pneumococcal vaccination: not discussed  Influenza vaccination: not discussed     Patient Active Problem List   Diagnosis    Cervical stenosis of spinal canal    Cervical radiculopathy    Cervical spondylosis    DDD (degenerative disc disease), cervical    DDD (degenerative disc disease), lumbar    Lumbar spondylosis    Lumbar herniated disc    Lumbar spinal stenosis    Lumbar radiculopathy    Herniated cervical disc    Bilateral shoulder bursitis    Bilateral hip bursitis    Type 2 " diabetes mellitus with hyperglycemia, with long-term current use of insulin    Morbid obesity with BMI of 40.0-44.9, adult    Mixed hyperlipidemia    HTN (hypertension)    Hepatic encephalopathy    Decompensated hepatic cirrhosis    Portal hypertension    Hypoalbuminemia    Type 2 diabetes mellitus with diabetic polyneuropathy, with long-term current use of insulin    Other ascites    Itching    RUQ pain    Elevated alkaline phosphatase level    Vitamin D deficiency       Past Medical History:   Diagnosis Date    A-fib     Anxiety     Cirrhosis     DDD (degenerative disc disease), cervical 3/21/2018    DDD (degenerative disc disease), lumbar 3/21/2018    Decompensated hepatic cirrhosis 12/26/2018    Depression     Diabetes mellitus, type 2     Hepatic encephalopathy 12/26/2018    HLD (hyperlipidemia)     HTN (hypertension)     Hypoalbuminemia 12/26/2018    Hypoglycemia associated with type 2 diabetes mellitus 12/26/2018    Morbid obesity with BMI of 45.0-49.9, adult 12/7/2018    Obesity     Other ascites 2/20/2019    Portal hypertension 12/26/2018    Type 2 diabetes mellitus with hyperglycemia, with long-term current use of insulin 12/6/2018       Past Surgical History:   Procedure Laterality Date    arm surgery      left    CARPAL TUNNEL RELEASE      left    CHOLECYSTECTOMY      HYSTERECTOMY      ROTATOR CUFF REPAIR      TOTAL KNEE ARTHROPLASTY         Family History   Problem Relation Age of Onset    Cirrhosis Brother         RHOADES cirrhosis       Social History     Socioeconomic History    Marital status:      Spouse name: Not on file    Number of children: Not on file    Years of education: Not on file    Highest education level: Not on file   Occupational History    Not on file   Social Needs    Financial resource strain: Not on file    Food insecurity:     Worry: Not on file     Inability: Not on file    Transportation needs:     Medical: Not on file      Non-medical: Not on file   Tobacco Use    Smoking status: Never Smoker    Smokeless tobacco: Never Used   Substance and Sexual Activity    Alcohol use: No     Frequency: Never    Drug use: No    Sexual activity: Not on file   Lifestyle    Physical activity:     Days per week: Not on file     Minutes per session: Not on file    Stress: Not on file   Relationships    Social connections:     Talks on phone: Not on file     Gets together: Not on file     Attends Rastafarian service: Not on file     Active member of club or organization: Not on file     Attends meetings of clubs or organizations: Not on file     Relationship status: Not on file   Other Topics Concern    Not on file   Social History Narrative    Not on file       Current Outpatient Medications   Medication Sig Dispense Refill    ACCU-CHEK SOFTCLIX LANCETS MISC Accu-Chek Softclix Lancets      aspirin (ECOTRIN) 81 MG EC tablet Take 81 mg by mouth once daily.      clonazePAM (KLONOPIN) 1 MG tablet Take 1 tablet (1 mg total) by mouth 2 (two) times daily as needed for Anxiety. 30 tablet 0    ergocalciferol (ERGOCALCIFEROL) 50,000 unit Cap Take 1 capsule (50,000 Units total) by mouth every 7 days. 12 capsule 4    escitalopram oxalate (LEXAPRO) 20 MG tablet Take 20 mg by mouth once daily.      furosemide (LASIX) 40 MG tablet Take 2 tablets (80 mg total) by mouth once daily. DOSE CHANGE 180 tablet 1    gabapentin (NEURONTIN) 600 MG tablet Take 600 mg by mouth 2 (two) times daily.      HYDROcodone-acetaminophen (NORCO) 5-325 mg per tablet Take 1 tablet by mouth every 8 (eight) hours as needed for Pain. 12 tablet 0    hydrOXYzine (VISTARIL) 100 MG capsule Take 1 capsule (100 mg total) by mouth 3 (three) times daily as needed for Itching. 30 capsule 0    insulin aspart U-100 (NOVOLOG FLEXPEN U-100 INSULIN) 100 unit/mL InPn pen 25 units before each meal + correction Max  u 45 mL 3    insulin glargine (LANTUS) 100 unit/mL injection Inject 80  Units into the skin once daily. 45 mL 3    ketoconazole (NIZORAL) 2 % cream Apply topically once daily. 60 Tube 3    lactulose (CHRONULAC) 20 gram/30 mL Soln Take 30 mLs (20 g total) by mouth 3 (three) times daily. Adjust dose to have  3-4 BM's daily 5000 mL 5    metoprolol succinate (TOPROL XL) 25 MG 24 hr tablet Take 25 mg by mouth once daily.      omeprazole (PRILOSEC) 40 MG capsule Take 40 mg by mouth once daily.      ondansetron (ZOFRAN) 4 MG tablet Take 1 tablet (4 mg total) by mouth every 6 (six) hours. 12 tablet 0    rifAXIMin (XIFAXAN) 550 mg Tab Take 1 tablet (550 mg total) by mouth 2 (two) times daily. 60 tablet 5    rosuvastatin (CRESTOR) 10 MG tablet Take 10 mg by mouth once daily.      spironolactone (ALDACTONE) 100 MG tablet Take 2 tablets (200 mg total) by mouth once daily. DOSE INCREASE 180 tablet 2    XARELTO 20 mg Tab Take 20 mg by mouth once daily.      zinc 50 mg Tab Take 200 mg by mouth once daily.  0     No current facility-administered medications for this visit.      Facility-Administered Medications Ordered in Other Visits   Medication Dose Route Frequency Provider Last Rate Last Dose    albumin human 25% bottle 25 g  25 g Intravenous Once Deepika Plunkett NP           Review of patient's allergies indicates:   Allergen Reactions    Iodinated contrast- oral and iv dye Hives    Zoloft [sertraline]        Review of Systems   Constitutional: Positive for malaise/fatigue. Negative for chills, fever and weight loss.   Eyes: Negative.    Respiratory: Negative for cough and shortness of breath.    Cardiovascular: Positive for leg swelling. Negative for chest pain.   Gastrointestinal: Positive for nausea. Negative for abdominal pain, heartburn and vomiting.        Abdominal distention   Musculoskeletal: Negative for joint pain and myalgias.   Skin: Positive for itching. Negative for rash.   Neurological: Negative for dizziness, focal weakness and headaches.   Endo/Heme/Allergies: Does  "not bruise/bleed easily.   Psychiatric/Behavioral: Positive for depression and memory loss. The patient has insomnia.        Vitals:    04/26/19 1404   BP: (!) 132/59   Pulse: 83   Resp: 18   SpO2: 100%   Weight: 108.7 kg (239 lb 10.2 oz)   Height: 5' 2" (1.575 m)       Physical Exam   Constitutional: She is oriented to person, place, and time. She appears well-developed and well-nourished. No distress.   Frail, requires wheelchair today   HENT:   Head: Normocephalic and atraumatic.   Mouth/Throat: Oropharynx is clear and moist. No oropharyngeal exudate.   Eyes: Pupils are equal, round, and reactive to light. EOM are normal. No scleral icterus.   Neck: Normal range of motion. Neck supple. No thyromegaly present.   Cardiovascular: Normal rate, regular rhythm and normal heart sounds. Exam reveals no gallop and no friction rub.   No murmur heard.  Pulmonary/Chest: Effort normal. No respiratory distress. She has no wheezes. She has no rales.   Abdominal: Soft. Bowel sounds are normal. She exhibits distension. There is no tenderness.   Musculoskeletal: Normal range of motion. She exhibits edema.   Lymphadenopathy:     She has no cervical adenopathy.   Neurological: She is alert and oriented to person, place, and time. No cranial nerve deficit.   No asterixis   Skin: Skin is warm and dry. No rash noted.   Psychiatric: She has a normal mood and affect. Her behavior is normal.   Vitals reviewed.      Lab Results   Component Value Date    ALT 42 04/06/2019    AST 60 (H) 04/06/2019     (H) 03/21/2019    ALKPHOS 304 (H) 04/06/2019    BILITOT 0.8 04/06/2019       Lab Results   Component Value Date    WBC 3.50 (L) 04/07/2019    HGB 8.6 (L) 04/07/2019    HCT 26.8 (L) 04/07/2019    MCV 88 04/07/2019     04/07/2019       Lab Results   Component Value Date     (L) 04/07/2019    K 4.5 04/07/2019     04/07/2019    CO2 29 04/07/2019    BUN 16 04/07/2019    CREATININE 0.9 04/07/2019    CALCIUM 8.8 04/07/2019    " ANIONGAP 5 (L) 04/07/2019    ESTGFRAFRICA >60 04/07/2019    EGFRNONAA >60 04/07/2019     MELD-Na score: 8 at 4/7/2019  7:12 AM  MELD score: 8 at 4/7/2019  7:12 AM  Calculated from:  Serum Creatinine: 0.9 mg/dL (Rounded to 1 mg/dL) at 4/7/2019  7:12 AM  Serum Sodium: 135 mmol/L at 4/7/2019  7:12 AM  Total Bilirubin: 0.8 mg/dL (Rounded to 1 mg/dL) at 4/6/2019  8:33 AM  INR(ratio): 1.2 at 4/6/2019  8:33 AM  Age: 68 years    Imaging: EMR and external imaging available reviewed     Assessment:  68 y.o. female presenting for ongoing management of decompensated RHOADES cirrhosis.     Plan:    Transplant candidacy:  Thomas discussion with patient and family today.  She is currently too frail for consideration of transplant but could be considered if functional status and malnutrition were improved.  Patient is unsure of how she feels regarding transplant and was recommended to discuss with family over the next few weeks while we adjust medical therapy.  If patient continues to have significant symptoms despite low MELD including need for LVP and HE despite therapy then transplant can be further addressed.      HE:  Addition of rifaximin and titration of lactulose; consideration addition of zinc if not improved    Pruritus:  Cholestyramine, discussed timing of medication away from other medications.  Sertraline is listed as allergy but patient reports hypertension as reaction.  Unclear if this was related and patient may benefit from change in escitalopram to sertraline for management of itching if not improved with cholestyramine.     Ascites:  Continue LVP as needed, restart diuretic therapy; low sodium diet with educational handouts provided    Malnutrition:  High protein diet with frequent meals and supplements; provided handouts    Functional status:  Increased physical activity    RTC in 1 month    HCM as documented above

## 2019-04-29 LAB — BACTERIA SPEC AEROBE CULT: NO GROWTH

## 2019-05-02 ENCOUNTER — TELEPHONE (OUTPATIENT)
Dept: HEPATOLOGY | Facility: CLINIC | Age: 69
End: 2019-05-02

## 2019-05-02 NOTE — TELEPHONE ENCOUNTER
Patient scheduled for labs in [place] on 5/15/19.  Lab orders linked to the appointment.  Appt letter placed in the mail.

## 2019-05-02 NOTE — TELEPHONE ENCOUNTER
----- Message from Bety Cerrato MD sent at 5/1/2019  5:26 PM CDT -----  Please schedule labs in 1 week.

## 2019-05-03 LAB — BACTERIA SPEC ANAEROBE CULT: NORMAL

## 2019-05-06 ENCOUNTER — HOSPITAL ENCOUNTER (EMERGENCY)
Facility: HOSPITAL | Age: 69
Discharge: HOME | End: 2019-05-06
Attending: EMERGENCY MEDICINE
Payer: MEDICARE

## 2019-05-06 ENCOUNTER — TELEPHONE (OUTPATIENT)
Dept: HEPATOLOGY | Facility: CLINIC | Age: 69
End: 2019-05-06

## 2019-05-06 VITALS
RESPIRATION RATE: 20 BRPM | HEART RATE: 76 BPM | HEIGHT: 62 IN | OXYGEN SATURATION: 99 % | BODY MASS INDEX: 43.98 KG/M2 | SYSTOLIC BLOOD PRESSURE: 126 MMHG | TEMPERATURE: 99 F | DIASTOLIC BLOOD PRESSURE: 58 MMHG | WEIGHT: 239 LBS

## 2019-05-06 DIAGNOSIS — L03.90 CELLULITIS, UNSPECIFIED CELLULITIS SITE: ICD-10-CM

## 2019-05-06 DIAGNOSIS — R18.8 OTHER ASCITES: Primary | ICD-10-CM

## 2019-05-06 DIAGNOSIS — R07.89 CHEST DISCOMFORT: ICD-10-CM

## 2019-05-06 DIAGNOSIS — R25.2 LEG CRAMPS: ICD-10-CM

## 2019-05-06 LAB
ALBUMIN SERPL BCP-MCNC: 2.4 G/DL (ref 3.5–5.2)
ALP SERPL-CCNC: 236 U/L (ref 55–135)
ALT SERPL W/O P-5'-P-CCNC: 20 U/L (ref 10–44)
AMMONIA PLAS-SCNC: 34 UMOL/L (ref 10–50)
ANION GAP SERPL CALC-SCNC: 7 MMOL/L (ref 8–16)
AST SERPL-CCNC: 29 U/L (ref 10–40)
BACTERIA #/AREA URNS HPF: NORMAL /HPF
BASOPHILS # BLD AUTO: 0 K/UL (ref 0–0.2)
BASOPHILS NFR BLD: 0.7 % (ref 0–1.9)
BILIRUB SERPL-MCNC: 0.6 MG/DL (ref 0.1–1)
BILIRUB UR QL STRIP: NEGATIVE
BUN SERPL-MCNC: 30 MG/DL (ref 8–23)
CALCIUM SERPL-MCNC: 9.3 MG/DL (ref 8.7–10.5)
CHLORIDE SERPL-SCNC: 96 MMOL/L (ref 95–110)
CLARITY UR: CLEAR
CO2 SERPL-SCNC: 32 MMOL/L (ref 23–29)
COLOR UR: YELLOW
CREAT SERPL-MCNC: 1.1 MG/DL (ref 0.5–1.4)
DIFFERENTIAL METHOD: ABNORMAL
EOSINOPHIL # BLD AUTO: 0 K/UL (ref 0–0.5)
EOSINOPHIL NFR BLD: 0.8 % (ref 0–8)
ERYTHROCYTE [DISTWIDTH] IN BLOOD BY AUTOMATED COUNT: 16.6 % (ref 11.5–14.5)
EST. GFR  (AFRICAN AMERICAN): 60 ML/MIN/1.73 M^2
EST. GFR  (NON AFRICAN AMERICAN): 52 ML/MIN/1.73 M^2
GLUCOSE SERPL-MCNC: 120 MG/DL (ref 70–110)
GLUCOSE UR QL STRIP: NEGATIVE
HCT VFR BLD AUTO: 25 % (ref 37–48.5)
HGB BLD-MCNC: 8 G/DL (ref 12–16)
HGB UR QL STRIP: NEGATIVE
KETONES UR QL STRIP: NEGATIVE
LEUKOCYTE ESTERASE UR QL STRIP: ABNORMAL
LYMPHOCYTES # BLD AUTO: 1.4 K/UL (ref 1–4.8)
LYMPHOCYTES NFR BLD: 27.4 % (ref 18–48)
MCH RBC QN AUTO: 27.1 PG (ref 27–31)
MCHC RBC AUTO-ENTMCNC: 32.1 G/DL (ref 32–36)
MCV RBC AUTO: 84 FL (ref 82–98)
MICROSCOPIC COMMENT: NORMAL
MONOCYTES # BLD AUTO: 0.7 K/UL (ref 0.3–1)
MONOCYTES NFR BLD: 13.8 % (ref 4–15)
NEUTROPHILS # BLD AUTO: 2.9 K/UL (ref 1.8–7.7)
NEUTROPHILS NFR BLD: 57.3 % (ref 38–73)
NITRITE UR QL STRIP: NEGATIVE
PH UR STRIP: 6 [PH] (ref 5–8)
PLATELET # BLD AUTO: 248 K/UL (ref 150–350)
PMV BLD AUTO: 7.7 FL (ref 9.2–12.9)
POTASSIUM SERPL-SCNC: 3.8 MMOL/L (ref 3.5–5.1)
PROT SERPL-MCNC: 6.8 G/DL (ref 6–8.4)
PROT UR QL STRIP: NEGATIVE
RBC # BLD AUTO: 2.96 M/UL (ref 4–5.4)
RBC #/AREA URNS HPF: 0 /HPF (ref 0–4)
SODIUM SERPL-SCNC: 135 MMOL/L (ref 136–145)
SP GR UR STRIP: 1.01 (ref 1–1.03)
SQUAMOUS #/AREA URNS HPF: 2 /HPF
URN SPEC COLLECT METH UR: ABNORMAL
UROBILINOGEN UR STRIP-ACNC: 1 EU/DL
WBC # BLD AUTO: 5.1 K/UL (ref 3.9–12.7)
WBC #/AREA URNS HPF: 5 /HPF (ref 0–5)

## 2019-05-06 PROCEDURE — 80053 COMPREHEN METABOLIC PANEL: CPT

## 2019-05-06 PROCEDURE — 36415 COLL VENOUS BLD VENIPUNCTURE: CPT

## 2019-05-06 PROCEDURE — 85025 COMPLETE CBC W/AUTO DIFF WBC: CPT

## 2019-05-06 PROCEDURE — 81000 URINALYSIS NONAUTO W/SCOPE: CPT

## 2019-05-06 PROCEDURE — 93005 ELECTROCARDIOGRAM TRACING: CPT

## 2019-05-06 PROCEDURE — 99284 EMERGENCY DEPT VISIT MOD MDM: CPT

## 2019-05-06 PROCEDURE — 82140 ASSAY OF AMMONIA: CPT

## 2019-05-06 PROCEDURE — 25000003 PHARM REV CODE 250: Performed by: EMERGENCY MEDICINE

## 2019-05-06 RX ORDER — CYCLOBENZAPRINE HCL 10 MG
10 TABLET ORAL 3 TIMES DAILY PRN
Qty: 15 TABLET | Refills: 0 | Status: SHIPPED | OUTPATIENT
Start: 2019-05-06 | End: 2019-05-11

## 2019-05-06 RX ORDER — CYCLOBENZAPRINE HCL 10 MG
10 TABLET ORAL
Status: COMPLETED | OUTPATIENT
Start: 2019-05-06 | End: 2019-05-06

## 2019-05-06 RX ORDER — CEPHALEXIN 250 MG/1
500 CAPSULE ORAL
Status: COMPLETED | OUTPATIENT
Start: 2019-05-06 | End: 2019-05-06

## 2019-05-06 RX ORDER — CEPHALEXIN 500 MG/1
500 CAPSULE ORAL EVERY 8 HOURS
Qty: 20 CAPSULE | Refills: 0 | Status: SHIPPED | OUTPATIENT
Start: 2019-05-06 | End: 2019-05-13

## 2019-05-06 RX ORDER — TRAMADOL HYDROCHLORIDE 50 MG/1
50 TABLET ORAL
Status: COMPLETED | OUTPATIENT
Start: 2019-05-06 | End: 2019-05-06

## 2019-05-06 RX ADMIN — CYCLOBENZAPRINE HYDROCHLORIDE 10 MG: 10 TABLET, FILM COATED ORAL at 12:05

## 2019-05-06 RX ADMIN — CEPHALEXIN 500 MG: 250 CAPSULE ORAL at 12:05

## 2019-05-06 RX ADMIN — TRAMADOL HYDROCHLORIDE 50 MG: 50 TABLET ORAL at 10:05

## 2019-05-06 NOTE — TELEPHONE ENCOUNTER
----- Message from Jessica Castellanos MA sent at 5/6/2019  2:16 PM CDT -----  Contact: Patient Daughter    Can you please put in PARA orders for me!!     ----- Message -----  From: Radha Ramey  Sent: 5/6/2019  12:09 PM  To: [facility] Staff        Reason for call: Calling In to schedule a PARA this week if possible. States mother is really swollen         Communication Preference: [phone]    Additional Information: N/A

## 2019-05-06 NOTE — TELEPHONE ENCOUNTER
Received message from the patient's daughter Danitza requesting a Para appointment on Thurs 5/9/19.   IR called and spoke with Luz Maria. Thurs 5/9/19 at 8 am is available.    Call returned to Danitza at [phone]. No Answer.  Left VM with the date and time for the Para.  Please call us back at the Office of Dr Cerrato if you have any questions at [phone].

## 2019-05-06 NOTE — ED PROVIDER NOTES
"Encounter Date: 5/6/2019    SCRIBE #1 NOTE: IJackie, veda scribing for, and in the presence of, Dr. Trevon Heart.       History     Chief Complaint   Patient presents with    Leg Pain     " allnight "    Abdominal Pain       Time seen by provider: 10:07 AM on 05/06/2019    Sejal Matamoros is a 68 y.o. female who presents to the ED with complaints of chronic bilateral leg pain and left arm numbness associated with pain that started x6 months ago. Pain has worsened this morning, prompting the patient to come to the ED. Per daughter, patient also complained of chest pain, nausea, and headache that started this morning. Chest pain is right sided and does not radiate to other regions. She denies recent fever, vomiting, or cough but mentions having chronic SOB that has not worsened today. The patient has a history of cirrhosis and recently had her abdomen drained last week resulting in 5L being removed. Patient denies taking any OTC medications for pain. She has no other medical concerns or complaints at this moment. She denies onset of any other new symptoms currently. PMHx includes DM type II, cirrhosis, HLD, A-fib, hepatic encephalopathy, and hypoalbuminemia. SHx includes hysterectomy and cholecystectomy. Iodinated contrast and Zoloft allergies noted.     The history is provided by the patient and a relative (daughter).     Review of patient's allergies indicates:   Allergen Reactions    Iodinated contrast- oral and iv dye Hives    Zoloft [sertraline]      Past Medical History:   Diagnosis Date    A-fib     Anxiety     Cirrhosis     DDD (degenerative disc disease), cervical 3/21/2018    DDD (degenerative disc disease), lumbar 3/21/2018    Decompensated hepatic cirrhosis 12/26/2018    Depression     Diabetes mellitus, type 2     Hepatic encephalopathy 12/26/2018    HLD (hyperlipidemia)     HTN (hypertension)     Hypoalbuminemia 12/26/2018    Hypoglycemia associated with type 2 diabetes " mellitus 12/26/2018    Morbid obesity with BMI of 45.0-49.9, adult 12/7/2018    Obesity     Other ascites 2/20/2019    Portal hypertension 12/26/2018    Type 2 diabetes mellitus with hyperglycemia, with long-term current use of insulin 12/6/2018     Past Surgical History:   Procedure Laterality Date    arm surgery      left    CARPAL TUNNEL RELEASE      left    CHOLECYSTECTOMY      HYSTERECTOMY      ROTATOR CUFF REPAIR      TOTAL KNEE ARTHROPLASTY       Family History   Problem Relation Age of Onset    Cirrhosis Brother         RHOADES cirrhosis     Social History     Tobacco Use    Smoking status: Never Smoker    Smokeless tobacco: Never Used   Substance Use Topics    Alcohol use: No     Frequency: Never    Drug use: No     Review of Systems   Constitutional: Negative for activity change, appetite change, chills, fatigue and fever.   Respiratory: Positive for shortness of breath (chronic). Negative for apnea.    Cardiovascular: Positive for chest pain. Negative for palpitations.   Gastrointestinal: Positive for nausea. Negative for abdominal pain, diarrhea and vomiting.   Genitourinary: Negative for difficulty urinating, dysuria, hematuria and urgency.   Musculoskeletal: Positive for myalgias (BLE; LUE). Negative for back pain and neck pain.   Skin: Negative for pallor and rash.   Neurological: Positive for numbness (LUE) and headaches. Negative for weakness.   Hematological: Does not bruise/bleed easily.   Psychiatric/Behavioral: Negative for agitation.       Physical Exam     Initial Vitals [05/06/19 0958]   BP Pulse Resp Temp SpO2   (!) 113/58 68 18 98.8 °F (37.1 °C) 97 %      MAP       --         Physical Exam    Nursing note and vitals reviewed.  Constitutional: She appears well-developed and well-nourished. She is not diaphoretic. No distress.   HENT:   Head: Normocephalic and atraumatic.   Eyes: Conjunctivae are normal.   Neck: Normal range of motion. Neck supple.   Cardiovascular: Normal rate,  regular rhythm and normal heart sounds. Exam reveals no gallop and no friction rub.    No murmur heard.  Pulmonary/Chest: Effort normal and breath sounds normal. No respiratory distress. She has no wheezes. She has no rhonchi. She has no rales.   Equal, bilateral breath sounds noted without wheezing.    Abdominal: She exhibits distension and fluid wave. There is no tenderness. A hernia is present. Hernia confirmed positive in the ventral area.   Abdomen distended.  + fluid wave  Reducible ventral hernia   Musculoskeletal: Normal range of motion.   Neurological: She is alert and oriented to person, place, and time. She has normal strength. No sensory deficit.   5/5 strength and sensation to light touch intact to BUE and BLE.    Skin: Skin is warm and dry. No erythema.   Psychiatric: She has a normal mood and affect.         ED Course   Procedures  Labs Reviewed   CBC W/ AUTO DIFFERENTIAL - Abnormal; Notable for the following components:       Result Value    RBC 2.96 (*)     Hemoglobin 8.0 (*)     Hematocrit 25.0 (*)     RDW 16.6 (*)     MPV 7.7 (*)     All other components within normal limits   COMPREHENSIVE METABOLIC PANEL - Abnormal; Notable for the following components:    Sodium 135 (*)     CO2 32 (*)     Glucose 120 (*)     BUN, Bld 30 (*)     Albumin 2.4 (*)     Alkaline Phosphatase 236 (*)     Anion Gap 7 (*)     eGFR if non  52 (*)     All other components within normal limits   URINALYSIS, REFLEX TO URINE CULTURE - Abnormal; Notable for the following components:    Leukocytes, UA 1+ (*)     All other components within normal limits    Narrative:     Preferred Collection Type->Urine, Clean Catch   AMMONIA   URINALYSIS MICROSCOPIC    Narrative:     Preferred Collection Type->Urine, Clean Catch     EKG Readings: (Independently Interpreted)   Rhythm: Normal Sinus Rhythm. Heart Rate: 75. Ectopy: No Ectopy. Conduction: Normal. ST Segments: Normal ST Segments. T Waves: Normal. Axis: Normal.  Clinical Impression: Normal Sinus Rhythm       Imaging Results    None          Medical Decision Making:   History:   Old Medical Records: I decided to obtain old medical records.  Clinical Tests:   Lab Tests: Reviewed and Ordered  Medical Tests: Reviewed and Ordered  ED Management:  Sejal Matamoros is a 68 y.o. female who presents to the ED with complaints of chronic bilateral leg pain and left arm numbness associated with pain that started x6 months ago. Pain has worsened this morning, prompting the patient to come to the ED. Per daughter, patient also complained of chest pain, nausea, and headache that started this morning. Chest pain is right sided and does not radiate to other regions. She denies recent fever, vomiting, or cough but mentions having chronic SOB that has not worsened today.  She has substantial ascites which most likely accounts with abdominal discomfort and shortness of breath.  She has no fever leukocytosis with no evidence of spontaneous bacterial peritonitis.  Her daughter has arranged outpatient large volume paracentesis.  She has lower extremity swelling but no evidence of CHF.  She does have some erythema of the left anterior leg which may represent cellulitis and will therefore be treated with Keflex.       APC / Resident Notes:   I, Dr. Trevon Heart III, personally performed the services described in this documentation. All medical record entries made by the scribe were at my direction and in my presence.  I have reviewed the chart and agree that the record reflects my personal performance and is accurate and complete       Scribe Attestation:   Scribe #1: I performed the above scribed service and the documentation accurately describes the services I performed. I attest to the accuracy of the note.               Clinical Impression:       ICD-10-CM ICD-9-CM   1. Other ascites R18.8 789.59   2. Chest discomfort R07.89 786.59   3. Cellulitis, unspecified cellulitis site L03.90  682.9   4. Leg cramps R25.2 729.82         Disposition:   Disposition: Discharged  Condition: Stable                        Trevon Heart III, MD  05/06/19 9413

## 2019-05-06 NOTE — PROCEDURES
Radiology Post-Procedure Note    Pre Op Diagnosis: Ascites  Post Op Diagnosis: Same    Procedure: Ultrasound Guided Paracentesis    Procedure performed by: Jerzy MCNEIL, Jane     Written Informed Consent Obtained: Yes  Specimen Removed: YES cloudy yellow  Estimated Blood Loss: Minimal    Findings:   Successful paracentesis.  Albumin administered PRN per protocol.    Patient tolerated procedure well.    Jane Bertrand, APRN, FNP  Interventional Radiology  [phone] [employer]

## 2019-05-06 NOTE — ED NOTES
Pt presents to ED POV from home with abdominal pain and bilateral leg pain. Pt is AAOx4. Skin warm, dry to touch. Respirations even, nonlabored. NAD noted. Pt is calm, cooperative. Family at bedside. Breath sounds clear, equal bilaterally.

## 2019-05-07 ENCOUNTER — TELEPHONE (OUTPATIENT)
Dept: HEPATOLOGY | Facility: CLINIC | Age: 69
End: 2019-05-07

## 2019-05-07 DIAGNOSIS — K74.60 LIVER CIRRHOSIS SECONDARY TO NASH: Primary | ICD-10-CM

## 2019-05-07 DIAGNOSIS — K75.81 LIVER CIRRHOSIS SECONDARY TO NASH: Primary | ICD-10-CM

## 2019-05-07 NOTE — TELEPHONE ENCOUNTER
----- Message from Jessica Castellanos MA sent at 5/6/2019  2:15 PM CDT -----  Contact: Patient Daughter       ----- Message -----  From: Radha Ramey  Sent: 5/6/2019  12:09 PM  To: [facility] Staff        Reason for call: Calling In to schedule a PARA this week if possible. States mother is really swollen         Communication Preference: [phone]    Additional Information: N/A

## 2019-05-08 ENCOUNTER — TELEPHONE (OUTPATIENT)
Dept: INTERVENTIONAL RADIOLOGY/VASCULAR | Facility: HOSPITAL | Age: 69
End: 2019-05-08

## 2019-05-09 ENCOUNTER — HOSPITAL ENCOUNTER (OUTPATIENT)
Dept: INTERVENTIONAL RADIOLOGY/VASCULAR | Facility: HOSPITAL | Age: 69
Discharge: HOME | End: 2019-05-09
Attending: INTERNAL MEDICINE
Payer: MEDICARE

## 2019-05-09 VITALS
SYSTOLIC BLOOD PRESSURE: 171 MMHG | DIASTOLIC BLOOD PRESSURE: 97 MMHG | RESPIRATION RATE: 16 BRPM | HEART RATE: 76 BPM | OXYGEN SATURATION: 100 %

## 2019-05-09 DIAGNOSIS — R18.8 OTHER ASCITES: ICD-10-CM

## 2019-05-09 LAB
ALBUMIN FLD-MCNC: 1 G/DL
APPEARANCE FLD: NORMAL
BODY FLD TYPE: NORMAL
COLOR FLD: YELLOW
LYMPHOCYTES NFR FLD MANUAL: 82 %
MESOTHL CELL NFR FLD MANUAL: 4 %
MONOS+MACROS NFR FLD MANUAL: 13 %
NEUTROPHILS NFR FLD MANUAL: 1 %
PROT FLD-MCNC: 2.2 G/DL
SPECIMEN SOURCE: NORMAL
SPECIMEN SOURCE: NORMAL
WBC # FLD: 319 /CU MM

## 2019-05-09 PROCEDURE — 84157 ASSAY OF PROTEIN OTHER: CPT

## 2019-05-09 PROCEDURE — 63600175 PHARM REV CODE 636 W HCPCS: Mod: JG | Performed by: INTERNAL MEDICINE

## 2019-05-09 PROCEDURE — 82042 OTHER SOURCE ALBUMIN QUAN EA: CPT

## 2019-05-09 PROCEDURE — 89051 BODY FLUID CELL COUNT: CPT

## 2019-05-09 PROCEDURE — 49083 ABD PARACENTESIS W/IMAGING: CPT | Mod: GC,,, | Performed by: RADIOLOGY

## 2019-05-09 PROCEDURE — P9047 ALBUMIN (HUMAN), 25%, 50ML: HCPCS | Mod: JG | Performed by: INTERNAL MEDICINE

## 2019-05-09 PROCEDURE — 49083 ABD PARACENTESIS W/IMAGING: CPT

## 2019-05-09 PROCEDURE — 49083 IR PARACENTESIS WITH IMAGING: ICD-10-PCS | Mod: GC,,, | Performed by: RADIOLOGY

## 2019-05-09 RX ORDER — ALBUMIN HUMAN 250 G/1000ML
25 SOLUTION INTRAVENOUS ONCE
Status: COMPLETED | OUTPATIENT
Start: 2019-05-09 | End: 2019-05-09

## 2019-05-09 RX ORDER — ALBUMIN HUMAN 250 G/1000ML
12.5 SOLUTION INTRAVENOUS ONCE
Status: COMPLETED | OUTPATIENT
Start: 2019-05-09 | End: 2019-05-09

## 2019-05-09 RX ADMIN — ALBUMIN HUMAN 25 G: 0.25 SOLUTION INTRAVENOUS at 09:05

## 2019-05-09 RX ADMIN — ALBUMIN (HUMAN) 12.5 G: 25 SOLUTION INTRAVENOUS at 09:05

## 2019-05-09 NOTE — H&P
Radiology History & Physical      SUBJECTIVE:     Chief Complaint: abdominal distension     History of Present Illness:  Sejal Matamoros is a 68 y.o. female with abdominal distension who presents for paracentesis.    Past Medical History:   Diagnosis Date    A-fib     Anxiety     Cirrhosis     DDD (degenerative disc disease), cervical 3/21/2018    DDD (degenerative disc disease), lumbar 3/21/2018    Decompensated hepatic cirrhosis 12/26/2018    Depression     Diabetes mellitus, type 2     Hepatic encephalopathy 12/26/2018    HLD (hyperlipidemia)     HTN (hypertension)     Hypoalbuminemia 12/26/2018    Hypoglycemia associated with type 2 diabetes mellitus 12/26/2018    Morbid obesity with BMI of 45.0-49.9, adult 12/7/2018    Obesity     Other ascites 2/20/2019    Portal hypertension 12/26/2018    Type 2 diabetes mellitus with hyperglycemia, with long-term current use of insulin 12/6/2018     Past Surgical History:   Procedure Laterality Date    arm surgery      left    CARPAL TUNNEL RELEASE      left    CHOLECYSTECTOMY      HYSTERECTOMY      ROTATOR CUFF REPAIR      TOTAL KNEE ARTHROPLASTY         Home Meds:   Prior to Admission medications    Medication Sig Start Date End Date Taking? Authorizing Provider   ACCU-CHEK SOFTCLIX LANCETS MISC Accu-Chek Softclix Lancets    Historical Provider, MD   aspirin (ECOTRIN) 81 MG EC tablet Take 81 mg by mouth once daily.    Historical Provider, MD   cephALEXin (KEFLEX) 500 MG capsule Take 1 capsule (500 mg total) by mouth every 8 (eight) hours. for 7 days 5/6/19 5/13/19  Trevon Heart III, MD   cholestyramine-aspartame (CHOLESTYRAMINE LIGHT) 4 gram PwPk Take 1 packet (4 g total) by mouth 2 (two) times daily. 4/26/19 4/25/20  Bety Cerrato MD   clonazePAM (KLONOPIN) 1 MG tablet Take 1 tablet (1 mg total) by mouth 2 (two) times daily as needed for Anxiety. 4/7/19   Berto Crook MD   cyclobenzaprine (FLEXERIL) 10 MG tablet Take 1 tablet (10  mg total) by mouth 3 (three) times daily as needed for Muscle spasms. 5/6/19 5/11/19  Trevon Heart III, MD   ergocalciferol (ERGOCALCIFEROL) 50,000 unit Cap Take 1 capsule (50,000 Units total) by mouth every 7 days. 2/19/19   Mary Agudelo DNP, NP   escitalopram oxalate (LEXAPRO) 20 MG tablet Take 20 mg by mouth once daily. 10/31/18   Historical Provider, MD   furosemide (LASIX) 40 MG tablet Take 2 tablets (80 mg total) by mouth once daily. DOSE CHANGE 2/8/19   Deepika Plunkett NP   gabapentin (NEURONTIN) 600 MG tablet Take 600 mg by mouth 2 (two) times daily.    Historical Provider, MD   HYDROcodone-acetaminophen (NORCO) 5-325 mg per tablet Take 1 tablet by mouth every 8 (eight) hours as needed for Pain. 3/7/19   Catracho Dawson MD   insulin aspart U-100 (NOVOLOG FLEXPEN U-100 INSULIN) 100 unit/mL InPn pen 25 units before each meal + correction Max  u 4/17/19   Mary Agudelo DNP, NP   insulin glargine (LANTUS) 100 unit/mL injection Inject 80 Units into the skin once daily. 4/17/19   Mary Agudelo DNP, NP   ketoconazole (NIZORAL) 2 % cream Apply topically once daily. 2/7/19   Carlos Eduardo Ley DPM   lactulose (CHRONULAC) 20 gram/30 mL Soln Take 30 mLs (20 g total) by mouth 3 (three) times daily. Adjust dose to have  3-4 BM's daily 3/21/19   Deepika Plunkett NP   metoprolol succinate (TOPROL XL) 25 MG 24 hr tablet Take 25 mg by mouth once daily. 10/3/18 10/3/19  Historical Provider, MD   omeprazole (PRILOSEC) 40 MG capsule Take 40 mg by mouth once daily. 10/17/18   Historical Provider, MD   ondansetron (ZOFRAN) 4 MG tablet Take 1 tablet (4 mg total) by mouth every 6 (six) hours. 3/27/19   Fabrizio Rodriguez MD   rifAXIMin (XIFAXAN) 550 mg Tab Take 1 tablet (550 mg total) by mouth 2 (two) times daily. 4/26/19 4/25/20  Bety Cerrato MD   rosuvastatin (CRESTOR) 10 MG tablet Take 10 mg by mouth once daily. 8/13/18   Historical Provider, MD   spironolactone (ALDACTONE) 100 MG tablet Take 2  tablets (200 mg total) by mouth once daily. DOSE INCREASE 3/21/19   Deepika Plunkett NP   XARELTO 20 mg Tab Take 20 mg by mouth once daily. 11/30/18   Historical Provider, MD   zinc 50 mg Tab Take 200 mg by mouth once daily. 2/20/19   Deepika Plunkett NP     Anticoagulants/Antiplatelets: xarelto    Allergies:   Review of patient's allergies indicates:   Allergen Reactions    Iodinated contrast- oral and iv dye Hives    Zoloft [sertraline]      Sedation History:  no adverse reactions    Review of Systems:   Hematological: no known coagulopathies  Respiratory: no shortness of breath  Cardiovascular: no chest pain  Gastrointestinal: no abdominal pain  Genito-Urinary: no dysuria  Musculoskeletal: negative  Neurological: no TIA or stroke symptoms         OBJECTIVE:     Vital Signs (Most Recent)  Pulse: 76 (05/09/19 0814)  Resp: 16 (05/09/19 0814)  BP: (!) 171/97 (05/09/19 0814)  SpO2: 100 % (05/09/19 0814)    Physical Exam:  ASA: 2  Mallampati: 2  General: no acute distress  Mental Status: alert and oriented to person, place and time  HEENT: normocephalic, atraumatic  Chest: unlabored breathing  Heart: regular heart rate  Abdomen: nondistended  Extremity: moves all extremities    Laboratory  Lab Results   Component Value Date    INR 1.2 04/06/2019       Lab Results   Component Value Date    WBC 5.10 05/06/2019    HGB 8.0 (L) 05/06/2019    HCT 25.0 (L) 05/06/2019    MCV 84 05/06/2019     05/06/2019      Lab Results   Component Value Date     (H) 05/06/2019     (L) 05/06/2019    K 3.8 05/06/2019    CL 96 05/06/2019    CO2 32 (H) 05/06/2019    BUN 30 (H) 05/06/2019    CREATININE 1.1 05/06/2019    CALCIUM 9.3 05/06/2019    MG 1.7 03/27/2019    ALT 20 05/06/2019    AST 29 05/06/2019    ALBUMIN 2.4 (L) 05/06/2019    BILITOT 0.6 05/06/2019    BILIDIR 0.6 (H) 03/21/2019       ASSESSMENT/PLAN:     Sedation Plan: local  Patient will undergo paracentesis.    Raheem Torres MD  Interventional Radiology  PGY-II  [facility]  Pager: [phone]

## 2019-05-09 NOTE — PROGRESS NOTES
Paracentesis complete, 4100 MLs removed. Specimen sent to lab. 150 Albumin administered per Renal protocol. Dressing applied to RLQ puncture site, dressing clean dry and intact. Pt given discharge instructions, pt verbalizes understanding. Questions answered. Pt denies pain and discomfort. Pt to lobby, pt to be transported home with family

## 2019-05-09 NOTE — PROCEDURES
Radiology Post-Procedure Note    Pre Op Diagnosis: Ascites  Post Op Diagnosis: Same    Procedure: Paracentesis    Procedure performed by: Sam Asif MD    Written Informed Consent Obtained: Yes  Specimen Removed: YES   Estimated Blood Loss: Minimal    Findings:   Successful paracentesis.  Albumin administered PRN per protocol.    Patient tolerated procedure well.    Raheem Torres MD  Interventional Radiology PGY-II  [facility]  Pager: [phone]

## 2019-05-15 ENCOUNTER — LAB VISIT (OUTPATIENT)
Dept: LAB | Facility: HOSPITAL | Age: 69
End: 2019-05-15
Attending: NURSE PRACTITIONER
Payer: MEDICARE

## 2019-05-15 DIAGNOSIS — R18.8 CIRRHOSIS OF LIVER WITH ASCITES, UNSPECIFIED HEPATIC CIRRHOSIS TYPE: ICD-10-CM

## 2019-05-15 DIAGNOSIS — E11.65 TYPE 2 DIABETES MELLITUS WITH HYPERGLYCEMIA, WITH LONG-TERM CURRENT USE OF INSULIN: ICD-10-CM

## 2019-05-15 DIAGNOSIS — K74.60 CIRRHOSIS OF LIVER WITH ASCITES, UNSPECIFIED HEPATIC CIRRHOSIS TYPE: ICD-10-CM

## 2019-05-15 DIAGNOSIS — Z79.4 TYPE 2 DIABETES MELLITUS WITH HYPERGLYCEMIA, WITH LONG-TERM CURRENT USE OF INSULIN: ICD-10-CM

## 2019-05-15 LAB
25(OH)D3+25(OH)D2 SERPL-MCNC: 18 NG/ML (ref 30–96)
ALBUMIN SERPL BCP-MCNC: 2.2 G/DL (ref 3.5–5.2)
ALBUMIN SERPL BCP-MCNC: 2.2 G/DL (ref 3.5–5.2)
ALP SERPL-CCNC: 254 U/L (ref 55–135)
ALP SERPL-CCNC: 254 U/L (ref 55–135)
ALT SERPL W/O P-5'-P-CCNC: 19 U/L (ref 10–44)
ALT SERPL W/O P-5'-P-CCNC: 19 U/L (ref 10–44)
ANION GAP SERPL CALC-SCNC: 5 MMOL/L (ref 8–16)
ANION GAP SERPL CALC-SCNC: 5 MMOL/L (ref 8–16)
AST SERPL-CCNC: 33 U/L (ref 10–40)
AST SERPL-CCNC: 33 U/L (ref 10–40)
BASOPHILS # BLD AUTO: 0.04 K/UL (ref 0–0.2)
BASOPHILS NFR BLD: 1.1 % (ref 0–1.9)
BILIRUB SERPL-MCNC: 0.4 MG/DL (ref 0.1–1)
BILIRUB SERPL-MCNC: 0.4 MG/DL (ref 0.1–1)
BUN SERPL-MCNC: 26 MG/DL (ref 8–23)
BUN SERPL-MCNC: 26 MG/DL (ref 8–23)
CALCIUM SERPL-MCNC: 8.6 MG/DL (ref 8.7–10.5)
CALCIUM SERPL-MCNC: 8.6 MG/DL (ref 8.7–10.5)
CHLORIDE SERPL-SCNC: 99 MMOL/L (ref 95–110)
CHLORIDE SERPL-SCNC: 99 MMOL/L (ref 95–110)
CO2 SERPL-SCNC: 29 MMOL/L (ref 23–29)
CO2 SERPL-SCNC: 29 MMOL/L (ref 23–29)
CREAT SERPL-MCNC: 1 MG/DL (ref 0.5–1.4)
CREAT SERPL-MCNC: 1 MG/DL (ref 0.5–1.4)
DIFFERENTIAL METHOD: ABNORMAL
EOSINOPHIL # BLD AUTO: 0.1 K/UL (ref 0–0.5)
EOSINOPHIL NFR BLD: 1.3 % (ref 0–8)
ERYTHROCYTE [DISTWIDTH] IN BLOOD BY AUTOMATED COUNT: 16.5 % (ref 11.5–14.5)
EST. GFR  (AFRICAN AMERICAN): >60 ML/MIN/1.73 M^2
EST. GFR  (AFRICAN AMERICAN): >60 ML/MIN/1.73 M^2
EST. GFR  (NON AFRICAN AMERICAN): 58 ML/MIN/1.73 M^2
EST. GFR  (NON AFRICAN AMERICAN): 58 ML/MIN/1.73 M^2
ESTIMATED AVG GLUCOSE: 260 MG/DL (ref 68–131)
GLUCOSE SERPL-MCNC: 341 MG/DL (ref 70–110)
GLUCOSE SERPL-MCNC: 341 MG/DL (ref 70–110)
HBA1C MFR BLD HPLC: 10.7 % (ref 4–5.6)
HCT VFR BLD AUTO: 23.3 % (ref 37–48.5)
HGB BLD-MCNC: 7.2 G/DL (ref 12–16)
IMM GRANULOCYTES # BLD AUTO: 0.01 K/UL (ref 0–0.04)
IMM GRANULOCYTES NFR BLD AUTO: 0.3 % (ref 0–0.5)
INR PPP: 1.2 (ref 0.8–1.2)
LYMPHOCYTES # BLD AUTO: 1 K/UL (ref 1–4.8)
LYMPHOCYTES NFR BLD: 26.7 % (ref 18–48)
MCH RBC QN AUTO: 27.7 PG (ref 27–31)
MCHC RBC AUTO-ENTMCNC: 30.9 G/DL (ref 32–36)
MCV RBC AUTO: 90 FL (ref 82–98)
MONOCYTES # BLD AUTO: 0.5 K/UL (ref 0.3–1)
MONOCYTES NFR BLD: 13.9 % (ref 4–15)
NEUTROPHILS # BLD AUTO: 2.1 K/UL (ref 1.8–7.7)
NEUTROPHILS NFR BLD: 56.7 % (ref 38–73)
NRBC BLD-RTO: 0 /100 WBC
PLATELET # BLD AUTO: 221 K/UL (ref 150–350)
PMV BLD AUTO: 11.1 FL (ref 9.2–12.9)
POTASSIUM SERPL-SCNC: 4.2 MMOL/L (ref 3.5–5.1)
POTASSIUM SERPL-SCNC: 4.2 MMOL/L (ref 3.5–5.1)
PROT SERPL-MCNC: 6.3 G/DL (ref 6–8.4)
PROT SERPL-MCNC: 6.3 G/DL (ref 6–8.4)
PROTHROMBIN TIME: 12.2 SEC (ref 9–12.5)
RBC # BLD AUTO: 2.6 M/UL (ref 4–5.4)
SODIUM SERPL-SCNC: 133 MMOL/L (ref 136–145)
SODIUM SERPL-SCNC: 133 MMOL/L (ref 136–145)
TSH SERPL DL<=0.005 MIU/L-ACNC: 1.48 UIU/ML (ref 0.4–4)
WBC # BLD AUTO: 3.74 K/UL (ref 3.9–12.7)

## 2019-05-15 PROCEDURE — 83036 HEMOGLOBIN GLYCOSYLATED A1C: CPT

## 2019-05-15 PROCEDURE — 85025 COMPLETE CBC W/AUTO DIFF WBC: CPT

## 2019-05-15 PROCEDURE — 85610 PROTHROMBIN TIME: CPT

## 2019-05-15 PROCEDURE — 36415 COLL VENOUS BLD VENIPUNCTURE: CPT | Mod: PO

## 2019-05-15 PROCEDURE — 80053 COMPREHEN METABOLIC PANEL: CPT

## 2019-05-15 PROCEDURE — 82306 VITAMIN D 25 HYDROXY: CPT

## 2019-05-15 PROCEDURE — 84443 ASSAY THYROID STIM HORMONE: CPT

## 2019-05-16 ENCOUNTER — TELEPHONE (OUTPATIENT)
Dept: HEPATOLOGY | Facility: CLINIC | Age: 69
End: 2019-05-16

## 2019-05-16 DIAGNOSIS — R18.8 CIRRHOSIS OF LIVER WITH ASCITES, UNSPECIFIED HEPATIC CIRRHOSIS TYPE: ICD-10-CM

## 2019-05-16 DIAGNOSIS — K74.60 CIRRHOSIS OF LIVER WITH ASCITES, UNSPECIFIED HEPATIC CIRRHOSIS TYPE: ICD-10-CM

## 2019-05-16 DIAGNOSIS — R18.8 OTHER ASCITES: ICD-10-CM

## 2019-05-16 RX ORDER — FUROSEMIDE 40 MG/1
80 TABLET ORAL 2 TIMES DAILY
Qty: 180 TABLET | Refills: 5 | Status: SHIPPED | OUTPATIENT
Start: 2019-05-16 | End: 2019-07-08 | Stop reason: CLARIF

## 2019-05-16 RX ORDER — SPIRONOLACTONE 100 MG/1
200 TABLET, FILM COATED ORAL 2 TIMES DAILY
Qty: 120 TABLET | Refills: 5 | Status: SHIPPED | OUTPATIENT
Start: 2019-05-16 | End: 2019-07-08 | Stop reason: CLARIF

## 2019-05-16 NOTE — TELEPHONE ENCOUNTER
Called patient spoke to patients daughter. Let her no about the medication changes and also scheduled her for the labs on May 22nd.

## 2019-05-16 NOTE — TELEPHONE ENCOUNTER
----- Message from Bety Cerrato MD sent at 5/16/2019  5:25 AM CDT -----  Continue low sodium diet.  Increase lasix 80mg twice daily and spironolactone 400mg twice daily.  Repeat labs in 1 week, BMP ordered.

## 2019-05-22 ENCOUNTER — OFFICE VISIT (OUTPATIENT)
Dept: ENDOCRINOLOGY | Facility: CLINIC | Age: 69
End: 2019-05-22
Payer: MEDICARE

## 2019-05-22 ENCOUNTER — LAB VISIT (OUTPATIENT)
Dept: LAB | Facility: HOSPITAL | Age: 69
End: 2019-05-22
Attending: INTERNAL MEDICINE
Payer: MEDICARE

## 2019-05-22 VITALS
WEIGHT: 239.06 LBS | DIASTOLIC BLOOD PRESSURE: 66 MMHG | BODY MASS INDEX: 43.99 KG/M2 | SYSTOLIC BLOOD PRESSURE: 129 MMHG | HEIGHT: 62 IN | TEMPERATURE: 98 F | HEART RATE: 63 BPM

## 2019-05-22 DIAGNOSIS — I10 ESSENTIAL HYPERTENSION: ICD-10-CM

## 2019-05-22 DIAGNOSIS — E66.01 MORBID OBESITY WITH BMI OF 40.0-44.9, ADULT: ICD-10-CM

## 2019-05-22 DIAGNOSIS — Z79.4 TYPE 2 DIABETES MELLITUS WITH HYPERGLYCEMIA, WITH LONG-TERM CURRENT USE OF INSULIN: ICD-10-CM

## 2019-05-22 DIAGNOSIS — E11.65 TYPE 2 DIABETES MELLITUS WITH HYPERGLYCEMIA, WITH LONG-TERM CURRENT USE OF INSULIN: ICD-10-CM

## 2019-05-22 DIAGNOSIS — E11.42 TYPE 2 DIABETES MELLITUS WITH DIABETIC POLYNEUROPATHY, WITH LONG-TERM CURRENT USE OF INSULIN: Primary | ICD-10-CM

## 2019-05-22 DIAGNOSIS — E78.2 MIXED HYPERLIPIDEMIA: ICD-10-CM

## 2019-05-22 DIAGNOSIS — E55.9 VITAMIN D DEFICIENCY: ICD-10-CM

## 2019-05-22 DIAGNOSIS — K74.60 CIRRHOSIS OF LIVER WITH ASCITES, UNSPECIFIED HEPATIC CIRRHOSIS TYPE: ICD-10-CM

## 2019-05-22 DIAGNOSIS — Z79.4 TYPE 2 DIABETES MELLITUS WITH DIABETIC POLYNEUROPATHY, WITH LONG-TERM CURRENT USE OF INSULIN: Primary | ICD-10-CM

## 2019-05-22 DIAGNOSIS — R18.8 CIRRHOSIS OF LIVER WITH ASCITES, UNSPECIFIED HEPATIC CIRRHOSIS TYPE: ICD-10-CM

## 2019-05-22 LAB
ANION GAP SERPL CALC-SCNC: 4 MMOL/L (ref 8–16)
BUN SERPL-MCNC: 19 MG/DL (ref 8–23)
CALCIUM SERPL-MCNC: 9.1 MG/DL (ref 8.7–10.5)
CHLORIDE SERPL-SCNC: 99 MMOL/L (ref 95–110)
CO2 SERPL-SCNC: 29 MMOL/L (ref 23–29)
CREAT SERPL-MCNC: 1.1 MG/DL (ref 0.5–1.4)
EST. GFR  (AFRICAN AMERICAN): 59.6 ML/MIN/1.73 M^2
EST. GFR  (NON AFRICAN AMERICAN): 51.7 ML/MIN/1.73 M^2
GLUCOSE SERPL-MCNC: 223 MG/DL (ref 70–110)
POTASSIUM SERPL-SCNC: 5 MMOL/L (ref 3.5–5.1)
SODIUM SERPL-SCNC: 132 MMOL/L (ref 136–145)

## 2019-05-22 PROCEDURE — 99214 OFFICE O/P EST MOD 30 MIN: CPT | Mod: S$GLB,,, | Performed by: NURSE PRACTITIONER

## 2019-05-22 PROCEDURE — 80048 BASIC METABOLIC PNL TOTAL CA: CPT

## 2019-05-22 PROCEDURE — 99999 PR PBB SHADOW E&M-EST. PATIENT-LVL IV: CPT | Mod: PBBFAC,,, | Performed by: NURSE PRACTITIONER

## 2019-05-22 PROCEDURE — 36415 COLL VENOUS BLD VENIPUNCTURE: CPT | Mod: PO

## 2019-05-22 PROCEDURE — 99214 PR OFFICE/OUTPT VISIT, EST, LEVL IV, 30-39 MIN: ICD-10-PCS | Mod: S$GLB,,, | Performed by: NURSE PRACTITIONER

## 2019-05-22 PROCEDURE — 99999 PR PBB SHADOW E&M-EST. PATIENT-LVL IV: ICD-10-PCS | Mod: PBBFAC,,, | Performed by: NURSE PRACTITIONER

## 2019-05-22 RX ORDER — INSULIN ASPART 100 [IU]/ML
INJECTION, SOLUTION INTRAVENOUS; SUBCUTANEOUS
Qty: 60 ML | Refills: 3 | Status: SHIPPED | OUTPATIENT
Start: 2019-05-22 | End: 2019-08-21

## 2019-05-22 RX ORDER — ERGOCALCIFEROL 1.25 MG/1
CAPSULE ORAL
Qty: 24 CAPSULE | Refills: 4 | Status: SHIPPED | OUTPATIENT
Start: 2019-05-22

## 2019-05-22 NOTE — PROGRESS NOTES
CC: This 68 y.o. female presents for management of diabetes and chronic conditions pending review including HTN, HLP, fatty liver w cirrhosis     HPI: She was diagnosed with gestational diabetes during her 4th pregnancy in 1975. Resolved and then in the 1980s was diagnosed with type 2 diabetes.  Has never been hospitalized r/t DM.  Family hx of DM: father, brothers x 3, sister, daughter    Since her last she has had an ER visit for cellulitis of her LLE, wound closed now  Weakness in her BUE extremities, keeps dropping things  BG remain elevated  Did not increase Novolog to 25 u AC as instructed after last visit   snacking on freddie crackers, bananas and grapes    Bg have been grossly elevated                 Diet: Eats 2-3  Meals a day, snacks- as above; drinking 3 Premier Protein shakes daily  Exercise: none   CURRENT DM MEDS: 80 u lantus qam,  20 units Novolog AC   Vial/pen:  Uses pen  Glucometer type:  Accu check Liz     Standards of Care:  Eye exam: Dr. Hadley > 1 year- will schedule     Following w [facility] Plunkett NP and Dr Cerrato in hepatology     Lives w her  who also has multiple medical issues   Her daughter (Danitza) who is present for this visit, checks on them daily  Home Health coming out twice a week  PT and OT also coming twice a week     ROS:   Gen: poor appetite,  weight stable, + fatigue and weakness  Skin: Skin is intact and heals well, no rashes, + hair changes  Eyes: Denies visual disturbances  Resp: + SOB , no cough  Cardiac: No palpitations, chest pain, no edema   GI: nonausea, no vomiting, diarrhea, constipation  /GYN: 0-1+ nocturia, burning or pain.   MS/Neuro: +numbness/ tingling in BLE; Gait steady, speech clear  Psych: Denies drug/ETOH abuse, +depression.  Other systems: negative.     PE:  GENERAL: Well developed, well nourished.  PSYCH: AAOx2 confused to time, short term is an issue, pleasant expression, conversant, appears relaxed .   EYES: Conjunctiva, corneas clear  NECK:  Supple, trachea midline,no thyromegaly or nodules  VASCULAR: DP pulses +2/4 bilaterally, no edema.  NEURO: Gait steady  SKIN: Skin warm and dry no acanthosis nigracans.  FOOT EXAMINATION: 2/6/19  No foot deformity, corns or callus formation, Onychomycosis, nails in good condition and well trimmed, no interspace maceration or ulceration noted.  Decreased hair growth present over toes/feet. Positive vibratory response to 128 Hz tuning fork bilaterally.  Protective sensation intact with 10 gram monofilament.  +2 dorsalis pedis and posterior pulses noted.    Lab Results   Component Value Date    MICALBCREAT 2 02/07/2019       Hemoglobin A1C   Date Value Ref Range Status   05/15/2019 10.7 (H) 4.0 - 5.6 % Final     Comment:     ADA Screening Guidelines:  5.7-6.4%  Consistent with prediabetes  >or=6.5%  Consistent with diabetes  High levels of fetal hemoglobin interfere with the HbA1C  assay. Heterozygous hemoglobin variants (HbS, HgC, etc)do  not significantly interfere with this assay.   However, presence of multiple variants may affect accuracy.     02/07/2019 7.7 (H) <5.7 % of total Hgb Final     Comment:     For someone without known diabetes, a hemoglobin A1c  value of 6.5% or greater indicates that they may have   diabetes and this should be confirmed with a follow-up   test.     For someone with known diabetes, a value <7% indicates   that their diabetes is well controlled and a value   greater than or equal to 7% indicates suboptimal   control. A1c targets should be individualized based on   duration of diabetes, age, comorbid conditions, and   other considerations.     Currently, no consensus exists regarding use of  hemoglobin A1c for diagnosis of diabetes for children.         11/12/2018 8.9 (A) 4.0 - 6.0 % Final        ASSESSMENT and PLAN:    1. T2DM with hyperglycemia, DM PN-  Change lantus to 80 u qd;  Novolog 30 u AC + correction 150/25  Check bg times a day- log q2 weeks or sooner for issues  All logs now  have doses of insulin on them (has copies)  Discussed A1c and BG goals.    2. HTN - controlled, continue meds as previously prescribed and monitor.     3. HLP -  on statin therapy, LFTs WNL    4. Cirrhosis- following closely w LTS- will notified A Tristenogjigna NP patient is confused to time     5. Morbid obesity- Body mass index is 43.73 kg/m².  Stop grapes and bananas, change to apples and oranges or berries          6. Vitamin D deficiency - Increase ergo to 2 tabs weekly    Follow-up: in 3 months with lab prior

## 2019-05-23 ENCOUNTER — TELEPHONE (OUTPATIENT)
Dept: HEPATOLOGY | Facility: CLINIC | Age: 69
End: 2019-05-23

## 2019-05-23 DIAGNOSIS — K75.81 LIVER CIRRHOSIS SECONDARY TO NASH: Primary | ICD-10-CM

## 2019-05-23 DIAGNOSIS — K74.60 LIVER CIRRHOSIS SECONDARY TO NASH: Primary | ICD-10-CM

## 2019-05-23 NOTE — TELEPHONE ENCOUNTER
----- Message from Bety Cerrato MD sent at 5/23/2019 11:56 AM CDT -----  Kidney function is stable, hold spironolactone.  Labs with return appointment next week

## 2019-05-23 NOTE — TELEPHONE ENCOUNTER
Called daughter to check on patient. Daughter reports some worsening memory loss over the last few weeks. No overt confusion or disorientation but increased forgetfulness with routine tasks and short term memory loss    Denies any s/s infection: no abd pain, fever, chills, urinary symptoms, cough     Daughter reports pt taking Lactulose with 3-4 BM daily, taking Zinc and Xifaxan as prescribed. Encouraged to continue compliance with current medication regimen    Instructed daughter that recommend pt present to the ER for any overt confusion/disorientation/change in mental status - currently stable without concerns for need for ER, daughter verbalized understanding

## 2019-05-23 NOTE — TELEPHONE ENCOUNTER
Call placed to [facility]. They wanted to notify us af a drug interaction with the spironolactone and potassium.  We do not have the patient on potassium on our medication list.  It was a Rx from Feb for 90 days. We wanted to know if you wanted it refilled?  Who is the Dr that ordered the potassium? Dr Lundberg.  You will have to contact that Provider and let them know. They will let you know if they wanted additional refills.

## 2019-05-23 NOTE — TELEPHONE ENCOUNTER
----- Message from Mary Agudelo DNP, NP sent at 5/22/2019 10:54 AM CDT -----  Yes a little confused, foggy  ----- Message -----  From: Deepika Plunkett NP  Sent: 5/22/2019  10:41 AM  To: Mary Agudelo DNP, NP    Is she confused or disoriented? Did you see her today? I can call her later. The labs wont tell me much when it comes to confusion so I can call and see how her mind clarity is.     Deepika  ----- Message -----  From: Mary Agudelo DNP, NP  Sent: 5/22/2019  10:32 AM  To: Deepika Plunkett NP    Patient more confused. In the [place] area now then heading to [place] for MD appt at 2 pm. Do you want any labs now?

## 2019-05-23 NOTE — TELEPHONE ENCOUNTER
----- Message from Joyce Baez sent at 5/23/2019 11:35 AM CDT -----  Contact: [facility]   Pharmacy Calling    Reason for call: Drug Interaction    [email or web address]    Prescription Name: Spironolactone and potassium    Phone Number: [phone] / fax [phone]    Additional Information: n/a

## 2019-05-27 ENCOUNTER — TELEPHONE (OUTPATIENT)
Dept: HEPATOLOGY | Facility: CLINIC | Age: 69
End: 2019-05-27

## 2019-05-27 NOTE — TELEPHONE ENCOUNTER
Called patients daughter offered her an appt for 8am and daughter accepted the appt. Luz Maria scheduled it for me.

## 2019-05-27 NOTE — TELEPHONE ENCOUNTER
----- Message from Octavia Ferguson sent at 5/27/2019  1:16 PM CDT -----  Contact: Danitza pt's daughter  Needs Advice    Reason for call: Would like to know if pt can have a para done the same day as her appt    with Dr. Cerrato?        Communication Preference: [phone]    Additional Information: N/A

## 2019-05-30 ENCOUNTER — OFFICE VISIT (OUTPATIENT)
Dept: HEPATOLOGY | Facility: CLINIC | Age: 69
End: 2019-05-30
Payer: MEDICARE

## 2019-05-30 ENCOUNTER — TELEPHONE (OUTPATIENT)
Dept: TRANSPLANT | Facility: CLINIC | Age: 69
End: 2019-05-30

## 2019-05-30 ENCOUNTER — HOSPITAL ENCOUNTER (OUTPATIENT)
Dept: INTERVENTIONAL RADIOLOGY/VASCULAR | Facility: HOSPITAL | Age: 69
Discharge: HOME | End: 2019-05-30
Attending: INTERNAL MEDICINE
Payer: MEDICARE

## 2019-05-30 VITALS
BODY MASS INDEX: 43.29 KG/M2 | DIASTOLIC BLOOD PRESSURE: 56 MMHG | OXYGEN SATURATION: 98 % | HEIGHT: 62 IN | SYSTOLIC BLOOD PRESSURE: 115 MMHG | WEIGHT: 235.25 LBS | HEART RATE: 74 BPM

## 2019-05-30 VITALS
HEART RATE: 73 BPM | SYSTOLIC BLOOD PRESSURE: 134 MMHG | DIASTOLIC BLOOD PRESSURE: 60 MMHG | OXYGEN SATURATION: 99 % | RESPIRATION RATE: 14 BRPM

## 2019-05-30 DIAGNOSIS — Z76.82 ORGAN TRANSPLANT CANDIDATE: ICD-10-CM

## 2019-05-30 DIAGNOSIS — K74.60 LIVER CIRRHOSIS SECONDARY TO NASH: ICD-10-CM

## 2019-05-30 DIAGNOSIS — K75.81 LIVER CIRRHOSIS SECONDARY TO NASH: ICD-10-CM

## 2019-05-30 DIAGNOSIS — F41.9 ANXIETY: Primary | ICD-10-CM

## 2019-05-30 LAB
ALBUMIN FLD-MCNC: 1 G/DL
APPEARANCE FLD: CLEAR
BODY FLD TYPE: NORMAL
COLOR FLD: YELLOW
LYMPHOCYTES NFR FLD MANUAL: 77 %
MESOTHL CELL NFR FLD MANUAL: 3 %
MONOS+MACROS NFR FLD MANUAL: 7 %
NEUTROPHILS NFR FLD MANUAL: 13 %
PROT FLD-MCNC: 2.5 G/DL
SPECIMEN SOURCE: NORMAL
SPECIMEN SOURCE: NORMAL
WBC # FLD: 302 /CU MM

## 2019-05-30 PROCEDURE — 87070 CULTURE OTHR SPECIMN AEROBIC: CPT

## 2019-05-30 PROCEDURE — 87205 SMEAR GRAM STAIN: CPT

## 2019-05-30 PROCEDURE — 82042 OTHER SOURCE ALBUMIN QUAN EA: CPT

## 2019-05-30 PROCEDURE — 3074F SYST BP LT 130 MM HG: CPT | Mod: CPTII,S$GLB,, | Performed by: INTERNAL MEDICINE

## 2019-05-30 PROCEDURE — 99999 PR PBB SHADOW E&M-EST. PATIENT-LVL V: ICD-10-PCS | Mod: PBBFAC,,, | Performed by: INTERNAL MEDICINE

## 2019-05-30 PROCEDURE — 99215 OFFICE O/P EST HI 40 MIN: CPT | Mod: S$GLB,,, | Performed by: INTERNAL MEDICINE

## 2019-05-30 PROCEDURE — 84157 ASSAY OF PROTEIN OTHER: CPT

## 2019-05-30 PROCEDURE — 99999 PR PBB SHADOW E&M-EST. PATIENT-LVL V: CPT | Mod: PBBFAC,,, | Performed by: INTERNAL MEDICINE

## 2019-05-30 PROCEDURE — 89051 BODY FLUID CELL COUNT: CPT

## 2019-05-30 PROCEDURE — 49083 IR PARACENTESIS WITH IMAGING: ICD-10-PCS | Mod: ,,, | Performed by: NURSE PRACTITIONER

## 2019-05-30 PROCEDURE — 3078F DIAST BP <80 MM HG: CPT | Mod: CPTII,S$GLB,, | Performed by: INTERNAL MEDICINE

## 2019-05-30 PROCEDURE — 3078F PR MOST RECENT DIASTOLIC BLOOD PRESSURE < 80 MM HG: ICD-10-PCS | Mod: CPTII,S$GLB,, | Performed by: INTERNAL MEDICINE

## 2019-05-30 PROCEDURE — 49083 ABD PARACENTESIS W/IMAGING: CPT | Mod: ,,, | Performed by: NURSE PRACTITIONER

## 2019-05-30 PROCEDURE — 1101F PT FALLS ASSESS-DOCD LE1/YR: CPT | Mod: CPTII,S$GLB,, | Performed by: INTERNAL MEDICINE

## 2019-05-30 PROCEDURE — 49083 ABD PARACENTESIS W/IMAGING: CPT

## 2019-05-30 PROCEDURE — 3074F PR MOST RECENT SYSTOLIC BLOOD PRESSURE < 130 MM HG: ICD-10-PCS | Mod: CPTII,S$GLB,, | Performed by: INTERNAL MEDICINE

## 2019-05-30 PROCEDURE — 1101F PR PT FALLS ASSESS DOC 0-1 FALLS W/OUT INJ PAST YR: ICD-10-PCS | Mod: CPTII,S$GLB,, | Performed by: INTERNAL MEDICINE

## 2019-05-30 PROCEDURE — 99215 PR OFFICE/OUTPT VISIT, EST, LEVL V, 40-54 MIN: ICD-10-PCS | Mod: S$GLB,,, | Performed by: INTERNAL MEDICINE

## 2019-05-30 RX ORDER — CLONAZEPAM 1 MG/1
0.5 TABLET ORAL NIGHTLY
Qty: 30 TABLET | Refills: 0
Start: 2019-05-30 | End: 2019-07-08 | Stop reason: CLARIF

## 2019-05-30 NOTE — PROGRESS NOTES
Paracentesis complete, 4500 MLs removed. Specimen sent to lab. No  Albumin administered per protocol. Dressing applied to RLQ puncture site, dressing clean dry and intact. Pt given discharge instructions and handouts, pt verbalizes understanding. Questions answered. Pt denies pain and discomfort. Pt refusing transport. Pt ambulated to [facility] for transport home with family. Gait steady.

## 2019-05-30 NOTE — TELEPHONE ENCOUNTER
----- Message from Bety Cerrato MD sent at 5/30/2019 11:38 AM CDT -----  Needs financial clearance for transplant eval.  Patient also needs assistance with finding facility closer to home for paracentesis.  Would like [place] if possible.    Thanks,  CB

## 2019-05-30 NOTE — H&P
Radiology History & Physical      SUBJECTIVE:     Chief Complaint: abdominal distnetino    History of Present Illness:  Sejal Matamoros is a 68 y.o. female who presents for evaluation of ascites  Past Medical History:   Diagnosis Date    A-fib     Anxiety     Cirrhosis     DDD (degenerative disc disease), cervical 3/21/2018    DDD (degenerative disc disease), lumbar 3/21/2018    Decompensated hepatic cirrhosis 12/26/2018    Depression     Diabetes mellitus, type 2     Hepatic encephalopathy 12/26/2018    HLD (hyperlipidemia)     HTN (hypertension)     Hypoalbuminemia 12/26/2018    Hypoglycemia associated with type 2 diabetes mellitus 12/26/2018    Morbid obesity with BMI of 45.0-49.9, adult 12/7/2018    Obesity     Other ascites 2/20/2019    Portal hypertension 12/26/2018    Type 2 diabetes mellitus with hyperglycemia, with long-term current use of insulin 12/6/2018     Past Surgical History:   Procedure Laterality Date    arm surgery      left    CARPAL TUNNEL RELEASE      left    CHOLECYSTECTOMY      HYSTERECTOMY      ROTATOR CUFF REPAIR      TOTAL KNEE ARTHROPLASTY         Home Meds:   Prior to Admission medications    Medication Sig Start Date End Date Taking? Authorizing Provider   ACCU-CHEK SOFTCLIX LANCETS MISC Accu-Chek Softclix Lancets    Historical Provider, MD   aspirin (ECOTRIN) 81 MG EC tablet Take 81 mg by mouth once daily.    Historical Provider, MD   cholestyramine-aspartame (CHOLESTYRAMINE LIGHT) 4 gram PwPk Take 1 packet (4 g total) by mouth 2 (two) times daily. 4/26/19 4/25/20  Bety Cerrato MD   clonazePAM (KLONOPIN) 1 MG tablet Take 1 tablet (1 mg total) by mouth 2 (two) times daily as needed for Anxiety. 4/7/19   Berto Crook MD   ergocalciferol (ERGOCALCIFEROL) 50,000 unit Cap Take 2 tablets weekly 5/22/19   Mary Agudelo, DNP, NP   escitalopram oxalate (LEXAPRO) 20 MG tablet Take 20 mg by mouth once daily. 10/31/18   Historical Provider, MD   furosemide  (LASIX) 40 MG tablet Take 2 tablets (80 mg total) by mouth 2 (two) times daily. DOSE CHANGE 5/16/19 5/15/20  Bety Cerrato MD   gabapentin (NEURONTIN) 600 MG tablet Take 600 mg by mouth 2 (two) times daily.    Historical Provider, MD   HYDROcodone-acetaminophen (NORCO) 5-325 mg per tablet Take 1 tablet by mouth every 8 (eight) hours as needed for Pain. 3/7/19   Catracho Dawson MD   insulin aspart U-100 (NOVOLOG FLEXPEN U-100 INSULIN) 100 unit/mL (3 mL) InPn pen 30 units before each meal + correction Max  u 5/22/19   Mary Agudelo DNP, NP   insulin glargine (LANTUS) 100 unit/mL injection Inject 80 Units into the skin once daily. 4/17/19   Mary Agudelo DNP, NP   ketoconazole (NIZORAL) 2 % cream Apply topically once daily. 2/7/19   Carlos Eduardo Ley DPM   lactulose (CHRONULAC) 20 gram/30 mL Soln Take 30 mLs (20 g total) by mouth 3 (three) times daily. Adjust dose to have  3-4 BM's daily 3/21/19   Deepika Plunkett NP   metoprolol succinate (TOPROL XL) 25 MG 24 hr tablet Take 25 mg by mouth once daily. 10/3/18 10/3/19  Historical Provider, MD   omeprazole (PRILOSEC) 40 MG capsule Take 40 mg by mouth once daily. 10/17/18   Historical Provider, MD   ondansetron (ZOFRAN) 4 MG tablet Take 1 tablet (4 mg total) by mouth every 6 (six) hours. 3/27/19   Fabrizio Rodriguez MD   rifAXIMin (XIFAXAN) 550 mg Tab Take 1 tablet (550 mg total) by mouth 2 (two) times daily. 4/26/19 4/25/20  Bety Cerrato MD   rosuvastatin (CRESTOR) 10 MG tablet Take 10 mg by mouth once daily. 8/13/18   Historical Provider, MD   spironolactone (ALDACTONE) 100 MG tablet Take 2 tablets (200 mg total) by mouth 2 (two) times daily. DOSE INCREASE 5/16/19 5/15/20  Bety Cerrato MD   XARELTO 20 mg Tab Take 20 mg by mouth once daily. 11/30/18   Historical Provider, MD   zinc 50 mg Tab Take 200 mg by mouth once daily. 2/20/19   Deepika Plunkett, TARUN     Anticoagulants/Antiplatelets: no anticoagulation    Allergies:   Review of patient's  allergies indicates:   Allergen Reactions    Iodinated contrast- oral and iv dye Hives    Zoloft [sertraline]      Sedation History: xeralto    Review of Systems:   Hematological: no known coagulopathies  Respiratory: no shortness of breath  Cardiovascular: no chest pain  Gastrointestinal: no abdominal pain  Genito-Urinary: no dysuria  Musculoskeletal: negative  Neurological: no TIA or stroke symptoms         OBJECTIVE:     Vital Signs (Most Recent)  Pulse: 72 (05/30/19 0758)  Resp: 14 (05/30/19 0758)  BP: (!) 119/58 (05/30/19 0758)  SpO2: 100 % (05/30/19 0758)    Physical Exam:  ASA: 2  Mallampati: NA    General: no acute distress  Mental Status: alert and oriented to person, place and time  HEENT: normocephalic, atraumatic  Chest: unlabored breathing  Heart: regular heart rate  Abdomen: distended  Extremity: moves all extremities    Laboratory  Lab Results   Component Value Date    INR 1.2 05/15/2019       Lab Results   Component Value Date    WBC 3.74 (L) 05/15/2019    HGB 7.2 (L) 05/15/2019    HCT 23.3 (L) 05/15/2019    MCV 90 05/15/2019     05/15/2019      Lab Results   Component Value Date     (H) 05/22/2019     (L) 05/22/2019    K 5.0 05/22/2019    CL 99 05/22/2019    CO2 29 05/22/2019    BUN 19 05/22/2019    CREATININE 1.1 05/22/2019    CALCIUM 9.1 05/22/2019    MG 1.7 03/27/2019    ALT 19 05/15/2019    ALT 19 05/15/2019    AST 33 05/15/2019    AST 33 05/15/2019    ALBUMIN 2.2 (L) 05/15/2019    ALBUMIN 2.2 (L) 05/15/2019    BILITOT 0.4 05/15/2019    BILITOT 0.4 05/15/2019    BILIDIR 0.6 (H) 03/21/2019       ASSESSMENT/PLAN:     Sedation Plan: local anesthesia  Patient will undergo US guide paracentesis

## 2019-05-30 NOTE — Clinical Note
Needs financial clearance for transplant eval.Patient also needs assistance with finding facility closer to home for paracentesis.  Would like [facility] if possible.Thanks,CB

## 2019-05-30 NOTE — PATIENT INSTRUCTIONS
Restart spironolactone 200mg twice daily.    You should discontinue potassium supplements.    We will request financial approval for liver transplant evaluation.    Contact home health regarding arm wound.    Return to transplant clinic in 3 months

## 2019-05-30 NOTE — PROGRESS NOTES
Hepatology Follow-up Note    Chief complaint: RHOADES cirrhosis    HPI:  Sejal Matamoros is a 68 y.o. female that presents to hepatology clinic for consultation of RHOADES cirrhosis.  She is accompanied by her son and daughter in law.    The patient has been followed by Deepika Plunkett and was last seen on 4/26/2019.  She has had clinical improvement.      The patient and her family described many symptoms related to chronic liver disease.   HE:  This is improved although patient continues to have memory issues.  She is taking lactulose with 3 bowel movements daily.  Rifaximin twice daily along with zinc supplementation.  She has not had hospitalizations related to HE.    Pruritus: resolved  Volume overload:  Taking lasix 80mg bid; holding spironolactone 200mg bid due to hyperkalemia but patient has also been taking a potassium supplement.  Instructed to discontinue supplement and restart spironolactone.  She is requiring LVP twice monthly.   Malnutrition:  Taking protein supplements 3 times per day and appetite is improving with better ascites control.  Albumin 2.1.      The patient reports that she is ambulating more independently.  She comes to clinic and has ambulated to her IR and hepatology clinic appointment without assistive device.  States that she only uses motorized cart when shopping for prolonged periods.    Sleeping is improved   She has discussed liver transplant with family and wishes to proceed with evaluation.  States that since her clinical improvement, depression is much better and she wants to proceed with transplant if it can extend her life and improve current quality of life.  Significant skin tear of LUE which is currently bandaged.  States that granddaughter pulled on skin and tore.  She has HH nursing along with PT/OT.  They are planning to come tomorrow and will contact today regarding assistance with wound care.     Cirrhosis HCM:  Hepatitis A vaccination: non-immune  Hepatitis B  vaccination: immune   HCC screening: MRI 2/2019 no focal lesions   Variceal screening: 10/2017 - no varices  Pneumococcal vaccination: not discussed  Influenza vaccination: not discussed     Patient Active Problem List   Diagnosis    Cervical stenosis of spinal canal    Cervical radiculopathy    Cervical spondylosis    DDD (degenerative disc disease), cervical    DDD (degenerative disc disease), lumbar    Lumbar spondylosis    Lumbar herniated disc    Lumbar spinal stenosis    Lumbar radiculopathy    Herniated cervical disc    Bilateral shoulder bursitis    Bilateral hip bursitis    Type 2 diabetes mellitus with hyperglycemia, with long-term current use of insulin    Morbid obesity with BMI of 40.0-44.9, adult    Mixed hyperlipidemia    HTN (hypertension)    Hepatic encephalopathy    Decompensated hepatic cirrhosis    Portal hypertension    Hypoalbuminemia    Type 2 diabetes mellitus with diabetic polyneuropathy, with long-term current use of insulin    Other ascites    Itching    RUQ pain    Elevated alkaline phosphatase level    Vitamin D deficiency       Past Medical History:   Diagnosis Date    A-fib     Anxiety     Cirrhosis     DDD (degenerative disc disease), cervical 3/21/2018    DDD (degenerative disc disease), lumbar 3/21/2018    Decompensated hepatic cirrhosis 12/26/2018    Depression     Diabetes mellitus, type 2     Hepatic encephalopathy 12/26/2018    HLD (hyperlipidemia)     HTN (hypertension)     Hypoalbuminemia 12/26/2018    Hypoglycemia associated with type 2 diabetes mellitus 12/26/2018    Morbid obesity with BMI of 45.0-49.9, adult 12/7/2018    Obesity     Other ascites 2/20/2019    Portal hypertension 12/26/2018    Type 2 diabetes mellitus with hyperglycemia, with long-term current use of insulin 12/6/2018       Past Surgical History:   Procedure Laterality Date    arm surgery      left    CARPAL TUNNEL RELEASE      left    CHOLECYSTECTOMY       HYSTERECTOMY      ROTATOR CUFF REPAIR      TOTAL KNEE ARTHROPLASTY     lap kaye    Family History   Problem Relation Age of Onset    Cirrhosis Brother         RHOADES cirrhosis       Social History     Socioeconomic History    Marital status:      Spouse name: Not on file    Number of children: Not on file    Years of education: Not on file    Highest education level: Not on file   Occupational History    Not on file   Social Needs    Financial resource strain: Not on file    Food insecurity:     Worry: Not on file     Inability: Not on file    Transportation needs:     Medical: Not on file     Non-medical: Not on file   Tobacco Use    Smoking status: Never Smoker    Smokeless tobacco: Never Used   Substance and Sexual Activity    Alcohol use: No     Frequency: Never    Drug use: No    Sexual activity: Not on file   Lifestyle    Physical activity:     Days per week: Not on file     Minutes per session: Not on file    Stress: Not on file   Relationships    Social connections:     Talks on phone: Not on file     Gets together: Not on file     Attends [employer] service: Not on file     Active member of club or organization: Not on file     Attends meetings of clubs or organizations: Not on file     Relationship status: Not on file   Other Topics Concern    Not on file   Social History Narrative    Not on file       Current Outpatient Medications   Medication Sig Dispense Refill    ACCU-CHEK SOFTCLIX LANCETS MISC Accu-Chek Softclix Lancets      aspirin (ECOTRIN) 81 MG EC tablet Take 81 mg by mouth once daily.      cholestyramine-aspartame (CHOLESTYRAMINE LIGHT) 4 gram PwPk Take 1 packet (4 g total) by mouth 2 (two) times daily. 180 packet 3    clonazePAM (KLONOPIN) 1 MG tablet Take 1 tablet (1 mg total) by mouth 2 (two) times daily as needed for Anxiety. 30 tablet 0    ergocalciferol (ERGOCALCIFEROL) 50,000 unit Cap Take 2 tablets weekly 24 capsule 4    escitalopram oxalate (LEXAPRO) 20  MG tablet Take 20 mg by mouth once daily.      furosemide (LASIX) 40 MG tablet Take 2 tablets (80 mg total) by mouth 2 (two) times daily. DOSE CHANGE 180 tablet 5    gabapentin (NEURONTIN) 600 MG tablet Take 600 mg by mouth 2 (two) times daily.      HYDROcodone-acetaminophen (NORCO) 5-325 mg per tablet Take 1 tablet by mouth every 8 (eight) hours as needed for Pain. 12 tablet 0    insulin aspart U-100 (NOVOLOG FLEXPEN U-100 INSULIN) 100 unit/mL (3 mL) InPn pen 30 units before each meal + correction Max  u 60 mL 3    insulin glargine (LANTUS) 100 unit/mL injection Inject 80 Units into the skin once daily. 45 mL 3    ketoconazole (NIZORAL) 2 % cream Apply topically once daily. 60 Tube 3    lactulose (CHRONULAC) 20 gram/30 mL Soln Take 30 mLs (20 g total) by mouth 3 (three) times daily. Adjust dose to have  3-4 BM's daily 5000 mL 5    metoprolol succinate (TOPROL XL) 25 MG 24 hr tablet Take 25 mg by mouth once daily.      omeprazole (PRILOSEC) 40 MG capsule Take 40 mg by mouth once daily.      ondansetron (ZOFRAN) 4 MG tablet Take 1 tablet (4 mg total) by mouth every 6 (six) hours. 12 tablet 0    rifAXIMin (XIFAXAN) 550 mg Tab Take 1 tablet (550 mg total) by mouth 2 (two) times daily. 60 tablet 11    rosuvastatin (CRESTOR) 10 MG tablet Take 10 mg by mouth once daily.      spironolactone (ALDACTONE) 100 MG tablet Take 2 tablets (200 mg total) by mouth 2 (two) times daily. DOSE INCREASE 120 tablet 5    XARELTO 20 mg Tab Take 20 mg by mouth once daily.      zinc 50 mg Tab Take 200 mg by mouth once daily.  0     No current facility-administered medications for this visit.        Review of patient's allergies indicates:   Allergen Reactions    Iodinated contrast- oral and iv dye Hives    Zoloft [sertraline]        Review of Systems   Constitutional: Positive for malaise/fatigue. Negative for chills, fever and weight loss.   Eyes: Negative.    Respiratory: Negative for cough and shortness of breath.   "  Cardiovascular: Positive for leg swelling. Negative for chest pain.   Gastrointestinal: Negative for abdominal pain, heartburn, nausea and vomiting.        Abdominal distention   Musculoskeletal: Negative for joint pain and myalgias.   Skin: Negative for itching and rash.   Neurological: Negative for dizziness, focal weakness and headaches.   Endo/Heme/Allergies: Does not bruise/bleed easily.   Psychiatric/Behavioral: Positive for memory loss. Negative for depression. The patient does not have insomnia.        Vitals:    05/30/19 1006   BP: (!) 115/56   Pulse: 74   SpO2: 98%   Weight: 106.7 kg (235 lb 3.7 oz)   Height: 5' 2" (1.575 m)       Physical Exam   Constitutional: She is oriented to person, place, and time. She appears well-developed and well-nourished. No distress.   Ambulating independently, appears stronger than last visit   HENT:   Head: Normocephalic and atraumatic.   Mouth/Throat: Oropharynx is clear and moist. No oropharyngeal exudate.   Eyes: Pupils are equal, round, and reactive to light. EOM are normal. No scleral icterus.   Neck: Normal range of motion. Neck supple. No thyromegaly present.   Cardiovascular: Normal rate, regular rhythm and normal heart sounds. Exam reveals no gallop and no friction rub.   No murmur heard.  Pulmonary/Chest: Effort normal. No respiratory distress. She has no wheezes. She has no rales.   Abdominal: Soft. Bowel sounds are normal. She exhibits no distension. There is no tenderness.   Musculoskeletal: Normal range of motion. She exhibits edema.   Lymphadenopathy:     She has no cervical adenopathy.   Neurological: She is alert and oriented to person, place, and time. No cranial nerve deficit.   No asterixis   Skin: Skin is warm and dry. No rash noted.   Psychiatric: She has a normal mood and affect. Her behavior is normal.   Vitals reviewed.      Lab Results   Component Value Date    ALT 23 05/30/2019    AST 43 (H) 05/30/2019     (H) 03/21/2019    ALKPHOS 287 " (H) 05/30/2019    BILITOT 0.5 05/30/2019       Lab Results   Component Value Date    WBC 5.90 05/30/2019    HGB 7.5 (L) 05/30/2019    HCT 24.7 (L) 05/30/2019    MCV 85 05/30/2019     05/30/2019       Lab Results   Component Value Date     05/30/2019    K 4.1 05/30/2019     05/30/2019    CO2 27 05/30/2019    BUN 24 (H) 05/30/2019    CREATININE 1.1 05/30/2019    CALCIUM 8.6 (L) 05/30/2019    ANIONGAP 7 (L) 05/30/2019    ESTGFRAFRICA 59.6 (A) 05/30/2019    EGFRNONAA 51.7 (A) 05/30/2019     MELD-Na score: 10 at 5/30/2019  7:49 AM  MELD score: 10 at 5/30/2019  7:49 AM  Calculated from:  Serum Creatinine: 1.1 mg/dL at 5/30/2019  7:49 AM  Serum Sodium: 136 mmol/L at 5/30/2019  7:49 AM  Total Bilirubin: 0.5 mg/dL (Rounded to 1 mg/dL) at 5/30/2019  7:49 AM  INR(ratio): 1.3 at 5/30/2019  7:49 AM  Age: 68 years    Imaging: EMR and external imaging available reviewed     Assessment:  68 y.o. female presenting for ongoing management of decompensated RHOADES cirrhosis.  Although patient with low MELD, she has significant complications of portal hypertension including diuretic refractory ascites requiring frequent LVP.       Plan:    Transplant candidacy:  Patient has discussed with family and desires to proceed with liver transplant evaluation.  She is not a good candidate for TIPS for management of ascites given her history of HE and use of maximal therapy to control.  Therefore despite low MELD, the patient's functional status has improved where evaluation is reasonable.  Discussed that evaluation does not guarantee transplant and patient could have issues with obtaining MELD exception even if approved.  The patient and family express understanding but desire to proceed.  Will request financial approval.      HE:  Continue lactulose, rifaximin and zinc    Pruritus:  Resolved with medical therapy     Ascites:  Continue LVP as needed, restarted spironolactone and continue lasix.  Discontinue potassium supplement.   Patient following low sodium diet.      Malnutrition:  Improving although hypoalbuminemia remains    Functional status:  Improved with patient able to ambulate independently and increasing physical activity.  Recommend continuing HH PT/OT    Wound:  Patient will have HH nursing assist with wound care of UE lesion.  Given parameters to seek further medical condition if non-healing.     RTC in transplant in 3 months     HCM as documented above

## 2019-05-30 NOTE — DISCHARGE INSTRUCTIONS
For scheduling: Call Sri at [phone]    For questions or concerns call: LIZZY MON-FRI 8 AM- 5PM [phone]. Radiology resident on call [phone].    For immediate concerns that are not emergent, you may call our radiology clinic at: [phone]

## 2019-05-30 NOTE — TELEPHONE ENCOUNTER
Referral received from Dr Cerrato   Initial  referral from Dr. Jim Jolley  Patient with decompensated RHOADES cirrhosis.  Although patient with low MELD, she has significant complications of portal hypertension including diuretic refractory ascites requiring frequent LVP.   . MELD 10  ICD-10:  ICD-10-CM: K72.90  Referred for liver transplant for EVALUATION.    Referral completed and forwarded to [facility].          Insurance: Epic   PRIMARY:   ID:  Contact #     SECONDARY:   ID:  Contact #

## 2019-06-03 LAB
BACTERIA FLD AEROBE CULT: NO GROWTH
GRAM STN SPEC: NORMAL
GRAM STN SPEC: NORMAL

## 2019-06-13 ENCOUNTER — TELEPHONE (OUTPATIENT)
Dept: TRANSPLANT | Facility: CLINIC | Age: 69
End: 2019-06-13

## 2019-06-13 NOTE — TELEPHONE ENCOUNTER
PT called re clr for evaluation. Spoke with daughter Danitza, informed of eval process. Pt assigned to Kaylyn. And Lynda requested a paracentesis . Message to Luz Maria in IR. She will call the patient.

## 2019-06-14 ENCOUNTER — TELEPHONE (OUTPATIENT)
Dept: TRANSPLANT | Facility: CLINIC | Age: 69
End: 2019-06-14

## 2019-06-14 NOTE — TELEPHONE ENCOUNTER
Called pt to inform her that financial clearance has been obtained from the insurance company to initiate liver transplant evaluation.  Spoke w/ her daughter.  Informed her that evaluation will take approx 2 to 5 days to complete depending on rather standard or Fast pass evaluation scheduled.  Informed her that all testing will be done outpatient.  She voiced understanding of the need for patient to have her caregiver present for the  and surgeon consult, as well as for the patient education.  She reports that she and her brother will be present.  All questions/ concerns regarding liver transplant evaluation answered/ addressed.  Requesting to schedule Fast Pass evaluation.   Will enter orders for liver transplant evaluation and submit to  for scheduling.  Will schedule appts 8/8 and 8/9.

## 2019-06-17 ENCOUNTER — TELEPHONE (OUTPATIENT)
Dept: TRANSPLANT | Facility: CLINIC | Age: 69
End: 2019-06-17

## 2019-06-17 ENCOUNTER — DOCUMENTATION ONLY (OUTPATIENT)
Dept: TRANSPLANT | Facility: CLINIC | Age: 69
End: 2019-06-17

## 2019-06-17 DIAGNOSIS — R18.8 CIRRHOSIS OF LIVER WITH ASCITES, UNSPECIFIED HEPATIC CIRRHOSIS TYPE: ICD-10-CM

## 2019-06-17 DIAGNOSIS — Z76.82 ORGAN TRANSPLANT CANDIDATE: ICD-10-CM

## 2019-06-17 DIAGNOSIS — K76.6 PORTAL HYPERTENSION: ICD-10-CM

## 2019-06-17 DIAGNOSIS — R18.8 OTHER ASCITES: ICD-10-CM

## 2019-06-17 DIAGNOSIS — K72.90 DECOMPENSATED HEPATIC CIRRHOSIS: Primary | ICD-10-CM

## 2019-06-17 DIAGNOSIS — Z78.0 ASYMPTOMATIC MENOPAUSAL STATE: ICD-10-CM

## 2019-06-17 DIAGNOSIS — Z11.4 ENCOUNTER FOR SCREENING FOR HIV: ICD-10-CM

## 2019-06-17 DIAGNOSIS — K74.60 DECOMPENSATED HEPATIC CIRRHOSIS: Primary | ICD-10-CM

## 2019-06-17 DIAGNOSIS — Z12.31 ENCOUNTER FOR SCREENING MAMMOGRAM FOR MALIGNANT NEOPLASM OF BREAST: ICD-10-CM

## 2019-06-17 DIAGNOSIS — K74.60 CIRRHOSIS OF LIVER WITH ASCITES, UNSPECIFIED HEPATIC CIRRHOSIS TYPE: ICD-10-CM

## 2019-06-17 NOTE — TELEPHONE ENCOUNTER
Phone call returned to pt's daughter.  She is asking rather paracentesis can be scheduled at [facility].  Informed her that we are unable to schedule since the doctor does not have privileges there.   She states that patient is feeling ok now, but unsure if she can wait until 6/26.  Called the IR dept.  They have no availability before then.  Instructed her to report to local ED for SOB, CO, abdominal pain. But will continue to contact IR dept in the event there is a cancellation.  Pt's daughter verbalized understanding.    ==================================    ----- Message from Kya Hurley sent at 6/17/2019  9:04 AM CDT -----  Calling to find out if she can have paracentesis done at [facility]     Pt can't wait until appt on 6/26    Pt is swollen and stomach is hard    Pt is having discomfort as well    Pt contact [phone]

## 2019-06-18 DIAGNOSIS — E11.65 TYPE 2 DIABETES MELLITUS WITH HYPERGLYCEMIA, WITH LONG-TERM CURRENT USE OF INSULIN: ICD-10-CM

## 2019-06-18 DIAGNOSIS — Z79.4 TYPE 2 DIABETES MELLITUS WITH HYPERGLYCEMIA, WITH LONG-TERM CURRENT USE OF INSULIN: ICD-10-CM

## 2019-06-18 DIAGNOSIS — K72.90 DECOMPENSATED HEPATIC CIRRHOSIS: ICD-10-CM

## 2019-06-18 DIAGNOSIS — K74.60 DECOMPENSATED HEPATIC CIRRHOSIS: ICD-10-CM

## 2019-06-18 DIAGNOSIS — Z01.810 ENCOUNTER FOR PREPROCEDURAL CARDIOVASCULAR EXAMINATION: ICD-10-CM

## 2019-06-18 DIAGNOSIS — I10 ESSENTIAL HYPERTENSION: Primary | ICD-10-CM

## 2019-06-18 DIAGNOSIS — Z76.82 ORGAN TRANSPLANT CANDIDATE: ICD-10-CM

## 2019-06-20 ENCOUNTER — HOSPITAL ENCOUNTER (OUTPATIENT)
Dept: INTERVENTIONAL RADIOLOGY/VASCULAR | Facility: HOSPITAL | Age: 69
Discharge: HOME | End: 2019-06-20
Attending: INTERNAL MEDICINE
Payer: MEDICARE

## 2019-06-20 VITALS
HEART RATE: 100 BPM | SYSTOLIC BLOOD PRESSURE: 156 MMHG | DIASTOLIC BLOOD PRESSURE: 68 MMHG | OXYGEN SATURATION: 100 % | RESPIRATION RATE: 18 BRPM

## 2019-06-20 DIAGNOSIS — R18.8 OTHER ASCITES: ICD-10-CM

## 2019-06-20 LAB
ALBUMIN FLD-MCNC: 0.8 G/DL
APPEARANCE FLD: NORMAL
BODY FLD TYPE: NORMAL
COLOR FLD: YELLOW
LYMPHOCYTES NFR FLD MANUAL: 35 %
MESOTHL CELL NFR FLD MANUAL: 8 %
MONOS+MACROS NFR FLD MANUAL: 1 %
NEUTROPHILS NFR FLD MANUAL: 56 %
PROT FLD-MCNC: 2.1 G/DL
SPECIMEN SOURCE: NORMAL
SPECIMEN SOURCE: NORMAL
WBC # FLD: 168 /CU MM

## 2019-06-20 PROCEDURE — 63600175 PHARM REV CODE 636 W HCPCS: Mod: JG,TXP | Performed by: INTERNAL MEDICINE

## 2019-06-20 PROCEDURE — P9047 ALBUMIN (HUMAN), 25%, 50ML: HCPCS | Mod: JG,TXP | Performed by: INTERNAL MEDICINE

## 2019-06-20 PROCEDURE — 49083 ABD PARACENTESIS W/IMAGING: CPT | Mod: TXP,,, | Performed by: NURSE PRACTITIONER

## 2019-06-20 PROCEDURE — 89051 BODY FLUID CELL COUNT: CPT | Mod: TXP

## 2019-06-20 PROCEDURE — 49083 ABD PARACENTESIS W/IMAGING: CPT | Mod: NTX

## 2019-06-20 PROCEDURE — 82042 OTHER SOURCE ALBUMIN QUAN EA: CPT | Mod: NTX

## 2019-06-20 PROCEDURE — 49083 IR PARACENTESIS WITH IMAGING: ICD-10-PCS | Mod: TXP,,, | Performed by: NURSE PRACTITIONER

## 2019-06-20 PROCEDURE — 84157 ASSAY OF PROTEIN OTHER: CPT | Mod: TXP

## 2019-06-20 RX ORDER — ALBUMIN HUMAN 250 G/1000ML
50 SOLUTION INTRAVENOUS ONCE
Status: COMPLETED | OUTPATIENT
Start: 2019-06-20 | End: 2019-06-20

## 2019-06-20 RX ADMIN — ALBUMIN HUMAN 50 G: 0.25 SOLUTION INTRAVENOUS at 01:06

## 2019-06-20 NOTE — H&P
Radiology History & Physical      SUBJECTIVE:     Chief Complaint: abdominal distention    History of Present Illness:  Sejal Matamoros is a 68 y.o. female who presents for evaluation of ascites  Past Medical History:   Diagnosis Date    A-fib     Anxiety     Cirrhosis     DDD (degenerative disc disease), cervical 3/21/2018    DDD (degenerative disc disease), lumbar 3/21/2018    Decompensated hepatic cirrhosis 12/26/2018    Depression     Diabetes mellitus, type 2     Hepatic encephalopathy 12/26/2018    HLD (hyperlipidemia)     HTN (hypertension)     Hypoalbuminemia 12/26/2018    Hypoglycemia associated with type 2 diabetes mellitus 12/26/2018    Morbid obesity with BMI of 45.0-49.9, adult 12/7/2018    Obesity     Other ascites 2/20/2019    Portal hypertension 12/26/2018    Type 2 diabetes mellitus with hyperglycemia, with long-term current use of insulin 12/6/2018     Past Surgical History:   Procedure Laterality Date    arm surgery      left    CARPAL TUNNEL RELEASE      left    CHOLECYSTECTOMY      HYSTERECTOMY      ROTATOR CUFF REPAIR      TOTAL KNEE ARTHROPLASTY         Home Meds:   Prior to Admission medications    Medication Sig Start Date End Date Taking? Authorizing Provider   ACCU-CHEK SOFTCLIX LANCETS MISC Accu-Chek Softclix Lancets    Historical Provider, MD   aspirin (ECOTRIN) 81 MG EC tablet Take 81 mg by mouth once daily.    Historical Provider, MD   cholestyramine-aspartame (CHOLESTYRAMINE LIGHT) 4 gram PwPk Take 1 packet (4 g total) by mouth 2 (two) times daily. 4/26/19 4/25/20  Bety Cerrato MD   clonazePAM (KLONOPIN) 1 MG tablet Take 0.5 tablets (0.5 mg total) by mouth every evening. 5/30/19   Bety Cerrato MD   ergocalciferol (ERGOCALCIFEROL) 50,000 unit Cap Take 2 tablets weekly 5/22/19   Mary Agudelo, DNP, NP   escitalopram oxalate (LEXAPRO) 20 MG tablet Take 20 mg by mouth once daily. 10/31/18   Historical Provider, MD   furosemide (LASIX) 40 MG tablet  Take 2 tablets (80 mg total) by mouth 2 (two) times daily. DOSE CHANGE 5/16/19 5/15/20  Bety Cerrato MD   gabapentin (NEURONTIN) 600 MG tablet Take 600 mg by mouth 2 (two) times daily.    Historical Provider, MD   HYDROcodone-acetaminophen (NORCO) 5-325 mg per tablet Take 1 tablet by mouth every 8 (eight) hours as needed for Pain. 3/7/19   Catracho Dawson MD   insulin aspart U-100 (NOVOLOG FLEXPEN U-100 INSULIN) 100 unit/mL (3 mL) InPn pen 30 units before each meal + correction Max  u 5/22/19   Mary Agudelo DNP, NP   insulin glargine (LANTUS) 100 unit/mL injection Inject 80 Units into the skin once daily. 4/17/19   Mary Agudelo DNP, NP   ketoconazole (NIZORAL) 2 % cream Apply topically once daily. 2/7/19   Carlos Eduardo Ley DPM   lactulose (CHRONULAC) 20 gram/30 mL Soln Take 30 mLs (20 g total) by mouth 3 (three) times daily. Adjust dose to have  3-4 BM's daily 3/21/19   Deepika Plunkett, TARUN   metoprolol succinate (TOPROL XL) 25 MG 24 hr tablet Take 25 mg by mouth once daily. 10/3/18 10/3/19  Historical Provider, MD   omeprazole (PRILOSEC) 40 MG capsule Take 40 mg by mouth once daily. 10/17/18   Historical Provider, MD   ondansetron (ZOFRAN) 4 MG tablet Take 1 tablet (4 mg total) by mouth every 6 (six) hours. 3/27/19   Fabrizio Rodriguez MD   rifAXIMin (XIFAXAN) 550 mg Tab Take 1 tablet (550 mg total) by mouth 2 (two) times daily. 4/26/19 4/25/20  Bety Cerrato MD   rosuvastatin (CRESTOR) 10 MG tablet Take 10 mg by mouth once daily. 8/13/18   Historical Provider, MD   spironolactone (ALDACTONE) 100 MG tablet Take 2 tablets (200 mg total) by mouth 2 (two) times daily. DOSE INCREASE 5/16/19 5/15/20  Bety Cerrato MD   XARELTO 20 mg Tab Take 20 mg by mouth once daily. 11/30/18   Historical Provider, MD   zinc 50 mg Tab Take 200 mg by mouth once daily. 2/20/19   Deepika Plunkett, TARUN     Anticoagulants/Antiplatelets: Xeralto  Allergies:   Review of patient's allergies indicates:   Allergen  Reactions    Iodinated contrast- oral and iv dye Hives    Zoloft [sertraline]      Sedation History:  no adverse reactions    Review of Systems:   Hematological: no known coagulopathies  Respiratory: no shortness of breath  Cardiovascular: no chest pain  Gastrointestinal: no abdominal pain  Genito-Urinary: no dysuria  Musculoskeletal: negative  Neurological: no TIA or stroke symptoms         OBJECTIVE:     Vital Signs (Most Recent)       Physical Exam:  ASA: 2  Mallampati: na    General: no acute distress  Mental Status: alert and oriented to person, place and time  HEENT: normocephalic, atraumatic  Chest: unlabored breathing  Heart: regular heart rate  Abdomen: distended  Extremity: moves all extremities    ASSESSMENT/PLAN:     Sedation Plan: local anesthesia  Patient will undergo US guided paracentesis.

## 2019-07-03 ENCOUNTER — TELEPHONE (OUTPATIENT)
Dept: TRANSPLANT | Facility: CLINIC | Age: 69
End: 2019-07-03

## 2019-07-03 ENCOUNTER — HOSPITAL ENCOUNTER (OUTPATIENT)
Dept: INTERVENTIONAL RADIOLOGY/VASCULAR | Facility: HOSPITAL | Age: 69
Discharge: HOME | End: 2019-07-03
Attending: INTERNAL MEDICINE
Payer: MEDICARE

## 2019-07-03 VITALS
DIASTOLIC BLOOD PRESSURE: 70 MMHG | SYSTOLIC BLOOD PRESSURE: 165 MMHG | RESPIRATION RATE: 16 BRPM | HEART RATE: 95 BPM | OXYGEN SATURATION: 100 %

## 2019-07-03 DIAGNOSIS — R18.8 OTHER ASCITES: ICD-10-CM

## 2019-07-03 LAB
ALBUMIN FLD-MCNC: 0.8 G/DL
APPEARANCE FLD: NORMAL
BODY FLD TYPE: NORMAL
COLOR FLD: YELLOW
LYMPHOCYTES NFR FLD MANUAL: 44 %
MESOTHL CELL NFR FLD MANUAL: 28 %
MONOS+MACROS NFR FLD MANUAL: 19 %
NEUTROPHILS NFR FLD MANUAL: 9 %
PROT FLD-MCNC: 2.1 G/DL
SPECIMEN SOURCE: NORMAL
SPECIMEN SOURCE: NORMAL
WBC # FLD: 182 /CU MM

## 2019-07-03 PROCEDURE — 63600175 PHARM REV CODE 636 W HCPCS: Mod: JG,NTX | Performed by: INTERNAL MEDICINE

## 2019-07-03 PROCEDURE — 82042 OTHER SOURCE ALBUMIN QUAN EA: CPT | Mod: NTX

## 2019-07-03 PROCEDURE — 49083 IR PARACENTESIS WITH IMAGING: ICD-10-PCS | Mod: NTX,,, | Performed by: RADIOLOGY

## 2019-07-03 PROCEDURE — 49083 ABD PARACENTESIS W/IMAGING: CPT | Mod: NTX

## 2019-07-03 PROCEDURE — P9047 ALBUMIN (HUMAN), 25%, 50ML: HCPCS | Mod: JG,NTX | Performed by: INTERNAL MEDICINE

## 2019-07-03 PROCEDURE — 89051 BODY FLUID CELL COUNT: CPT | Mod: TXP

## 2019-07-03 PROCEDURE — 84157 ASSAY OF PROTEIN OTHER: CPT | Mod: NTX

## 2019-07-03 PROCEDURE — 87070 CULTURE OTHR SPECIMN AEROBIC: CPT | Mod: NTX

## 2019-07-03 PROCEDURE — 49083 ABD PARACENTESIS W/IMAGING: CPT | Mod: NTX,,, | Performed by: RADIOLOGY

## 2019-07-03 RX ORDER — ALBUMIN HUMAN 250 G/1000ML
25 SOLUTION INTRAVENOUS ONCE
Status: COMPLETED | OUTPATIENT
Start: 2019-07-03 | End: 2019-07-03

## 2019-07-03 RX ADMIN — ALBUMIN HUMAN 25 G: 0.25 SOLUTION INTRAVENOUS at 11:07

## 2019-07-03 NOTE — PLAN OF CARE
Paracentesis complete 6.4L removed from right abdomen.  Pt tolerated well, Mepore/bandaid applied. Pt in no acute distress, denies pain and discomfort. Discharge care and instructions given and acknolwedged. Pt discharged via wheelchair to main lobby to daughter in waiting room.

## 2019-07-03 NOTE — PLAN OF CARE
Pt arrived ambulatory  to [facility]  for  paracentesis. Name verified using two identifiers. No acute distress noted. Orders, allergies and labs reviewed. Awaiting consent.

## 2019-07-03 NOTE — TELEPHONE ENCOUNTER
----- Message from Nany Arredondo sent at 7/3/2019  3:54 PM CDT -----    Returned call to pt daughter about tamy an para 2wk from now. Called IR, but there was no answer. LVM for IR asking them to tamy the pt for 7/17 and to call her daughter at [phone].    Told pt daughter they will call her to tamy the PARA.    ----- Message -----  From: Kya Hurley  Sent: 7/3/2019   3:31 PM  To: [facility] Pre-Liver Transplant Non-Clinical    Pt needs paracentesis scheduled in 2 wks from today    Pt contact [phone]

## 2019-07-03 NOTE — H&P
Radiology History & Physical      SUBJECTIVE:     Chief Complaint: abdominal distention    History of Present Illness:  Sejal Matamoros is a 68 y.o. female who presents for evaluation of ascites  Past Medical History:   Diagnosis Date    A-fib     Anxiety     Cirrhosis     DDD (degenerative disc disease), cervical 3/21/2018    DDD (degenerative disc disease), lumbar 3/21/2018    Decompensated hepatic cirrhosis 12/26/2018    Depression     Diabetes mellitus, type 2     Hepatic encephalopathy 12/26/2018    HLD (hyperlipidemia)     HTN (hypertension)     Hypoalbuminemia 12/26/2018    Hypoglycemia associated with type 2 diabetes mellitus 12/26/2018    Morbid obesity with BMI of 45.0-49.9, adult 12/7/2018    Obesity     Other ascites 2/20/2019    Portal hypertension 12/26/2018    Type 2 diabetes mellitus with hyperglycemia, with long-term current use of insulin 12/6/2018     Past Surgical History:   Procedure Laterality Date    arm surgery      left    CARPAL TUNNEL RELEASE      left    CHOLECYSTECTOMY      HYSTERECTOMY      ROTATOR CUFF REPAIR      TOTAL KNEE ARTHROPLASTY         Home Meds:   Prior to Admission medications    Medication Sig Start Date End Date Taking? Authorizing Provider   ACCU-CHEK SOFTCLIX LANCETS MISC Accu-Chek Softclix Lancets    Historical Provider, MD   aspirin (ECOTRIN) 81 MG EC tablet Take 81 mg by mouth once daily.    Historical Provider, MD   cholestyramine-aspartame (CHOLESTYRAMINE LIGHT) 4 gram PwPk Take 1 packet (4 g total) by mouth 2 (two) times daily. 4/26/19 4/25/20  Bety Cerrato MD   clonazePAM (KLONOPIN) 1 MG tablet Take 0.5 tablets (0.5 mg total) by mouth every evening. 5/30/19   Bety Cerrato MD   ergocalciferol (ERGOCALCIFEROL) 50,000 unit Cap Take 2 tablets weekly 5/22/19   Mary Agudelo, DNP, NP   escitalopram oxalate (LEXAPRO) 20 MG tablet Take 20 mg by mouth once daily. 10/31/18   Historical Provider, MD   furosemide (LASIX) 40 MG tablet  Take 2 tablets (80 mg total) by mouth 2 (two) times daily. DOSE CHANGE 5/16/19 5/15/20  Bety Cerrato MD   gabapentin (NEURONTIN) 600 MG tablet Take 600 mg by mouth 2 (two) times daily.    Historical Provider, MD   HYDROcodone-acetaminophen (NORCO) 5-325 mg per tablet Take 1 tablet by mouth every 8 (eight) hours as needed for Pain. 3/7/19   Catracho Dawson MD   insulin aspart U-100 (NOVOLOG FLEXPEN U-100 INSULIN) 100 unit/mL (3 mL) InPn pen 30 units before each meal + correction Max  u 5/22/19   Mary Agudelo DNP, NP   insulin glargine (LANTUS) 100 unit/mL injection Inject 80 Units into the skin once daily. 4/17/19   Mary Agudelo DNP, NP   ketoconazole (NIZORAL) 2 % cream Apply topically once daily. 2/7/19   Carlos Eduardo Ley DPM   lactulose (CHRONULAC) 20 gram/30 mL Soln Take 30 mLs (20 g total) by mouth 3 (three) times daily. Adjust dose to have  3-4 BM's daily 3/21/19   Deepika Plunkett, TARUN   metoprolol succinate (TOPROL XL) 25 MG 24 hr tablet Take 25 mg by mouth once daily. 10/3/18 10/3/19  Historical Provider, MD   omeprazole (PRILOSEC) 40 MG capsule Take 40 mg by mouth once daily. 10/17/18   Historical Provider, MD   ondansetron (ZOFRAN) 4 MG tablet Take 1 tablet (4 mg total) by mouth every 6 (six) hours. 3/27/19   Fabrizio Rodriguez MD   rifAXIMin (XIFAXAN) 550 mg Tab Take 1 tablet (550 mg total) by mouth 2 (two) times daily. 4/26/19 4/25/20  Bety Cerrato MD   rosuvastatin (CRESTOR) 10 MG tablet Take 10 mg by mouth once daily. 8/13/18   Historical Provider, MD   spironolactone (ALDACTONE) 100 MG tablet Take 2 tablets (200 mg total) by mouth 2 (two) times daily. DOSE INCREASE 5/16/19 5/15/20  Bety Cerrato MD   XARELTO 20 mg Tab Take 20 mg by mouth once daily. 11/30/18   Historical Provider, MD   zinc 50 mg Tab Take 200 mg by mouth once daily. 2/20/19   Deepika Plunkett NP     Anticoagulants/Antiplatelets:xeralto and aspirin    Allergies:   Review of patient's allergies indicates:    Allergen Reactions    Iodinated contrast- oral and iv dye Hives    Zoloft [sertraline]      Sedation History:  no adverse reactions    Review of Systems:   Hematological: no known coagulopathies  Respiratory: no shortness of breath  Cardiovascular: no chest pain  Gastrointestinal: no abdominal pain  Genito-Urinary: no dysuria  Musculoskeletal: negative  Neurological: no TIA or stroke symptoms         OBJECTIVE:     Vital Signs (Most Recent)   nurse in room, vitals pending    Physical Exam:  ASA: 2  Mallampati: na    General: no acute distress  Mental Status: alert and oriented to person, place and time  HEENT: normocephalic, atraumatic  Chest: unlabored breathing  Heart: regular heart rate  Abdomen: distended  Extremity: moves all extremities    ASSESSMENT/PLAN:     Sedation Plan: local anesthesia  Patient will undergo US guided paracentesis

## 2019-07-08 ENCOUNTER — HOSPITAL ENCOUNTER (OUTPATIENT)
Facility: HOSPITAL | Age: 69
Discharge: HOME HEALTH | End: 2019-07-10
Attending: EMERGENCY MEDICINE | Admitting: INTERNAL MEDICINE
Payer: MEDICARE

## 2019-07-08 ENCOUNTER — TELEPHONE (OUTPATIENT)
Dept: TRANSPLANT | Facility: CLINIC | Age: 69
End: 2019-07-08

## 2019-07-08 DIAGNOSIS — Z79.4 TYPE 2 DIABETES MELLITUS WITH HYPERGLYCEMIA, WITH LONG-TERM CURRENT USE OF INSULIN: ICD-10-CM

## 2019-07-08 DIAGNOSIS — R07.9 CHEST PAIN: ICD-10-CM

## 2019-07-08 DIAGNOSIS — Z79.4 TYPE 2 DIABETES MELLITUS WITH DIABETIC POLYNEUROPATHY, WITH LONG-TERM CURRENT USE OF INSULIN: ICD-10-CM

## 2019-07-08 DIAGNOSIS — E78.2 MIXED HYPERLIPIDEMIA: ICD-10-CM

## 2019-07-08 DIAGNOSIS — N30.00 ACUTE CYSTITIS WITHOUT HEMATURIA: ICD-10-CM

## 2019-07-08 DIAGNOSIS — E11.65 TYPE 2 DIABETES MELLITUS WITH HYPERGLYCEMIA, WITH LONG-TERM CURRENT USE OF INSULIN: ICD-10-CM

## 2019-07-08 DIAGNOSIS — D64.9 SYMPTOMATIC ANEMIA: Primary | ICD-10-CM

## 2019-07-08 DIAGNOSIS — R42 DIZZINESS: ICD-10-CM

## 2019-07-08 DIAGNOSIS — E11.42 TYPE 2 DIABETES MELLITUS WITH DIABETIC POLYNEUROPATHY, WITH LONG-TERM CURRENT USE OF INSULIN: ICD-10-CM

## 2019-07-08 PROBLEM — E44.0 MALNUTRITION OF MODERATE DEGREE: Status: ACTIVE | Noted: 2019-07-08

## 2019-07-08 PROBLEM — N39.0 UTI (URINARY TRACT INFECTION): Status: ACTIVE | Noted: 2019-07-08

## 2019-07-08 PROBLEM — K75.81 LIVER CIRRHOSIS SECONDARY TO NASH: Status: ACTIVE | Noted: 2019-07-08

## 2019-07-08 PROBLEM — K74.60 LIVER CIRRHOSIS SECONDARY TO NASH: Status: ACTIVE | Noted: 2019-07-08

## 2019-07-08 LAB
ABO + RH BLD: NORMAL
ALBUMIN SERPL BCP-MCNC: 2.3 G/DL (ref 3.5–5.2)
ALP SERPL-CCNC: 239 U/L (ref 55–135)
ALT SERPL W/O P-5'-P-CCNC: 21 U/L (ref 10–44)
AMMONIA PLAS-SCNC: 37 UMOL/L (ref 10–50)
ANION GAP SERPL CALC-SCNC: 9 MMOL/L (ref 8–16)
AST SERPL-CCNC: 35 U/L (ref 10–40)
BACTERIA #/AREA URNS HPF: ABNORMAL /HPF
BACTERIA FLD CULT: NORMAL
BASOPHILS # BLD AUTO: 0.04 K/UL (ref 0–0.2)
BASOPHILS NFR BLD: 0.7 % (ref 0–1.9)
BILIRUB SERPL-MCNC: 0.6 MG/DL (ref 0.1–1)
BILIRUB UR QL STRIP: NEGATIVE
BLD GP AB SCN CELLS X3 SERPL QL: NORMAL
BLD PROD TYP BPU: NORMAL
BLD PROD TYP BPU: NORMAL
BLOOD UNIT EXPIRATION DATE: NORMAL
BLOOD UNIT EXPIRATION DATE: NORMAL
BLOOD UNIT TYPE CODE: 5100
BLOOD UNIT TYPE CODE: 5100
BLOOD UNIT TYPE: NORMAL
BLOOD UNIT TYPE: NORMAL
BNP SERPL-MCNC: 420 PG/ML (ref 0–99)
BUN SERPL-MCNC: 22 MG/DL (ref 8–23)
CALCIUM SERPL-MCNC: 8.7 MG/DL (ref 8.7–10.5)
CHLORIDE SERPL-SCNC: 100 MMOL/L (ref 95–110)
CLARITY UR: CLEAR
CO2 SERPL-SCNC: 27 MMOL/L (ref 23–29)
CODING SYSTEM: NORMAL
CODING SYSTEM: NORMAL
COLOR UR: YELLOW
CREAT SERPL-MCNC: 1.1 MG/DL (ref 0.5–1.4)
DIFFERENTIAL METHOD: ABNORMAL
DISPENSE STATUS: NORMAL
DISPENSE STATUS: NORMAL
EOSINOPHIL # BLD AUTO: 0.2 K/UL (ref 0–0.5)
EOSINOPHIL NFR BLD: 3 % (ref 0–8)
ERYTHROCYTE [DISTWIDTH] IN BLOOD BY AUTOMATED COUNT: 18.5 % (ref 11.5–14.5)
EST. GFR  (AFRICAN AMERICAN): 60 ML/MIN/1.73 M^2
EST. GFR  (NON AFRICAN AMERICAN): 52 ML/MIN/1.73 M^2
FOLATE SERPL-MCNC: 14.9 NG/ML (ref 4–24)
GLUCOSE SERPL-MCNC: 171 MG/DL (ref 70–110)
GLUCOSE UR QL STRIP: NEGATIVE
HCT VFR BLD AUTO: 22.2 % (ref 37–48.5)
HGB BLD-MCNC: 6.3 G/DL (ref 12–16)
HGB UR QL STRIP: NEGATIVE
IMM GRANULOCYTES # BLD AUTO: 0.02 K/UL (ref 0–0.04)
KETONES UR QL STRIP: NEGATIVE
LEUKOCYTE ESTERASE UR QL STRIP: ABNORMAL
LYMPHOCYTES # BLD AUTO: 1.8 K/UL (ref 1–4.8)
LYMPHOCYTES NFR BLD: 31.5 % (ref 18–48)
MCH RBC QN AUTO: 23.1 PG (ref 27–31)
MCHC RBC AUTO-ENTMCNC: 28.4 G/DL (ref 32–36)
MCV RBC AUTO: 81 FL (ref 82–98)
MICROSCOPIC COMMENT: ABNORMAL
MONOCYTES # BLD AUTO: 0.6 K/UL (ref 0.3–1)
MONOCYTES NFR BLD: 10.6 % (ref 4–15)
NEUTROPHILS # BLD AUTO: 3.1 K/UL (ref 1.8–7.7)
NEUTROPHILS NFR BLD: 53.9 % (ref 38–73)
NITRITE UR QL STRIP: NEGATIVE
NRBC BLD-RTO: 0 /100 WBC
NUM UNITS TRANS PACKED RBC: NORMAL
NUM UNITS TRANS PACKED RBC: NORMAL
PH UR STRIP: 6 [PH] (ref 5–8)
PLATELET # BLD AUTO: 307 K/UL (ref 150–350)
PMV BLD AUTO: 10.7 FL (ref 9.2–12.9)
POCT GLUCOSE: 102 MG/DL (ref 70–110)
POTASSIUM SERPL-SCNC: 4.2 MMOL/L (ref 3.5–5.1)
PROT SERPL-MCNC: 6.8 G/DL (ref 6–8.4)
PROT UR QL STRIP: NEGATIVE
RBC # BLD AUTO: 2.73 M/UL (ref 4–5.4)
SODIUM SERPL-SCNC: 136 MMOL/L (ref 136–145)
SP GR UR STRIP: 1.01 (ref 1–1.03)
SQUAMOUS #/AREA URNS HPF: 4 /HPF
TROPONIN I SERPL DL<=0.01 NG/ML-MCNC: 0.05 NG/ML (ref 0–0.03)
TROPONIN I SERPL DL<=0.01 NG/ML-MCNC: 0.06 NG/ML (ref 0–0.03)
TSH SERPL DL<=0.005 MIU/L-ACNC: 1.5 UIU/ML (ref 0.4–4)
URN SPEC COLLECT METH UR: ABNORMAL
UROBILINOGEN UR STRIP-ACNC: NEGATIVE EU/DL
VIT B12 SERPL-MCNC: 199 PG/ML (ref 210–950)
WBC # BLD AUTO: 5.75 K/UL (ref 3.9–12.7)
WBC #/AREA URNS HPF: 10 /HPF (ref 0–5)

## 2019-07-08 PROCEDURE — 84484 ASSAY OF TROPONIN QUANT: CPT | Mod: 91,NTX

## 2019-07-08 PROCEDURE — 25000003 PHARM REV CODE 250: Mod: NTX | Performed by: INTERNAL MEDICINE

## 2019-07-08 PROCEDURE — 86901 BLOOD TYPING SEROLOGIC RH(D): CPT | Mod: NTX

## 2019-07-08 PROCEDURE — 36430 TRANSFUSION BLD/BLD COMPNT: CPT | Mod: NTX

## 2019-07-08 PROCEDURE — 36415 COLL VENOUS BLD VENIPUNCTURE: CPT | Mod: NTX

## 2019-07-08 PROCEDURE — 82140 ASSAY OF AMMONIA: CPT | Mod: NTX

## 2019-07-08 PROCEDURE — P9016 RBC LEUKOCYTES REDUCED: HCPCS | Mod: NTX

## 2019-07-08 PROCEDURE — 86920 COMPATIBILITY TEST SPIN: CPT | Mod: NTX

## 2019-07-08 PROCEDURE — 99285 EMERGENCY DEPT VISIT HI MDM: CPT | Mod: 25,NTX

## 2019-07-08 PROCEDURE — G0378 HOSPITAL OBSERVATION PER HR: HCPCS | Mod: NTX

## 2019-07-08 PROCEDURE — 96376 TX/PRO/DX INJ SAME DRUG ADON: CPT | Performed by: EMERGENCY MEDICINE

## 2019-07-08 PROCEDURE — 96375 TX/PRO/DX INJ NEW DRUG ADDON: CPT | Mod: NTX | Performed by: EMERGENCY MEDICINE

## 2019-07-08 PROCEDURE — 63600175 PHARM REV CODE 636 W HCPCS: Mod: NTX | Performed by: NURSE PRACTITIONER

## 2019-07-08 PROCEDURE — 84443 ASSAY THYROID STIM HORMONE: CPT | Mod: NTX

## 2019-07-08 PROCEDURE — C9113 INJ PANTOPRAZOLE SODIUM, VIA: HCPCS | Mod: NTX | Performed by: EMERGENCY MEDICINE

## 2019-07-08 PROCEDURE — 82746 ASSAY OF FOLIC ACID SERUM: CPT | Mod: NTX

## 2019-07-08 PROCEDURE — C9113 INJ PANTOPRAZOLE SODIUM, VIA: HCPCS | Mod: NTX | Performed by: INTERNAL MEDICINE

## 2019-07-08 PROCEDURE — 81000 URINALYSIS NONAUTO W/SCOPE: CPT | Mod: NTX

## 2019-07-08 PROCEDURE — 83880 ASSAY OF NATRIURETIC PEPTIDE: CPT | Mod: NTX

## 2019-07-08 PROCEDURE — 80053 COMPREHEN METABOLIC PANEL: CPT | Mod: NTX

## 2019-07-08 PROCEDURE — 63600175 PHARM REV CODE 636 W HCPCS: Mod: NTX | Performed by: INTERNAL MEDICINE

## 2019-07-08 PROCEDURE — 93005 ELECTROCARDIOGRAM TRACING: CPT | Mod: NTX

## 2019-07-08 PROCEDURE — 85025 COMPLETE CBC W/AUTO DIFF WBC: CPT | Mod: NTX

## 2019-07-08 PROCEDURE — 82607 VITAMIN B-12: CPT | Mod: NTX

## 2019-07-08 PROCEDURE — 83540 ASSAY OF IRON: CPT | Mod: NTX

## 2019-07-08 PROCEDURE — 63600175 PHARM REV CODE 636 W HCPCS: Mod: NTX | Performed by: EMERGENCY MEDICINE

## 2019-07-08 PROCEDURE — 94761 N-INVAS EAR/PLS OXIMETRY MLT: CPT | Mod: NTX

## 2019-07-08 RX ORDER — GABAPENTIN 300 MG/1
600 CAPSULE ORAL 2 TIMES DAILY
Status: DISCONTINUED | OUTPATIENT
Start: 2019-07-08 | End: 2019-07-10 | Stop reason: HOSPADM

## 2019-07-08 RX ORDER — ERGOCALCIFEROL 1.25 MG/1
50000 CAPSULE ORAL
Status: DISCONTINUED | OUTPATIENT
Start: 2019-07-12 | End: 2019-07-10 | Stop reason: HOSPADM

## 2019-07-08 RX ORDER — CLONAZEPAM 0.5 MG/1
0.5 TABLET ORAL NIGHTLY
COMMUNITY
End: 2020-01-14 | Stop reason: CLARIF

## 2019-07-08 RX ORDER — HYDROCODONE BITARTRATE AND ACETAMINOPHEN 500; 5 MG/1; MG/1
TABLET ORAL
Status: DISCONTINUED | OUTPATIENT
Start: 2019-07-08 | End: 2019-07-10 | Stop reason: HOSPADM

## 2019-07-08 RX ORDER — PANTOPRAZOLE SODIUM 40 MG/10ML
40 INJECTION, POWDER, LYOPHILIZED, FOR SOLUTION INTRAVENOUS EVERY 12 HOURS
Status: DISCONTINUED | OUTPATIENT
Start: 2019-07-08 | End: 2019-07-10 | Stop reason: HOSPADM

## 2019-07-08 RX ORDER — METOPROLOL SUCCINATE 25 MG/1
25 TABLET, EXTENDED RELEASE ORAL DAILY
Status: DISCONTINUED | OUTPATIENT
Start: 2019-07-09 | End: 2019-07-10 | Stop reason: HOSPADM

## 2019-07-08 RX ORDER — FUROSEMIDE 40 MG/1
80 TABLET ORAL DAILY
Status: DISCONTINUED | OUTPATIENT
Start: 2019-07-09 | End: 2019-07-10 | Stop reason: HOSPADM

## 2019-07-08 RX ORDER — FUROSEMIDE 40 MG/1
80 TABLET ORAL 2 TIMES DAILY
Status: DISCONTINUED | OUTPATIENT
Start: 2019-07-08 | End: 2019-07-08

## 2019-07-08 RX ORDER — INSULIN ASPART 100 [IU]/ML
0-5 INJECTION, SOLUTION INTRAVENOUS; SUBCUTANEOUS EVERY 6 HOURS PRN
Status: DISCONTINUED | OUTPATIENT
Start: 2019-07-08 | End: 2019-07-10 | Stop reason: HOSPADM

## 2019-07-08 RX ORDER — ESCITALOPRAM OXALATE 10 MG/1
20 TABLET ORAL DAILY
Status: DISCONTINUED | OUTPATIENT
Start: 2019-07-09 | End: 2019-07-10 | Stop reason: HOSPADM

## 2019-07-08 RX ORDER — SPIRONOLACTONE 100 MG/1
200 TABLET, FILM COATED ORAL 2 TIMES DAILY
Status: DISCONTINUED | OUTPATIENT
Start: 2019-07-08 | End: 2019-07-08 | Stop reason: SDUPTHER

## 2019-07-08 RX ORDER — ZINC GLUCONATE 50 MG
50 TABLET ORAL DAILY
COMMUNITY

## 2019-07-08 RX ORDER — GLUCAGON 1 MG
1 KIT INJECTION
Status: DISCONTINUED | OUTPATIENT
Start: 2019-07-08 | End: 2019-07-10 | Stop reason: HOSPADM

## 2019-07-08 RX ORDER — MORPHINE SULFATE 2 MG/ML
2 INJECTION, SOLUTION INTRAMUSCULAR; INTRAVENOUS EVERY 4 HOURS PRN
Status: DISCONTINUED | OUTPATIENT
Start: 2019-07-08 | End: 2019-07-10 | Stop reason: HOSPADM

## 2019-07-08 RX ORDER — PANTOPRAZOLE SODIUM 40 MG/10ML
40 INJECTION, POWDER, LYOPHILIZED, FOR SOLUTION INTRAVENOUS DAILY
Status: DISCONTINUED | OUTPATIENT
Start: 2019-07-09 | End: 2019-07-08 | Stop reason: SDUPTHER

## 2019-07-08 RX ORDER — PANTOPRAZOLE SODIUM 40 MG/10ML
40 INJECTION, POWDER, LYOPHILIZED, FOR SOLUTION INTRAVENOUS
Status: COMPLETED | OUTPATIENT
Start: 2019-07-08 | End: 2019-07-08

## 2019-07-08 RX ORDER — CLONAZEPAM 0.5 MG/1
0.5 TABLET ORAL NIGHTLY
Status: DISCONTINUED | OUTPATIENT
Start: 2019-07-08 | End: 2019-07-10 | Stop reason: HOSPADM

## 2019-07-08 RX ORDER — FUROSEMIDE 40 MG/1
40 TABLET ORAL 2 TIMES DAILY
COMMUNITY

## 2019-07-08 RX ORDER — SPIRONOLACTONE 100 MG/1
300 TABLET, FILM COATED ORAL EVERY MORNING
COMMUNITY

## 2019-07-08 RX ORDER — ZINC SULFATE 50(220)MG
220 CAPSULE ORAL DAILY
Status: DISCONTINUED | OUTPATIENT
Start: 2019-07-09 | End: 2019-07-10 | Stop reason: HOSPADM

## 2019-07-08 RX ORDER — LACTULOSE 10 G/15ML
20 SOLUTION ORAL 3 TIMES DAILY
Status: DISCONTINUED | OUTPATIENT
Start: 2019-07-08 | End: 2019-07-10 | Stop reason: HOSPADM

## 2019-07-08 RX ORDER — SPIRONOLACTONE 25 MG/1
200 TABLET ORAL DAILY
Status: DISCONTINUED | OUTPATIENT
Start: 2019-07-09 | End: 2019-07-10 | Stop reason: HOSPADM

## 2019-07-08 RX ORDER — ONDANSETRON 2 MG/ML
4 INJECTION INTRAMUSCULAR; INTRAVENOUS EVERY 8 HOURS PRN
Status: DISCONTINUED | OUTPATIENT
Start: 2019-07-08 | End: 2019-07-10 | Stop reason: HOSPADM

## 2019-07-08 RX ORDER — SODIUM CHLORIDE 0.9 % (FLUSH) 0.9 %
10 SYRINGE (ML) INJECTION
Status: DISCONTINUED | OUTPATIENT
Start: 2019-07-08 | End: 2019-07-10 | Stop reason: HOSPADM

## 2019-07-08 RX ORDER — FUROSEMIDE 10 MG/ML
20 INJECTION INTRAMUSCULAR; INTRAVENOUS EVERY 4 HOURS
Status: COMPLETED | OUTPATIENT
Start: 2019-07-08 | End: 2019-07-09

## 2019-07-08 RX ORDER — ROSUVASTATIN CALCIUM 5 MG/1
10 TABLET, COATED ORAL DAILY
Status: DISCONTINUED | OUTPATIENT
Start: 2019-07-09 | End: 2019-07-10 | Stop reason: HOSPADM

## 2019-07-08 RX ADMIN — LACTULOSE 20 G: 20 SOLUTION ORAL at 09:07

## 2019-07-08 RX ADMIN — PANTOPRAZOLE SODIUM 40 MG: 40 INJECTION, POWDER, LYOPHILIZED, FOR SOLUTION INTRAVENOUS at 09:07

## 2019-07-08 RX ADMIN — CLONAZEPAM 0.5 MG: 0.5 TABLET ORAL at 09:07

## 2019-07-08 RX ADMIN — PANTOPRAZOLE SODIUM 40 MG: 40 INJECTION, POWDER, LYOPHILIZED, FOR SOLUTION INTRAVENOUS at 03:07

## 2019-07-08 RX ADMIN — RIFAXIMIN 550 MG: 550 TABLET ORAL at 09:07

## 2019-07-08 RX ADMIN — FUROSEMIDE 20 MG: 10 INJECTION, SOLUTION INTRAVENOUS at 09:07

## 2019-07-08 RX ADMIN — GABAPENTIN 600 MG: 300 CAPSULE ORAL at 09:07

## 2019-07-08 NOTE — ED NOTES
"Presents to the ER with c/o generalized weakness with SOB that started 2 weeks ago. Patient reports that symptoms are often exacerbated with ambulation, "But I sometimes feel it when I am sitting still." Patient reports that she is waiting for a liver transplant and was told by her physician at [facility] to come to the ED for evaluation for possible treatment of blood transfusion. Mucous membranes are pink and moist. Skin is warm, dry and intact. Lungs are clear bilaterally, respirations are regular and unlabored. Denies cough, congestion or  rhinorrhea.  BS active x4, no tenderness with palpation, abd is soft and not distended. Denies any appetite or activity change. S1S2, capillary refill is < 2 seconds. Denies dysuria, difficulty urinating, frequency, numbness, tingling or weakness. REGGIE IVORY    "

## 2019-07-08 NOTE — ED NOTES
Upon transfer to room 201b, patient is AAOx4, no cardiac or respiratory complications. No needs, questions from family or patient and no transfusion reactions to blood products.

## 2019-07-08 NOTE — H&P
PCP: Deandre Lundberg NP    History & Physical    Chief Complaint:  Progressively worsening generalized weakness and shortness of breath for 2 weeks    History of Present Illness:  Patient is a 68 y.o. female admitted to Hospitalist Service from [facility] Emergency Room with complaint of progressively worsening generalized weakness and shortness of breath for 2 weeks. Patient reportedly has past medical history significant for non alcoholic steatohepatitis/cirrhosis of liver, chronic anxiety disorder, chronic atrial fibrillation, diabetes mellitus type 2, hypertension, hypo lipidemia, moderate malnutrition, portal hypertension and history of recurrent ascites.  Patient closely follows with liver transplant team at [facility].  Patient presented to the emergency room with complaint of progressively worsening generalized weakness and dizziness especially when she is up and about and tries to walk.  Also patient is reporting dyspnea on exertion as well.  Patient denied any prior history of peptic ulcer disease, melena, bleeding per rectum, hemorrhoid problems or hematuria.  Patient denied nausea, vomiting, headache, vision changes, focal neuro-deficits, cough or fever.  Patient reports, every 2 weeks she gets therapeutic paracentesis done.  Last paracentesis done on July 3rd, 2019 when more than 7.4 L ascitic fluid removed.    Past Medical History:   Diagnosis Date    A-fib     Anxiety     Cirrhosis     DDD (degenerative disc disease), cervical 3/21/2018    DDD (degenerative disc disease), lumbar 3/21/2018    Decompensated hepatic cirrhosis 12/26/2018    Depression     Diabetes mellitus, type 2     Hepatic encephalopathy 12/26/2018    HLD (hyperlipidemia)     HTN (hypertension)     Hypoalbuminemia 12/26/2018    Hypoglycemia associated with type 2 diabetes mellitus 12/26/2018    Morbid obesity with BMI of 45.0-49.9, adult 12/7/2018    Obesity     Other ascites 2/20/2019    Portal  hypertension 12/26/2018    Type 2 diabetes mellitus with hyperglycemia, with long-term current use of insulin 12/6/2018     Past Surgical History:   Procedure Laterality Date    arm surgery      left    CARPAL TUNNEL RELEASE      left    CHOLECYSTECTOMY      HYSTERECTOMY      ROTATOR CUFF REPAIR      TOTAL KNEE ARTHROPLASTY       Family History   Problem Relation Age of Onset    Cirrhosis Brother         RHOADES cirrhosis     Social History     Tobacco Use    Smoking status: Never Smoker    Smokeless tobacco: Never Used   Substance Use Topics    Alcohol use: No     Frequency: Never    Drug use: No      Review of patient's allergies indicates:   Allergen Reactions    Iodinated contrast- oral and iv dye Hives    Zoloft [sertraline]        (Not in a hospital admission)  Review of Systems:  Constitutional: no fever or chills. + generalized weakness + dizziness + headache  Eyes: no visual changes  Ears, nose, mouth, throat, and face: no nasal congestion or sore throat  Respiratory: no cough + shorness of breath  Cardiovascular: no chest pain or palpitations  Gastrointestinal: no nausea or vomiting, + chronic abdominal pain  Genitourinary: no hematuria or dysuria  Integument/breast: no rash or pruritis  Hematologic/lymphatic: no easy bruising or lymphadenopathy  Musculoskeletal: + chronic lower back pain  Neurological: no seizures or tremors.  Behavioral/Psych: no auditory or visual hallucinations  Endocrine: no heat or cold intolerance     OBJECTIVE:     Vital Signs (Most Recent)  Temp: 97.8 °F (36.6 °C) (07/08/19 1223)  Pulse: 61 (07/08/19 1442)  Resp: 18 (07/08/19 1223)  BP: (!) 107/48 (07/08/19 1431)  SpO2: 100 % (07/08/19 1442)    Physical Exam:  General appearance: well developed, appears stated age, obese female  Head: normocephalic, atraumatic  Eyes:  conjunctivae/corneas clear. PERRL.  Nose: Nares normal. Septum midline.  Throat: lips, mucosa, and tongue normal; teeth and gums normal, no throat  erythema.  Neck: supple, symmetrical, trachea midline, no JVD and thyroid not enlarged, symmetric, no tenderness/mass/nodules  Lungs:  clear to auscultation bilaterally and normal respiratory effort  Chest wall: no tenderness  Heart: regular rate and rhythm, S1, S2 normal, no murmur, click, rub or gallop  Abdomen: soft, mild tender, no rebound or peritoneal signs noted, distended due to ascites; bowel sounds normal; no masses, liver is palpable and nontender. Mild shifting dullness but no fluid thrill noted.  Extremities: no cyanosis, clubbing.  Trace pedal edema bilaterally.   Pulses: 2+ and symmetric  Skin: Skin color, texture, turgor normal. No rashes or lesions.  Lymph nodes: Cervical, supraclavicular, and axillary nodes normal.  Neurologic: Normal strength and tone. No focal numbness or weakness. CNII-XII intact.      Laboratory:   CBC:   Recent Labs   Lab 07/08/19  1351   WBC 5.75   RBC 2.73*   HGB 6.3*   HCT 22.2*      MCV 81*   MCH 23.1*   MCHC 28.4*     CMP:   Recent Labs   Lab 07/08/19  1351   *   CALCIUM 8.7   ALBUMIN 2.3*   PROT 6.8      K 4.2   CO2 27      BUN 22   CREATININE 1.1   ALKPHOS 239*   ALT 21   AST 35   BILITOT 0.6     BNP: 420  Ammonia: 37    Cardiac markers:   Recent Labs   Lab 07/08/19  1351   TROPONINI 0.059*     Microbiology Results (last 7 days)     ** No results found for the last 168 hours. **        Recent Labs   Lab 07/08/19  1345   COLORU Yellow   SPECGRAV 1.010   PHUR 6.0   PROTEINUA Negative   BACTERIA Few*   NITRITE Negative   LEUKOCYTESUR 3+*   UROBILINOGEN Negative     Hemoglobin A1C   Date Value Ref Range Status   05/15/2019 10.7 (H) 4.0 - 5.6 % Final     Comment:     ADA Screening Guidelines:  5.7-6.4%  Consistent with prediabetes  >or=6.5%  Consistent with diabetes  High levels of fetal hemoglobin interfere with the HbA1C  assay. Heterozygous hemoglobin variants (HbS, HgC, etc)do  not significantly interfere with this assay.   However, presence of  multiple variants may affect accuracy.     02/07/2019 7.7 (H) <5.7 % of total Hgb Final     Comment:     For someone without known diabetes, a hemoglobin A1c  value of 6.5% or greater indicates that they may have   diabetes and this should be confirmed with a follow-up   test.     For someone with known diabetes, a value <7% indicates   that their diabetes is well controlled and a value   greater than or equal to 7% indicates suboptimal   control. A1c targets should be individualized based on   duration of diabetes, age, comorbid conditions, and   other considerations.     Currently, no consensus exists regarding use of  hemoglobin A1c for diagnosis of diabetes for children.         11/12/2018 8.9 (A) 4.0 - 6.0 % Final     Microbiology Results (last 7 days)     ** No results found for the last 168 hours. **        Diagnostic Results:  Chest X-Ray:  No acute cardiopulmonary disease    CT head without contrast: Mild involutional changes and findings suggesting mild microvascular ischemic change with no acute intracranial findings.    Assessment/Plan:     Active Hospital Problems    Diagnosis  POA    *Symptomatic anemia [D64.9]  Type and screen.  Transfuse 2 units of packed red blood cells followed by 20 mg IV Lasix each.  Follow CBC and transfuse as needed.  Check iron, TIBC, B12 and folic acid.  Keep patient NPO.  Follow H/H q.8 hours. Type and screen blood and transfuse as needed.  Continue IV Protonix Q 12 hr.  Consult Gastronetrologist.   Check Iron, TIBC, B12 and folic acid.   Continue IVF hydration.   Use IV anti-emetics as needed.     Yes    UTI (urinary tract infection) [N39.0]  Urine culture and sensitivity.  Start intravenous Rocephin 1 g daily.    Yes    Malnutrition of moderate degree [E44.0]  Nutrition consulted. Encourage maximal PO intake. Diet supplementation ordered per nutrition approval. Will encourage PO and monitor closely for weight changes.    Yes    Liver cirrhosis secondary to RHOADES  [K75.81, K74.60]  Yes    Other ascites [R18.8]  Ascites is not tense at this time.  No need for paracentesis at this time.  Continue routine lactulose, Xifaxan, Lasix and spironolactone use.    Yes    HTN (hypertension) [I10]  Chronic problem. Will continue chronic medications and monitor for any changes, adjusting as needed.    Yes    Mixed hyperlipidemia [E78.2]  Chronic problem. Will continue chronic medications and monitor for any changes, adjusting as needed.    Yes    Type 2 diabetes mellitus with hyperglycemia, with long-term current use of insulin [E11.65, Z79.4]  Check blood glucose level q AC/HS.  Use Novolog Insulin Sliding Scale as needed.   Continue American Diabetic Association 1800 Kcal diet.    Not Applicable    DDD (degenerative disc disease), lumbar [M51.36]  Supportive care, fall precautions, physical therapy evaluation and treatment in a.m.    Chronic atrial fibrillation  Telemonitoring.  Hold Xarelto until approved by GI team.  Yes     DVT prophylaxis:  Use sequential compression stockings and Anuj hose.  Avoiding anticoagulation due to possibility of GI bleeding.     Seth Murray MD  Department of Hospital Medicine   [facility]

## 2019-07-08 NOTE — ED PROVIDER NOTES
Encounter Date: 7/8/2019    SCRIBE #1 NOTE: Jami SMITH and am scribing for, and in the presence of, Grayson Groves MD.       History     Chief Complaint   Patient presents with    Dizziness     x 2 weeks     Weakness    Shortness of Breath     Time seen by provider: 1:05 PM on 07/08/2019    Sejal Matamoros is a 68 y.o. female with PMHx of Type 2 DM, DDD, depression, anxiety, HLD, hepatic encephalopathy, and decompensated hepatic cirrhosis who presents to the ED with an onset of dizziness. The patient reports that her dizziness has been worse with sitting up or walking. She reports mild headache. She has slightly worse SOB during her dizzy episodes than her chronic SOB. She also reports back and chest pain that occur during her dizzy episodes and usually resolve within 10 minutes. She reports chronic abdominal pain that hurts with palpation. The patient is in need of a liver transplant, and the last time she had blood work done, it showed that she was close to needing a blood transfusion. She comes to the ED with concern that she might need a blood transfusion today. The patient denies weakness, numbness, rash, swelling, bloody stools, confusion, or any other symptoms at this time. Patient's PSHx includes cholecystectomy. Drug allergies to Zoloft and Iodinated Contrast noted.     The history is provided by the patient and a relative.     Review of patient's allergies indicates:   Allergen Reactions    Iodinated contrast- oral and iv dye Hives    Zoloft [sertraline]      Past Medical History:   Diagnosis Date    A-fib     Anxiety     Cirrhosis     DDD (degenerative disc disease), cervical 3/21/2018    DDD (degenerative disc disease), lumbar 3/21/2018    Decompensated hepatic cirrhosis 12/26/2018    Depression     Diabetes mellitus, type 2     Hepatic encephalopathy 12/26/2018    HLD (hyperlipidemia)     HTN (hypertension)     Hypoalbuminemia 12/26/2018    Hypoglycemia associated  with type 2 diabetes mellitus 12/26/2018    Morbid obesity with BMI of 45.0-49.9, adult 12/7/2018    Obesity     Other ascites 2/20/2019    Portal hypertension 12/26/2018    Type 2 diabetes mellitus with hyperglycemia, with long-term current use of insulin 12/6/2018     Past Surgical History:   Procedure Laterality Date    arm surgery      left    CARPAL TUNNEL RELEASE      left    CHOLECYSTECTOMY      HYSTERECTOMY      ROTATOR CUFF REPAIR      TOTAL KNEE ARTHROPLASTY       Family History   Problem Relation Age of Onset    Cirrhosis Brother         RHOADES cirrhosis     Social History     Tobacco Use    Smoking status: Never Smoker    Smokeless tobacco: Never Used   Substance Use Topics    Alcohol use: No     Frequency: Never    Drug use: No     Review of Systems   Constitutional: Negative for activity change, appetite change, chills, diaphoresis, fatigue and fever.   HENT: Negative for congestion, rhinorrhea, sore throat, trouble swallowing and voice change.    Respiratory: Positive for shortness of breath (chronic, slightly worse). Negative for cough, choking, chest tightness, wheezing and stridor.    Cardiovascular: Positive for chest pain. Negative for palpitations and leg swelling.   Gastrointestinal: Positive for abdominal pain (chronic). Negative for abdominal distention, blood in stool, constipation, diarrhea, nausea and vomiting.   Genitourinary: Negative for difficulty urinating, dysuria, flank pain, frequency and hematuria.   Musculoskeletal: Positive for back pain. Negative for arthralgias, joint swelling, myalgias, neck pain and neck stiffness.   Skin: Negative for color change and rash.   Neurological: Positive for dizziness and headaches. Negative for syncope, speech difficulty, weakness and numbness.   Hematological: Negative for adenopathy. Does not bruise/bleed easily.   Psychiatric/Behavioral: Negative for confusion.   All other systems reviewed and are negative.      Physical Exam      Initial Vitals [07/08/19 1223]   BP Pulse Resp Temp SpO2   (!) 140/65 67 18 97.8 °F (36.6 °C) 99 %      MAP       --         Physical Exam    Nursing note and vitals reviewed.  Constitutional: She appears well-developed and well-nourished. She is not diaphoretic. No distress.   HENT:   Head: Normocephalic and atraumatic.   Mouth/Throat: Oropharynx is clear and moist.   Eyes: EOM are normal. Pupils are equal, round, and reactive to light.   Neck: Neck supple. No tracheal deviation present.   Cardiovascular: Normal rate, regular rhythm and normal heart sounds. Exam reveals no gallop and no friction rub.    No murmur heard.  Pulses:       Radial pulses are 2+ on the right side, and 2+ on the left side.        Dorsalis pedis pulses are 2+ on the right side, and 2+ on the left side.   Mild ankle edema.    Pulmonary/Chest: Breath sounds normal. She has no wheezes. She has no rhonchi. She has no rales.   Abdominal: Soft. Bowel sounds are normal. She exhibits distension (mild). There is hepatomegaly. There is no tenderness. There is no rebound, no guarding and no CVA tenderness.   No other organ enlargement noted.   Musculoskeletal:   No step-off, deformity, or bony tenderness of the spine. Full ROM in LE's. Negative straight leg raise, bilaterally.    Lymphadenopathy:     She has no cervical adenopathy.   Neurological: She is alert and oriented to person, place, and time. She has normal strength. No cranial nerve deficit or sensory deficit.   Skin: Skin is warm and dry.         ED Course   Procedures  Labs Reviewed   CBC W/ AUTO DIFFERENTIAL - Abnormal; Notable for the following components:       Result Value    RBC 2.73 (*)     Hemoglobin 6.3 (*)     Hematocrit 22.2 (*)     Mean Corpuscular Volume 81 (*)     Mean Corpuscular Hemoglobin 23.1 (*)     Mean Corpuscular Hemoglobin Conc 28.4 (*)     RDW 18.5 (*)     All other components within normal limits   COMPREHENSIVE METABOLIC PANEL - Abnormal; Notable for the  following components:    Glucose 171 (*)     Albumin 2.3 (*)     Alkaline Phosphatase 239 (*)     eGFR if non  52 (*)     All other components within normal limits   TROPONIN I - Abnormal; Notable for the following components:    Troponin I 0.059 (*)     All other components within normal limits   B-TYPE NATRIURETIC PEPTIDE - Abnormal; Notable for the following components:     (*)     All other components within normal limits   URINALYSIS - Abnormal; Notable for the following components:    Leukocytes, UA 3+ (*)     All other components within normal limits   TROPONIN I - Abnormal; Notable for the following components:    Troponin I 0.047 (*)     All other components within normal limits   URINALYSIS MICROSCOPIC - Abnormal; Notable for the following components:    WBC, UA 10 (*)     Bacteria Few (*)     All other components within normal limits   TSH   AMMONIA   OCCULT BLOOD X 1, STOOL   TYPE & SCREEN   PREPARE RBC SOFT     EKG Readings: (Independently Interpreted)   Initial Reading: No STEMI.   Normal sinus rhythm at a rate of 61 with PACs noted. Normal axis. No other acute abnormalities.        Imaging Results          X-Ray Chest 1 View (Final result)  Result time 07/08/19 14:15:57    Final result by Bailey Ross MD (07/08/19 14:15:57)                 Impression:      No acute cardiopulmonary disease      Electronically signed by: Bailey Ross MD  Date:    07/08/2019  Time:    14:15             Narrative:    EXAMINATION:  XR CHEST 1 VIEW    CLINICAL HISTORY:  Dizziness and giddiness    TECHNIQUE:  Single frontal view of the chest was performed.    COMPARISON:  2/23/2019    FINDINGS:  The cardiomediastinal silhouette is stable.  The lungs are clear of infiltrate.  There is no pleural effusion.                               CT Head Without Contrast (Final result)  Result time 07/08/19 13:44:47    Final result by Bailey Ross MD (07/08/19 13:44:47)                 Impression:       Mild involutional changes and findings suggesting mild microvascular ischemic change with no acute intracranial findings.      Electronically signed by: Bailey Ross MD  Date:    07/08/2019  Time:    13:44             Narrative:    EXAMINATION:  CT HEAD WITHOUT CONTRAST    CLINICAL HISTORY:  Syncope/fainting;    TECHNIQUE:  Low dose axial images were obtained through the head.  Coronal and sagittal reformations were also performed. Contrast was not administered.    COMPARISON:  None.    FINDINGS:  There is mild dilatation of ventricles, sulci, fissures.  There is subtle low-density in the white matter suggesting mild microvascular ischemic change.  There is no acute intracranial hemorrhage.  There is no intracranial mass effect.  There is no acute major vascular territory infarct.  The calvarium appears intact, mastoids well pneumatized, visualized paranasal sinuses essentially clear.  There are calcifications in parasellar portions of internal carotid arteries.                                 Medical Decision Making:   History:   Old Medical Records: I decided to obtain old medical records.  Initial Assessment:   This is an emergent evaluation of an 68 y.o. presenting with dizziness and SOB as well as chest discomfort.  Differential Diagnosis:   My differential diagnosis includes ACS, electrolyte abnormality, endocrine dysfunction, progression of liver failure, and anemia.  Clinical Tests:   Lab Tests: Ordered and Reviewed  Radiological Study: Ordered and Reviewed  Medical Tests: Ordered and Reviewed            Scribe Attestation:   Scribe #1: I performed the above scribed service and the documentation accurately describes the services I performed. I attest to the accuracy of the note.     I, Dr. Grayson Groves, personally performed the services described in this documentation. All medical record entries made by the scribe were at my direction and in my presence.  I have reviewed the chart and agree that the  record reflects my personal performance and is accurate and complete. Grayson Decker MD.                 Clinical Impression:       ICD-10-CM ICD-9-CM   1. Symptomatic anemia D64.9 285.9   2. Chest pain R07.9 786.50   3. Dizziness R42 780.4         Disposition:   Disposition: Admitted                        Grayson Groves MD  07/08/19 1142

## 2019-07-08 NOTE — TELEPHONE ENCOUNTER
Call returned.  Danitza states Ms Smith makes c/o dizziness with standing, resulting in her falling back on surface she was sitting on.  States she also makes c/o weakness and shortness of breath with ambulation.  States symptoms started about a week ago and have gotten progressively worse.  Denies any falls to the floor or head injury.  Denies any obvious bleeding (hematuria, epistaxis, melena, hematochezia).  Danitza instructed to have patient seen in the ED.  Understanding expressed.    ----- Message from Kya Hurley sent at 7/8/2019 10:08 AM CDT -----  Contact: Danitza   Daughter called stating pt is dizzy when she stands and falls down  Daughter wants to know if add'l blood work can be ordered and if pt's anemia may be the root cause of this issue    Pt contact [phone]

## 2019-07-08 NOTE — ED NOTES
Dr. Groves is at the bedside of patient to give an updated on admission and consent has been obtained to receive blood.

## 2019-07-09 ENCOUNTER — ANESTHESIA EVENT (OUTPATIENT)
Dept: ENDOSCOPY | Facility: HOSPITAL | Age: 69
End: 2019-07-09
Payer: MEDICARE

## 2019-07-09 ENCOUNTER — ANESTHESIA (OUTPATIENT)
Dept: ENDOSCOPY | Facility: HOSPITAL | Age: 69
End: 2019-07-09
Payer: MEDICARE

## 2019-07-09 LAB
ALBUMIN SERPL BCP-MCNC: 2 G/DL (ref 3.5–5.2)
ALBUMIN SERPL BCP-MCNC: 2 G/DL (ref 3.5–5.2)
ALP SERPL-CCNC: 193 U/L (ref 55–135)
ALP SERPL-CCNC: 193 U/L (ref 55–135)
ALT SERPL W/O P-5'-P-CCNC: 20 U/L (ref 10–44)
ALT SERPL W/O P-5'-P-CCNC: 20 U/L (ref 10–44)
ANION GAP SERPL CALC-SCNC: 9 MMOL/L (ref 8–16)
ANION GAP SERPL CALC-SCNC: 9 MMOL/L (ref 8–16)
AST SERPL-CCNC: 35 U/L (ref 10–40)
AST SERPL-CCNC: 35 U/L (ref 10–40)
BACTERIA #/AREA URNS HPF: NORMAL /HPF
BASOPHILS # BLD AUTO: 0.03 K/UL (ref 0–0.2)
BASOPHILS # BLD AUTO: 0.03 K/UL (ref 0–0.2)
BASOPHILS NFR BLD: 0.7 % (ref 0–1.9)
BASOPHILS NFR BLD: 0.7 % (ref 0–1.9)
BILIRUB SERPL-MCNC: 1.4 MG/DL (ref 0.1–1)
BILIRUB SERPL-MCNC: 1.4 MG/DL (ref 0.1–1)
BILIRUB UR QL STRIP: NEGATIVE
BUN SERPL-MCNC: 18 MG/DL (ref 8–23)
BUN SERPL-MCNC: 18 MG/DL (ref 8–23)
CALCIUM SERPL-MCNC: 8.7 MG/DL (ref 8.7–10.5)
CALCIUM SERPL-MCNC: 8.7 MG/DL (ref 8.7–10.5)
CHLORIDE SERPL-SCNC: 102 MMOL/L (ref 95–110)
CHLORIDE SERPL-SCNC: 102 MMOL/L (ref 95–110)
CHOLEST SERPL-MCNC: 110 MG/DL (ref 120–199)
CHOLEST/HDLC SERPL: 5.2 {RATIO} (ref 2–5)
CK MB SERPL-MCNC: 1.4 NG/ML (ref 0.1–6.5)
CK MB SERPL-MCNC: 1.6 NG/ML (ref 0.1–6.5)
CK MB SERPL-RTO: 2 % (ref 0–5)
CK MB SERPL-RTO: 2.4 % (ref 0–5)
CK SERPL-CCNC: 67 U/L (ref 20–180)
CK SERPL-CCNC: 70 U/L (ref 20–180)
CLARITY UR: CLEAR
CO2 SERPL-SCNC: 30 MMOL/L (ref 23–29)
CO2 SERPL-SCNC: 30 MMOL/L (ref 23–29)
COLOR UR: YELLOW
CREAT SERPL-MCNC: 1.1 MG/DL (ref 0.5–1.4)
CREAT SERPL-MCNC: 1.1 MG/DL (ref 0.5–1.4)
DIFFERENTIAL METHOD: ABNORMAL
DIFFERENTIAL METHOD: ABNORMAL
EOSINOPHIL # BLD AUTO: 0.1 K/UL (ref 0–0.5)
EOSINOPHIL # BLD AUTO: 0.1 K/UL (ref 0–0.5)
EOSINOPHIL NFR BLD: 2.4 % (ref 0–8)
EOSINOPHIL NFR BLD: 2.4 % (ref 0–8)
ERYTHROCYTE [DISTWIDTH] IN BLOOD BY AUTOMATED COUNT: 17.6 % (ref 11.5–14.5)
ERYTHROCYTE [DISTWIDTH] IN BLOOD BY AUTOMATED COUNT: 17.6 % (ref 11.5–14.5)
EST. GFR  (AFRICAN AMERICAN): 60 ML/MIN/1.73 M^2
EST. GFR  (AFRICAN AMERICAN): 60 ML/MIN/1.73 M^2
EST. GFR  (NON AFRICAN AMERICAN): 52 ML/MIN/1.73 M^2
EST. GFR  (NON AFRICAN AMERICAN): 52 ML/MIN/1.73 M^2
GLUCOSE SERPL-MCNC: 76 MG/DL (ref 70–110)
GLUCOSE SERPL-MCNC: 76 MG/DL (ref 70–110)
GLUCOSE UR QL STRIP: NEGATIVE
HCT VFR BLD AUTO: 25.8 % (ref 37–48.5)
HCT VFR BLD AUTO: 25.8 % (ref 37–48.5)
HDLC SERPL-MCNC: 21 MG/DL (ref 40–75)
HDLC SERPL: 19.1 % (ref 20–50)
HGB BLD-MCNC: 7.9 G/DL (ref 12–16)
HGB BLD-MCNC: 7.9 G/DL (ref 12–16)
HGB BLD-MCNC: 8.5 G/DL (ref 12–16)
HGB BLD-MCNC: 9.6 G/DL (ref 12–16)
HGB UR QL STRIP: NEGATIVE
IMM GRANULOCYTES # BLD AUTO: 0.01 K/UL (ref 0–0.04)
IMM GRANULOCYTES # BLD AUTO: 0.01 K/UL (ref 0–0.04)
INR PPP: 1.2 (ref 0.8–1.2)
IRON SERPL-MCNC: 13 UG/DL (ref 30–160)
KETONES UR QL STRIP: NEGATIVE
LDLC SERPL CALC-MCNC: 72.4 MG/DL (ref 63–159)
LEUKOCYTE ESTERASE UR QL STRIP: ABNORMAL
LYMPHOCYTES # BLD AUTO: 1.3 K/UL (ref 1–4.8)
LYMPHOCYTES # BLD AUTO: 1.3 K/UL (ref 1–4.8)
LYMPHOCYTES NFR BLD: 31.8 % (ref 18–48)
LYMPHOCYTES NFR BLD: 31.8 % (ref 18–48)
MAGNESIUM SERPL-MCNC: 2 MG/DL (ref 1.6–2.6)
MCH RBC QN AUTO: 25.3 PG (ref 27–31)
MCH RBC QN AUTO: 25.3 PG (ref 27–31)
MCHC RBC AUTO-ENTMCNC: 30.6 G/DL (ref 32–36)
MCHC RBC AUTO-ENTMCNC: 30.6 G/DL (ref 32–36)
MCV RBC AUTO: 83 FL (ref 82–98)
MCV RBC AUTO: 83 FL (ref 82–98)
MICROSCOPIC COMMENT: NORMAL
MONOCYTES # BLD AUTO: 0.5 K/UL (ref 0.3–1)
MONOCYTES # BLD AUTO: 0.5 K/UL (ref 0.3–1)
MONOCYTES NFR BLD: 12.9 % (ref 4–15)
MONOCYTES NFR BLD: 12.9 % (ref 4–15)
NEUTROPHILS # BLD AUTO: 2.2 K/UL (ref 1.8–7.7)
NEUTROPHILS # BLD AUTO: 2.2 K/UL (ref 1.8–7.7)
NEUTROPHILS NFR BLD: 52 % (ref 38–73)
NEUTROPHILS NFR BLD: 52 % (ref 38–73)
NITRITE UR QL STRIP: NEGATIVE
NONHDLC SERPL-MCNC: 89 MG/DL
NRBC BLD-RTO: 0 /100 WBC
NRBC BLD-RTO: 0 /100 WBC
OB PNL STL: POSITIVE
PH UR STRIP: 6 [PH] (ref 5–8)
PHOSPHATE SERPL-MCNC: 3.9 MG/DL (ref 2.7–4.5)
PLATELET # BLD AUTO: 226 K/UL (ref 150–350)
PLATELET # BLD AUTO: 226 K/UL (ref 150–350)
PMV BLD AUTO: 10.9 FL (ref 9.2–12.9)
PMV BLD AUTO: 10.9 FL (ref 9.2–12.9)
POCT GLUCOSE: 103 MG/DL (ref 70–110)
POCT GLUCOSE: 148 MG/DL (ref 70–110)
POCT GLUCOSE: 172 MG/DL (ref 70–110)
POTASSIUM SERPL-SCNC: 3.8 MMOL/L (ref 3.5–5.1)
POTASSIUM SERPL-SCNC: 3.8 MMOL/L (ref 3.5–5.1)
PROT SERPL-MCNC: 6 G/DL (ref 6–8.4)
PROT SERPL-MCNC: 6 G/DL (ref 6–8.4)
PROT UR QL STRIP: NEGATIVE
PROTHROMBIN TIME: 12 SEC (ref 9–12.5)
RBC # BLD AUTO: 3.12 M/UL (ref 4–5.4)
RBC # BLD AUTO: 3.12 M/UL (ref 4–5.4)
SATURATED IRON: 3 % (ref 20–50)
SODIUM SERPL-SCNC: 141 MMOL/L (ref 136–145)
SODIUM SERPL-SCNC: 141 MMOL/L (ref 136–145)
SP GR UR STRIP: 1.01 (ref 1–1.03)
SQUAMOUS #/AREA URNS HPF: 5 /HPF
TOTAL IRON BINDING CAPACITY: 407 UG/DL (ref 250–450)
TRANSFERRIN SERPL-MCNC: 275 MG/DL (ref 200–375)
TRIGL SERPL-MCNC: 83 MG/DL (ref 30–150)
TROPONIN I SERPL DL<=0.01 NG/ML-MCNC: 0.05 NG/ML (ref 0–0.03)
TROPONIN I SERPL DL<=0.01 NG/ML-MCNC: 0.05 NG/ML (ref 0–0.03)
TROPONIN I SERPL DL<=0.01 NG/ML-MCNC: 0.06 NG/ML (ref 0–0.03)
TROPONIN I SERPL DL<=0.01 NG/ML-MCNC: 0.06 NG/ML (ref 0–0.03)
URN SPEC COLLECT METH UR: ABNORMAL
UROBILINOGEN UR STRIP-ACNC: ABNORMAL EU/DL
WBC # BLD AUTO: 4.18 K/UL (ref 3.9–12.7)
WBC # BLD AUTO: 4.18 K/UL (ref 3.9–12.7)
WBC #/AREA URNS HPF: 2 /HPF (ref 0–5)

## 2019-07-09 PROCEDURE — 27201012 HC FORCEPS, HOT/COLD, DISP: Mod: NTX | Performed by: INTERNAL MEDICINE

## 2019-07-09 PROCEDURE — 83735 ASSAY OF MAGNESIUM: CPT | Mod: NTX

## 2019-07-09 PROCEDURE — 36415 COLL VENOUS BLD VENIPUNCTURE: CPT | Mod: NTX

## 2019-07-09 PROCEDURE — 43239 EGD BIOPSY SINGLE/MULTIPLE: CPT | Mod: NTX | Performed by: INTERNAL MEDICINE

## 2019-07-09 PROCEDURE — 25000003 PHARM REV CODE 250: Mod: NTX | Performed by: NURSE ANESTHETIST, CERTIFIED REGISTERED

## 2019-07-09 PROCEDURE — 25000003 PHARM REV CODE 250: Mod: NTX | Performed by: INTERNAL MEDICINE

## 2019-07-09 PROCEDURE — 82553 CREATINE MB FRACTION: CPT | Mod: 91,NTX

## 2019-07-09 PROCEDURE — 84484 ASSAY OF TROPONIN QUANT: CPT | Mod: NTX

## 2019-07-09 PROCEDURE — 37000008 HC ANESTHESIA 1ST 15 MINUTES: Mod: NTX | Performed by: INTERNAL MEDICINE

## 2019-07-09 PROCEDURE — D9220A PRA ANESTHESIA: ICD-10-PCS | Mod: ANES,NTX,, | Performed by: ANESTHESIOLOGY

## 2019-07-09 PROCEDURE — 84484 ASSAY OF TROPONIN QUANT: CPT | Mod: 91,NTX

## 2019-07-09 PROCEDURE — 99214 PR OFFICE/OUTPT VISIT, EST, LEVL IV, 30-39 MIN: ICD-10-PCS | Mod: 25,NTX,, | Performed by: INTERNAL MEDICINE

## 2019-07-09 PROCEDURE — 43239 EGD BIOPSY SINGLE/MULTIPLE: CPT | Mod: ,,, | Performed by: INTERNAL MEDICINE

## 2019-07-09 PROCEDURE — 88342 IMHCHEM/IMCYTCHM 1ST ANTB: CPT | Mod: 26,NTX,, | Performed by: PATHOLOGY

## 2019-07-09 PROCEDURE — 82272 OCCULT BLD FECES 1-3 TESTS: CPT | Mod: NTX

## 2019-07-09 PROCEDURE — 84100 ASSAY OF PHOSPHORUS: CPT | Mod: NTX

## 2019-07-09 PROCEDURE — 80061 LIPID PANEL: CPT | Mod: NTX

## 2019-07-09 PROCEDURE — 85610 PROTHROMBIN TIME: CPT | Mod: NTX

## 2019-07-09 PROCEDURE — 25000003 PHARM REV CODE 250: Mod: NTX | Performed by: EMERGENCY MEDICINE

## 2019-07-09 PROCEDURE — 96376 TX/PRO/DX INJ SAME DRUG ADON: CPT | Mod: NTX,59 | Performed by: EMERGENCY MEDICINE

## 2019-07-09 PROCEDURE — 63600175 PHARM REV CODE 636 W HCPCS: Mod: NTX | Performed by: INTERNAL MEDICINE

## 2019-07-09 PROCEDURE — 63600175 PHARM REV CODE 636 W HCPCS: Mod: NTX | Performed by: NURSE ANESTHETIST, CERTIFIED REGISTERED

## 2019-07-09 PROCEDURE — D9220A PRA ANESTHESIA: ICD-10-PCS | Mod: CRNA,NTX,, | Performed by: NURSE ANESTHETIST, CERTIFIED REGISTERED

## 2019-07-09 PROCEDURE — 82550 ASSAY OF CK (CPK): CPT | Mod: 91,NTX

## 2019-07-09 PROCEDURE — 88305 TISSUE EXAM BY PATHOLOGIST: CPT | Mod: NTX,59 | Performed by: PATHOLOGY

## 2019-07-09 PROCEDURE — 99214 OFFICE O/P EST MOD 30 MIN: CPT | Mod: 25,NTX,, | Performed by: INTERNAL MEDICINE

## 2019-07-09 PROCEDURE — 88305 TISSUE SPECIMEN TO PATHOLOGY - SURGERY: ICD-10-PCS | Mod: 26,NTX,, | Performed by: PATHOLOGY

## 2019-07-09 PROCEDURE — G0378 HOSPITAL OBSERVATION PER HR: HCPCS | Mod: NTX

## 2019-07-09 PROCEDURE — 94761 N-INVAS EAR/PLS OXIMETRY MLT: CPT | Mod: NTX

## 2019-07-09 PROCEDURE — 88305 TISSUE EXAM BY PATHOLOGIST: CPT | Mod: 26,NTX,, | Performed by: PATHOLOGY

## 2019-07-09 PROCEDURE — 81000 URINALYSIS NONAUTO W/SCOPE: CPT | Mod: NTX

## 2019-07-09 PROCEDURE — 63600175 PHARM REV CODE 636 W HCPCS: Mod: NTX | Performed by: NURSE PRACTITIONER

## 2019-07-09 PROCEDURE — 85018 HEMOGLOBIN: CPT | Mod: 91,NTX

## 2019-07-09 PROCEDURE — C9113 INJ PANTOPRAZOLE SODIUM, VIA: HCPCS | Mod: NTX | Performed by: INTERNAL MEDICINE

## 2019-07-09 PROCEDURE — 80053 COMPREHEN METABOLIC PANEL: CPT | Mod: NTX

## 2019-07-09 PROCEDURE — 96365 THER/PROPH/DIAG IV INF INIT: CPT | Mod: NTX,59 | Performed by: EMERGENCY MEDICINE

## 2019-07-09 PROCEDURE — 63600175 PHARM REV CODE 636 W HCPCS: Mod: NTX | Performed by: EMERGENCY MEDICINE

## 2019-07-09 PROCEDURE — D9220A PRA ANESTHESIA: Mod: CRNA,NTX,, | Performed by: NURSE ANESTHETIST, CERTIFIED REGISTERED

## 2019-07-09 PROCEDURE — 43239 PR EGD, FLEX, W/BIOPSY, SGL/MULTI: ICD-10-PCS | Mod: ,,, | Performed by: INTERNAL MEDICINE

## 2019-07-09 PROCEDURE — 85025 COMPLETE CBC W/AUTO DIFF WBC: CPT | Mod: NTX

## 2019-07-09 PROCEDURE — 88342 TISSUE SPECIMEN TO PATHOLOGY - SURGERY: ICD-10-PCS | Mod: 26,NTX,, | Performed by: PATHOLOGY

## 2019-07-09 PROCEDURE — D9220A PRA ANESTHESIA: Mod: ANES,NTX,, | Performed by: ANESTHESIOLOGY

## 2019-07-09 RX ORDER — PROPOFOL 10 MG/ML
VIAL (ML) INTRAVENOUS
Status: DISCONTINUED | OUTPATIENT
Start: 2019-07-09 | End: 2019-07-09

## 2019-07-09 RX ORDER — POLYETHYLENE GLYCOL 3350, SODIUM CHLORIDE, SODIUM BICARBONATE, POTASSIUM CHLORIDE 420; 11.2; 5.72; 1.48 G/4L; G/4L; G/4L; G/4L
4000 POWDER, FOR SOLUTION ORAL ONCE
Status: COMPLETED | OUTPATIENT
Start: 2019-07-09 | End: 2019-07-09

## 2019-07-09 RX ORDER — SODIUM CHLORIDE 9 MG/ML
INJECTION, SOLUTION INTRAVENOUS CONTINUOUS
Status: DISCONTINUED | OUTPATIENT
Start: 2019-07-09 | End: 2019-07-09

## 2019-07-09 RX ORDER — GLYCOPYRROLATE 0.2 MG/ML
INJECTION INTRAMUSCULAR; INTRAVENOUS
Status: DISCONTINUED | OUTPATIENT
Start: 2019-07-09 | End: 2019-07-09

## 2019-07-09 RX ORDER — LIDOCAINE HCL/PF 100 MG/5ML
SYRINGE (ML) INTRAVENOUS
Status: DISCONTINUED | OUTPATIENT
Start: 2019-07-09 | End: 2019-07-09

## 2019-07-09 RX ORDER — LANOLIN ALCOHOL/MO/W.PET/CERES
1000 CREAM (GRAM) TOPICAL DAILY
Status: DISCONTINUED | OUTPATIENT
Start: 2019-07-09 | End: 2019-07-10 | Stop reason: HOSPADM

## 2019-07-09 RX ORDER — SODIUM CHLORIDE 9 MG/ML
INJECTION, SOLUTION INTRAVENOUS CONTINUOUS PRN
Status: DISCONTINUED | OUTPATIENT
Start: 2019-07-09 | End: 2019-07-09

## 2019-07-09 RX ADMIN — FUROSEMIDE 20 MG: 10 INJECTION, SOLUTION INTRAVENOUS at 12:07

## 2019-07-09 RX ADMIN — GLYCOPYRROLATE 0.2 MG: 0.2 INJECTION, SOLUTION INTRAMUSCULAR; INTRAVENOUS at 10:07

## 2019-07-09 RX ADMIN — POLYETHYLENE GLYCOL 3350, SODIUM CHLORIDE, SODIUM BICARBONATE AND POTASSIUM CHLORIDE 4000 ML: KIT ORAL at 06:07

## 2019-07-09 RX ADMIN — ZINC SULFATE 220 MG (50 MG) CAPSULE 220 MG: CAPSULE at 11:07

## 2019-07-09 RX ADMIN — ROSUVASTATIN CALCIUM 10 MG: 5 TABLET, COATED ORAL at 11:07

## 2019-07-09 RX ADMIN — METOPROLOL SUCCINATE 25 MG: 25 TABLET, EXTENDED RELEASE ORAL at 11:07

## 2019-07-09 RX ADMIN — CEFTRIAXONE 1 G: 1 INJECTION, SOLUTION INTRAVENOUS at 11:07

## 2019-07-09 RX ADMIN — ESCITALOPRAM OXALATE 20 MG: 10 TABLET ORAL at 11:07

## 2019-07-09 RX ADMIN — LIDOCAINE HYDROCHLORIDE 100 MG: 20 INJECTION, SOLUTION INTRAVENOUS at 10:07

## 2019-07-09 RX ADMIN — PANTOPRAZOLE SODIUM 40 MG: 40 INJECTION, POWDER, LYOPHILIZED, FOR SOLUTION INTRAVENOUS at 11:07

## 2019-07-09 RX ADMIN — RIFAXIMIN 550 MG: 550 TABLET ORAL at 11:07

## 2019-07-09 RX ADMIN — LACTULOSE 20 G: 20 SOLUTION ORAL at 11:07

## 2019-07-09 RX ADMIN — GABAPENTIN 600 MG: 300 CAPSULE ORAL at 08:07

## 2019-07-09 RX ADMIN — RIFAXIMIN 550 MG: 550 TABLET ORAL at 08:07

## 2019-07-09 RX ADMIN — CLONAZEPAM 0.5 MG: 0.5 TABLET ORAL at 08:07

## 2019-07-09 RX ADMIN — SODIUM CHLORIDE: 0.9 INJECTION, SOLUTION INTRAVENOUS at 10:07

## 2019-07-09 RX ADMIN — GABAPENTIN 600 MG: 300 CAPSULE ORAL at 11:07

## 2019-07-09 RX ADMIN — PROPOFOL 130 MG: 10 INJECTION, EMULSION INTRAVENOUS at 10:07

## 2019-07-09 RX ADMIN — SPIRONOLACTONE 200 MG: 25 TABLET ORAL at 11:07

## 2019-07-09 RX ADMIN — PANTOPRAZOLE SODIUM 40 MG: 40 INJECTION, POWDER, LYOPHILIZED, FOR SOLUTION INTRAVENOUS at 08:07

## 2019-07-09 RX ADMIN — PROPOFOL 30 MG: 10 INJECTION, EMULSION INTRAVENOUS at 10:07

## 2019-07-09 RX ADMIN — CYANOCOBALAMIN TAB 1000 MCG 1000 MCG: 1000 TAB at 11:07

## 2019-07-09 RX ADMIN — FUROSEMIDE 80 MG: 40 TABLET ORAL at 11:07

## 2019-07-09 RX ADMIN — ONDANSETRON 4 MG: 2 INJECTION, SOLUTION INTRAMUSCULAR; INTRAVENOUS at 10:07

## 2019-07-09 NOTE — PLAN OF CARE
Problem: Adult Inpatient Plan of Care  Goal: Plan of Care Review  Outcome: Ongoing (interventions implemented as appropriate)  Pt denies pain or SOB. Pt aware and verbalizes understanding of NPO status after midnight for colonoscopy in AM. Questions answered. Pt and family verbalized understanding.

## 2019-07-09 NOTE — PLAN OF CARE
EGD:  -Very small esophageal varices without stigmata of bleeding that flattened with insufflation  -Mild gastritis, biopsied for H.pylori  -Multiple benign gastric polyps, biopsied   -No source of CHIDI    Recommendation:  -Clear liquids today, NPO at midnight  -Prep tonight for colonoscopy tomorrow  -Hold Xarelto for now  -Ok to transfuse 1 more unit of pRBCs    Dee Dee Anthony MD

## 2019-07-09 NOTE — ANESTHESIA PREPROCEDURE EVALUATION
07/09/2019  Sejal Matamoros is a 68 y.o., female.    Anesthesia Evaluation    I have reviewed the Patient Summary Reports.    I have reviewed the Nursing Notes.   I have reviewed the Medications.     Review of Systems  Anesthesia Hx:  Denies Family Hx of Anesthesia complications.   Denies Personal Hx of Anesthesia complications.   Hematology/Oncology:         -- Anemia:   Cardiovascular:   Hypertension Dysrhythmias atrial fibrillation    Pulmonary:   Probable KARON   Hepatic/GI:   Liver Disease,    Musculoskeletal:   Arthritis   Spine Disorders: cervical    Neurological:   Denies Neuromuscular Disease.   Chronic Pain Syndrome   Endocrine:   Diabetes    Psych:   Psychiatric History          Physical Exam  General:  Morbid Obesity, Malnutrition    Airway/Jaw/Neck:  Airway Findings: Mouth Opening: Normal Tongue: Normal  General Airway Assessment: Adult  Mallampati: III  Improves to II with phonation.  Jaw/Neck Findings:  Neck ROM: Extension Decreased, Mild  Neck Findings:  Girth Increased      Dental:  Dental Findings: Periodontal disease, Severe   Chest/Lungs:  Chest/Lungs Findings: Clear to auscultation, Decreased Breath Sounds Bilateral     Heart/Vascular:  Heart Findings: Rate: Normal  Rhythm: Regular Rhythm  Sounds: Normal             Anesthesia Plan  Type of Anesthesia, risks & benefits discussed:  Anesthesia Type:  general  Patient's Preference:   Intra-op Monitoring Plan: standard ASA monitors  Intra-op Monitoring Plan Comments:   Post Op Pain Control Plan:   Post Op Pain Control Plan Comments:   Induction:   IV  Beta Blocker:  Patient is on a Beta-Blocker and has received one dose within the past 24 hours (No further documentation required).       Informed Consent: Patient understands risks and agrees with Anesthesia plan.  Questions answered. Anesthesia consent signed with patient.  ASA Score: 3      Day of Surgery Review of History & Physical:    H&P update referred to the provider.         Ready For Surgery From Anesthesia Perspective.

## 2019-07-09 NOTE — PLAN OF CARE
CM met with pt and family bedside. Pt verified all information as correct on facesheet. Pt lives with spouse. Denies POA/LW. PCP is Deandre Lundberg NP. Pharm is [facility] in [place]. Pt states she is was due to be discharged from  today ([facility]- pt disclosure form signed) and would like it reordered. DME- bsc, rw, wc. Pt's daughter provides transportation to [facility] and will provide transportation home once dc. DC plan is home with home health. CM will follow.        07/09/19 1223   Discharge Assessment   Assessment Type Discharge Planning Assessment   Confirmed/corrected address and phone number on facesheet? Yes   Assessment information obtained from? Patient   Communicated expected length of stay with patient/caregiver yes   Prior to hospitilization cognitive status: Alert/Oriented   Prior to hospitalization functional status: Assistive Equipment   Current cognitive status: Alert/Oriented   Current Functional Status: Assistive Equipment   Lives With spouse   Able to Return to Prior Arrangements yes   Is patient able to care for self after discharge? Yes   Patient's perception of discharge disposition home health   Readmission Within the Last 30 Days no previous admission in last 30 days   Patient currently being followed by outpatient case management? No   Patient currently receives any other outside agency services? Yes   Name and contact number of agency or person providing outside services family unsure of name right now   Is it the patient/care giver preference to resume care with the current outside agency? Yes   Equipment Currently Used at Home wheelchair;bedside commode;walker, rolling   Do you have any problems affording any of your prescribed medications? No   Is the patient taking medications as prescribed? yes   Does the patient have transportation home? Yes   Transportation Anticipated family or friend will provide   Does the patient receive services at the [facility]? No   Discharge Plan A  Home Health   Discharge Plan B Home with family   DME Needed Upon Discharge  none   Patient/Family in Agreement with Plan yes

## 2019-07-09 NOTE — CONSULTS
[facility]     CC: Anemia    HPI 68 y.o. female with history of afib on Xarelto (off x 2 days), decompensated RHOADES cirrhosis (followed by hepatology), recurrent ascites requiring bimonthly paracentesis and HE on Lasix and Aldactone, presents to the hospital with acute worsening of chronic, microcytic, moderate anemia associated with dizziness and weakness. She denies abdominal pain, dysphagia, hematemesis, melena, blood in stool or changes in bowel habits. She denies NSAID use. She believes she had an EGD several years ago but had no diagnosis of cirrhosis at the time. She has no history of UGIB. Her last colonoscopy was several years ago performed by Dr. Jolley, she believes several polyps were removed. There is no family history of esophageal, gastric or colon cancer however her brother also has cirrhosis thought to be from fatty liver.     Past Medical History:   Diagnosis Date    A-fib     Anxiety     Cirrhosis     DDD (degenerative disc disease), cervical 3/21/2018    DDD (degenerative disc disease), lumbar 3/21/2018    Decompensated hepatic cirrhosis 12/26/2018    Depression     Diabetes mellitus, type 2     Hepatic encephalopathy 12/26/2018    HLD (hyperlipidemia)     HTN (hypertension)     Hypoalbuminemia 12/26/2018    Hypoglycemia associated with type 2 diabetes mellitus 12/26/2018    Morbid obesity with BMI of 45.0-49.9, adult 12/7/2018    Obesity     Other ascites 2/20/2019    Portal hypertension 12/26/2018    Type 2 diabetes mellitus with hyperglycemia, with long-term current use of insulin 12/6/2018       Past Surgical History:   Procedure Laterality Date    arm surgery      left    CARPAL TUNNEL RELEASE      left    CHOLECYSTECTOMY      HYSTERECTOMY      ROTATOR CUFF REPAIR      TOTAL KNEE ARTHROPLASTY         Social History     Tobacco Use    Smoking status: Never Smoker    Smokeless tobacco: Never Used   Substance Use Topics    Alcohol use: No     Frequency:  "Never    Drug use: No       Family History   Problem Relation Age of Onset    Cirrhosis Brother         RHOADES cirrhosis       Review of Systems  General ROS: negative for - chills, fever or weight loss, + weakness  Psychological ROS: negative for - hallucination, depression or suicidal ideation  Ophthalmic ROS: negative for - blurry vision, photophobia or eye pain  ENT ROS: negative for - epistaxis, sore throat or rhinorrhea  Respiratory ROS: no cough, shortness of breath, or wheezing  Cardiovascular ROS: no chest pain or dyspnea on exertion  Gastrointestinal ROS: no abdominal pain, no vomiting, no blood in stool   Genito-Urinary ROS: no dysuria, trouble voiding, or hematuria  Musculoskeletal ROS: negative for - arthralgia, myalgia;+  weakness  Neurological ROS: no syncope or seizures; no ataxia, + dizziness  Dermatological ROS: negative for pruritis, rash and jaundice    Physical Examination  BP (!) 112/56 (BP Location: Left arm, Patient Position: Lying)   Pulse (!) 56   Temp 98.4 °F (36.9 °C) (Temporal)   Resp 18   Ht 5' 2" (1.575 m)   Wt 106.6 kg (235 lb)   LMP  (LMP Unknown)   SpO2 99%   Breastfeeding? No   BMI 42.98 kg/m²   General appearance: alert, cooperative, no distress  HENT: Normocephalic, atraumatic, neck symmetrical, no nasal discharge   Eyes: conjunctivae/corneas clear, PERRL, EOM's intact, sclera anicteric, poor dentition  Lungs: clear to auscultation bilaterally, no dullness to percussion bilaterally, symmetric expansion, breathing unlabored  Heart: regular rate and rhythm without rub; no displacement of the PMI   Abdomen: soft ascites, obese, nontender, mild distention, no masses appreciated, BS normoactive  Extremities: extremities symmetric; no clubbing, cyanosis, or edema  Integument: Skin color, texture, turgor normal; no rashes; hair distrubution normal, no jaundice  Neurologic: Alert and oriented X 3, no focal sensory or motor neurologic deficits  Psychiatric: no pressured speech; " normal affect; no evidence of impaired cognition, no anxiety/depression     Labs:  Lab Results   Component Value Date    WBC 4.18 07/09/2019    WBC 4.18 07/09/2019    HGB 7.9 (L) 07/09/2019    HGB 7.9 (L) 07/09/2019    HCT 25.8 (L) 07/09/2019    HCT 25.8 (L) 07/09/2019    MCV 83 07/09/2019    MCV 83 07/09/2019     07/09/2019     07/09/2019     -INR- 1.2, PT- 12    CMP  Sodium   Date Value Ref Range Status   07/09/2019 141 136 - 145 mmol/L Final   07/09/2019 141 136 - 145 mmol/L Final     Potassium   Date Value Ref Range Status   07/09/2019 3.8 3.5 - 5.1 mmol/L Final   07/09/2019 3.8 3.5 - 5.1 mmol/L Final     Chloride   Date Value Ref Range Status   07/09/2019 102 95 - 110 mmol/L Final   07/09/2019 102 95 - 110 mmol/L Final     CO2   Date Value Ref Range Status   07/09/2019 30 (H) 23 - 29 mmol/L Final   07/09/2019 30 (H) 23 - 29 mmol/L Final     Glucose   Date Value Ref Range Status   07/09/2019 76 70 - 110 mg/dL Final   07/09/2019 76 70 - 110 mg/dL Final     BUN, Bld   Date Value Ref Range Status   07/09/2019 18 8 - 23 mg/dL Final   07/09/2019 18 8 - 23 mg/dL Final     Creatinine   Date Value Ref Range Status   07/09/2019 1.1 0.5 - 1.4 mg/dL Final   07/09/2019 1.1 0.5 - 1.4 mg/dL Final     Calcium   Date Value Ref Range Status   07/09/2019 8.7 8.7 - 10.5 mg/dL Final   07/09/2019 8.7 8.7 - 10.5 mg/dL Final     Total Protein   Date Value Ref Range Status   07/09/2019 6.0 6.0 - 8.4 g/dL Final   07/09/2019 6.0 6.0 - 8.4 g/dL Final     Albumin   Date Value Ref Range Status   07/09/2019 2.0 (L) 3.5 - 5.2 g/dL Final   07/09/2019 2.0 (L) 3.5 - 5.2 g/dL Final     Total Bilirubin   Date Value Ref Range Status   07/09/2019 1.4 (H) 0.1 - 1.0 mg/dL Final     Comment:     For infants and newborns, interpretation of results should be based  on gestational age, weight and in agreement with clinical  observations.  Premature Infant recommended reference ranges:  Up to 24 hours.............<8.0 mg/dL  Up to 48  hours............<12.0 mg/dL  3-5 days..................<15.0 mg/dL  6-29 days.................<15.0 mg/dL     07/09/2019 1.4 (H) 0.1 - 1.0 mg/dL Final     Comment:     For infants and newborns, interpretation of results should be based  on gestational age, weight and in agreement with clinical  observations.  Premature Infant recommended reference ranges:  Up to 24 hours.............<8.0 mg/dL  Up to 48 hours............<12.0 mg/dL  3-5 days..................<15.0 mg/dL  6-29 days.................<15.0 mg/dL       Alkaline Phosphatase   Date Value Ref Range Status   07/09/2019 193 (H) 55 - 135 U/L Final   07/09/2019 193 (H) 55 - 135 U/L Final     AST   Date Value Ref Range Status   07/09/2019 35 10 - 40 U/L Final   07/09/2019 35 10 - 40 U/L Final     ALT   Date Value Ref Range Status   07/09/2019 20 10 - 44 U/L Final   07/09/2019 20 10 - 44 U/L Final     Anion Gap   Date Value Ref Range Status   07/09/2019 9 8 - 16 mmol/L Final   07/09/2019 9 8 - 16 mmol/L Final     eGFR if    Date Value Ref Range Status   07/09/2019 60 >60 mL/min/1.73 m^2 Final   07/09/2019 60 >60 mL/min/1.73 m^2 Final     eGFR if non    Date Value Ref Range Status   07/09/2019 52 (A) >60 mL/min/1.73 m^2 Final     Comment:     Calculation used to obtain the estimated glomerular filtration  rate (eGFR) is the CKD-EPI equation.      07/09/2019 52 (A) >60 mL/min/1.73 m^2 Final     Comment:     Calculation used to obtain the estimated glomerular filtration  rate (eGFR) is the CKD-EPI equation.            Imaging:  CXR was independently visualized and reviewed by me and showed clear lungs    Assessment:   68 y.o. female with decompensated RHOADES cirrhosis presents with symptomatic anemia without overt bleeding.     Plan:  -Hold Xarelto  -PPI  -NPO  -EGD today    Dee Dee Anthony MD  [facility]  [address], [address]  [place] [address]  Office: [phone]  Fax: [phone]

## 2019-07-09 NOTE — ANESTHESIA POSTPROCEDURE EVALUATION
Anesthesia Post Evaluation    Patient: Sejal Matamoros    Procedure(s) Performed: Procedure(s) (LRB):  EGD (ESOPHAGOGASTRODUODENOSCOPY) (N/A)    Final Anesthesia Type: general  Patient location during evaluation: PACU  Patient participation: Yes- Able to Participate  Level of consciousness: awake and alert  Post-procedure vital signs: reviewed and stable  Pain management: adequate  Airway patency: patent  PONV status at discharge: No PONV  Anesthetic complications: no      Cardiovascular status: blood pressure returned to baseline  Respiratory status: unassisted  Hydration status: euvolemic  Follow-up not needed.          Vitals Value Taken Time   /59 7/9/2019 11:20 AM   Temp 36.6 °C (97.9 °F) 7/9/2019 11:15 AM   Pulse 60 7/9/2019 11:51 AM   Resp 16 7/9/2019 11:50 AM   SpO2 99 % 7/9/2019 11:50 AM   Vitals shown include unvalidated device data.      Event Time     Out of Recovery 07/09/2019 11:23:00          Pain/Rima Score: Rima Score: 10 (7/9/2019 11:20 AM)

## 2019-07-09 NOTE — [employer] PATIENT INSTRUCTIONS
Discharge Summary/Instructions after an Endoscopic Procedure  Patient Name: Sejal Matamoros  Patient MRN: [id number]  Patient YOB: 1950 Tuesday, July 09, 2019  Dee Dee Anthony MD  RESTRICTIONS:  During your procedure today, you received medications for sedation.  These   medications may affect your judgment, balance and coordination.  Therefore,   for 24 hours, you have the following restrictions:   - DO NOT drive a car, operate machinery, make legal/financial decisions,   sign important papers or drink alcohol.    ACTIVITY:  Today: no heavy lifting, straining or running due to procedural   sedation/anesthesia.  The following day: return to full activity including work.  DIET:  Eat and drink normally unless instructed otherwise.     TREATMENT FOR COMMON SIDE EFFECTS:  - Mild abdominal pain, nausea, belching, bloating or excessive gas:  rest,   eat lightly and use a heating pad.  - Sore Throat: treat with throat lozenges and/or gargle with warm salt   water.  - Because air was used during the procedure, expelling large amounts of air   from your rectum or belching is normal.  - If a bowel prep was taken, you may not have a bowel movement for 1-3 days.    This is normal.  SYMPTOMS TO WATCH FOR AND REPORT TO YOUR PHYSICIAN:  1. Abdominal pain or bloating, other than gas cramps.  2. Chest pain.  3. Back pain.  4. Signs of infection such as: chills or fever occurring within 24 hours   after the procedure.  5. Rectal bleeding, which would show as bright red, maroon, or black stools.   (A tablespoon of blood from the rectum is not serious, especially if   hemorrhoids are present.)  6. Vomiting.  7. Weakness or dizziness.  GO DIRECTLY TO THE NEAREST EMERGENCY ROOM IF YOU HAVE ANY OF THE FOLLOWING:      Difficulty breathing              Chills and/or fever over 101 F   Persistent vomiting and/or vomiting blood   Severe abdominal pain   Severe chest pain   Black, tarry stools   Bleeding- more than  one tablespoon   Any other symptom or condition that you feel may need urgent attention  Your doctor recommends these additional instructions:  If any biopsies were taken, your doctors clinic will contact you in 1 to 2   weeks with any results.  - Await pathology results.   - [facility] patient to [facility] for [facility] care.   - Clear liquid diet. NPO at midnight  -Colonoscopy tomorrow  -Hold Xarelto  -In setting of cardiac history ok to transfuse one more unit of pRBCs  For questions, problems or results please call your physician - Dee Dee Anthony MD at Work:  [phone].  [facility], EMERGENCY ROOM PHONE NUMBER: [phone]  IF A COMPLICATION OR EMERGENCY SITUATION ARISES AND YOU ARE UNABLE TO REACH   YOUR PHYSICIAN - GO DIRECTLY TO THE EMERGENCY ROOM.  Dee Dee Anthony MD  7/9/2019 10:54:58 AM  This report has been verified and signed electronically.  [employer]

## 2019-07-09 NOTE — TRANSFER OF CARE
"Anesthesia Transfer of Care Note    Patient: Sejal Matamoros    Procedure(s) Performed: Procedure(s) (LRB):  EGD (ESOPHAGOGASTRODUODENOSCOPY) (N/A)    Patient location: Other: (staying in [facility])    Anesthesia Type: general    Transport from OR: Transported from OR on room air with adequate spontaneous ventilation    Post pain: adequate analgesia    Post assessment: no apparent anesthetic complications and tolerated procedure well    Post vital signs: stable    Level of consciousness: sedated    Nausea/Vomiting: no nausea/vomiting    Complications: none    Transfer of care protocol was followed      Last vitals:   Visit Vitals  BP (!) 112/56 (BP Location: Left arm, Patient Position: Lying)   Pulse (!) 56   Temp 36.9 °C (98.4 °F) (Temporal)   Resp 18   Ht 5' 2" (1.575 m)   Wt 106.6 kg (235 lb)   LMP  (LMP Unknown)   SpO2 99%   Breastfeeding? No   BMI 42.98 kg/m²     "

## 2019-07-09 NOTE — PROGRESS NOTES
Progress Note  Hospital Medicine  Patient Name:Sejal Matamoros  MRN:  [id number]  Patient Class: OP- Observation  Admit Date: 7/8/2019  Length of Stay: 0 days  Expected Discharge Date:   Attending Physician: Seth Murray MD  Primary Care Provider:  Deandre Lundberg NP    SUBJECTIVE:     Principal Problem: Symptomatic anemia  Initial history of present illness: Patient is a 68 y.o. female admitted to Hospitalist Service from [facility] Room with complaint of progressively worsening generalized weakness and shortness of breath for 2 weeks. Patient reportedly has past medical history significant for non alcoholic steatohepatitis/cirrhosis of liver, chronic anxiety disorder, chronic atrial fibrillation, diabetes mellitus type 2, hypertension, hypo lipidemia, moderate malnutrition, portal hypertension and history of recurrent ascites.  Patient closely follows with liver transplant team at [facility].  Patient presented to the emergency room with complaint of progressively worsening generalized weakness and dizziness especially when she is up and about and tries to walk.  Also patient is reporting dyspnea on exertion as well.  Patient denied any prior history of peptic ulcer disease, melena, bleeding per rectum, hemorrhoid problems or hematuria.  Patient denied nausea, vomiting, headache, vision changes, focal neuro-deficits, cough or fever.  Patient reports, every 2 weeks she gets therapeutic paracentesis done.  Last paracentesis done on July 3rd, 2019 when more than 7.4 L ascitic fluid removed.    PMH/PSH/SH/FH/Meds: reviewed.    Symptoms/Review of Systems:  No shortness of breath, cough, chest pain or headache, fever or abdominal pain.     Diet:  Adequate intake.    Activity level: Normal.    Pain:  Patient reports no pain.       OBJECTIVE:   Vital Signs (Most Recent):      Temp: 98.4 °F (36.9 °C) (07/09/19 1006)  Pulse: (!) 56 (07/09/19 1006)  Resp: 18 (07/09/19 0725)  BP: (!)  112/56 (07/09/19 1006)  SpO2: 99 % (07/09/19 1006)       Vital Signs Range (Last 24H):  Temp:  [96.8 °F (36 °C)-98.4 °F (36.9 °C)]   Pulse:  [56-79]   Resp:  [14-18]   BP: ()/(47-65)   SpO2:  [95 %-100 %]     Weight: 106.6 kg (235 lb)  Body mass index is 42.98 kg/m².    Intake/Output Summary (Last 24 hours) at 7/9/2019 1010  Last data filed at 7/9/2019 0045  Gross per 24 hour   Intake 1107.5 ml   Output --   Net 1107.5 ml     Physical Examination:  General appearance: well developed, appears stated age, obese female  Head: normocephalic, atraumatic  Eyes:  conjunctivae/corneas clear. PERRL.  Nose: Nares normal. Septum midline.  Throat: lips, mucosa, and tongue normal; teeth and gums normal, no throat erythema.  Neck: supple, symmetrical, trachea midline, no JVD and thyroid not enlarged, symmetric, no tenderness/mass/nodules  Lungs:  clear to auscultation bilaterally and normal respiratory effort  Chest wall: no tenderness  Heart: regular rate and rhythm, S1, S2 normal, no murmur, click, rub or gallop  Abdomen: soft, mild tender, no rebound or peritoneal signs noted, distended due to ascites; bowel sounds normal; no masses, liver is palpable and nontender. Mild shifting dullness but no fluid thrill noted.  Extremities: no cyanosis, clubbing.  Trace pedal edema bilaterally.   Pulses: 2+ and symmetric  Skin: Skin color, texture, turgor normal. No rashes or lesions.  Lymph nodes: Cervical, supraclavicular, and axillary nodes normal.  Neurologic: Normal strength and tone. No focal numbness or weakness. CNII-XII intact.      CBC:  Recent Labs   Lab 07/08/19  1351 07/09/19  0413   WBC 5.75 4.18  4.18   RBC 2.73* 3.12*  3.12*   HGB 6.3* 7.9*  7.9*   HCT 22.2* 25.8*  25.8*    226  226   MCV 81* 83  83   MCH 23.1* 25.3*  25.3*   MCHC 28.4* 30.6*  30.6*   BMP  Recent Labs   Lab 07/08/19  1351 07/09/19  0413   * 76  76    141  141   K 4.2 3.8  3.8    102  102   CO2 27 30*  30*   BUN  22 18  18   CREATININE 1.1 1.1  1.1   CALCIUM 8.7 8.7  8.7   MG  --  2.0      Diagnostic Results:  Microbiology Results (last 7 days)     ** No results found for the last 168 hours. **       Chest X-Ray:  No acute cardiopulmonary disease     CT head without contrast: Mild involutional changes and findings suggesting mild microvascular ischemic change with no acute intracranial findings.    EGD:  - Grade I esophageal varices.                       - Gastritis. Biopsied.                       - Multiple gastric polyps. Resected and retrieved.                       - Normal examined duodenum.                       -Symptomatic CHIDI, not etiology found on upper                        endoscopy.    Assessment/Plan:      *Symptomatic anemia [D64.9] status post 2 units of packed red blood cells  EGD results reviewed.  Patient is scheduled for colonoscopy in a.m..  Follow CBC and transfuse as needed.  Keep patient NPO.  Follow H/H q.8 hours. Type and screen blood and transfuse as needed.  Continue IV Protonix Q 12 hr.  Follow GI recommendation.  Patient will need iron and vitamin B12 supplementation upon discharge.  Continue IVF hydration.   Use IV anti-emetics as needed.       Yes    UTI (urinary tract infection) [N39.0]  Urine culture and sensitivity.  Start intravenous Rocephin 1 g daily.      Yes    Malnutrition of moderate degree [E44.0]  Nutrition consulted. Encourage maximal PO intake. Diet supplementation ordered per nutrition approval. Will encourage PO and monitor closely for weight changes.      Yes    Liver cirrhosis secondary to RHOADES [K75.81, K74.60]   Yes    Other ascites [R18.8]  Ascites is not tense at this time.  No need for paracentesis at this time.  Continue routine lactulose, Xifaxan, Lasix and spironolactone use.      Yes    HTN (hypertension) [I10]  Chronic problem. Will continue chronic medications and monitor for any changes, adjusting as needed.      Yes    Mixed hyperlipidemia  [E78.2]  Chronic problem. Will continue chronic medications and monitor for any changes, adjusting as needed.      Yes    Type 2 diabetes mellitus with hyperglycemia, with long-term current use of insulin [E11.65, Z79.4]  Check blood glucose level q AC/HS.  Use Novolog Insulin Sliding Scale as needed.   Continue American Diabetic Association 1800 Kcal diet.      Not Applicable    DDD (degenerative disc disease), lumbar [M51.36]  Supportive care, fall precautions, physical therapy evaluation and treatment in a.m.     Chronic atrial fibrillation  Telemonitoring.  Hold Xarelto until approved by GI team.   Yes      DVT prophylaxis:  Use sequential compression stockings and Anuj hose.  Avoiding anticoagulation due to possibility of GI bleeding.    Discussed with patient's 2 daughters, answered all questions.       Seth Murray MD  Department of Hospital Medicine   [facility]

## 2019-07-09 NOTE — PT/OT/SLP PROGRESS
Physical Therapy      Patient Name:  Sejal Matamoros   MRN:  [id number]    Patient not seen today secondary to EGD- will reattempt.    Yazmin Iyer, PT

## 2019-07-10 ENCOUNTER — ANESTHESIA EVENT (OUTPATIENT)
Dept: ENDOSCOPY | Facility: HOSPITAL | Age: 69
End: 2019-07-10
Payer: MEDICARE

## 2019-07-10 ENCOUNTER — ANESTHESIA (OUTPATIENT)
Dept: ENDOSCOPY | Facility: HOSPITAL | Age: 69
End: 2019-07-10
Payer: MEDICARE

## 2019-07-10 VITALS
BODY MASS INDEX: 43.24 KG/M2 | HEART RATE: 69 BPM | WEIGHT: 235 LBS | SYSTOLIC BLOOD PRESSURE: 134 MMHG | DIASTOLIC BLOOD PRESSURE: 71 MMHG | HEIGHT: 62 IN | RESPIRATION RATE: 16 BRPM | OXYGEN SATURATION: 100 % | TEMPERATURE: 98 F

## 2019-07-10 LAB
ALBUMIN SERPL BCP-MCNC: 2 G/DL (ref 3.5–5.2)
ALBUMIN SERPL BCP-MCNC: 2 G/DL (ref 3.5–5.2)
ALP SERPL-CCNC: 194 U/L (ref 55–135)
ALP SERPL-CCNC: 194 U/L (ref 55–135)
ALT SERPL W/O P-5'-P-CCNC: 21 U/L (ref 10–44)
ALT SERPL W/O P-5'-P-CCNC: 21 U/L (ref 10–44)
ANION GAP SERPL CALC-SCNC: 8 MMOL/L (ref 8–16)
ANION GAP SERPL CALC-SCNC: 8 MMOL/L (ref 8–16)
AST SERPL-CCNC: 41 U/L (ref 10–40)
AST SERPL-CCNC: 41 U/L (ref 10–40)
BASOPHILS # BLD AUTO: 0.03 K/UL (ref 0–0.2)
BASOPHILS # BLD AUTO: 0.03 K/UL (ref 0–0.2)
BASOPHILS NFR BLD: 0.7 % (ref 0–1.9)
BASOPHILS NFR BLD: 0.7 % (ref 0–1.9)
BILIRUB SERPL-MCNC: 0.9 MG/DL (ref 0.1–1)
BILIRUB SERPL-MCNC: 0.9 MG/DL (ref 0.1–1)
BUN SERPL-MCNC: 17 MG/DL (ref 8–23)
BUN SERPL-MCNC: 17 MG/DL (ref 8–23)
CALCIUM SERPL-MCNC: 8.5 MG/DL (ref 8.7–10.5)
CALCIUM SERPL-MCNC: 8.5 MG/DL (ref 8.7–10.5)
CHLORIDE SERPL-SCNC: 103 MMOL/L (ref 95–110)
CHLORIDE SERPL-SCNC: 103 MMOL/L (ref 95–110)
CO2 SERPL-SCNC: 25 MMOL/L (ref 23–29)
CO2 SERPL-SCNC: 25 MMOL/L (ref 23–29)
CREAT SERPL-MCNC: 1.1 MG/DL (ref 0.5–1.4)
CREAT SERPL-MCNC: 1.1 MG/DL (ref 0.5–1.4)
DIFFERENTIAL METHOD: ABNORMAL
DIFFERENTIAL METHOD: ABNORMAL
EOSINOPHIL # BLD AUTO: 0.1 K/UL (ref 0–0.5)
EOSINOPHIL # BLD AUTO: 0.1 K/UL (ref 0–0.5)
EOSINOPHIL NFR BLD: 2.3 % (ref 0–8)
EOSINOPHIL NFR BLD: 2.3 % (ref 0–8)
ERYTHROCYTE [DISTWIDTH] IN BLOOD BY AUTOMATED COUNT: 17.9 % (ref 11.5–14.5)
ERYTHROCYTE [DISTWIDTH] IN BLOOD BY AUTOMATED COUNT: 17.9 % (ref 11.5–14.5)
EST. GFR  (AFRICAN AMERICAN): 60 ML/MIN/1.73 M^2
EST. GFR  (AFRICAN AMERICAN): 60 ML/MIN/1.73 M^2
EST. GFR  (NON AFRICAN AMERICAN): 52 ML/MIN/1.73 M^2
EST. GFR  (NON AFRICAN AMERICAN): 52 ML/MIN/1.73 M^2
GLUCOSE SERPL-MCNC: 141 MG/DL (ref 70–110)
GLUCOSE SERPL-MCNC: 141 MG/DL (ref 70–110)
HCT VFR BLD AUTO: 27.4 % (ref 37–48.5)
HCT VFR BLD AUTO: 27.4 % (ref 37–48.5)
HGB BLD-MCNC: 8.3 G/DL (ref 12–16)
HGB BLD-MCNC: 8.3 G/DL (ref 12–16)
HGB BLD-MCNC: 8.9 G/DL (ref 12–16)
IMM GRANULOCYTES # BLD AUTO: 0.02 K/UL (ref 0–0.04)
IMM GRANULOCYTES # BLD AUTO: 0.02 K/UL (ref 0–0.04)
LYMPHOCYTES # BLD AUTO: 1.3 K/UL (ref 1–4.8)
LYMPHOCYTES # BLD AUTO: 1.3 K/UL (ref 1–4.8)
LYMPHOCYTES NFR BLD: 28.6 % (ref 18–48)
LYMPHOCYTES NFR BLD: 28.6 % (ref 18–48)
MAGNESIUM SERPL-MCNC: 2 MG/DL (ref 1.6–2.6)
MCH RBC QN AUTO: 25.2 PG (ref 27–31)
MCH RBC QN AUTO: 25.2 PG (ref 27–31)
MCHC RBC AUTO-ENTMCNC: 30.3 G/DL (ref 32–36)
MCHC RBC AUTO-ENTMCNC: 30.3 G/DL (ref 32–36)
MCV RBC AUTO: 83 FL (ref 82–98)
MCV RBC AUTO: 83 FL (ref 82–98)
MONOCYTES # BLD AUTO: 0.6 K/UL (ref 0.3–1)
MONOCYTES # BLD AUTO: 0.6 K/UL (ref 0.3–1)
MONOCYTES NFR BLD: 13.2 % (ref 4–15)
MONOCYTES NFR BLD: 13.2 % (ref 4–15)
NEUTROPHILS # BLD AUTO: 2.4 K/UL (ref 1.8–7.7)
NEUTROPHILS # BLD AUTO: 2.4 K/UL (ref 1.8–7.7)
NEUTROPHILS NFR BLD: 54.7 % (ref 38–73)
NEUTROPHILS NFR BLD: 54.7 % (ref 38–73)
NRBC BLD-RTO: 0 /100 WBC
NRBC BLD-RTO: 0 /100 WBC
PHOSPHATE SERPL-MCNC: 3.5 MG/DL (ref 2.7–4.5)
PLATELET # BLD AUTO: 225 K/UL (ref 150–350)
PLATELET # BLD AUTO: 225 K/UL (ref 150–350)
PMV BLD AUTO: 11 FL (ref 9.2–12.9)
PMV BLD AUTO: 11 FL (ref 9.2–12.9)
POCT GLUCOSE: 162 MG/DL (ref 70–110)
POTASSIUM SERPL-SCNC: 4.2 MMOL/L (ref 3.5–5.1)
POTASSIUM SERPL-SCNC: 4.2 MMOL/L (ref 3.5–5.1)
PROT SERPL-MCNC: 6 G/DL (ref 6–8.4)
PROT SERPL-MCNC: 6 G/DL (ref 6–8.4)
RBC # BLD AUTO: 3.29 M/UL (ref 4–5.4)
RBC # BLD AUTO: 3.29 M/UL (ref 4–5.4)
SODIUM SERPL-SCNC: 136 MMOL/L (ref 136–145)
SODIUM SERPL-SCNC: 136 MMOL/L (ref 136–145)
WBC # BLD AUTO: 4.4 K/UL (ref 3.9–12.7)
WBC # BLD AUTO: 4.4 K/UL (ref 3.9–12.7)

## 2019-07-10 PROCEDURE — 25000003 PHARM REV CODE 250: Mod: NTX | Performed by: INTERNAL MEDICINE

## 2019-07-10 PROCEDURE — 45385 COLONOSCOPY W/LESION REMOVAL: CPT | Mod: NTX | Performed by: INTERNAL MEDICINE

## 2019-07-10 PROCEDURE — 80053 COMPREHEN METABOLIC PANEL: CPT | Mod: NTX

## 2019-07-10 PROCEDURE — 63600175 PHARM REV CODE 636 W HCPCS: Mod: NTX | Performed by: EMERGENCY MEDICINE

## 2019-07-10 PROCEDURE — 96361 HYDRATE IV INFUSION ADD-ON: CPT | Mod: NTX | Performed by: EMERGENCY MEDICINE

## 2019-07-10 PROCEDURE — D9220A PRA ANESTHESIA: Mod: CRNA,NTX,, | Performed by: NURSE ANESTHETIST, CERTIFIED REGISTERED

## 2019-07-10 PROCEDURE — 84100 ASSAY OF PHOSPHORUS: CPT | Mod: NTX

## 2019-07-10 PROCEDURE — 94761 N-INVAS EAR/PLS OXIMETRY MLT: CPT | Mod: NTX

## 2019-07-10 PROCEDURE — 36415 COLL VENOUS BLD VENIPUNCTURE: CPT | Mod: NTX

## 2019-07-10 PROCEDURE — 85025 COMPLETE CBC W/AUTO DIFF WBC: CPT | Mod: NTX

## 2019-07-10 PROCEDURE — 25000003 PHARM REV CODE 250: Mod: NTX | Performed by: NURSE ANESTHETIST, CERTIFIED REGISTERED

## 2019-07-10 PROCEDURE — 83735 ASSAY OF MAGNESIUM: CPT | Mod: NTX

## 2019-07-10 PROCEDURE — 37000008 HC ANESTHESIA 1ST 15 MINUTES: Mod: NTX | Performed by: INTERNAL MEDICINE

## 2019-07-10 PROCEDURE — 88305 TISSUE EXAM BY PATHOLOGIST: CPT | Mod: NTX | Performed by: PATHOLOGY

## 2019-07-10 PROCEDURE — 45385 PR COLONOSCOPY,REMV LESN,SNARE: ICD-10-PCS | Mod: NTX,,, | Performed by: INTERNAL MEDICINE

## 2019-07-10 PROCEDURE — 27201089 HC SNARE, DISP (ANY): Mod: NTX | Performed by: INTERNAL MEDICINE

## 2019-07-10 PROCEDURE — 88305 TISSUE EXAM BY PATHOLOGIST: CPT | Mod: 26,NTX,, | Performed by: PATHOLOGY

## 2019-07-10 PROCEDURE — 85018 HEMOGLOBIN: CPT | Mod: NTX,91

## 2019-07-10 PROCEDURE — D9220A PRA ANESTHESIA: ICD-10-PCS | Mod: ANES,NTX,, | Performed by: ANESTHESIOLOGY

## 2019-07-10 PROCEDURE — D9220A PRA ANESTHESIA: Mod: ANES,NTX,, | Performed by: ANESTHESIOLOGY

## 2019-07-10 PROCEDURE — 45385 COLONOSCOPY W/LESION REMOVAL: CPT | Mod: NTX,,, | Performed by: INTERNAL MEDICINE

## 2019-07-10 PROCEDURE — D9220A PRA ANESTHESIA: ICD-10-PCS | Mod: CRNA,NTX,, | Performed by: NURSE ANESTHETIST, CERTIFIED REGISTERED

## 2019-07-10 PROCEDURE — G0378 HOSPITAL OBSERVATION PER HR: HCPCS | Mod: NTX

## 2019-07-10 PROCEDURE — 88305 TISSUE SPECIMEN TO PATHOLOGY - SURGERY: ICD-10-PCS | Mod: 26,NTX,, | Performed by: PATHOLOGY

## 2019-07-10 PROCEDURE — 37000009 HC ANESTHESIA EA ADD 15 MINS: Mod: NTX | Performed by: INTERNAL MEDICINE

## 2019-07-10 PROCEDURE — 63600175 PHARM REV CODE 636 W HCPCS: Mod: NTX | Performed by: NURSE ANESTHETIST, CERTIFIED REGISTERED

## 2019-07-10 RX ORDER — SODIUM CHLORIDE 9 MG/ML
INJECTION, SOLUTION INTRAVENOUS CONTINUOUS
Status: DISCONTINUED | OUTPATIENT
Start: 2019-07-10 | End: 2019-07-10 | Stop reason: HOSPADM

## 2019-07-10 RX ORDER — GLYCOPYRROLATE 0.2 MG/ML
INJECTION INTRAMUSCULAR; INTRAVENOUS
Status: DISCONTINUED | OUTPATIENT
Start: 2019-07-10 | End: 2019-07-10

## 2019-07-10 RX ORDER — PROPOFOL 10 MG/ML
VIAL (ML) INTRAVENOUS
Status: DISCONTINUED | OUTPATIENT
Start: 2019-07-10 | End: 2019-07-10

## 2019-07-10 RX ORDER — CIPROFLOXACIN 250 MG/1
250 TABLET, FILM COATED ORAL 2 TIMES DAILY
Qty: 6 TABLET | Refills: 0 | Status: SHIPPED | OUTPATIENT
Start: 2019-07-10 | End: 2019-07-15

## 2019-07-10 RX ORDER — LIDOCAINE HCL/PF 100 MG/5ML
SYRINGE (ML) INTRAVENOUS
Status: DISCONTINUED | OUTPATIENT
Start: 2019-07-10 | End: 2019-07-10

## 2019-07-10 RX ADMIN — PROPOFOL 50 MG: 10 INJECTION, EMULSION INTRAVENOUS at 10:07

## 2019-07-10 RX ADMIN — ONDANSETRON 4 MG: 2 INJECTION, SOLUTION INTRAMUSCULAR; INTRAVENOUS at 10:07

## 2019-07-10 RX ADMIN — LIDOCAINE HYDROCHLORIDE 50 MG: 20 INJECTION, SOLUTION INTRAVENOUS at 10:07

## 2019-07-10 RX ADMIN — PROPOFOL 100 MG: 10 INJECTION, EMULSION INTRAVENOUS at 10:07

## 2019-07-10 RX ADMIN — GLYCOPYRROLATE 0.2 MG: 0.2 INJECTION, SOLUTION INTRAMUSCULAR; INTRAVENOUS at 10:07

## 2019-07-10 RX ADMIN — SODIUM CHLORIDE: 0.9 INJECTION, SOLUTION INTRAVENOUS at 09:07

## 2019-07-10 NOTE — ANESTHESIA PREPROCEDURE EVALUATION
07/10/2019  Sejal Matamoros is a 68 y.o., female.    Pre-op Assessment    I have reviewed the Patient Summary Reports.     I have reviewed the Nursing Notes.   I have reviewed the Medications.     Review of Systems  Anesthesia Hx:  Denies Family Hx of Anesthesia complications.   Denies Personal Hx of Anesthesia complications.   Hematology/Oncology:         -- Anemia:   Cardiovascular:   Hypertension Dysrhythmias atrial fibrillation    Pulmonary:   Probable KARON   Hepatic/GI:   Bowel Prep. Liver Disease,    Musculoskeletal:   Arthritis   Spine Disorders: cervical    Neurological:   Denies Neuromuscular Disease.   Chronic Pain Syndrome   Endocrine:   Diabetes    Psych:   Psychiatric History          Physical Exam  General:  Morbid Obesity, Malnutrition    Airway/Jaw/Neck:  Airway Findings: Mouth Opening: Normal Tongue: Normal  General Airway Assessment: Adult  Mallampati: III  Improves to II with phonation.  Jaw/Neck Findings:  Neck ROM: Extension Decreased, Mild  Neck Findings:  Girth Increased      Dental:  Dental Findings: Periodontal disease, Severe   Chest/Lungs:  Chest/Lungs Findings: Clear to auscultation, Decreased Breath Sounds Bilateral     Heart/Vascular:  Heart Findings: Rate: Normal  Rhythm: Regular Rhythm  Sounds: Normal             Anesthesia Plan  Type of Anesthesia, risks & benefits discussed:  Anesthesia Type:  general  Patient's Preference:   Intra-op Monitoring Plan: standard ASA monitors  Intra-op Monitoring Plan Comments:   Post Op Pain Control Plan:   Post Op Pain Control Plan Comments:   Induction:   IV  Beta Blocker:  Patient is on a Beta-Blocker and has received one dose within the past 24 hours (No further documentation required).       Informed Consent: Patient understands risks and agrees with Anesthesia plan.  Questions answered. Anesthesia consent signed with patient.  ASA  Score: 3     Day of Surgery Review of History & Physical:    H&P update referred to the provider.         Ready For Surgery From Anesthesia Perspective.

## 2019-07-10 NOTE — DISCHARGE SUMMARY
Discharge Summary  Hospital Medicine    Admit Date: 7/8/2019    Date and Time: 7/10/797378:30 AM    Discharge Attending Physician: Seth Murray MD    Primary Care Physician: Deandre Lundberg NP    Diagnoses:  Active Hospital Problems    Diagnosis  POA    *Symptomatic anemia [D64.9] status post 2 units of packed red blood cells  Yes    UTI (urinary tract infection) [N39.0]  Yes    Malnutrition of moderate degree [E44.0]  Yes    Liver cirrhosis secondary to RHOADES [K75.81, K74.60]  Yes    Other ascites [R18.8]  Yes    HTN (hypertension) [I10]  Yes    Mixed hyperlipidemia [E78.2]  Yes    Type 2 diabetes mellitus with hyperglycemia, with long-term current use of insulin [E11.65, Z79.4]  Not Applicable    DDD (degenerative disc disease), lumbar [M51.36]  Yes     Discharged Condition: Good    Hospital Course:   Patient is a 68 y.o. female admitted to Hospitalist Service from [facility] Emergency Room with complaint of progressively worsening generalized weakness and shortness of breath for 2 weeks. Patient reportedly has past medical history significant for non alcoholic steatohepatitis/cirrhosis of liver, chronic anxiety disorder, chronic atrial fibrillation, diabetes mellitus type 2, hypertension, hypo lipidemia, moderate malnutrition, portal hypertension and history of recurrent ascites.  Patient closely follows with liver transplant team at [facility]. Patient presented to the emergency room with complaint of progressively worsening generalized weakness and dizziness especially when she is up and about and tries to walk.  Also patient was reporting dyspnea on exertion as well.  Patient denied any prior history of peptic ulcer disease, melena, bleeding per rectum, hemorrhoid problems or hematuria.  Patient denied nausea, vomiting, headache, vision changes, focal neuro-deficits, cough or fever.  Patient reports, every 2 weeks she gets therapeutic paracentesis done.  Last paracentesis done on  July 3rd, 2019 when more than 7.4 L ascitic fluid removed. Patient was admitted to Hospitalist medicine service. Patient was evaluated by Dr. Anthony. Patient was evaluated by GI specialist. Hemoglobin and hematocrit closely monitored. Patient required two transfusion of PRBC. Patient under went endoscopic evaluation, results below. Hemoglobin and hematocrit remained stable, diet slowly advanced, patient tolerated diet. Symptoms resolved.  Outpatient capsule endoscopy has been arranged.  Patient to continue close follow-up with her liver Transplant Team.  Home health is being resumed.  Patient will have a surveillance CBC blood testing performed next week.  Patient was discharged home in stable condition with following discharge plan of care.     Consults: Dr. Anthony    Significant Diagnostic Studies:    Chest X-Ray:  No acute cardiopulmonary disease     CT head without contrast: Mild involutional changes and findings suggesting mild microvascular ischemic change with no acute intracranial findings.     EGD:  - Grade I esophageal varices.                       - Gastritis. Biopsied.                       - Multiple gastric polyps. Resected and retrieved.                       - Normal examined duodenum.                       -Symptomatic CHIDI, not etiology found on upper                        endoscopy.    Colonoscopy:   - One 4 mm polyp in the cecum, removed with a cold                        snare. Resected and retrieved.                       - Diverticulosis in the sigmoid colon.                       - External and internal hemorrhoids.                       - The examination was otherwise normal on direct and                        retroflexion views.                       -CHIDI, no clear etiology found on upper or lower                        endoscopy.    Special Treatments/Procedures: None  Disposition: Home or Self Care    Medications:  Reconciled Home Medications:   Current Discharge Medication List       START taking these medications    Details   ciprofloxacin HCl (CIPRO) 250 MG tablet Take 1 tablet (250 mg total) by mouth 2 (two) times daily. for 5 days  Qty: 6 tablet, Refills: 0         CONTINUE these medications which have NOT CHANGED    Details   aspirin (ECOTRIN) 81 MG EC tablet Take 81 mg by mouth once daily.      clonazePAM (KLONOPIN) 0.5 MG tablet Take 0.5 mg by mouth every evening.      ergocalciferol (ERGOCALCIFEROL) 50,000 unit Cap Take 2 tablets weekly  Qty: 24 capsule, Refills: 4      escitalopram oxalate (LEXAPRO) 20 MG tablet Take 20 mg by mouth once daily.      furosemide (LASIX) 40 MG tablet Take 80 mg by mouth once daily.      gabapentin (NEURONTIN) 600 MG tablet Take 600 mg by mouth 2 (two) times daily.      insulin aspart U-100 (NOVOLOG FLEXPEN U-100 INSULIN) 100 unit/mL (3 mL) InPn pen 30 units before each meal + correction Max  u  Qty: 60 mL, Refills: 3      insulin glargine (LANTUS) 100 unit/mL injection Inject 80 Units into the skin once daily.  Qty: 45 mL, Refills: 3      ketoconazole (NIZORAL) 2 % cream Apply topically once daily.  Qty: 60 Tube, Refills: 3    Associated Diagnoses: Tinea pedis of right foot      lactulose (CHRONULAC) 20 gram/30 mL Soln Take 30 mLs (20 g total) by mouth 3 (three) times daily. Adjust dose to have  3-4 BM's daily  Qty: 5000 mL, Refills: 5    Associated Diagnoses: Hepatic encephalopathy      metoprolol succinate (TOPROL XL) 25 MG 24 hr tablet Take 25 mg by mouth once daily.      omeprazole (PRILOSEC) 40 MG capsule Take 40 mg by mouth once daily.      rifAXIMin (XIFAXAN) 550 mg Tab Take 1 tablet (550 mg total) by mouth 2 (two) times daily.  Qty: 60 tablet, Refills: 11    Associated Diagnoses: Cirrhosis of liver with ascites, unspecified hepatic cirrhosis type; Hepatic encephalopathy      rosuvastatin (CRESTOR) 10 MG tablet Take 10 mg by mouth once daily.      spironolactone (ALDACTONE) 100 MG tablet Take 200 mg by mouth once daily.      XARELTO 20 mg  Tab Take 20 mg by mouth once daily.      zinc gluconate 50 mg tablet Take 50 mg by mouth once daily.           Discharge Procedure Orders   Referral to Home health   Referral Priority: Routine Referral Type: Home Health   Referral Reason: Specialty Services Required   Requested Specialty: Home Health Services   Number of Visits Requested: 1     Diet Cardiac     Diet diabetic     Other restrictions (specify):   Order Comments: PLEASE OBSERVE FALL PRECAUTIONS     Call MD for:   Order Comments: For worsening symptoms, chest pain, shortness of breath, increased abdominal pain, high grade fever, stroke or stroke like symptoms, immediately go to the nearest Emergency Room or call 911 as soon as possible.     Follow-up Information     Deandre Lundberg NP In 1 week.    Specialty:  Family Medicine  Contact information:  [address] [place] [address] [place] [address] [phone]             Please follow up.    Contact information:  Follow up with liver transplant team as before.           Dee Dee Anthony MD.    Specialties:  Gastroenterology, Internal Medicine  Why:  Her office will arrange Capsule endoscopy.   Contact information:  [address] [facility]  [address] [place] [address] [id number] [phone]                 Time spent on the discharge of patient: 32 minutes  Patient was seen and examined on the date of discharge and determined to be suitable for discharge.

## 2019-07-10 NOTE — PROVATION PATIENT INSTRUCTIONS
Discharge Summary/Instructions after an Endoscopic Procedure  Patient Name: Sejal Matamoros  Patient MRN: [id number]  Patient YOB: 1950  Wednesday, July 10, 2019  Dee Dee Anthony MD  RESTRICTIONS:  During your procedure today, you received medications for sedation.  These   medications may affect your judgment, balance and coordination.  Therefore,   for 24 hours, you have the following restrictions:   - DO NOT drive a car, operate machinery, make legal/financial decisions,   sign important papers or drink alcohol.    ACTIVITY:  Today: no heavy lifting, straining or running due to procedural   sedation/anesthesia.  The following day: return to full activity including work.  DIET:  Eat and drink normally unless instructed otherwise.     TREATMENT FOR COMMON SIDE EFFECTS:  - Mild abdominal pain, nausea, belching, bloating or excessive gas:  rest,   eat lightly and use a heating pad.  - Sore Throat: treat with throat lozenges and/or gargle with warm salt   water.  - Because air was used during the procedure, expelling large amounts of air   from your rectum or belching is normal.  - If a bowel prep was taken, you may not have a bowel movement for 1-3 days.    This is normal.  SYMPTOMS TO WATCH FOR AND REPORT TO YOUR PHYSICIAN:  1. Abdominal pain or bloating, other than gas cramps.  2. Chest pain.  3. Back pain.  4. Signs of infection such as: chills or fever occurring within 24 hours   after the procedure.  5. Rectal bleeding, which would show as bright red, maroon, or black stools.   (A tablespoon of blood from the rectum is not serious, especially if   hemorrhoids are present.)  6. Vomiting.  7. Weakness or dizziness.  GO DIRECTLY TO THE NEAREST EMERGENCY ROOM IF YOU HAVE ANY OF THE FOLLOWING:      Difficulty breathing              Chills and/or fever over 101 F   Persistent vomiting and/or vomiting blood   Severe abdominal pain   Severe chest pain   Black, tarry stools   Bleeding- more than  one tablespoon   Any other symptom or condition that you feel may need urgent attention  Your doctor recommends these additional instructions:  If any biopsies were taken, your doctors clinic will contact you in 1 to 2   weeks with any results.  - [facility] patient to [facility] for [facility] care.   - Advance diet as tolerated today.   - Resume aspirin today and Xarelto (rivaroxaban) today at prior doses.    Refer to primary physician for further adjustment of therapy.   - Await pathology results.   - Repeat colonoscopy in 5 years for surveillance based on pathology   results.  -Will arrange outpatient VCE to further evaluate source of anemia  -Would discharge on oral iron supplementation  For questions, problems or results please call your physician - Dee Dee Anthony MD at Work:  [phone].  [facility], EMERGENCY ROOM PHONE NUMBER: [phone]  IF A COMPLICATION OR EMERGENCY SITUATION ARISES AND YOU ARE UNABLE TO REACH   YOUR PHYSICIAN - GO DIRECTLY TO THE EMERGENCY ROOM.  Dee Dee Anthony MD  7/10/2019 10:46:38 AM  This report has been verified and signed electronically.  [employer]ATION

## 2019-07-10 NOTE — PLAN OF CARE
Colonoscopy:  -Small cecal polyp, removed  -Sigmoid diverticulosis  -Non-bleeding hemorrhoids    Recommendation:  -No source of blood loss found on upper or lower endoscopy  -Ok for regular diet and to restart Xarelto and Aspirin from GI perspective  -Place on oral iron supplementation  -Will arrange outpatient video capsule endoscopy to further evaluate source of anemia    Dee Dee Anthony MD

## 2019-07-10 NOTE — PLAN OF CARE
CM attempted to schedule PCP follow up- PCP office not answering phone. CM will try again at later time.     07/10/19 1148   Discharge Assessment   Assessment Type Discharge Planning Reassessment

## 2019-07-10 NOTE — PLAN OF CARE
07/09/19 1947   Patient Assessment/Suction   Level of Consciousness (AVPU) alert   PRE-TX-O2   O2 Device (Oxygen Therapy) room air   SpO2 98 %   Pulse (!) 55   Resp 16

## 2019-07-10 NOTE — ANESTHESIA POSTPROCEDURE EVALUATION
Anesthesia Post Evaluation    Patient: Sejal Matamoros    Procedure(s) Performed: Procedure(s) (LRB):  COLONOSCOPY (N/A)    Final Anesthesia Type: general  Patient location during evaluation: PACU  Patient participation: Yes- Able to Participate  Level of consciousness: awake and alert  Post-procedure vital signs: reviewed and stable  Pain management: adequate  Airway patency: patent  PONV status at discharge: No PONV  Anesthetic complications: no      Cardiovascular status: blood pressure returned to baseline  Respiratory status: unassisted  Hydration status: euvolemic  Follow-up not needed.          Vitals Value Taken Time   /71 7/10/2019 11:10 AM   Temp 36.6 °C (97.9 °F) 7/10/2019  8:51 AM   Pulse 69 7/10/2019 11:10 AM   Resp 16 7/10/2019 11:10 AM   SpO2 100 % 7/10/2019 11:10 AM         Event Time     Out of Recovery 07/10/2019 11:15:02          Pain/Rima Score: Rima Score: 10 (7/10/2019 11:10 AM)

## 2019-07-10 NOTE — PLAN OF CARE
07/10/19 0705   PRE-TX-O2   O2 Device (Oxygen Therapy) room air   SpO2 95 %   Pulse Oximetry Type Continuous   $ Pulse Oximetry - Multiple Charge Pulse Oximetry - Multiple

## 2019-07-10 NOTE — PLAN OF CARE
07/10/19 1314   Final Note   Assessment Type Final Discharge Note   Anticipated Discharge Disposition Home-Health

## 2019-07-10 NOTE — TRANSFER OF CARE
"Anesthesia Transfer of Care Note    Patient: Sejal Matamoros    Procedure(s) Performed: Procedure(s) (LRB):  COLONOSCOPY (N/A)    Patient location: PACU    Anesthesia Type: general    Transport from OR: Transported from OR on room air with adequate spontaneous ventilation    Post pain: adequate analgesia    Post assessment: no apparent anesthetic complications and tolerated procedure well    Post vital signs: stable    Level of consciousness: sedated    Nausea/Vomiting: no nausea/vomiting    Complications: none    Transfer of care protocol was followed      Last vitals:   Visit Vitals  BP (!) 127/57 (BP Location: Left arm, Patient Position: Lying)   Pulse 67   Temp 36.6 °C (97.9 °F) (Skin)   Resp 16   Ht 5' 2" (1.575 m)   Wt 106.6 kg (235 lb)   LMP  (LMP Unknown)   SpO2 100%   Breastfeeding? No   BMI 42.98 kg/m²     "

## 2019-07-10 NOTE — PLAN OF CARE
Pt awake, alert.  Vital signs stable, denies nausea or pain, abd soft,  No adverse effects of anesthesia noted. Transferred to room per wheelchair,  Report given to Iva

## 2019-07-10 NOTE — PLAN OF CARE
CM sent hh order and hh packet to [facility] ([phone] fax [phone]). CM will follow     07/10/19 1314   Post-Acute Status   Post-Acute Authorization Home Health/Hospice   Home Health/Hospice Status Referrals Sent

## 2019-07-10 NOTE — NURSING
Pt returned to room from [facility] via wheelchair. VSS. Dr. Murray at bedside. Stated ok to discharge after pt tolerates lunch.

## 2019-07-10 NOTE — PLAN OF CARE
Problem: Adult Inpatient Plan of Care  Goal: Plan of Care Review  Patient alert and oriented.  Sinus larissa on cardiac monitor.  NPO now except bowel prep, patient verbal understanding.  Diabetes management.  Up with 1 person assist to the bathroom.  Full code.  Call light in reach.  Bed alarm set for patient's safety.

## 2019-07-10 NOTE — NURSING
Discharge instructions reviewed with pt and sister, verbalized understanding. PIV dc, catheter intact. Educated pt on antibiotic therapy and follow up appointments, verbalized understanding. RX placed in discharge folder. All questions answered. Pt transferred off unit via wheelchair with pt transport. Sister at side.

## 2019-07-10 NOTE — PLAN OF CARE
"CM called [facility] to check status of referral- stated they did not receive all of information, but pt is accepted since she already was active with [facility]. TACHO hard faxed  order and  packet to [phone]. TACHO received fax confirmation     7/10/2019 2:51:31 PM Transmission Record          Sent to [phone] with remote ID "[email or web address]"          Result: (0/339;0/0) Success          Page record: 1 - 25          Elapsed time: 09:31 on channel 17     07/10/19 4641   Post-Acute Status   Post-Acute Authorization Home Health/Hospice   Home Health/Hospice Status Set-up Complete     "

## 2019-07-11 ENCOUNTER — TELEPHONE (OUTPATIENT)
Dept: TRANSPLANT | Facility: CLINIC | Age: 69
End: 2019-07-11

## 2019-07-11 NOTE — TELEPHONE ENCOUNTER
Phone call returned to pt's daughter, Danitza.  Paracentesis scheduled for next week as requested.  Para scheduled for 7/18 @ 9:30.  Danitza voiced understanding.  Provided her with the number to schedule future tracee...standing orders for tracee previously entered.  She agrees to call [phone] to schedule.  She denies the need for any further assistance at this time.    ===============================    ----- Message from Bi Angulo sent at 7/11/2019  9:26 AM CDT -----  Contact: pt daughter/Danitza  Please call pt daughter at [phone]    Patient daughter has questions about getting a future paracentesis procedure     Thank you

## 2019-07-18 ENCOUNTER — HOSPITAL ENCOUNTER (OUTPATIENT)
Dept: INTERVENTIONAL RADIOLOGY/VASCULAR | Facility: HOSPITAL | Age: 69
Discharge: HOME | End: 2019-07-18
Attending: INTERNAL MEDICINE
Payer: MEDICARE

## 2019-07-18 VITALS
DIASTOLIC BLOOD PRESSURE: 61 MMHG | SYSTOLIC BLOOD PRESSURE: 121 MMHG | OXYGEN SATURATION: 100 % | RESPIRATION RATE: 16 BRPM | HEART RATE: 76 BPM

## 2019-07-18 DIAGNOSIS — R18.8 OTHER ASCITES: ICD-10-CM

## 2019-07-18 LAB
GRAM STN SPEC: NORMAL
GRAM STN SPEC: NORMAL

## 2019-07-18 PROCEDURE — 49083 IR PARACENTESIS WITH IMAGING: ICD-10-PCS | Mod: NTX,,, | Performed by: RADIOLOGY

## 2019-07-18 PROCEDURE — 87205 SMEAR GRAM STAIN: CPT | Mod: TXP

## 2019-07-18 PROCEDURE — 87070 CULTURE OTHR SPECIMN AEROBIC: CPT | Mod: TXP

## 2019-07-18 PROCEDURE — 87075 CULTR BACTERIA EXCEPT BLOOD: CPT | Mod: TXP

## 2019-07-18 PROCEDURE — 49083 ABD PARACENTESIS W/IMAGING: CPT | Mod: NTX

## 2019-07-18 PROCEDURE — 49083 ABD PARACENTESIS W/IMAGING: CPT | Mod: NTX,,, | Performed by: RADIOLOGY

## 2019-07-18 NOTE — H&P
Radiology History & Physical      SUBJECTIVE:     Chief Complaint: abdominal distention    History of Present Illness:  Sejal Matamoros is a 68 y.o. female who presents for eevaluation of ascites  Past Medical History:   Diagnosis Date    A-fib     Anxiety     Cirrhosis     DDD (degenerative disc disease), cervical 3/21/2018    DDD (degenerative disc disease), lumbar 3/21/2018    Decompensated hepatic cirrhosis 12/26/2018    Depression     Diabetes mellitus, type 2     Hepatic encephalopathy 12/26/2018    HLD (hyperlipidemia)     HTN (hypertension)     Hypoalbuminemia 12/26/2018    Hypoglycemia associated with type 2 diabetes mellitus 12/26/2018    Morbid obesity with BMI of 45.0-49.9, adult 12/7/2018    Obesity     Other ascites 2/20/2019    Portal hypertension 12/26/2018    Type 2 diabetes mellitus with hyperglycemia, with long-term current use of insulin 12/6/2018     Past Surgical History:   Procedure Laterality Date    arm surgery      left    CARPAL TUNNEL RELEASE      left    CHOLECYSTECTOMY      COLONOSCOPY N/A 7/10/2019    [facility] at [facility]    EGD (ESOPHAGOGASTRODUODENOSCOPY) N/A 7/9/2019    Per[facility] by [facility] at [facility] END[facility]    HYSTERECTOMY      ROTATOR CUFF REPAIR      TOTAL KNEE ARTHROPLASTY         Home Meds:   Prior to Admission medications    Medication Sig Start Date End Date Taking? Authorizing Provider   aspirin (ECOTRIN) 81 MG EC tablet Take 81 mg by mouth once daily.    Historical Provider, MD   clonazePAM (KLONOPIN) 0.5 MG tablet Take 0.5 mg by mouth every evening.    Historical Provider, MD   ergocalciferol (ERGOCALCIFEROL) 50,000 unit Cap Take 2 tablets weekly  Patient taking differently: Take 50,000 Units by mouth every 7 days. Friday 5/22/19   Mary Agudelo, DNP, NP   escitalopram oxalate (LEXAPRO) 20 MG tablet Take 20 mg by mouth once daily. 10/31/18   Historical Provider, MD   furosemide (LASIX) 40 MG tablet  Take 80 mg by mouth once daily.    Historical Provider, MD   gabapentin (NEURONTIN) 600 MG tablet Take 600 mg by mouth 2 (two) times daily.    Historical Provider, MD   insulin aspart U-100 (NOVOLOG FLEXPEN U-100 INSULIN) 100 unit/mL (3 mL) InPn pen 30 units before each meal + correction Max  u 5/22/19   Mary Augdelo DNP, NP   insulin glargine (LANTUS) 100 unit/mL injection Inject 80 Units into the skin once daily.  Patient taking differently: Inject 80 Units into the skin every morning.  4/17/19   Mary Agudelo DNP, NP   ketoconazole (NIZORAL) 2 % cream Apply topically once daily. 2/7/19   Carlos Eduardo Ley DPM   lactulose (CHRONULAC) 20 gram/30 mL Soln Take 30 mLs (20 g total) by mouth 3 (three) times daily. Adjust dose to have  3-4 BM's daily  Patient taking differently: Take 20 g by mouth once daily. Adjust dose to have  3-4 BM's daily 3/21/19   Deepika Plunkett, TARUN   metoprolol succinate (TOPROL XL) 25 MG 24 hr tablet Take 25 mg by mouth once daily. 10/3/18 10/3/19  Historical Provider, MD   omeprazole (PRILOSEC) 40 MG capsule Take 40 mg by mouth once daily. 10/17/18   Historical Provider, MD   rifAXIMin (XIFAXAN) 550 mg Tab Take 1 tablet (550 mg total) by mouth 2 (two) times daily. 4/26/19 4/25/20  Bety Cerrato MD   rosuvastatin (CRESTOR) 10 MG tablet Take 10 mg by mouth once daily. 8/13/18   Historical Provider, MD   spironolactone (ALDACTONE) 100 MG tablet Take 200 mg by mouth once daily.    Historical Provider, MD   XARELTO 20 mg Tab Take 20 mg by mouth once daily. 11/30/18   Historical Provider, MD   zinc gluconate 50 mg tablet Take 50 mg by mouth once daily.    Historical Provider, MD     Anticoagulants/Antiplatelets: no anticoagulation    Allergies:   Review of patient's allergies indicates:   Allergen Reactions    Iodinated contrast- oral and iv dye Hives    Zoloft [sertraline]      Sedation History:  no adverse reactions    Review of Systems:   Hematological: no known  coagulopathies  Respiratory: no shortness of breath  Cardiovascular: no chest pain  Gastrointestinal: no abdominal pain  Genito-Urinary: no dysuria  Musculoskeletal: negative  Neurological: no TIA or stroke symptoms         OBJECTIVE:     Vital Signs (Most Recent)  Pulse: 83 (07/18/19 0907)  Resp: 16 (07/18/19 0907)  BP: (!) 165/62 (07/18/19 0907)  SpO2: 100 % (07/18/19 0907)    Physical Exam:  ASA: 2  Mallampati: na    General: no acute distress  Mental Status: alert and oriented to person, place and time  HEENT: normocephalic, atraumatic  Chest: unlabored breathing  Heart: regular heart rate  Abdomen: distended  Extremity: moves all extremities    ASSESSMENT/PLAN:     Sedation Plan: local anesthesia  Patient will undergo US guided paracentesis

## 2019-07-18 NOTE — PROGRESS NOTES
Paracentesis complete. 4200 mLs peritoneal fluid drained. Pt tolerated well. Dressing to abdomen clean, dry, and intact.  Specimens sent per lab order. Pt acknowledged discharge instructions. Pt discharged per unit and hospital protocol..

## 2019-07-22 LAB — BACTERIA SPEC AEROBE CULT: NO GROWTH

## 2019-07-25 LAB — BACTERIA SPEC ANAEROBE CULT: NORMAL

## 2019-07-26 ENCOUNTER — TELEPHONE (OUTPATIENT)
Dept: TRANSPLANT | Facility: CLINIC | Age: 69
End: 2019-07-26

## 2019-07-26 ENCOUNTER — HOSPITAL ENCOUNTER (OUTPATIENT)
Facility: HOSPITAL | Age: 69
Discharge: HOME HEALTH | End: 2019-07-28
Attending: EMERGENCY MEDICINE | Admitting: HOSPITALIST
Payer: MEDICARE

## 2019-07-26 DIAGNOSIS — R06.02 SOB (SHORTNESS OF BREATH): ICD-10-CM

## 2019-07-26 DIAGNOSIS — E11.42 TYPE 2 DIABETES MELLITUS WITH DIABETIC POLYNEUROPATHY, WITH LONG-TERM CURRENT USE OF INSULIN: ICD-10-CM

## 2019-07-26 DIAGNOSIS — K75.81 LIVER CIRRHOSIS SECONDARY TO NASH: ICD-10-CM

## 2019-07-26 DIAGNOSIS — Z79.4 TYPE 2 DIABETES MELLITUS WITH DIABETIC POLYNEUROPATHY, WITH LONG-TERM CURRENT USE OF INSULIN: ICD-10-CM

## 2019-07-26 DIAGNOSIS — M54.12 CERVICAL RADICULOPATHY: ICD-10-CM

## 2019-07-26 DIAGNOSIS — K74.60 LIVER CIRRHOSIS SECONDARY TO NASH: ICD-10-CM

## 2019-07-26 DIAGNOSIS — I10 ESSENTIAL HYPERTENSION: ICD-10-CM

## 2019-07-26 DIAGNOSIS — D50.9 IRON DEFICIENCY ANEMIA, UNSPECIFIED IRON DEFICIENCY ANEMIA TYPE: ICD-10-CM

## 2019-07-26 DIAGNOSIS — R18.8 OTHER ASCITES: Primary | ICD-10-CM

## 2019-07-26 LAB
ALBUMIN SERPL BCP-MCNC: 2.2 G/DL (ref 3.5–5.2)
ALP SERPL-CCNC: 247 U/L (ref 55–135)
ALT SERPL W/O P-5'-P-CCNC: 20 U/L (ref 10–44)
ANION GAP SERPL CALC-SCNC: 7 MMOL/L (ref 8–16)
ANISOCYTOSIS BLD QL SMEAR: ABNORMAL
AST SERPL-CCNC: 37 U/L (ref 10–40)
BASOPHILS # BLD AUTO: 0.04 K/UL (ref 0–0.2)
BASOPHILS NFR BLD: 0.9 % (ref 0–1.9)
BILIRUB SERPL-MCNC: 0.6 MG/DL (ref 0.1–1)
BNP SERPL-MCNC: 352 PG/ML (ref 0–99)
BUN SERPL-MCNC: 24 MG/DL (ref 8–23)
CALCIUM SERPL-MCNC: 8.4 MG/DL (ref 8.7–10.5)
CHLORIDE SERPL-SCNC: 102 MMOL/L (ref 95–110)
CO2 SERPL-SCNC: 28 MMOL/L (ref 23–29)
CREAT SERPL-MCNC: 1.1 MG/DL (ref 0.5–1.4)
DIFFERENTIAL METHOD: ABNORMAL
EOSINOPHIL # BLD AUTO: 0.1 K/UL (ref 0–0.5)
EOSINOPHIL NFR BLD: 1.3 % (ref 0–8)
ERYTHROCYTE [DISTWIDTH] IN BLOOD BY AUTOMATED COUNT: 22.7 % (ref 11.5–14.5)
EST. GFR  (AFRICAN AMERICAN): 60 ML/MIN/1.73 M^2
EST. GFR  (NON AFRICAN AMERICAN): 52 ML/MIN/1.73 M^2
GLUCOSE SERPL-MCNC: 161 MG/DL (ref 70–110)
HCT VFR BLD AUTO: 25.9 % (ref 37–48.5)
HGB BLD-MCNC: 7.7 G/DL (ref 12–16)
IMM GRANULOCYTES # BLD AUTO: 0.01 K/UL (ref 0–0.04)
LYMPHOCYTES # BLD AUTO: 1.5 K/UL (ref 1–4.8)
LYMPHOCYTES NFR BLD: 32.2 % (ref 18–48)
MCH RBC QN AUTO: 25.7 PG (ref 27–31)
MCHC RBC AUTO-ENTMCNC: 29.7 G/DL (ref 32–36)
MCV RBC AUTO: 86 FL (ref 82–98)
MONOCYTES # BLD AUTO: 0.5 K/UL (ref 0.3–1)
MONOCYTES NFR BLD: 11.2 % (ref 4–15)
NEUTROPHILS # BLD AUTO: 2.5 K/UL (ref 1.8–7.7)
NEUTROPHILS NFR BLD: 54.2 % (ref 38–73)
NRBC BLD-RTO: 0 /100 WBC
PLATELET # BLD AUTO: 267 K/UL (ref 150–350)
PLATELET BLD QL SMEAR: ABNORMAL
PMV BLD AUTO: 10 FL (ref 9.2–12.9)
POCT GLUCOSE: 194 MG/DL (ref 70–110)
POLYCHROMASIA BLD QL SMEAR: ABNORMAL
POTASSIUM SERPL-SCNC: 3.5 MMOL/L (ref 3.5–5.1)
PROT SERPL-MCNC: 6.4 G/DL (ref 6–8.4)
RBC # BLD AUTO: 3 M/UL (ref 4–5.4)
SODIUM SERPL-SCNC: 137 MMOL/L (ref 136–145)
TSH SERPL DL<=0.005 MIU/L-ACNC: 1.72 UIU/ML (ref 0.4–4)
WBC # BLD AUTO: 4.56 K/UL (ref 3.9–12.7)

## 2019-07-26 PROCEDURE — G0378 HOSPITAL OBSERVATION PER HR: HCPCS | Mod: NTX

## 2019-07-26 PROCEDURE — 84443 ASSAY THYROID STIM HORMONE: CPT | Mod: NTX

## 2019-07-26 PROCEDURE — 36430 TRANSFUSION BLD/BLD COMPNT: CPT | Performed by: EMERGENCY MEDICINE

## 2019-07-26 PROCEDURE — 99285 EMERGENCY DEPT VISIT HI MDM: CPT | Mod: NTX

## 2019-07-26 PROCEDURE — 96372 THER/PROPH/DIAG INJ SC/IM: CPT | Mod: 59,NTX | Performed by: EMERGENCY MEDICINE

## 2019-07-26 PROCEDURE — 83880 ASSAY OF NATRIURETIC PEPTIDE: CPT | Mod: NTX

## 2019-07-26 PROCEDURE — 80053 COMPREHEN METABOLIC PANEL: CPT | Mod: NTX

## 2019-07-26 PROCEDURE — 36415 COLL VENOUS BLD VENIPUNCTURE: CPT | Mod: NTX

## 2019-07-26 PROCEDURE — 85025 COMPLETE CBC W/AUTO DIFF WBC: CPT | Mod: NTX

## 2019-07-26 PROCEDURE — 25000003 PHARM REV CODE 250: Mod: NTX | Performed by: EMERGENCY MEDICINE

## 2019-07-26 PROCEDURE — 93005 ELECTROCARDIOGRAM TRACING: CPT | Mod: NTX

## 2019-07-26 PROCEDURE — S5571 INSULIN DISPOS PEN 3 ML: HCPCS | Mod: NTX | Performed by: EMERGENCY MEDICINE

## 2019-07-26 PROCEDURE — 63600175 PHARM REV CODE 636 W HCPCS: Mod: NTX | Performed by: EMERGENCY MEDICINE

## 2019-07-26 RX ORDER — PANTOPRAZOLE SODIUM 40 MG/1
40 TABLET, DELAYED RELEASE ORAL DAILY
Status: DISCONTINUED | OUTPATIENT
Start: 2019-07-27 | End: 2019-07-28 | Stop reason: HOSPADM

## 2019-07-26 RX ORDER — IBUPROFEN 200 MG
16 TABLET ORAL
Status: DISCONTINUED | OUTPATIENT
Start: 2019-07-26 | End: 2019-07-28 | Stop reason: HOSPADM

## 2019-07-26 RX ORDER — ASPIRIN 81 MG/1
81 TABLET ORAL DAILY
Status: DISCONTINUED | OUTPATIENT
Start: 2019-07-27 | End: 2019-07-26

## 2019-07-26 RX ORDER — CLONAZEPAM 0.5 MG/1
0.5 TABLET ORAL NIGHTLY
Status: DISCONTINUED | OUTPATIENT
Start: 2019-07-26 | End: 2019-07-28 | Stop reason: HOSPADM

## 2019-07-26 RX ORDER — SODIUM CHLORIDE 0.9 % (FLUSH) 0.9 %
10 SYRINGE (ML) INJECTION
Status: DISCONTINUED | OUTPATIENT
Start: 2019-07-26 | End: 2019-07-28 | Stop reason: HOSPADM

## 2019-07-26 RX ORDER — INSULIN ASPART 100 [IU]/ML
30 INJECTION, SOLUTION INTRAVENOUS; SUBCUTANEOUS
Status: DISCONTINUED | OUTPATIENT
Start: 2019-07-27 | End: 2019-07-28 | Stop reason: HOSPADM

## 2019-07-26 RX ORDER — SPIRONOLACTONE 25 MG/1
200 TABLET ORAL DAILY
Status: DISCONTINUED | OUTPATIENT
Start: 2019-07-27 | End: 2019-07-28 | Stop reason: HOSPADM

## 2019-07-26 RX ORDER — ACETAMINOPHEN 325 MG/1
650 TABLET ORAL EVERY 6 HOURS PRN
Status: DISCONTINUED | OUTPATIENT
Start: 2019-07-26 | End: 2019-07-28 | Stop reason: HOSPADM

## 2019-07-26 RX ORDER — GLUCAGON 1 MG
1 KIT INJECTION
Status: DISCONTINUED | OUTPATIENT
Start: 2019-07-26 | End: 2019-07-28 | Stop reason: HOSPADM

## 2019-07-26 RX ORDER — INSULIN ASPART 100 [IU]/ML
1-10 INJECTION, SOLUTION INTRAVENOUS; SUBCUTANEOUS
Status: DISCONTINUED | OUTPATIENT
Start: 2019-07-26 | End: 2019-07-28 | Stop reason: HOSPADM

## 2019-07-26 RX ORDER — METOPROLOL SUCCINATE 25 MG/1
25 TABLET, EXTENDED RELEASE ORAL DAILY
Status: DISCONTINUED | OUTPATIENT
Start: 2019-07-27 | End: 2019-07-28 | Stop reason: HOSPADM

## 2019-07-26 RX ORDER — GABAPENTIN 300 MG/1
600 CAPSULE ORAL 2 TIMES DAILY
Status: DISCONTINUED | OUTPATIENT
Start: 2019-07-26 | End: 2019-07-28 | Stop reason: HOSPADM

## 2019-07-26 RX ORDER — IBUPROFEN 200 MG
24 TABLET ORAL
Status: DISCONTINUED | OUTPATIENT
Start: 2019-07-26 | End: 2019-07-28 | Stop reason: HOSPADM

## 2019-07-26 RX ORDER — ROSUVASTATIN CALCIUM 5 MG/1
10 TABLET, COATED ORAL DAILY
Status: DISCONTINUED | OUTPATIENT
Start: 2019-07-27 | End: 2019-07-28 | Stop reason: HOSPADM

## 2019-07-26 RX ORDER — ONDANSETRON 2 MG/ML
4 INJECTION INTRAMUSCULAR; INTRAVENOUS EVERY 8 HOURS PRN
Status: DISCONTINUED | OUTPATIENT
Start: 2019-07-26 | End: 2019-07-28 | Stop reason: HOSPADM

## 2019-07-26 RX ORDER — LACTULOSE 10 G/15ML
20 SOLUTION ORAL 3 TIMES DAILY
Status: DISCONTINUED | OUTPATIENT
Start: 2019-07-27 | End: 2019-07-28 | Stop reason: HOSPADM

## 2019-07-26 RX ORDER — ESCITALOPRAM OXALATE 10 MG/1
20 TABLET ORAL DAILY
Status: DISCONTINUED | OUTPATIENT
Start: 2019-07-27 | End: 2019-07-28 | Stop reason: HOSPADM

## 2019-07-26 RX ORDER — FUROSEMIDE 40 MG/1
80 TABLET ORAL DAILY
Status: DISCONTINUED | OUTPATIENT
Start: 2019-07-27 | End: 2019-07-28 | Stop reason: HOSPADM

## 2019-07-26 RX ORDER — INSULIN ASPART 100 [IU]/ML
30 INJECTION, SOLUTION INTRAVENOUS; SUBCUTANEOUS 2 TIMES DAILY WITH MEALS
Status: DISCONTINUED | OUTPATIENT
Start: 2019-07-27 | End: 2019-07-26

## 2019-07-26 RX ADMIN — INSULIN DETEMIR 80 UNITS: 100 INJECTION, SOLUTION SUBCUTANEOUS at 09:07

## 2019-07-26 RX ADMIN — CLONAZEPAM 0.5 MG: 0.5 TABLET ORAL at 09:07

## 2019-07-26 RX ADMIN — GABAPENTIN 600 MG: 300 CAPSULE ORAL at 09:07

## 2019-07-26 NOTE — TELEPHONE ENCOUNTER
Received phone call from Deana Paredes NP.  She reports that patient was seen today and has significant discomfort d/t ascites. Patient scheduled for outpatient paracentesis on 8/1.  Called IR to see if there are any sooner appts, but unfortunately there is no availability prior to 8/1.  Spoke w/ patient.  Informed her that first available for outpatient paracentesis is 8/1.  Patient c/o SOB and extreme discomfort.  Instructed patient to report to ED for assessment/ para.  She voiced understanding and agrees to do so.

## 2019-07-26 NOTE — ED PROVIDER NOTES
"Encounter Date: 7/26/2019    SCRIBE #1 NOTE: I, Rema Yang, am scribing for, and in the presence of, Dr. Trevon Heart.       History     Chief Complaint   Patient presents with    Shortness of Breath       Time seen by provider: 3:26 PM on 07/26/2019    Sejal Matamoros is a 68 y.o. female who presents the ED with complaints of SOB since x1 day ago. The patient reports that her "liver feels full" which is making her breath harder. She reports not being able to sleep last night due to the SOB. The patient was seen x2 weeks ago for anemia, blood transfusion, and 7L ascites. She had another paracentesis done x1 week ago. The patient has home health and they noticed her abdomen to be very bloated. The patient mentions that she has a preliminary check up for a liver transplant in x2 weeks at [place]. The patient denies onset of any other symptoms at this time. PMHx of DM type 2, cirrhosis, DDD, depression, anxiety, HLD, obesity, a-fib, morbid obesity, HTN, hepatic encephalopathy, hypoglycemia, decompensated hepatic cirrhosis, portal hypertension, hypoalbuminemia, and other ascites. SHx includes rotator cuff repair, total knee arthroplasty, hysterectomy, carpal tunnel release, arm surgery, cholecystectomy, colonoscopy, and esophagogastroduodenoscopy. Iodine and Zoloft allergy noted.    The history is provided by the patient and a relative.     Review of patient's allergies indicates:   Allergen Reactions    Iodinated contrast- oral and iv dye Hives    Zoloft [sertraline]      Past Medical History:   Diagnosis Date    A-fib     Anxiety     Cirrhosis     DDD (degenerative disc disease), cervical 3/21/2018    DDD (degenerative disc disease), lumbar 3/21/2018    Decompensated hepatic cirrhosis 12/26/2018    Depression     Diabetes mellitus, type 2     Hepatic encephalopathy 12/26/2018    HLD (hyperlipidemia)     HTN (hypertension)     Hypoalbuminemia 12/26/2018    Hypoglycemia associated with type 2 " diabetes mellitus 12/26/2018    Morbid obesity with BMI of 45.0-49.9, adult 12/7/2018    Obesity     Other ascites 2/20/2019    Portal hypertension 12/26/2018    Type 2 diabetes mellitus with hyperglycemia, with long-term current use of insulin 12/6/2018     Past Surgical History:   Procedure Laterality Date    arm surgery      left    CARPAL TUNNEL RELEASE      left    CHOLECYSTECTOMY      COLONOSCOPY N/A 7/10/2019    [facility]    EGD (ESOPHAGOGASTRODUODENOSCOPY) N/A 7/9/2019    [facility] ENDO    HYSTERECTOMY      ROTATOR CUFF REPAIR      TOTAL KNEE ARTHROPLASTY       Family History   Problem Relation Age of Onset    Cirrhosis Brother         RHOADES cirrhosis     Social History     Tobacco Use    Smoking status: Never Smoker    Smokeless tobacco: Never Used   Substance Use Topics    Alcohol use: No     Frequency: Never    Drug use: No     Review of Systems   Constitutional: Negative for activity change, appetite change, chills, fatigue and fever.   Eyes: Negative for visual disturbance.   Respiratory: Positive for shortness of breath. Negative for apnea.    Cardiovascular: Negative for chest pain and palpitations.   Gastrointestinal: Negative for abdominal distention and abdominal pain.   Genitourinary: Negative for difficulty urinating.   Musculoskeletal: Negative for neck pain.   Skin: Negative for pallor and rash.   Neurological: Negative for headaches.   Hematological: Does not bruise/bleed easily.   Psychiatric/Behavioral: Positive for sleep disturbance. Negative for agitation.       Physical Exam     Initial Vitals [07/26/19 1517]   BP Pulse Resp Temp SpO2   127/72 61 (!) 26 98 °F (36.7 °C) 100 %      MAP       --         Physical Exam    Nursing note and vitals reviewed.  Constitutional: She appears well-developed and well-nourished.   HENT:   Head: Normocephalic and atraumatic.   Eyes: Conjunctivae are normal.   Neck:  Normal range of motion. Neck supple.   Cardiovascular: Normal rate, regular rhythm and normal heart sounds. Exam reveals no gallop and no friction rub.    No murmur heard.  Pulmonary/Chest: Effort normal. No respiratory distress. She has decreased breath sounds. She has no wheezes. She has no rhonchi. She has no rales.   Diminished breath sounds to bases bilaterally. Dull percussion to bases bilaterally.   Abdominal: Soft. She exhibits distension and fluid wave. There is no tenderness.   Abdomen distended with positive fluid wave.   Musculoskeletal: Normal range of motion. She exhibits edema.   1+ pitting edema bilaterally.   Neurological: She is alert and oriented to person, place, and time.   Skin: Skin is warm and dry. No erythema.   Psychiatric: She has a normal mood and affect.         ED Course   Procedures  Labs Reviewed   CBC W/ AUTO DIFFERENTIAL - Abnormal; Notable for the following components:       Result Value    RBC 3.00 (*)     Hemoglobin 7.7 (*)     Hematocrit 25.9 (*)     Mean Corpuscular Hemoglobin 25.7 (*)     Mean Corpuscular Hemoglobin Conc 29.7 (*)     RDW 22.7 (*)     All other components within normal limits   COMPREHENSIVE METABOLIC PANEL - Abnormal; Notable for the following components:    Glucose 161 (*)     BUN, Bld 24 (*)     Calcium 8.4 (*)     Albumin 2.2 (*)     Alkaline Phosphatase 247 (*)     Anion Gap 7 (*)     eGFR if non  52 (*)     All other components within normal limits   B-TYPE NATRIURETIC PEPTIDE - Abnormal; Notable for the following components:     (*)     All other components within normal limits   TSH     EKG Readings: (Independently Interpreted)   Initial Reading: No STEMI. Rhythm: Sinus Bradycardia. Heart Rate: 59 bpm. Axis: Normal.   Low voltage. Non specific ST and T wave changes.     ECG Results          EKG 12-lead (In process)  Result time 07/26/19 16:30:40    In process by Interface, Lab In [facility] (07/26/19 16:30:40)                  Narrative:    Test Reason : R06.02,    Vent. Rate : 059 BPM     Atrial Rate : 059 BPM     P-R Int : 162 ms          QRS Dur : 078 ms      QT Int : 486 ms       P-R-T Axes : 068 037 014 degrees     QTc Int : 481 ms    Sinus bradycardia  Low voltage QRS  Borderline Abnormal ECG  When compared with ECG of 08-JUL-2019 13:23,  Premature atrial complexes are no longer Present    Referred By: [email or web address]   SELF           Confirmed By:                             Imaging Results          X-Ray Chest AP Portable (Final result)  Result time 07/26/19 16:01:57    Final result by Mello Shetty Jr., MD (07/26/19 16:01:57)                 Impression:      Mild cardiomegaly otherwise negative chest.      Electronically signed by: Mello Shetty MD  Date:    07/26/2019  Time:    16:01             Narrative:    EXAMINATION:  XR CHEST AP PORTABLE    CLINICAL HISTORY:  Shortness of breath    TECHNIQUE:  Single frontal view of the chest was performed.    COMPARISON:  Prior chest of July 8, 2019.    FINDINGS:  There is mild cardiomegaly in a left ventricular predominant pattern.  An intrapulmonary mass or infiltrate is not seen.  No pneumothorax or pleural effusion is seen.                                 Medical Decision Making:   History:   Old Medical Records: I decided to obtain old medical records.  Clinical Tests:   Lab Tests: Ordered and Reviewed  Radiological Study: Ordered and Reviewed  Medical Tests: Ordered and Reviewed  ED Management:  68-year-old female with a history of nonalcoholic fatty cirrhosis presents with progressively worsening shortness of breath.  Abdominal exam reveals a fluid wave ulcer large volume ascites.  She underwent ascites 2 weeks ago with 7 L removed and reports another paracentesis 1 week ago.  Lung exam is normal with a chest x-ray which is independently interpreted by me that fails to demonstrate any significant effusion, infiltrate or congestive heart failure.  She will be admitted for  supplemental oxygen and an elective paracentesis.  Other:   I have discussed this case with another health care provider.       APC / Resident Notes:   I, Dr. Trveon Heart III, personally performed the services described in this documentation. All medical record entries made by the scribe were at my direction and in my presence.  I have reviewed the chart and agree that the record reflects my personal performance and is accurate and complete       Scribe Attestation:   Scribe #1: I performed the above scribed service and the documentation accurately describes the services I performed. I attest to the accuracy of the note.               Clinical Impression:       ICD-10-CM ICD-9-CM   1. Other ascites R18.8 789.59   2. SOB (shortness of breath) R06.02 786.05         Disposition:   Disposition: Admitted                        Trevon Heart III, MD  07/26/19 7921

## 2019-07-27 PROBLEM — D50.9 IRON DEFICIENCY ANEMIA: Status: ACTIVE | Noted: 2019-07-27

## 2019-07-27 LAB
ABO + RH BLD: NORMAL
BASOPHILS # BLD AUTO: 0.03 K/UL (ref 0–0.2)
BASOPHILS # BLD AUTO: 0.03 K/UL (ref 0–0.2)
BASOPHILS NFR BLD: 0.6 % (ref 0–1.9)
BASOPHILS NFR BLD: 0.7 % (ref 0–1.9)
BLD GP AB SCN CELLS X3 SERPL QL: NORMAL
BLD PROD TYP BPU: NORMAL
BLOOD UNIT EXPIRATION DATE: NORMAL
BLOOD UNIT TYPE CODE: 5100
BLOOD UNIT TYPE: NORMAL
CODING SYSTEM: NORMAL
DIFFERENTIAL METHOD: ABNORMAL
DIFFERENTIAL METHOD: ABNORMAL
DISPENSE STATUS: NORMAL
EOSINOPHIL # BLD AUTO: 0 K/UL (ref 0–0.5)
EOSINOPHIL # BLD AUTO: 0.2 K/UL (ref 0–0.5)
EOSINOPHIL NFR BLD: 0.6 % (ref 0–8)
EOSINOPHIL NFR BLD: 4 % (ref 0–8)
ERYTHROCYTE [DISTWIDTH] IN BLOOD BY AUTOMATED COUNT: 23.1 % (ref 11.5–14.5)
ERYTHROCYTE [DISTWIDTH] IN BLOOD BY AUTOMATED COUNT: 23.3 % (ref 11.5–14.5)
HCT VFR BLD AUTO: 23.4 % (ref 37–48.5)
HCT VFR BLD AUTO: 25.4 % (ref 37–48.5)
HGB BLD-MCNC: 6.9 G/DL (ref 12–16)
HGB BLD-MCNC: 7.4 G/DL (ref 12–16)
IMM GRANULOCYTES # BLD AUTO: 0 K/UL (ref 0–0.04)
IMM GRANULOCYTES # BLD AUTO: 0.01 K/UL (ref 0–0.04)
INR PPP: 1.1 (ref 0.8–1.2)
LYMPHOCYTES # BLD AUTO: 1 K/UL (ref 1–4.8)
LYMPHOCYTES # BLD AUTO: 1.2 K/UL (ref 1–4.8)
LYMPHOCYTES NFR BLD: 20 % (ref 18–48)
LYMPHOCYTES NFR BLD: 28.7 % (ref 18–48)
MCH RBC QN AUTO: 25.6 PG (ref 27–31)
MCH RBC QN AUTO: 25.7 PG (ref 27–31)
MCHC RBC AUTO-ENTMCNC: 29.1 G/DL (ref 32–36)
MCHC RBC AUTO-ENTMCNC: 29.5 G/DL (ref 32–36)
MCV RBC AUTO: 87 FL (ref 82–98)
MCV RBC AUTO: 88 FL (ref 82–98)
MONOCYTES # BLD AUTO: 0.5 K/UL (ref 0.3–1)
MONOCYTES # BLD AUTO: 0.5 K/UL (ref 0.3–1)
MONOCYTES NFR BLD: 10.6 % (ref 4–15)
MONOCYTES NFR BLD: 11.2 % (ref 4–15)
NEUTROPHILS # BLD AUTO: 2.3 K/UL (ref 1.8–7.7)
NEUTROPHILS # BLD AUTO: 3.3 K/UL (ref 1.8–7.7)
NEUTROPHILS NFR BLD: 55.4 % (ref 38–73)
NEUTROPHILS NFR BLD: 68 % (ref 38–73)
NRBC BLD-RTO: 0 /100 WBC
NRBC BLD-RTO: 0 /100 WBC
NUM UNITS TRANS PACKED RBC: NORMAL
PLATELET # BLD AUTO: 236 K/UL (ref 150–350)
PLATELET # BLD AUTO: 261 K/UL (ref 150–350)
PMV BLD AUTO: 10.2 FL (ref 9.2–12.9)
PMV BLD AUTO: 9.9 FL (ref 9.2–12.9)
POCT GLUCOSE: 115 MG/DL (ref 70–110)
POCT GLUCOSE: 142 MG/DL (ref 70–110)
POCT GLUCOSE: 52 MG/DL (ref 70–110)
POCT GLUCOSE: 55 MG/DL (ref 70–110)
POCT GLUCOSE: 70 MG/DL (ref 70–110)
POCT GLUCOSE: 76 MG/DL (ref 70–110)
PROTHROMBIN TIME: 11.5 SEC (ref 9–12.5)
RBC # BLD AUTO: 2.68 M/UL (ref 4–5.4)
RBC # BLD AUTO: 2.89 M/UL (ref 4–5.4)
WBC # BLD AUTO: 4.21 K/UL (ref 3.9–12.7)
WBC # BLD AUTO: 4.81 K/UL (ref 3.9–12.7)

## 2019-07-27 PROCEDURE — 86920 COMPATIBILITY TEST SPIN: CPT | Mod: NTX

## 2019-07-27 PROCEDURE — 25000003 PHARM REV CODE 250: Mod: NTX | Performed by: EMERGENCY MEDICINE

## 2019-07-27 PROCEDURE — G0378 HOSPITAL OBSERVATION PER HR: HCPCS | Mod: NTX

## 2019-07-27 PROCEDURE — P9016 RBC LEUKOCYTES REDUCED: HCPCS | Mod: NTX

## 2019-07-27 PROCEDURE — 36415 COLL VENOUS BLD VENIPUNCTURE: CPT | Mod: NTX

## 2019-07-27 PROCEDURE — 63600175 PHARM REV CODE 636 W HCPCS: Mod: NTX | Performed by: HOSPITALIST

## 2019-07-27 PROCEDURE — 85025 COMPLETE CBC W/AUTO DIFF WBC: CPT | Mod: 91,NTX

## 2019-07-27 PROCEDURE — P9047 ALBUMIN (HUMAN), 25%, 50ML: HCPCS | Mod: NTX | Performed by: RADIOLOGY

## 2019-07-27 PROCEDURE — 85610 PROTHROMBIN TIME: CPT | Mod: NTX

## 2019-07-27 PROCEDURE — 96374 THER/PROPH/DIAG INJ IV PUSH: CPT | Mod: NTX | Performed by: EMERGENCY MEDICINE

## 2019-07-27 PROCEDURE — 63600175 PHARM REV CODE 636 W HCPCS: Mod: NTX | Performed by: RADIOLOGY

## 2019-07-27 PROCEDURE — 25000003 PHARM REV CODE 250: Mod: NTX | Performed by: HOSPITALIST

## 2019-07-27 PROCEDURE — 96372 THER/PROPH/DIAG INJ SC/IM: CPT | Mod: 59,NTX | Performed by: EMERGENCY MEDICINE

## 2019-07-27 PROCEDURE — 86901 BLOOD TYPING SEROLOGIC RH(D): CPT | Mod: NTX

## 2019-07-27 RX ORDER — ALBUMIN HUMAN 250 G/1000ML
50 SOLUTION INTRAVENOUS ONCE
Status: COMPLETED | OUTPATIENT
Start: 2019-07-27 | End: 2019-07-27

## 2019-07-27 RX ORDER — HYDROCODONE BITARTRATE AND ACETAMINOPHEN 500; 5 MG/1; MG/1
TABLET ORAL
Status: DISCONTINUED | OUTPATIENT
Start: 2019-07-27 | End: 2019-07-28 | Stop reason: HOSPADM

## 2019-07-27 RX ADMIN — INSULIN ASPART 30 UNITS: 100 INJECTION, SOLUTION INTRAVENOUS; SUBCUTANEOUS at 08:07

## 2019-07-27 RX ADMIN — PANTOPRAZOLE SODIUM 40 MG: 40 TABLET, DELAYED RELEASE ORAL at 08:07

## 2019-07-27 RX ADMIN — GABAPENTIN 600 MG: 300 CAPSULE ORAL at 09:07

## 2019-07-27 RX ADMIN — ALBUMIN (HUMAN) 50 G: 12.5 SOLUTION INTRAVENOUS at 04:07

## 2019-07-27 RX ADMIN — GABAPENTIN 600 MG: 300 CAPSULE ORAL at 08:07

## 2019-07-27 RX ADMIN — INSULIN ASPART 30 UNITS: 100 INJECTION, SOLUTION INTRAVENOUS; SUBCUTANEOUS at 12:07

## 2019-07-27 RX ADMIN — ROSUVASTATIN CALCIUM 10 MG: 5 TABLET, COATED ORAL at 08:07

## 2019-07-27 RX ADMIN — INSULIN ASPART 30 UNITS: 100 INJECTION, SOLUTION INTRAVENOUS; SUBCUTANEOUS at 05:07

## 2019-07-27 RX ADMIN — RIFAXIMIN 550 MG: 550 TABLET ORAL at 09:07

## 2019-07-27 RX ADMIN — METOPROLOL SUCCINATE 25 MG: 25 TABLET, EXTENDED RELEASE ORAL at 08:07

## 2019-07-27 RX ADMIN — ESCITALOPRAM OXALATE 20 MG: 10 TABLET ORAL at 08:07

## 2019-07-27 RX ADMIN — LACTULOSE 20 G: 20 SOLUTION ORAL at 09:07

## 2019-07-27 RX ADMIN — LACTULOSE 20 G: 20 SOLUTION ORAL at 08:07

## 2019-07-27 RX ADMIN — CLONAZEPAM 0.5 MG: 0.5 TABLET ORAL at 09:07

## 2019-07-27 RX ADMIN — SPIRONOLACTONE 200 MG: 25 TABLET ORAL at 08:07

## 2019-07-27 RX ADMIN — LACTULOSE 20 G: 20 SOLUTION ORAL at 04:07

## 2019-07-27 RX ADMIN — INSULIN DETEMIR 80 UNITS: 100 INJECTION, SOLUTION SUBCUTANEOUS at 09:07

## 2019-07-27 RX ADMIN — RIFAXIMIN 550 MG: 550 TABLET ORAL at 08:07

## 2019-07-27 RX ADMIN — FUROSEMIDE 80 MG: 40 TABLET ORAL at 08:07

## 2019-07-27 NOTE — SUBJECTIVE & OBJECTIVE
OB History   No data available     Past Medical History:   Diagnosis Date    A-fib     Anxiety     Cirrhosis     DDD (degenerative disc disease), cervical 3/21/2018    DDD (degenerative disc disease), lumbar 3/21/2018    Decompensated hepatic cirrhosis 12/26/2018    Depression     Diabetes mellitus, type 2     Hepatic encephalopathy 12/26/2018    HLD (hyperlipidemia)     HTN (hypertension)     Hypoalbuminemia 12/26/2018    Hypoglycemia associated with type 2 diabetes mellitus 12/26/2018    Morbid obesity with BMI of 45.0-49.9, adult 12/7/2018    Obesity     Other ascites 2/20/2019    Portal hypertension 12/26/2018    Type 2 diabetes mellitus with hyperglycemia, with long-term current use of insulin 12/6/2018     Past Surgical History:   Procedure Laterality Date    arm surgery      left    CARPAL TUNNEL RELEASE      left    CHOLECYSTECTOMY      COLONOSCOPY N/A 7/10/2019    Performed by [facility] at [facility] ENDO    EGD (ESOPHAGOGASTRODUODENOSCOPY) N/A 7/9/2019    Performed by [facility] at [facility] ENDO    HYSTERECTOMY      ROTATOR CUFF REPAIR      TOTAL KNEE ARTHROPLASTY         PTA Medications   Medication Sig    aspirin (ECOTRIN) 81 MG EC tablet Take 81 mg by mouth once daily.    clonazePAM (KLONOPIN) 0.5 MG tablet Take 0.5 mg by mouth every evening.    ergocalciferol (ERGOCALCIFEROL) 50,000 unit Cap Take 2 tablets weekly (Patient taking differently: Take 50,000 Units by mouth every 7 days. Friday)    escitalopram oxalate (LEXAPRO) 20 MG tablet Take 20 mg by mouth once daily.    furosemide (LASIX) 40 MG tablet Take 80 mg by mouth once daily.    gabapentin (NEURONTIN) 600 MG tablet Take 600 mg by mouth 2 (two) times daily.    insulin aspart U-100 (NOVOLOG FLEXPEN U-100 INSULIN) 100 unit/mL (3 mL) InPn pen 30 units before each meal + correction Max  u    insulin glargine (LANTUS) 100 unit/mL injection Inject 80 Units into the skin once daily. (Patient  taking differently: Inject 80 Units into the skin every morning. )    ketoconazole (NIZORAL) 2 % cream Apply topically once daily.    lactulose (CHRONULAC) 20 gram/30 mL Soln Take 30 mLs (20 g total) by mouth 3 (three) times daily. Adjust dose to have  3-4 BM's daily (Patient taking differently: Take 20 g by mouth once daily. Adjust dose to have  3-4 BM's daily)    metoprolol succinate (TOPROL XL) 25 MG 24 hr tablet Take 25 mg by mouth once daily.    omeprazole (PRILOSEC) 40 MG capsule Take 40 mg by mouth once daily.    rifAXIMin (XIFAXAN) 550 mg Tab Take 1 tablet (550 mg total) by mouth 2 (two) times daily.    rosuvastatin (CRESTOR) 10 MG tablet Take 10 mg by mouth once daily.    spironolactone (ALDACTONE) 100 MG tablet Take 200 mg by mouth once daily.    XARELTO 20 mg Tab Take 20 mg by mouth once daily.    zinc gluconate 50 mg tablet Take 50 mg by mouth once daily.       Review of patient's allergies indicates:   Allergen Reactions    Iodinated contrast- oral and iv dye Hives    Zoloft [sertraline]         Family History     Problem Relation (Age of Onset)    Cirrhosis Brother        Tobacco Use    Smoking status: Never Smoker    Smokeless tobacco: Never Used   Substance and Sexual Activity    Alcohol use: No     Frequency: Never    Drug use: No    Sexual activity: Not on file     Review of Systems   Constitutional: Negative for activity change, fever and unexpected weight change.   Respiratory: Negative for shortness of breath.    Cardiovascular: Negative for chest pain.   Gastrointestinal: Negative for abdominal pain, constipation, diarrhea, nausea and vomiting.   Genitourinary: Negative for dysmenorrhea, dyspareunia, dysuria, flank pain, hematuria, menorrhagia, pelvic pain and vaginal discharge.   Integumentary:  Negative for breast mass.   Breast: Negative for mass     Objective:     Vital Signs (Most Recent):  Temp: 98.2 °F (36.8 °C) (07/27/19 0722)  Pulse: 79 (07/27/19 0722)  Resp: 17  "(07/27/19 0722)  BP: (!) 112/53 (07/27/19 0722)  SpO2: 96 % (07/27/19 0722) Vital Signs (24h Range):  Temp:  [97.5 °F (36.4 °C)-98.2 °F (36.8 °C)] 98.2 °F (36.8 °C)  Pulse:  [57-79] 79  Resp:  [17-26] 17  SpO2:  [96 %-100 %] 96 %  BP: ()/(44-85) 112/53     Weight: 116 kg (255 lb 11.7 oz)  Body mass index is 46.77 kg/m².    No LMP recorded (lmp unknown). Patient has had a hysterectomy.    Physical Exam:   Constitutional: She is oriented to person, place, and time. She appears well-developed and well-nourished.    HENT:   Head: Normocephalic.    Eyes: EOM are normal.    Neck: Normal range of motion.    Cardiovascular: Normal rate and regular rhythm.     Pulmonary/Chest: Effort normal.        Abdominal: Soft. Bowel sounds are normal. There is no tenderness.     Genitourinary: Vagina normal and uterus normal.           Musculoskeletal: Normal range of motion.       Neurological: She is alert and oriented to person, place, and time.    Skin: Skin is warm and dry.    Psychiatric: She has a normal mood and affect.       Laboratory:  {Results:96528}    Diagnostic Results:  {Results; Diagnostics:[id number]::"***"}  "

## 2019-07-27 NOTE — PLAN OF CARE
SW met w/ pt and daughter for assessment.  Pt lives w/ spouse and adult son, dtr lives next door and is primary person to assist w/ patient as needed.  Pt active w/ [facility], prefers to resume w/ same provider. Disclosure form explained to pt and signed by patient.  Pt has appt Aug 8 at [facility] to see if she is eligible for placement on liver transplant list.         07/27/19 1226   Discharge Assessment   Assessment Type Discharge Planning Assessment   Confirmed/corrected address and phone number on facesheet? Yes  (Pt's phone #:  [phone])   Assessment information obtained from? Patient   Prior to hospitilization cognitive status: Alert/Oriented   Prior to hospitalization functional status: Assistive Equipment   Current cognitive status: Alert/Oriented   Current Functional Status: Assistive Equipment   Facility Arrived From: home w/ home health   Lives With spouse;child(renetta), adult   Able to Return to Prior Arrangements yes   Is patient able to care for self after discharge? Yes   Who are your caregiver(s) and their phone number(s)? daughter: Danitza Buchanan   [phone]   Patient's perception of discharge disposition home health   Readmission Within the Last 30 Days no previous admission in last 30 days   Patient currently being followed by outpatient case management? No   Patient currently receives any other outside agency services? Yes   Name and contact number of agency or person providing outside services Home Health--[facility]   Is it the patient/care giver preference to resume care with the current outside agency? Yes   Equipment Currently Used at Home walker, rolling;wheelchair;bedside commode   Do you have any problems affording any of your prescribed medications? No   Is the patient taking medications as prescribed? yes   Does the patient have transportation home? Yes   Transportation Anticipated family or friend will provide   Does the patient receive services at the [facility]? No    Discharge Plan A Home with family;Home Health   Discharge Plan B Home with family;Home Health   DME Needed Upon Discharge  none   Patient/Family in Agreement with Plan yes

## 2019-07-27 NOTE — H&P
[facility]  Hospital Medicine  History & Physical    Patient Name: Sejal Matamoros  MRN: [id number]  Admission Date: 7/26/2019  Attending Physician: Berto Crook MD   Primary Care Provider: Deandre Lundberg NP         Patient information was obtained from patient, past medical records and ER records.     Subjective:     Principal Problem:Other ascites    Chief Complaint:   Chief Complaint   Patient presents with    Shortness of Breath        HPI: Ms. Matamoros presented to ED earlier today with increased ascites.  She gets paracentesis every 2 weeks as outpatient [place] [facility] [place].  Her last tap was a week ago.  Family says that the fluid re-accumulates faster than it did before.  Patient has non alcoholic steatohepatitis/cirrhosis of liver, chronic anxiety disorder, chronic atrial fibrillation, diabetes mellitus type 2, hypertension, hypolipidemia, moderate malnutrition, portal hypertension and history of recurrent ascites.  Patient closely follows with liver transplant team at [facility].    Past Medical History:   Diagnosis Date    A-fib     Anxiety     Cirrhosis     DDD (degenerative disc disease), cervical 3/21/2018    DDD (degenerative disc disease), lumbar 3/21/2018    Decompensated hepatic cirrhosis 12/26/2018    Depression     Diabetes mellitus, type 2     Hepatic encephalopathy 12/26/2018    HLD (hyperlipidemia)     HTN (hypertension)     Hypoalbuminemia 12/26/2018    Hypoglycemia associated with type 2 diabetes mellitus 12/26/2018    Morbid obesity with BMI of 45.0-49.9, adult 12/7/2018    Obesity     Other ascites 2/20/2019    Portal hypertension 12/26/2018    Type 2 diabetes mellitus with hyperglycemia, with long-term current use of insulin 12/6/2018       Past Surgical History:   Procedure Laterality Date    arm surgery      left    CARPAL TUNNEL RELEASE      left    CHOLECYSTECTOMY      COLONOSCOPY N/A 7/10/2019    Performed by  D[facility] Dee [facility] MD at [facility] ENDO    EGD (ESOPHAGOGASTRODUODENOSCOPY) N/A 7/9/2019    Performed by [facility] at [facility] ENDO    HYSTERECTOMY      ROTATOR CUFF REPAIR      TOTAL KNEE ARTHROPLASTY         Review of patient's allergies indicates:   Allergen Reactions    Iodinated contrast- oral and iv dye Hives    Zoloft [sertraline]        No current facility-administered medications on file prior to encounter.      Current Outpatient Medications on File Prior to Encounter   Medication Sig    aspirin (ECOTRIN) 81 MG EC tablet Take 81 mg by mouth once daily.    clonazePAM (KLONOPIN) 0.5 MG tablet Take 0.5 mg by mouth every evening.    ergocalciferol (ERGOCALCIFEROL) 50,000 unit Cap Take 2 tablets weekly (Patient taking differently: Take 50,000 Units by mouth every 7 days. Friday)    escitalopram oxalate (LEXAPRO) 20 MG tablet Take 20 mg by mouth once daily.    furosemide (LASIX) 40 MG tablet Take 80 mg by mouth once daily.    gabapentin (NEURONTIN) 600 MG tablet Take 600 mg by mouth 2 (two) times daily.    insulin aspart U-100 (NOVOLOG FLEXPEN U-100 INSULIN) 100 unit/mL (3 mL) InPn pen 30 units before each meal + correction Max  u    insulin glargine (LANTUS) 100 unit/mL injection Inject 80 Units into the skin once daily. (Patient taking differently: Inject 80 Units into the skin every morning. )    ketoconazole (NIZORAL) 2 % cream Apply topically once daily.    lactulose (CHRONULAC) 20 gram/30 mL Soln Take 30 mLs (20 g total) by mouth 3 (three) times daily. Adjust dose to have  3-4 BM's daily (Patient taking differently: Take 20 g by mouth once daily. Adjust dose to have  3-4 BM's daily)    metoprolol succinate (TOPROL XL) 25 MG 24 hr tablet Take 25 mg by mouth once daily.    omeprazole (PRILOSEC) 40 MG capsule Take 40 mg by mouth once daily.    rifAXIMin (XIFAXAN) 550 mg Tab Take 1 tablet (550 mg total) by mouth 2 (two) times daily.    rosuvastatin (CRESTOR) 10 MG tablet Take 10  mg by mouth once daily.    spironolactone (ALDACTONE) 100 MG tablet Take 200 mg by mouth once daily.    XARELTO 20 mg Tab Take 20 mg by mouth once daily.    zinc gluconate 50 mg tablet Take 50 mg by mouth once daily.     Family History     Problem Relation (Age of Onset)    Cirrhosis Brother        Tobacco Use    Smoking status: Never Smoker    Smokeless tobacco: Never Used   Substance and Sexual Activity    Alcohol use: No     Frequency: Never    Drug use: No    Sexual activity: Not on file     Review of Systems   Constitutional: Negative for fever.   Respiratory: Positive for shortness of breath. Negative for cough and chest tightness.    Cardiovascular: Negative for chest pain.   Gastrointestinal: Negative for abdominal pain, nausea and vomiting.   Genitourinary: Negative for difficulty urinating.   Psychiatric/Behavioral: Negative for confusion.   All other systems reviewed and are negative.    Objective:     Vital Signs (Most Recent):  Temp: 98 °F (36.7 °C) (07/26/19 2122)  Pulse: 76 (07/26/19 2122)  Resp: 18 (07/26/19 2122)  BP: (!) 149/69 (07/26/19 2122)  SpO2: 98 % (07/26/19 2122) Vital Signs (24h Range):  Temp:  [97.5 °F (36.4 °C)-98 °F (36.7 °C)] 98 °F (36.7 °C)  Pulse:  [57-76] 76  Resp:  [18-26] 18  SpO2:  [98 %-100 %] 98 %  BP: (121-190)/(58-85) 149/69     Weight: 116 kg (255 lb 11.7 oz)  Body mass index is 46.77 kg/m².    Physical Exam   Constitutional: She is oriented to person, place, and time. No distress.   HENT:   Head: Atraumatic.   Mouth/Throat: Oropharynx is clear and moist. No oropharyngeal exudate.   Eyes: Conjunctivae are normal. Right eye exhibits no discharge. Left eye exhibits no discharge.   Neck: Normal range of motion. Neck supple. No JVD present. No thyromegaly present.   Cardiovascular: Normal rate and regular rhythm.   Pulmonary/Chest: Effort normal and breath sounds normal.   Abdominal: Soft. Bowel sounds are normal. She exhibits ascites. There is no tenderness.    Musculoskeletal: She exhibits no deformity.        Right lower leg: She exhibits edema.        Left lower leg: She exhibits edema.   Neurological: She is alert and oriented to person, place, and time.   Skin: Skin is warm and dry. No rash noted. She is not diaphoretic.           Significant Labs:   BMP:   Recent Labs   Lab 07/26/19  1548   *      K 3.5      CO2 28   BUN 24*   CREATININE 1.1   CALCIUM 8.4*     CBC:   Recent Labs   Lab 07/26/19  1548   WBC 4.56   HGB 7.7*   HCT 25.9*          Significant Imaging: I have reviewed all pertinent imaging results/findings within the past 24 hours.    Assessment/Plan:     * Other ascites  Due to RHOADES cirrhosis.  Symptomatic.  In need of immediate paracentesis.  Will have radiology perform US-guided paracentesis.      Liver cirrhosis secondary to RHOADES  No acute issues.  Followed by liver transplant medicine at [facility].  Continue rifaximin and lactulose.        Type 2 diabetes mellitus with diabetic polyneuropathy, with long-term current use of insulin  Monitor FSG's.  Treat as follows:  Diabetes Medications at home             insulin aspart U-100 (NOVOLOG FLEXPEN U-100 INSULIN) 100 unit/mL (3 mL) InPn pen 30 units before each meal + correction Max  u    insulin glargine (LANTUS) 100 unit/mL injection Inject 80 Units into the skin once daily.      Hospital Medications             glucagon (human recombinant) injection 1 mg Inject 1 mg into the muscle as needed for Blood Glucose less than (70 mg/dL if patient is unconscious or obtunded and DOES NOT HAVE IV ACCESS.).    glucose chewable tablet 16 g Take 4 tablets (16 g total) by mouth as needed for For blood glucose (50-70 mg/dL X 1 dose for patients who can take PO.).    glucose chewable tablet 24 g Take 6 tablets (24 g total) by mouth as needed for Blood Glucose less than (50 mg/dL X 1 dose for patients who can take PO.).    insulin aspart U-100 pen 1-10 Units Inject 1-10 Units into the skin  before meals and at bedtime as needed for For blood glucose (Hyperglycemia).    insulin aspart U-100 pen 30 Units Starting on 7/27/2019. Inject 30 Units into the skin 3 times daily with meals.    insulin detemir U-100 pen 80 Units Inject 80 Units into the skin every evening.        Adjust treatment when it becomes necessary.    HTN (hypertension)  Monitor BP.  Treat as follows:  Hypertension Medications at home            furosemide (LASIX) 40 MG tablet Take 80 mg by mouth once daily.    metoprolol succinate (TOPROL XL) 25 MG 24 hr tablet Take 25 mg by mouth once daily.    spironolactone (ALDACTONE) 100 MG tablet Take 200 mg by mouth once daily.      Hospital Medications             furosemide tablet 80 mg Starting on 7/27/2019. Take 2 tablets (80 mg total) by mouth once daily.    metoprolol succinate (TOPROL-XL) 24 hr tablet 25 mg Starting on 7/27/2019. Take 1 tablet (25 mg total) by mouth once daily.    spironolactone tablet 200 mg Starting on 7/27/2019. Take 2 tablets (200 mg total) by mouth once daily.          Adjust treatment when it becomes necessary.      VTE Risk Mitigation (From admission, onward)        Ordered     IP VTE LOW RISK PATIENT  Once      07/26/19 9835             Berto Crook MD  Department of Hospital Medicine   [facility]

## 2019-07-27 NOTE — SUBJECTIVE & OBJECTIVE
Past Medical History:   Diagnosis Date    A-fib     Anxiety     Cirrhosis     DDD (degenerative disc disease), cervical 3/21/2018    DDD (degenerative disc disease), lumbar 3/21/2018    Decompensated hepatic cirrhosis 12/26/2018    Depression     Diabetes mellitus, type 2     Hepatic encephalopathy 12/26/2018    HLD (hyperlipidemia)     HTN (hypertension)     Hypoalbuminemia 12/26/2018    Hypoglycemia associated with type 2 diabetes mellitus 12/26/2018    Morbid obesity with BMI of 45.0-49.9, adult 12/7/2018    Obesity     Other ascites 2/20/2019    Portal hypertension 12/26/2018    Type 2 diabetes mellitus with hyperglycemia, with long-term current use of insulin 12/6/2018       Past Surgical History:   Procedure Laterality Date    arm surgery      left    CARPAL TUNNEL RELEASE      left    CHOLECYSTECTOMY      COLONOSCOPY N/A 7/10/2019    Performed by [facility] at [facility] ENDO    EGD (ESOPHAGOGASTRODUODENOSCOPY) N/A 7/9/2019    Performed by [facility] at [facility] ENDO    HYSTERECTOMY      ROTATOR CUFF REPAIR      TOTAL KNEE ARTHROPLASTY         Review of patient's allergies indicates:   Allergen Reactions    Iodinated contrast- oral and iv dye Hives    Zoloft [sertraline]        No current facility-administered medications on file prior to encounter.      Current Outpatient Medications on File Prior to Encounter   Medication Sig    aspirin (ECOTRIN) 81 MG EC tablet Take 81 mg by mouth once daily.    clonazePAM (KLONOPIN) 0.5 MG tablet Take 0.5 mg by mouth every evening.    ergocalciferol (ERGOCALCIFEROL) 50,000 unit Cap Take 2 tablets weekly (Patient taking differently: Take 50,000 Units by mouth every 7 days. Friday)    escitalopram oxalate (LEXAPRO) 20 MG tablet Take 20 mg by mouth once daily.    furosemide (LASIX) 40 MG tablet Take 80 mg by mouth once daily.    gabapentin (NEURONTIN) 600 MG tablet Take 600 mg by mouth 2 (two) times daily.    insulin aspart  U-100 (NOVOLOG FLEXPEN U-100 INSULIN) 100 unit/mL (3 mL) InPn pen 30 units before each meal + correction Max  u    insulin glargine (LANTUS) 100 unit/mL injection Inject 80 Units into the skin once daily. (Patient taking differently: Inject 80 Units into the skin every morning. )    ketoconazole (NIZORAL) 2 % cream Apply topically once daily.    lactulose (CHRONULAC) 20 gram/30 mL Soln Take 30 mLs (20 g total) by mouth 3 (three) times daily. Adjust dose to have  3-4 BM's daily (Patient taking differently: Take 20 g by mouth once daily. Adjust dose to have  3-4 BM's daily)    metoprolol succinate (TOPROL XL) 25 MG 24 hr tablet Take 25 mg by mouth once daily.    omeprazole (PRILOSEC) 40 MG capsule Take 40 mg by mouth once daily.    rifAXIMin (XIFAXAN) 550 mg Tab Take 1 tablet (550 mg total) by mouth 2 (two) times daily.    rosuvastatin (CRESTOR) 10 MG tablet Take 10 mg by mouth once daily.    spironolactone (ALDACTONE) 100 MG tablet Take 200 mg by mouth once daily.    XARELTO 20 mg Tab Take 20 mg by mouth once daily.    zinc gluconate 50 mg tablet Take 50 mg by mouth once daily.     Family History     Problem Relation (Age of Onset)    Cirrhosis Brother        Tobacco Use    Smoking status: Never Smoker    Smokeless tobacco: Never Used   Substance and Sexual Activity    Alcohol use: No     Frequency: Never    Drug use: No    Sexual activity: Not on file     Review of Systems   Constitutional: Negative for fever.   Respiratory: Positive for shortness of breath. Negative for cough and chest tightness.    Cardiovascular: Negative for chest pain.   Gastrointestinal: Negative for abdominal pain, nausea and vomiting.   Genitourinary: Negative for difficulty urinating.   Psychiatric/Behavioral: Negative for confusion.   All other systems reviewed and are negative.    Objective:     Vital Signs (Most Recent):  Temp: 98 °F (36.7 °C) (07/26/19 2122)  Pulse: 76 (07/26/19 2122)  Resp: 18 (07/26/19 2122)  BP:  (!) 149/69 (07/26/19 2122)  SpO2: 98 % (07/26/19 2122) Vital Signs (24h Range):  Temp:  [97.5 °F (36.4 °C)-98 °F (36.7 °C)] 98 °F (36.7 °C)  Pulse:  [57-76] 76  Resp:  [18-26] 18  SpO2:  [98 %-100 %] 98 %  BP: (121-190)/(58-85) 149/69     Weight: 116 kg (255 lb 11.7 oz)  Body mass index is 46.77 kg/m².    Physical Exam   Constitutional: She is oriented to person, place, and time. No distress.   HENT:   Head: Atraumatic.   Mouth/Throat: Oropharynx is clear and moist. No oropharyngeal exudate.   Eyes: Conjunctivae are normal. Right eye exhibits no discharge. Left eye exhibits no discharge.   Neck: Normal range of motion. Neck supple. No JVD present. No thyromegaly present.   Cardiovascular: Normal rate and regular rhythm.   Pulmonary/Chest: Effort normal and breath sounds normal.   Abdominal: Soft. Bowel sounds are normal. She exhibits ascites. There is no tenderness.   Musculoskeletal: She exhibits no deformity.        Right lower leg: She exhibits edema.        Left lower leg: She exhibits edema.   Neurological: She is alert and oriented to person, place, and time.   Skin: Skin is warm and dry. No rash noted. She is not diaphoretic.           Significant Labs:   BMP:   Recent Labs   Lab 07/26/19  1548   *      K 3.5      CO2 28   BUN 24*   CREATININE 1.1   CALCIUM 8.4*     CBC:   Recent Labs   Lab 07/26/19  1548   WBC 4.56   HGB 7.7*   HCT 25.9*          Significant Imaging: I have reviewed all pertinent imaging results/findings within the past 24 hours.

## 2019-07-27 NOTE — ASSESSMENT & PLAN NOTE
Monitor FSG's.  Treat as follows:  Diabetes Medications at home             insulin aspart U-100 (NOVOLOG FLEXPEN U-100 INSULIN) 100 unit/mL (3 mL) InPn pen 30 units before each meal + correction Max  u    insulin glargine (LANTUS) 100 unit/mL injection Inject 80 Units into the skin once daily.      Hospital Medications             glucagon (human recombinant) injection 1 mg Inject 1 mg into the muscle as needed for Blood Glucose less than (70 mg/dL if patient is unconscious or obtunded and DOES NOT HAVE IV ACCESS.).    glucose chewable tablet 16 g Take 4 tablets (16 g total) by mouth as needed for For blood glucose (50-70 mg/dL X 1 dose for patients who can take PO.).    glucose chewable tablet 24 g Take 6 tablets (24 g total) by mouth as needed for Blood Glucose less than (50 mg/dL X 1 dose for patients who can take PO.).    insulin aspart U-100 pen 1-10 Units Inject 1-10 Units into the skin before meals and at bedtime as needed for For blood glucose (Hyperglycemia).    insulin aspart U-100 pen 30 Units Starting on 7/27/2019. Inject 30 Units into the skin 3 times daily with meals.    insulin detemir U-100 pen 80 Units Inject 80 Units into the skin every evening.        Adjust treatment when it becomes necessary.

## 2019-07-27 NOTE — ASSESSMENT & PLAN NOTE
No acute issues.  [place] [facility]  Continue rifaximin and lactulose.

## 2019-07-27 NOTE — ASSESSMENT & PLAN NOTE
Due to RHOADES cirrhosis.  Symptomatic.  In need of immediate paracentesis.  Will have radiology perform US-guided paracentesis.

## 2019-07-27 NOTE — HPI
Ms. Matamoros presented to ED earlier today with increased ascites.  She gets paracentesis every 2 weeks as outpatient down at [facility] in [place].  Her last tap was a week ago.  Family says that the fluid re-accumulates faster than it did before.  Patient has non alcoholic steatohepatitis/cirrhosis of liver, chronic anxiety disorder, chronic atrial fibrillation, diabetes mellitus type 2, hypertension, hypolipidemia, moderate malnutrition, portal hypertension and history of recurrent ascites.  Patient closely follows with liver transplant team at [facility].

## 2019-07-27 NOTE — PLAN OF CARE
Pt resting quietly at this time. Able to make needs known. Denies pain. Cont b/b. Bed low and locked. Call light in reach.

## 2019-07-27 NOTE — PLAN OF CARE
07/27/19 1509   HDEZ Message   Medicare Outpatient and Observation Notification regarding financial responsibility Given to patient/caregiver;Explained to patient/caregiver;Signed/date by patient/caregiver   Date HDEZ was signed 07/27/19   Time HDEZ was signed 1502

## 2019-07-27 NOTE — ASSESSMENT & PLAN NOTE
Monitor BP.  Treat as follows:  Hypertension Medications at home            furosemide (LASIX) 40 MG tablet Take 80 mg by mouth once daily.    metoprolol succinate (TOPROL XL) 25 MG 24 hr tablet Take 25 mg by mouth once daily.    spironolactone (ALDACTONE) 100 MG tablet Take 200 mg by mouth once daily.      Hospital Medications             furosemide tablet 80 mg Starting on 7/27/2019. Take 2 tablets (80 mg total) by mouth once daily.    metoprolol succinate (TOPROL-XL) 24 hr tablet 25 mg Starting on 7/27/2019. Take 1 tablet (25 mg total) by mouth once daily.    spironolactone tablet 200 mg Starting on 7/27/2019. Take 2 tablets (200 mg total) by mouth once daily.          Adjust treatment when it becomes necessary.

## 2019-07-28 VITALS
WEIGHT: 255.75 LBS | RESPIRATION RATE: 18 BRPM | DIASTOLIC BLOOD PRESSURE: 63 MMHG | SYSTOLIC BLOOD PRESSURE: 139 MMHG | HEART RATE: 76 BPM | BODY MASS INDEX: 47.06 KG/M2 | OXYGEN SATURATION: 95 % | TEMPERATURE: 99 F | HEIGHT: 62 IN

## 2019-07-28 LAB
BASOPHILS # BLD AUTO: 0.03 K/UL (ref 0–0.2)
BASOPHILS NFR BLD: 0.7 % (ref 0–1.9)
DIFFERENTIAL METHOD: ABNORMAL
EOSINOPHIL # BLD AUTO: 0.1 K/UL (ref 0–0.5)
EOSINOPHIL NFR BLD: 2.7 % (ref 0–8)
ERYTHROCYTE [DISTWIDTH] IN BLOOD BY AUTOMATED COUNT: 22.4 % (ref 11.5–14.5)
HCT VFR BLD AUTO: 27.4 % (ref 37–48.5)
HGB BLD-MCNC: 8.3 G/DL (ref 12–16)
IMM GRANULOCYTES # BLD AUTO: 0.01 K/UL (ref 0–0.04)
LYMPHOCYTES # BLD AUTO: 1.4 K/UL (ref 1–4.8)
LYMPHOCYTES NFR BLD: 29.9 % (ref 18–48)
MCH RBC QN AUTO: 26.4 PG (ref 27–31)
MCHC RBC AUTO-ENTMCNC: 30.3 G/DL (ref 32–36)
MCV RBC AUTO: 87 FL (ref 82–98)
MONOCYTES # BLD AUTO: 0.6 K/UL (ref 0.3–1)
MONOCYTES NFR BLD: 13.3 % (ref 4–15)
NEUTROPHILS # BLD AUTO: 2.4 K/UL (ref 1.8–7.7)
NEUTROPHILS NFR BLD: 53.2 % (ref 38–73)
NRBC BLD-RTO: 0 /100 WBC
PLATELET # BLD AUTO: 228 K/UL (ref 150–350)
PMV BLD AUTO: 10.1 FL (ref 9.2–12.9)
POCT GLUCOSE: 110 MG/DL (ref 70–110)
POCT GLUCOSE: 184 MG/DL (ref 70–110)
POCT GLUCOSE: 42 MG/DL (ref 70–110)
POCT GLUCOSE: 46 MG/DL (ref 70–110)
POCT GLUCOSE: 54 MG/DL (ref 70–110)
POCT GLUCOSE: 82 MG/DL (ref 70–110)
RBC # BLD AUTO: 3.14 M/UL (ref 4–5.4)
WBC # BLD AUTO: 4.51 K/UL (ref 3.9–12.7)

## 2019-07-28 PROCEDURE — 36415 COLL VENOUS BLD VENIPUNCTURE: CPT | Mod: NTX

## 2019-07-28 PROCEDURE — 25000003 PHARM REV CODE 250: Mod: NTX | Performed by: EMERGENCY MEDICINE

## 2019-07-28 PROCEDURE — G0378 HOSPITAL OBSERVATION PER HR: HCPCS | Mod: NTX

## 2019-07-28 PROCEDURE — 96372 THER/PROPH/DIAG INJ SC/IM: CPT | Mod: 59,NTX | Performed by: EMERGENCY MEDICINE

## 2019-07-28 PROCEDURE — 85025 COMPLETE CBC W/AUTO DIFF WBC: CPT | Mod: NTX

## 2019-07-28 PROCEDURE — 25000003 PHARM REV CODE 250: Mod: NTX | Performed by: HOSPITALIST

## 2019-07-28 RX ORDER — INSULIN GLARGINE 100 [IU]/ML
60 INJECTION, SOLUTION SUBCUTANEOUS DAILY
Qty: 45 ML | Refills: 3
Start: 2019-07-28

## 2019-07-28 RX ADMIN — GABAPENTIN 600 MG: 300 CAPSULE ORAL at 08:07

## 2019-07-28 RX ADMIN — Medication 16 G: at 06:07

## 2019-07-28 RX ADMIN — INSULIN ASPART 30 UNITS: 100 INJECTION, SOLUTION INTRAVENOUS; SUBCUTANEOUS at 08:07

## 2019-07-28 RX ADMIN — ESCITALOPRAM OXALATE 20 MG: 10 TABLET ORAL at 08:07

## 2019-07-28 RX ADMIN — ROSUVASTATIN CALCIUM 10 MG: 5 TABLET, COATED ORAL at 08:07

## 2019-07-28 RX ADMIN — SPIRONOLACTONE 200 MG: 25 TABLET ORAL at 08:07

## 2019-07-28 RX ADMIN — LACTULOSE 20 G: 20 SOLUTION ORAL at 08:07

## 2019-07-28 RX ADMIN — METOPROLOL SUCCINATE 25 MG: 25 TABLET, EXTENDED RELEASE ORAL at 08:07

## 2019-07-28 RX ADMIN — FUROSEMIDE 80 MG: 40 TABLET ORAL at 08:07

## 2019-07-28 RX ADMIN — PANTOPRAZOLE SODIUM 40 MG: 40 TABLET, DELAYED RELEASE ORAL at 08:07

## 2019-07-28 RX ADMIN — Medication 24 G: at 02:07

## 2019-07-28 RX ADMIN — RIFAXIMIN 550 MG: 550 TABLET ORAL at 08:07

## 2019-07-28 RX ADMIN — INSULIN ASPART 30 UNITS: 100 INJECTION, SOLUTION INTRAVENOUS; SUBCUTANEOUS at 11:07

## 2019-07-28 NOTE — PLAN OF CARE
Problem: Adult Inpatient Plan of Care  Goal: Plan of Care Review  Outcome: Ongoing (interventions implemented as appropriate)     07/28/19 0947   Plan of Care Review   Plan of Care Reviewed With patient   Progress no change   Pt in bed resting. Able to make needs and wants known. No complaints or distress noted. Pt received a transfusion of prbc this shift tolerated well no adverse effects noted. Pt ambulated to the bathroom. Free of falls. Safety maintained will continue to monitor.

## 2019-07-28 NOTE — PLAN OF CARE
07/28/19 1412   Final Note   Assessment Type Final Discharge Note   Anticipated Discharge Disposition Home-Health  ([facility]))

## 2019-07-28 NOTE — PLAN OF CARE
JENNI left voicemail message w/ [facility] ([place]) [phone] re: resumption of hh.  JENNI faxed referral to [phone].       07/28/19 1407   Post-Acute Status   Post-Acute Authorization Home Health/Hospice   Home Health/Hospice Status Referrals Sent

## 2019-07-28 NOTE — PROGRESS NOTES
[facility] Hospital Medicine  Progress Note    Patient Name: Sejal Matamoros  MRN: [id number]  Patient Class: OP- Observation   Admission Date: 7/26/2019  Length of Stay: 0 days  Attending Physician: Berto Crook MD  Primary Care Provider: Deandre Lundberg NP        Subjective:     Principal Problem:Other ascites      HPI:  Ms. Matamoros presented to ED earlier today with increased ascites.  She gets paracentesis every 2 weeks as outpatient down at [facility] in [place].  Her last tap was a week ago.  Family says that the fluid re-accumulates faster than it did before.  Patient has non alcoholic steatohepatitis/cirrhosis of liver, chronic anxiety disorder, chronic atrial fibrillation, diabetes mellitus type 2, hypertension, hypolipidemia, moderate malnutrition, portal hypertension and history of recurrent ascites.  Patient closely follows with liver transplant team at [facility].    Overview/Hospital Course:  No notes on file    Interval History:  Underwent paracentesis today.  8 liters removed.  Abdomen still sore.  HGB lower.  No evidence of acute hemorrhage.    Review of Systems   Constitutional: Positive for fatigue. Negative for fever.   Respiratory: Negative for cough and shortness of breath.    Cardiovascular: Negative for chest pain.   Gastrointestinal: Negative for nausea.     Objective:     Vital Signs (Most Recent):  Temp: 98.7 °F (37.1 °C) (07/27/19 2027)  Pulse: 79 (07/27/19 2027)  Resp: 18 (07/27/19 2027)  BP: (!) 108/56 (07/27/19 2027)  SpO2: 96 % (07/27/19 2027) Vital Signs (24h Range):  Temp:  [97.4 °F (36.3 °C)-98.7 °F (37.1 °C)] 98.7 °F (37.1 °C)  Pulse:  [65-80] 79  Resp:  [17-18] 18  SpO2:  [96 %-98 %] 96 %  BP: ()/(44-69) 108/56     Weight: 116 kg (255 lb 11.7 oz)  Body mass index is 46.77 kg/m².  No intake or output data in the 24 hours ending 07/27/19 2028   Physical Exam   Constitutional: She is oriented to person, place, and time. No distress.    HENT:   Head: Atraumatic.   Mouth/Throat: Oropharynx is clear and moist. No oropharyngeal exudate.   Eyes: Conjunctivae are normal. Right eye exhibits no discharge. Left eye exhibits no discharge.   Neck: Normal range of motion. Neck supple. No JVD present. No thyromegaly present.   Cardiovascular: Normal rate and regular rhythm.   Pulmonary/Chest: Effort normal and breath sounds normal.   Abdominal: Soft. Bowel sounds are normal. She exhibits ascites. There is no tenderness.   Musculoskeletal: She exhibits no deformity.        Right lower leg: She exhibits edema.        Left lower leg: She exhibits edema.   Neurological: She is alert and oriented to person, place, and time.   Skin: Skin is warm and dry. No rash noted. She is not diaphoretic.       Significant Labs: All pertinent labs within the past 24 hours have been reviewed.    Significant Imaging: I have reviewed all pertinent imaging results/findings within the past 24 hours.      Assessment/Plan:      * Other ascites  Due to RHOADES cirrhosis.  Symptomatic.  Dr. Shetty did paracentesis using US guidance.  8 liters removed.    Iron deficiency anemia  Patient's anemia is currently uncontrolled. S/p 0 units of PRBCs.   Current CBC reviewed-   Lab Results   Component Value Date    HGB 6.9 (L) 07/27/2019    HCT 23.4 (L) 07/27/2019     Monitor serial CBC and transfuse if patient becomes hemodynamically unstable, symptomatic or H/H drops below 7/21.         Liver cirrhosis secondary to RHOADES  No acute issues.  Followed by liver transplant medicine at [facility].  Continue rifaximin and lactulose.        Type 2 diabetes mellitus with diabetic polyneuropathy, with long-term current use of insulin  Monitor FSG's.  They're low today, so will reduce the detemir to 60 units.  Treat as follows:  Diabetes Medications at home             insulin aspart U-100 (NOVOLOG FLEXPEN U-100 INSULIN) 100 unit/mL (3 mL) InPn pen 30 units before each meal + correction Max  u    insulin  glargine (LANTUS) 100 unit/mL injection Inject 80 Units into the skin once daily.      Hospital Medications             glucagon (human recombinant) injection 1 mg Inject 1 mg into the muscle as needed for Blood Glucose less than (70 mg/dL if patient is unconscious or obtunded and DOES NOT HAVE IV ACCESS.).    glucose chewable tablet 16 g Take 4 tablets (16 g total) by mouth as needed for For blood glucose (50-70 mg/dL X 1 dose for patients who can take PO.).    glucose chewable tablet 24 g Take 6 tablets (24 g total) by mouth as needed for Blood Glucose less than (50 mg/dL X 1 dose for patients who can take PO.).    insulin aspart U-100 pen 1-10 Units Inject 1-10 Units into the skin before meals and at bedtime as needed for For blood glucose (Hyperglycemia).    insulin aspart U-100 pen 30 Units Starting on 7/27/2019. Inject 30 Units into the skin 3 times daily with meals.    insulin detemir U-100 pen 60 Units Inject 60 Units into the skin every evening.        Adjust treatment when it becomes necessary.    HTN (hypertension)  Monitor BP.  Treat as follows:  Hypertension Medications at home            furosemide (LASIX) 40 MG tablet Take 80 mg by mouth once daily.    metoprolol succinate (TOPROL XL) 25 MG 24 hr tablet Take 25 mg by mouth once daily.    spironolactone (ALDACTONE) 100 MG tablet Take 200 mg by mouth once daily.      Hospital Medications             furosemide tablet 80 mg Starting on 7/27/2019. Take 2 tablets (80 mg total) by mouth once daily.    metoprolol succinate (TOPROL-XL) 24 hr tablet 25 mg Starting on 7/27/2019. Take 1 tablet (25 mg total) by mouth once daily.    spironolactone tablet 200 mg Starting on 7/27/2019. Take 2 tablets (200 mg total) by mouth once daily.          Adjust treatment when it becomes necessary.      VTE Risk Mitigation (From admission, onward)        Ordered     IP VTE LOW RISK PATIENT  Once      07/26/19 3327                Berto Crook MD  Department of Hospital  Medicine   [facility]

## 2019-07-28 NOTE — NURSING
BS 42 recheck at 46.  IV out 6 glucose tabs given VSS.  Pt alert.  S/O @ bedside. Dr Crook notified will recheck BS.

## 2019-07-28 NOTE — ASSESSMENT & PLAN NOTE
Monitor FSG's.  They're low today, so will reduce the detemir to 60 units.  Treat as follows:  Diabetes Medications at home             insulin aspart U-100 (NOVOLOG FLEXPEN U-100 INSULIN) 100 unit/mL (3 mL) InPn pen 30 units before each meal + correction Max  u    insulin glargine (LANTUS) 100 unit/mL injection Inject 80 Units into the skin once daily.      Hospital Medications             glucagon (human recombinant) injection 1 mg Inject 1 mg into the muscle as needed for Blood Glucose less than (70 mg/dL if patient is unconscious or obtunded and DOES NOT HAVE IV ACCESS.).    glucose chewable tablet 16 g Take 4 tablets (16 g total) by mouth as needed for For blood glucose (50-70 mg/dL X 1 dose for patients who can take PO.).    glucose chewable tablet 24 g Take 6 tablets (24 g total) by mouth as needed for Blood Glucose less than (50 mg/dL X 1 dose for patients who can take PO.).    insulin aspart U-100 pen 1-10 Units Inject 1-10 Units into the skin before meals and at bedtime as needed for For blood glucose (Hyperglycemia).    insulin aspart U-100 pen 30 Units Starting on 7/27/2019. Inject 30 Units into the skin 3 times daily with meals.    insulin detemir U-100 pen 60 Units Inject 60 Units into the skin every evening.        Adjust treatment when it becomes necessary.

## 2019-07-28 NOTE — PLAN OF CARE
Claudia w/ [facility] contacted SW--will accept pt.  JENNI sent referral via [facility] to [place] location as requested by Claudia.       07/28/19 1412   Post-Acute Status   Post-Acute Authorization Home Health/Hospice   Home Health/Hospice Status Set-up Complete

## 2019-07-28 NOTE — SUBJECTIVE & OBJECTIVE
Interval History:  Underwent paracentesis today.  8 liters removed.  Abdomen still sore.  HGB lower.  No evidence of acute hemorrhage.    Review of Systems   Constitutional: Positive for fatigue. Negative for fever.   Respiratory: Negative for cough and shortness of breath.    Cardiovascular: Negative for chest pain.   Gastrointestinal: Negative for nausea.     Objective:     Vital Signs (Most Recent):  Temp: 98.7 °F (37.1 °C) (07/27/19 2027)  Pulse: 79 (07/27/19 2027)  Resp: 18 (07/27/19 2027)  BP: (!) 108/56 (07/27/19 2027)  SpO2: 96 % (07/27/19 2027) Vital Signs (24h Range):  Temp:  [97.4 °F (36.3 °C)-98.7 °F (37.1 °C)] 98.7 °F (37.1 °C)  Pulse:  [65-80] 79  Resp:  [17-18] 18  SpO2:  [96 %-98 %] 96 %  BP: ()/(44-69) 108/56     Weight: 116 kg (255 lb 11.7 oz)  Body mass index is 46.77 kg/m².  No intake or output data in the 24 hours ending 07/27/19 2028   Physical Exam   Constitutional: She is oriented to person, place, and time. No distress.   HENT:   Head: Atraumatic.   Mouth/Throat: Oropharynx is clear and moist. No oropharyngeal exudate.   Eyes: Conjunctivae are normal. Right eye exhibits no discharge. Left eye exhibits no discharge.   Neck: Normal range of motion. Neck supple. No JVD present. No thyromegaly present.   Cardiovascular: Normal rate and regular rhythm.   Pulmonary/Chest: Effort normal and breath sounds normal.   Abdominal: Soft. Bowel sounds are normal. She exhibits ascites. There is no tenderness.   Musculoskeletal: She exhibits no deformity.        Right lower leg: She exhibits edema.        Left lower leg: She exhibits edema.   Neurological: She is alert and oriented to person, place, and time.   Skin: Skin is warm and dry. No rash noted. She is not diaphoretic.       Significant Labs: All pertinent labs within the past 24 hours have been reviewed.    Significant Imaging: I have reviewed all pertinent imaging results/findings within the past 24 hours.

## 2019-07-28 NOTE — ASSESSMENT & PLAN NOTE
No acute issues.  Followed by liver transplant medicine at [facility].  Continue rifaximin and lactulose.

## 2019-07-28 NOTE — ASSESSMENT & PLAN NOTE
Due to RHOADES cirrhosis.  Symptomatic.  Dr. Shetty did paracentesis using US guidance.  8 liters removed.

## 2019-07-28 NOTE — ASSESSMENT & PLAN NOTE
Patient's anemia is currently uncontrolled. S/p 0 units of PRBCs.   Current CBC reviewed-   Lab Results   Component Value Date    HGB 6.9 (L) 07/27/2019    HCT 23.4 (L) 07/27/2019     Monitor serial CBC and transfuse if patient becomes hemodynamically unstable, symptomatic or H/H drops below 7/21.

## 2019-07-29 ENCOUNTER — TELEPHONE (OUTPATIENT)
Dept: TRANSPLANT | Facility: CLINIC | Age: 69
End: 2019-07-29

## 2019-07-29 ENCOUNTER — TELEPHONE (OUTPATIENT)
Dept: MEDSURG UNIT | Facility: HOSPITAL | Age: 69
End: 2019-07-29

## 2019-07-29 NOTE — TELEPHONE ENCOUNTER
[facility] Pre-Liver Transplant Clinical   Caller: lisy pt. daughter (Today,  4:01 PM)             Patient Requesting Order     Order Needed: US paracentesis     Communication Preference: [phone]     Additional Information: pt. Would like to have para done at [place] location but need a new      order to have paracentesis done by US        Message forwarded to patient's transplant coordinator.

## 2019-07-29 NOTE — DISCHARGE SUMMARY
[facility]  Hospital Medicine  Discharge Summary      Patient Name: Sejal Matamoros  MRN: [id number]  Admission Date: 7/26/2019  Hospital Length of Stay: 0 days  Discharge Date and Time: 7/28/2019  3:49 PM  Attending Physician: No att. providers found   Discharging Provider: Berto Crook MD  Primary Care Provider: Deandre Lundberg NP      HPI:   Ms. Matamoros presented to ED earlier today with increased ascites.  She gets paracentesis every 2 weeks as outpatient down at [facility] in [place].  Her last tap was a week ago.  Family says that the fluid re-accumulates faster than it did before.  Patient has non alcoholic steatohepatitis/cirrhosis of liver, chronic anxiety disorder, chronic atrial fibrillation, diabetes mellitus type 2, hypertension, hypolipidemia, moderate malnutrition, portal hypertension and history of recurrent ascites.  Patient closely follows with liver transplant team at [facility].    * No surgery found *      Hospital Course:   Pt was admitted with symptomatic ascites.  Dr. Shetty in radiology performed paracentesis.  8 liters removed.  Symptoms were better controlled.  While this was being managed, pt's anemia was uncontrolled.  HGB dropped to 7.  She was given a pint of blood.  There were no obvious signs or symptoms of blood loss.  Transfusion was tolerated well.  She was stable on discharge.  I sent a message to Leora Kuo, my nurse, to find out how to arrange for pt's outpatient paracenteses to be done outpatient and here at [facility] rather than at [facility].    PE:  Constitutional: She is oriented to person, place, and time. No distress.   HENT:   Head: Atraumatic.   Mouth/Throat: Oropharynx is clear and moist. No oropharyngeal exudate.   Eyes: Conjunctivae are normal. Right eye exhibits no discharge. Left eye exhibits no discharge.   Neck: Normal range of motion. Neck supple. No JVD present. No thyromegaly present.    Cardiovascular: Normal rate and regular rhythm.   Pulmonary/Chest: Effort normal and breath sounds normal.   Abdominal: Soft. Bowel sounds are normal. She exhibits ascites. There is no tenderness.      Consults:     No new Assessment & Plan notes have been filed under this hospital service since the last note was generated.  Service: Hospital Medicine    Final Active Diagnoses:    Diagnosis Date Noted POA    PRINCIPAL PROBLEM:  Other ascites [R18.8] 02/20/2019 Yes    Iron deficiency anemia [D50.9] 07/27/2019 Yes    Liver cirrhosis secondary to RHOADES [K75.81, K74.60] 07/08/2019 Yes    Type 2 diabetes mellitus with diabetic polyneuropathy, with long-term current use of insulin [E11.42, Z79.4] 02/06/2019 Not Applicable    HTN (hypertension) [I10]  Yes      Problems Resolved During this Admission:       Discharged Condition: good    Disposition: Home-Health Care c    Follow Up:  Follow-up Information     keep your upcoming appointments.           Ms [facility].    Specialties:  Hospice Services, Home Health Services  Contact information:  [address] [place] [address] [address] [place] [address]  [phone]                 Patient Instructions:      Diet Adult Regular     Order Specific Question Answer Comments   Additional restrictions: Diabetic 1800      SUBSEQUENT HOME HEALTH ORDERS   Order Comments: Subsequent Home Health Orders    Diet:   diabetic diet 1800 calorie    Activities:   activity as tolerated    Labs:  SN to perform labs:  CBC: Weekly for 2 weeks and Report results to PCP.     Order Specific Question Answer Comments   What Home Health Agency is the patient currently using? Other/External      Activity as tolerated       Significant Diagnostic Studies:   BMP  Lab Results   Component Value Date     07/26/2019    K 3.5 07/26/2019     07/26/2019    CO2 28 07/26/2019    BUN 24 (H) 07/26/2019    CREATININE 1.1 07/26/2019    CALCIUM 8.4 (L) 07/26/2019    ANIONGAP 7 (L) 07/26/2019    ESTGFRAFRICA 60  07/26/2019    EGFRNONAA 52 (A) 07/26/2019     Lab Results   Component Value Date    WBC 4.51 07/28/2019    HGB 8.3 (L) 07/28/2019    HCT 27.4 (L) 07/28/2019    MCV 87 07/28/2019     07/28/2019         Pending Diagnostic Studies:     None         Medications:  Reconciled Home Medications:      Medication List      CHANGE how you take these medications    ergocalciferol 50,000 unit Cap  Commonly known as:  ERGOCALCIFEROL  Take 2 tablets weekly  What changed:    · how much to take  · how to take this  · when to take this  · additional instructions     insulin glargine 100 unit/mL injection  Commonly known as:  LANTUS  Inject 60 Units into the skin once daily.  What changed:  how much to take     lactulose 20 gram/30 mL Soln  Commonly known as:  CHRONULAC  Take 30 mLs (20 g total) by mouth 3 (three) times daily. Adjust dose to have  3-4 BM's daily  What changed:    · when to take this  · additional instructions        CONTINUE taking these medications    aspirin 81 MG EC tablet  Commonly known as:  ECOTRIN  Take 81 mg by mouth once daily.     clonazePAM 0.5 MG tablet  Commonly known as:  KLONOPIN  Take 0.5 mg by mouth every evening.     escitalopram oxalate 20 MG tablet  Commonly known as:  LEXAPRO  Take 20 mg by mouth once daily.     furosemide 40 MG tablet  Commonly known as:  LASIX  Take 80 mg by mouth once daily.     gabapentin 600 MG tablet  Commonly known as:  NEURONTIN  Take 600 mg by mouth 2 (two) times daily.     insulin aspart U-100 100 unit/mL (3 mL) Inpn pen  Commonly known as:  NovoLOG Flexpen U-100 Insulin  30 units before each meal + correction Max  u     ketoconazole 2 % cream  Commonly known as:  NIZORAL  Apply topically once daily.     omeprazole 40 MG capsule  Commonly known as:  PRILOSEC  Take 40 mg by mouth once daily.     rosuvastatin 10 MG tablet  Commonly known as:  CRESTOR  Take 10 mg by mouth once daily.     spironolactone 100 MG tablet  Commonly known as:  ALDACTONE  Take 200 mg  by mouth once daily.     TOPROL XL 25 MG 24 hr tablet  Generic drug:  metoprolol succinate  Take 25 mg by mouth once daily.     XARELTO 20 mg Tab  Generic drug:  rivaroxaban  Take 20 mg by mouth once daily.     XIFAXAN 550 mg Tab  Generic drug:  rifAXIMin  Take 1 tablet (550 mg total) by mouth 2 (two) times daily.     zinc gluconate 50 mg tablet  Take 50 mg by mouth once daily.            Indwelling Lines/Drains at time of discharge:   Lines/Drains/Airways          None          Time spent on the discharge of patient: 27 minutes  Patient was seen and examined on the date of discharge and determined to be suitable for discharge.         Berto Crook MD  Department of Hospital Medicine  [facility]

## 2019-07-29 NOTE — HOSPITAL COURSE
Pt was admitted with symptomatic ascites.  Dr. Shetty in radiology performed paracentesis.  8 liters removed.  Symptoms were better controlled.  While this was being managed, pt's anemia was uncontrolled.  HGB dropped to 7.  She was given a pint of blood.  There were no obvious signs or symptoms of blood loss.  Transfusion was tolerated well.  She was stable on discharge.  I sent a message to Leora Kuo, my nurse, to find out how to arrange for pt's outpatient paracenteses to be done outpatient and here at [facility] rather than at [facility].    PE:  Constitutional: She is oriented to person, place, and time. No distress.   HENT:   Head: Atraumatic.   Mouth/Throat: Oropharynx is clear and moist. No oropharyngeal exudate.   Eyes: Conjunctivae are normal. Right eye exhibits no discharge. Left eye exhibits no discharge.   Neck: Normal range of motion. Neck supple. No JVD present. No thyromegaly present.   Cardiovascular: Normal rate and regular rhythm.   Pulmonary/Chest: Effort normal and breath sounds normal.   Abdominal: Soft. Bowel sounds are normal. She exhibits ascites. There is no tenderness.

## 2019-07-30 ENCOUNTER — TELEPHONE (OUTPATIENT)
Dept: TRANSPLANT | Facility: CLINIC | Age: 69
End: 2019-07-30

## 2019-07-30 DIAGNOSIS — R18.8 OTHER ASCITES: Primary | ICD-10-CM

## 2019-07-30 NOTE — TELEPHONE ENCOUNTER
Phone call returned to pt's daughter, Danitza.  She is requesting to schedule a paracentesis this week and next week.  8L removed on saturday and pt continues w/ increased fluid retention and distended abdomen.  Orders entered and IR dept informed of her request.

## 2019-08-02 ENCOUNTER — HOSPITAL ENCOUNTER (OUTPATIENT)
Dept: RADIOLOGY | Facility: HOSPITAL | Age: 69
Discharge: HOME | End: 2019-08-02
Attending: INTERNAL MEDICINE
Payer: MEDICARE

## 2019-08-02 VITALS
HEART RATE: 67 BPM | RESPIRATION RATE: 16 BRPM | DIASTOLIC BLOOD PRESSURE: 58 MMHG | OXYGEN SATURATION: 99 % | SYSTOLIC BLOOD PRESSURE: 130 MMHG

## 2019-08-02 DIAGNOSIS — R18.8 OTHER ASCITES: ICD-10-CM

## 2019-08-02 PROCEDURE — 63600175 PHARM REV CODE 636 W HCPCS: Mod: TXP | Performed by: RADIOLOGY

## 2019-08-02 PROCEDURE — 49083 ABD PARACENTESIS W/IMAGING: CPT | Mod: TXP,,, | Performed by: RADIOLOGY

## 2019-08-02 PROCEDURE — A7048 VACUUM DRAIN BOTTLE/TUBE KIT: HCPCS | Mod: TXP

## 2019-08-02 PROCEDURE — P9047 ALBUMIN (HUMAN), 25%, 50ML: HCPCS | Mod: TXP | Performed by: RADIOLOGY

## 2019-08-02 PROCEDURE — 49083 ABD PARACENTESIS W/IMAGING: CPT | Mod: TXP

## 2019-08-02 PROCEDURE — 49083 US GUIDED PARACENTESIS INC IMAGING: ICD-10-PCS | Mod: TXP,,, | Performed by: RADIOLOGY

## 2019-08-02 RX ORDER — ALBUMIN HUMAN 250 G/1000ML
25 SOLUTION INTRAVENOUS ONCE
Status: COMPLETED | OUTPATIENT
Start: 2019-08-02 | End: 2019-08-02

## 2019-08-02 RX ADMIN — ALBUMIN (HUMAN) 25 G: 12.5 SOLUTION INTRAVENOUS at 12:08

## 2019-08-02 NOTE — NURSING
Ultrasound guided paracentesis performed by - 5 L removed- Patient received 25g albumin IV- VS remained stable for duration of procedure-  IV d/c'd, with tip intact. Access site cleaned with peroxide, derma bond applied, then covered with sterile gauze and tape. Pt discharge home.

## 2019-08-06 ENCOUNTER — TELEPHONE (OUTPATIENT)
Dept: HOME HEALTH SERVICES | Facility: HOSPITAL | Age: 69
End: 2019-08-06
Payer: MEDICARE

## 2019-08-06 ENCOUNTER — TELEPHONE (OUTPATIENT)
Dept: TRANSPLANT | Facility: CLINIC | Age: 69
End: 2019-08-06

## 2019-08-06 NOTE — TELEPHONE ENCOUNTER
Patient called to confirm that she will be attending the scheduled appointments for Liver Transplant Fast Pass Evaluation scheduled to start on 8/8/19 at 0730.  Patient's daughter confirms that they will be attending the scheduled appointments.  Patient appointments reviewed along with special instructions.  Questions answered and concerns voiced.

## 2019-08-07 ENCOUNTER — NURSE TRIAGE (OUTPATIENT)
Dept: ADMINISTRATIVE | Facility: CLINIC | Age: 69
End: 2019-08-07

## 2019-08-07 NOTE — TELEPHONE ENCOUNTER
Reason for Disposition   Caller has urgent medication question about med that PCP prescribed and triager unable to answer question    Protocols used: MEDICATION QUESTION CALL-A-AH  Liver referral   BPA n/a   CC: pt going to receive rx today. pt needs prednisone and Benadryl. Pt is going to be staying at [facility]. Pt wants rx sent to [facility] main pharmacy. spoke with dr bert hugo. rec to speak with dr Kenny - will send message to coord re med for contrast allergy before CT 8/9. Daughter notified. Call back with questions

## 2019-08-08 ENCOUNTER — CLINICAL SUPPORT (OUTPATIENT)
Dept: TRANSPLANT | Facility: CLINIC | Age: 69
End: 2019-08-08
Payer: MEDICARE

## 2019-08-08 ENCOUNTER — HOSPITAL ENCOUNTER (OUTPATIENT)
Dept: RADIOLOGY | Facility: HOSPITAL | Age: 69
Discharge: HOME | End: 2019-08-08
Attending: INTERNAL MEDICINE
Payer: MEDICARE

## 2019-08-08 ENCOUNTER — OFFICE VISIT (OUTPATIENT)
Dept: TRANSPLANT | Facility: CLINIC | Age: 69
End: 2019-08-08
Payer: MEDICARE

## 2019-08-08 ENCOUNTER — INITIAL CONSULT (OUTPATIENT)
Dept: TRANSPLANT | Facility: CLINIC | Age: 69
End: 2019-08-08
Payer: MEDICARE

## 2019-08-08 ENCOUNTER — CLINICAL SUPPORT (OUTPATIENT)
Dept: INFECTIOUS DISEASES | Facility: CLINIC | Age: 69
End: 2019-08-08
Payer: MEDICARE

## 2019-08-08 VITALS
WEIGHT: 242.75 LBS | HEIGHT: 62 IN | WEIGHT: 242.75 LBS | SYSTOLIC BLOOD PRESSURE: 148 MMHG | TEMPERATURE: 98 F | HEIGHT: 62 IN | WEIGHT: 242.75 LBS | OXYGEN SATURATION: 99 % | DIASTOLIC BLOOD PRESSURE: 62 MMHG | OXYGEN SATURATION: 99 % | RESPIRATION RATE: 17 BRPM | HEIGHT: 62 IN | BODY MASS INDEX: 44.67 KG/M2 | RESPIRATION RATE: 17 BRPM | HEART RATE: 68 BPM | DIASTOLIC BLOOD PRESSURE: 62 MMHG | HEART RATE: 68 BPM | TEMPERATURE: 98 F | BODY MASS INDEX: 44.67 KG/M2 | SYSTOLIC BLOOD PRESSURE: 148 MMHG | OXYGEN SATURATION: 99 % | DIASTOLIC BLOOD PRESSURE: 62 MMHG | SYSTOLIC BLOOD PRESSURE: 148 MMHG | TEMPERATURE: 98 F | BODY MASS INDEX: 44.67 KG/M2 | HEART RATE: 68 BPM | RESPIRATION RATE: 17 BRPM

## 2019-08-08 VITALS
HEART RATE: 68 BPM | OXYGEN SATURATION: 99 % | RESPIRATION RATE: 17 BRPM | WEIGHT: 242.75 LBS | TEMPERATURE: 98 F | SYSTOLIC BLOOD PRESSURE: 148 MMHG | DIASTOLIC BLOOD PRESSURE: 62 MMHG | BODY MASS INDEX: 44.67 KG/M2 | HEIGHT: 62 IN

## 2019-08-08 DIAGNOSIS — K74.60 LIVER CIRRHOSIS SECONDARY TO NASH: ICD-10-CM

## 2019-08-08 DIAGNOSIS — E11.65 TYPE 2 DIABETES MELLITUS WITH HYPERGLYCEMIA, WITH LONG-TERM CURRENT USE OF INSULIN: ICD-10-CM

## 2019-08-08 DIAGNOSIS — K76.6 PORTAL HYPERTENSION: ICD-10-CM

## 2019-08-08 DIAGNOSIS — Z01.818 PRE-TRANSPLANT EVALUATION FOR CHRONIC LIVER DISEASE: ICD-10-CM

## 2019-08-08 DIAGNOSIS — E66.01 CLASS 3 SEVERE OBESITY WITH BODY MASS INDEX (BMI) OF 40.0 TO 44.9 IN ADULT, UNSPECIFIED OBESITY TYPE, UNSPECIFIED WHETHER SERIOUS COMORBIDITY PRESENT: ICD-10-CM

## 2019-08-08 DIAGNOSIS — K74.60 DECOMPENSATED HEPATIC CIRRHOSIS: ICD-10-CM

## 2019-08-08 DIAGNOSIS — K72.90 DECOMPENSATED HEPATIC CIRRHOSIS: ICD-10-CM

## 2019-08-08 DIAGNOSIS — Z76.82 ORGAN TRANSPLANT CANDIDATE: ICD-10-CM

## 2019-08-08 DIAGNOSIS — R18.8 OTHER ASCITES: ICD-10-CM

## 2019-08-08 DIAGNOSIS — K75.81 LIVER CIRRHOSIS SECONDARY TO NASH: ICD-10-CM

## 2019-08-08 DIAGNOSIS — Z12.31 ENCOUNTER FOR SCREENING MAMMOGRAM FOR MALIGNANT NEOPLASM OF BREAST: ICD-10-CM

## 2019-08-08 DIAGNOSIS — Z76.82 ORGAN TRANSPLANT CANDIDATE: Primary | ICD-10-CM

## 2019-08-08 DIAGNOSIS — Z01.818 PRE-TRANSPLANT EVALUATION FOR CHRONIC LIVER DISEASE: Primary | ICD-10-CM

## 2019-08-08 DIAGNOSIS — Z79.4 TYPE 2 DIABETES MELLITUS WITH HYPERGLYCEMIA, WITH LONG-TERM CURRENT USE OF INSULIN: ICD-10-CM

## 2019-08-08 LAB
AMPHET+METHAMPHET UR QL: NEGATIVE
BARBITURATES UR QL SCN>200 NG/ML: NEGATIVE
BENZODIAZ UR QL SCN>200 NG/ML: NEGATIVE
BILIRUB UR QL STRIP: NEGATIVE
BZE UR QL SCN: NEGATIVE
CANNABINOIDS UR QL SCN: NEGATIVE
CLARITY UR REFRACT.AUTO: CLEAR
COLOR UR AUTO: NORMAL
CREAT UR-MCNC: 192 MG/DL (ref 15–325)
ETHANOL UR-MCNC: <10 MG/DL
GLUCOSE UR QL STRIP: NEGATIVE
HGB UR QL STRIP: NEGATIVE
KETONES UR QL STRIP: NEGATIVE
LEUKOCYTE ESTERASE UR QL STRIP: NEGATIVE
METHADONE UR QL SCN>300 NG/ML: NEGATIVE
NITRITE UR QL STRIP: NEGATIVE
OPIATES UR QL SCN: NEGATIVE
PCP UR QL SCN>25 NG/ML: NEGATIVE
PH UR STRIP: 5 [PH] (ref 5–8)
PROT UR QL STRIP: NEGATIVE
SP GR UR STRIP: 1.02 (ref 1–1.03)
TOXICOLOGY INFORMATION: NORMAL
URN SPEC COLLECT METH UR: NORMAL

## 2019-08-08 PROCEDURE — 76700 US ABDOMEN COMP WITH DOPPLER_PRE LIVER TRANSPLANT: ICD-10-PCS | Mod: 26,TXP,, | Performed by: RADIOLOGY

## 2019-08-08 PROCEDURE — G0009 ADMIN PNEUMOCOCCAL VACCINE: HCPCS | Mod: S$GLB,TXP,, | Performed by: PHYSICIAN ASSISTANT

## 2019-08-08 PROCEDURE — 99999 PR PBB SHADOW E&M-EST. PATIENT-LVL III: ICD-10-PCS | Mod: PBBFAC,TXP,, | Performed by: INTERNAL MEDICINE

## 2019-08-08 PROCEDURE — 3078F PR MOST RECENT DIASTOLIC BLOOD PRESSURE < 80 MM HG: ICD-10-PCS | Mod: CPTII,S$GLB,TXP, | Performed by: INTERNAL MEDICINE

## 2019-08-08 PROCEDURE — 3077F SYST BP >= 140 MM HG: CPT | Mod: CPTII,S$GLB,TXP, | Performed by: INTERNAL MEDICINE

## 2019-08-08 PROCEDURE — 93975 VASCULAR STUDY: CPT | Mod: 26,TXP,, | Performed by: RADIOLOGY

## 2019-08-08 PROCEDURE — 3078F DIAST BP <80 MM HG: CPT | Mod: CPTII,S$GLB,TXP, | Performed by: INTERNAL MEDICINE

## 2019-08-08 PROCEDURE — 3046F PR MOST RECENT HEMOGLOBIN A1C LEVEL > 9.0%: ICD-10-PCS | Mod: CPTII,S$GLB,TXP, | Performed by: TRANSPLANT SURGERY

## 2019-08-08 PROCEDURE — 93975 US ABDOMEN COMP WITH DOPPLER_PRE LIVER TRANSPLANT: ICD-10-PCS | Mod: 26,TXP,, | Performed by: RADIOLOGY

## 2019-08-08 PROCEDURE — 99204 PR OFFICE/OUTPT VISIT, NEW, LEVL IV, 45-59 MIN: ICD-10-PCS | Mod: S$GLB,TXP,, | Performed by: PHYSICIAN ASSISTANT

## 2019-08-08 PROCEDURE — 76700 US EXAM ABDOM COMPLETE: CPT | Mod: 26,TXP,, | Performed by: RADIOLOGY

## 2019-08-08 PROCEDURE — 3077F PR MOST RECENT SYSTOLIC BLOOD PRESSURE >= 140 MM HG: ICD-10-PCS | Mod: CPTII,S$GLB,TXP, | Performed by: INTERNAL MEDICINE

## 2019-08-08 PROCEDURE — 90670 PCV13 VACCINE IM: CPT | Mod: S$GLB,TXP,, | Performed by: PHYSICIAN ASSISTANT

## 2019-08-08 PROCEDURE — 71046 XR CHEST PA AND LATERAL: ICD-10-PCS | Mod: 26,,, | Performed by: RADIOLOGY

## 2019-08-08 PROCEDURE — 99205 PR OFFICE/OUTPT VISIT, NEW, LEVL V, 60-74 MIN: ICD-10-PCS | Mod: S$GLB,TXP,, | Performed by: TRANSPLANT SURGERY

## 2019-08-08 PROCEDURE — 90632 HEPATITIS A VACCINE ADULT IM: ICD-10-PCS | Mod: S$GLB,TXP,, | Performed by: PHYSICIAN ASSISTANT

## 2019-08-08 PROCEDURE — 90471 IMMUNIZATION ADMIN: CPT | Mod: S$GLB,TXP,, | Performed by: PHYSICIAN ASSISTANT

## 2019-08-08 PROCEDURE — 99999 PR PBB SHADOW E&M-EST. PATIENT-LVL III: ICD-10-PCS | Mod: PBBFAC,TXP,, | Performed by: TRANSPLANT SURGERY

## 2019-08-08 PROCEDURE — 99205 OFFICE O/P NEW HI 60 MIN: CPT | Mod: S$GLB,TXP,, | Performed by: TRANSPLANT SURGERY

## 2019-08-08 PROCEDURE — 93975 VASCULAR STUDY: CPT | Mod: TC,TXP

## 2019-08-08 PROCEDURE — 99999 PR PBB SHADOW E&M-EST. PATIENT-LVL III: CPT | Mod: PBBFAC,TXP,, | Performed by: TRANSPLANT SURGERY

## 2019-08-08 PROCEDURE — 3077F SYST BP >= 140 MM HG: CPT | Mod: CPTII,S$GLB,TXP, | Performed by: TRANSPLANT SURGERY

## 2019-08-08 PROCEDURE — 3078F DIAST BP <80 MM HG: CPT | Mod: CPTII,S$GLB,TXP, | Performed by: TRANSPLANT SURGERY

## 2019-08-08 PROCEDURE — 1101F PT FALLS ASSESS-DOCD LE1/YR: CPT | Mod: CPTII,S$GLB,TXP, | Performed by: TRANSPLANT SURGERY

## 2019-08-08 PROCEDURE — 90632 HEPA VACCINE ADULT IM: CPT | Mod: S$GLB,TXP,, | Performed by: PHYSICIAN ASSISTANT

## 2019-08-08 PROCEDURE — 99215 PR OFFICE/OUTPT VISIT, EST, LEVL V, 40-54 MIN: ICD-10-PCS | Mod: S$GLB,TXP,, | Performed by: INTERNAL MEDICINE

## 2019-08-08 PROCEDURE — 71046 X-RAY EXAM CHEST 2 VIEWS: CPT | Mod: TC,FY,TXP

## 2019-08-08 PROCEDURE — 71046 X-RAY EXAM CHEST 2 VIEWS: CPT | Mod: 26,,, | Performed by: RADIOLOGY

## 2019-08-08 PROCEDURE — 1101F PR PT FALLS ASSESS DOC 0-1 FALLS W/OUT INJ PAST YR: ICD-10-PCS | Mod: CPTII,S$GLB,TXP, | Performed by: TRANSPLANT SURGERY

## 2019-08-08 PROCEDURE — 1101F PR PT FALLS ASSESS DOC 0-1 FALLS W/OUT INJ PAST YR: ICD-10-PCS | Mod: CPTII,S$GLB,TXP, | Performed by: INTERNAL MEDICINE

## 2019-08-08 PROCEDURE — 1101F PT FALLS ASSESS-DOCD LE1/YR: CPT | Mod: CPTII,S$GLB,TXP, | Performed by: INTERNAL MEDICINE

## 2019-08-08 PROCEDURE — 99999 PR PBB SHADOW E&M-EST. PATIENT-LVL III: CPT | Mod: PBBFAC,TXP,,

## 2019-08-08 PROCEDURE — 90471 HEPATITIS A VACCINE ADULT IM: ICD-10-PCS | Mod: S$GLB,TXP,, | Performed by: PHYSICIAN ASSISTANT

## 2019-08-08 PROCEDURE — 77067 SCR MAMMO BI INCL CAD: CPT | Mod: 26,,, | Performed by: RADIOLOGY

## 2019-08-08 PROCEDURE — 99999 PR PBB SHADOW E&M-EST. PATIENT-LVL IV: ICD-10-PCS | Mod: PBBFAC,TXP,,

## 2019-08-08 PROCEDURE — 97802 PR MED NUTR THER, 1ST, INDIV, EA 15 MIN: ICD-10-PCS | Mod: S$GLB,TXP,, | Performed by: DIETITIAN, REGISTERED

## 2019-08-08 PROCEDURE — 3077F PR MOST RECENT SYSTOLIC BLOOD PRESSURE >= 140 MM HG: ICD-10-PCS | Mod: CPTII,S$GLB,TXP, | Performed by: TRANSPLANT SURGERY

## 2019-08-08 PROCEDURE — 80307 DRUG TEST PRSMV CHEM ANLYZR: CPT | Mod: TXP

## 2019-08-08 PROCEDURE — 97802 MEDICAL NUTRITION INDIV IN: CPT | Mod: S$GLB,TXP,, | Performed by: DIETITIAN, REGISTERED

## 2019-08-08 PROCEDURE — 81003 URINALYSIS AUTO W/O SCOPE: CPT | Mod: TXP

## 2019-08-08 PROCEDURE — 90670 PNEUMOCOCCAL CONJUGATE VACCINE 13-VALENT LESS THAN 5YO & GREATER THAN: ICD-10-PCS | Mod: S$GLB,TXP,, | Performed by: PHYSICIAN ASSISTANT

## 2019-08-08 PROCEDURE — 99999 PR PBB SHADOW E&M-EST. PATIENT-LVL IV: CPT | Mod: PBBFAC,TXP,,

## 2019-08-08 PROCEDURE — 3078F PR MOST RECENT DIASTOLIC BLOOD PRESSURE < 80 MM HG: ICD-10-PCS | Mod: CPTII,S$GLB,TXP, | Performed by: TRANSPLANT SURGERY

## 2019-08-08 PROCEDURE — 77067 SCR MAMMO BI INCL CAD: CPT | Mod: TC,TXP

## 2019-08-08 PROCEDURE — 99999 PR PBB SHADOW E&M-EST. PATIENT-LVL III: ICD-10-PCS | Mod: PBBFAC,TXP,,

## 2019-08-08 PROCEDURE — 99215 OFFICE O/P EST HI 40 MIN: CPT | Mod: S$GLB,TXP,, | Performed by: INTERNAL MEDICINE

## 2019-08-08 PROCEDURE — 99204 OFFICE O/P NEW MOD 45 MIN: CPT | Mod: S$GLB,TXP,, | Performed by: PHYSICIAN ASSISTANT

## 2019-08-08 PROCEDURE — 3046F HEMOGLOBIN A1C LEVEL >9.0%: CPT | Mod: CPTII,S$GLB,TXP, | Performed by: TRANSPLANT SURGERY

## 2019-08-08 PROCEDURE — 77067 MAMMO DIGITAL SCREENING BILAT WITH CAD: ICD-10-PCS | Mod: 26,,, | Performed by: RADIOLOGY

## 2019-08-08 PROCEDURE — 99999 PR PBB SHADOW E&M-EST. PATIENT-LVL III: CPT | Mod: PBBFAC,TXP,, | Performed by: INTERNAL MEDICINE

## 2019-08-08 PROCEDURE — G0009 PNEUMOCOCCAL CONJUGATE VACCINE 13-VALENT LESS THAN 5YO & GREATER THAN: ICD-10-PCS | Mod: S$GLB,TXP,, | Performed by: PHYSICIAN ASSISTANT

## 2019-08-08 RX ORDER — FERROUS GLUCONATE 324(38)MG
324 TABLET ORAL
COMMUNITY

## 2019-08-08 RX ORDER — POTASSIUM CHLORIDE 750 MG/1
10 CAPSULE, EXTENDED RELEASE ORAL DAILY
COMMUNITY

## 2019-08-08 RX ORDER — UBIDECARENONE 75 MG
500 CAPSULE ORAL DAILY
COMMUNITY

## 2019-08-08 NOTE — LETTER
August 13, 2019        Jim Jolley  [address] [place] [address]  Phone: [phone]  Fax: [phone]             [facility]  [address]  [facility] [address]  Phone: [phone]   Patient: Sejal Matamoros   MR Number: [id number]   YOB: 1950   Date of Visit: 8/8/2019       Dear Dr. Jim Jolley    Thank you for referring Sejal Matamoros to me for evaluation. Attached you will find relevant portions of my assessment and plan of care.    If you have questions, please do not hesitate to call me. I look forward to following Sejal Matamoros along with you.    Sincerely,    Bety Cerrato MD    Enclosure    If you would like to receive this communication electronically, please contact [email or web address] or [phone] to request [employer] access.    [employer] is a tool which provides read-only access to select patient information with whom you have a relationship. Its easy to use and provides real time access to review your patients record including encounter summaries, notes, results, and demographic information.    If you feel you have received this communication in error or would no longer like to receive these types of communications, please e-mail [email or web address]

## 2019-08-08 NOTE — PROGRESS NOTES
TRANSPLANT NUTRITIONAL ASSESSMENT    Referring Provider: Bety Cerrato MD    Reason for Visit: Pre-liver transplant work-up    Age: 69 y.o.  Sex: female    Patient Active Problem List   Diagnosis    Cervical stenosis of spinal canal    Cervical radiculopathy    Cervical spondylosis    DDD (degenerative disc disease), cervical    DDD (degenerative disc disease), lumbar    Lumbar spondylosis    Lumbar herniated disc    Lumbar spinal stenosis    Lumbar radiculopathy    Herniated cervical disc    Bilateral shoulder bursitis    Bilateral hip bursitis    Type 2 diabetes mellitus with hyperglycemia, with long-term current use of insulin    Morbid obesity with BMI of 40.0-44.9, adult    Mixed hyperlipidemia    HTN (hypertension)    Hepatic encephalopathy    Portal hypertension    Hypoalbuminemia    Type 2 diabetes mellitus with diabetic polyneuropathy, with long-term current use of insulin    Other ascites    Itching    RUQ pain    Elevated alkaline phosphatase level    Vitamin D deficiency    Symptomatic anemia    UTI (urinary tract infection)    Malnutrition of moderate degree    Liver cirrhosis secondary to RHOADES    Iron deficiency anemia     Past Medical History:   Diagnosis Date    A-fib     Anxiety     Cirrhosis     DDD (degenerative disc disease), cervical 3/21/2018    DDD (degenerative disc disease), lumbar 3/21/2018    Decompensated hepatic cirrhosis 12/26/2018    Depression     Diabetes mellitus, type 2     Hepatic encephalopathy 12/26/2018    HLD (hyperlipidemia)     HTN (hypertension)     Hypoalbuminemia 12/26/2018    Hypoglycemia associated with type 2 diabetes mellitus 12/26/2018    Morbid obesity with BMI of 45.0-49.9, adult 12/7/2018    Obesity     Other ascites 2/20/2019    Portal hypertension 12/26/2018    Type 2 diabetes mellitus with hyperglycemia, with long-term current use of insulin 12/6/2018     Lab Results   Component Value Date     08/08/2019     K 4.1 08/08/2019    PHOS 3.5 07/10/2019    MG 2.0 07/10/2019    CHOL 110 (L) 07/09/2019    HDL 21 (L) 07/09/2019    TRIG 83 07/09/2019    ALBUMIN 2.3 (L) 08/08/2019    AMMONIA 37 07/08/2019    HGBA1C 10.7 (H) 05/15/2019    CALCIUM 8.9 08/08/2019     Other Pertinent Labs: none    Current Outpatient Medications   Medication Sig    aspirin (ECOTRIN) 81 MG EC tablet Take 81 mg by mouth once daily.    clonazePAM (KLONOPIN) 0.5 MG tablet Take 0.5 mg by mouth every evening.    cyanocobalamin (VITAMIN B-12) 500 MCG tablet Take 500 mcg by mouth once daily.    ergocalciferol (ERGOCALCIFEROL) 50,000 unit Cap Take 2 tablets weekly (Patient taking differently: Take 50,000 Units by mouth every 7 days. Friday)    escitalopram oxalate (LEXAPRO) 20 MG tablet Take 20 mg by mouth once daily.    ferrous gluconate (FERGON) 324 MG tablet Take 324 mg by mouth daily with breakfast.    furosemide (LASIX) 40 MG tablet Take 80 mg by mouth once daily.    gabapentin (NEURONTIN) 600 MG tablet Take 600 mg by mouth 2 (two) times daily.    insulin aspart U-100 (NOVOLOG FLEXPEN U-100 INSULIN) 100 unit/mL (3 mL) InPn pen 30 units before each meal + correction Max  u    insulin glargine (LANTUS) 100 unit/mL injection Inject 60 Units into the skin once daily.    ketoconazole (NIZORAL) 2 % cream Apply topically once daily.    lactulose (CHRONULAC) 20 gram/30 mL Soln Take 30 mLs (20 g total) by mouth 3 (three) times daily. Adjust dose to have  3-4 BM's daily (Patient taking differently: Take 20 g by mouth once daily. Adjust dose to have  3-4 BM's daily)    metoprolol succinate (TOPROL XL) 25 MG 24 hr tablet Take 25 mg by mouth once daily.    omeprazole (PRILOSEC) 40 MG capsule Take 40 mg by mouth once daily.    potassium chloride (MICRO-K) 10 MEQ CpSR Take 10 mEq by mouth once daily.    rifAXIMin (XIFAXAN) 550 mg Tab Take 1 tablet (550 mg total) by mouth 2 (two) times daily.    rosuvastatin (CRESTOR) 10 MG tablet Take 10 mg by  "mouth once daily.    spironolactone (ALDACTONE) 100 MG tablet Take 200 mg by mouth once daily.    XARELTO 20 mg Tab Take 20 mg by mouth once daily.    zinc gluconate 50 mg tablet Take 50 mg by mouth once daily.     No current facility-administered medications for this visit.      Allergies: Iodinated contrast- oral and iv dye and Zoloft [sertraline]    Ht Readings from Last 1 Encounters:   08/08/19 5' 2.05" (1.576 m)     Wt Readings from Last 1 Encounters:   08/08/19 110.1 kg (242 lb 11.6 oz)      BMI: Body mass index is 44.33 kg/m².    Usual Weight: 230-240 lb  Weight Change/Time: fluctuates with paracentesis about every 10 days, generally stable otherwise  Current Diet: regular  Appetite/Current Intake: good   Exercise/Physical Activity: little activity, functional in ADLs, walks very little and walks slowly/gingerly  Nutritional/Herbal Supplements: Premier Protein shakes 3 x/day (30 gm protein )  Potential Food/Medication Interactions: xifaxan-no grapefruit  Chewing/Swallowing Problems: none  Symptoms: none  Assessment of Lab Values: Alb 2.3  Support System: daughter, son, other caregiver present, voice support in nutrition /diet regimen    Estimated Kcal Need: 2200 kcal (20 kcal/kg)  Estimated Protein Need: 110 gm (1 gm/kg)    Nutritional History:   Pt reports no n/v/d/abd pain. Pt aware of foods and beverages that affect BG and has cut back on these foods, BG better controlled. BG typically 100-200 at home. Pt has a good appetite some days, other days not as good. Pt has been advised by NP to drink protein shakes and she has been doing this consistently drinking 3 x/day Premier Protein shakes. Pt is not reading labels for sodium content. Pt is not active physically. Pt provided the following diet recall:  B: Protein shake, water, 2 eggs (no salt or pepper)  L: just protein shake or salad (lettuce/cheese/1 boiled egg/Italian dressing), slices of bologna w/ mustard, 2 x/ month fast food  D: daughter or son or " pt cook; red beans & rice / broccoli or cabbage (fresh) / pork chop/steak/chicken/fried fish, estimates she has about 3 oz meat, homemade vegetable soup  Snack: shake      Nutritional Diagnoses  Problem: limited adherence to nutrition-related recommendations  Etiology: r/t prior to today pt has been advised to avoid sodium/read labels on sodium content  Symptoms: aeb diet recall    Educational Need? yes  Barriers: none identified  Discussed with: patient and caregiver  Interventions: Patient taught nutrition information regarding Pre-liver transplant work-up.    Reviewed Low Sodium packet (low Na diet, foods recommended/not recommended, food label strategies, flavoring tips, & sample menu).   Provided education on protein content in foods, goal intake per day, suggestions for ways to reach protein intake goal; nutrition supplements, snacks.   Goals/Recommendations: diet adherence and consumption of juan c nutritional supplements  Actions Taken: instruct/provide written information  Strategies Used: problem solving, goal setting, motivational interviewing  Patient and/or family comprehend instructions: yes , adherence expected  Outcome: Verbalizes understanding  Monitoring:     Contact information provided, will f/u in clinic and communicate with the care team as needed.     Counseling Time: 15 minutes

## 2019-08-08 NOTE — PROGRESS NOTES
Pre Transplant Infectious Diseases Consult  Liver Transplant Recipient Evaluation      Reason for Visit:  Pre Transplant Evaluation    Organ:  Liver    Etiology of Liver Disease:  RHOADES cirrhosis. Reports hx of ascites requiring paracentesis. Denies known hx of SBP.    History of Prior Transplant:  No    Currently taking immunosuppressants/steroids:  No    History of Splenectomy:  No    Infectious History:  Current/recent infections or currently taking antibiotics? Yes   - Diagnosed with a UTI one month ago. Treated with Ciprofloxacin.  History of recurrent infections?   - Seasonal allergies. Occasional sinus infections.   - Dental issues. Recent dental work.  Any major hospitalizations due to infection?  No  If diabetic, history of diabetic foot infection/osteomyelitis?  No  History of shingles? No  History of STDs (syphilis, viral hepatitis, HIV)?  No  Exposure to TB or ever had a positive TB skin test?  No  History of residence in coccidioides endemic areas ([facility]?  No  Any foreign travel?  [place] cruise. Any associated illness?  No    Social/Environmental:  Occupational: ; fryer in restaurant;   Animal exposures: No  Hobbies (gardening, hike, fish/hunting, etc): indoor activities   Consumption of raw/undercooked meat or seafood? No  Any injectable or smoked recreational drug use? No    Immunization History:    Childhood vaccines:  Yes  Hepatitis B: Immune  Prevnar-13: never  Shingles (Zostavax/Shingrix): at [facility] in 2018.    Serologies:  Hepatitis A Antibody IgG   Date Value Ref Range Status   12/06/2018 Negative  Final     Hep B Core Total Ab   Date Value Ref Range Status   12/06/2018 Negative  Final     Hep B S Ab   Date Value Ref Range Status   12/06/2018 Positive (A)  Final     Hepatitis B Surface Ag   Date Value Ref Range Status   12/06/2018 Negative  Final        Review of Systems   Constitution: Negative for chills, decreased appetite, fever,  malaise/fatigue, night sweats, weight gain and weight loss.   HENT: Negative for congestion, ear pain, hearing loss, hoarse voice, sore throat and tinnitus.    Eyes: Negative for blurred vision, redness and visual disturbance.   Cardiovascular: Negative for chest pain, leg swelling and palpitations.   Respiratory: Negative for cough, hemoptysis, shortness of breath, sputum production and wheezing.    Hematologic/Lymphatic: Negative for adenopathy. Bruises/bleeds easily.   Skin: Negative for dry skin, itching, rash and suspicious lesions.   Musculoskeletal: Negative for back pain, joint pain, myalgias and neck pain.   Gastrointestinal: Positive for bloating and abdominal pain. Negative for constipation, diarrhea, heartburn, nausea and vomiting.   Genitourinary: Negative for dysuria, flank pain, frequency, hematuria, hesitancy and urgency.   Neurological: Negative for dizziness, headaches, numbness, paresthesias and weakness.   Psychiatric/Behavioral: Negative for depression and memory loss. The patient does not have insomnia and is not nervous/anxious.    Allergic/Immunologic: Negative for environmental allergies, HIV exposure, hives and persistent infections.     Physical Exam   Constitutional: She is oriented to person, place, and time. She appears well-developed and well-nourished. No distress.   HENT:   Head: Normocephalic and atraumatic.   Mouth/Throat: She does not have dentures. Abnormal dentition. No dental abscesses or dental caries. No oropharyngeal exudate.   Majority of upper teeth pulled.  Two decaying lower molars.   Eyes: Conjunctivae are normal. No scleral icterus.   Neck: Neck supple.   Cardiovascular: Normal rate and regular rhythm.   No murmur heard.  Pulmonary/Chest: Effort normal and breath sounds normal. No respiratory distress. She has no wheezes. She has no rales.   Abdominal: Soft. Bowel sounds are normal. She exhibits no distension. There is tenderness.   Musculoskeletal: She exhibits no  edema.   Lymphadenopathy:     She has no cervical adenopathy.   Neurological: She is alert and oriented to person, place, and time.   Skin: Skin is warm and dry. No rash noted. She is not diaphoretic. No erythema.   Psychiatric: She has a normal mood and affect. Her behavior is normal. Judgment and thought content normal.            Counseling:   I discussed with the patient the risk for increased susceptibility to infections following transplantation including increased risk for infection right after transplant and if rejection should occur.  The patient has been counseled on the importance of vaccinations including but not limited to a yearly flu vaccine. Patient was also instructed to encourage that family/caretakers receive their flu vaccine yearly. The patient was encouraged to contact us about any problems that may develop after immunizations and possible side effects were reviewed.     Specific guidance has been provided to the patient regarding the patient's occupation, hobbies and activities to avoid future infectious complications. These include but are not limited to: avoiding raw/undercooked meats and seafood, avoiding unpasteurized milk/cheeses, proper (hand) hygiene, contact with animals and appropriate vaccination of animals, use of mosquito/tick precautions, avoiding walking barefoot, avoiding sick contacts, and seeking medical advice prior to foreign travel (specifically developing countries).       Transplant Candidacy: Based on available information, there are no identified significant barriers to transplantation from an infectious disease standpoint pending acceptable serologies and subject to the following. Quantiferon gold, HIV, strongyloides IgG and RPR are pending. If positive, please refer to ID clinic. It is recommended the patient have their teeth treated prior to transplant.      Immunizations recommended:  - Influenza annually  - Tdap booster every 10 years  - Prevnar 13 today followed  by Pneumovax 23 booster every 5 years  - Hepatitis A vaccine today (second dose of series)    Final determination of transplant candidacy will be made once evaluation is complete and reviewed by the Transplant Selection Committee.

## 2019-08-08 NOTE — PROGRESS NOTES
Transplant Hepatology  Liver Transplant Recipient Evaluation      Consultation started: 8/8/2019 at 1:41 PM     Original Referring Provider: Jim Jolley  Current Corresponding Physician: Jim WALLACE Native Liver Diagnosis: Cirrhosis: Fatty Liver ([place])    Reason for Visit: evaluation for liver transplant     Subjective:     Sejal Matamoros is a 69 y.o. female with ESLD secondary to ROSA (non-alcoholic steatohepatitis).  She is accompanied by her son, daughter and daughter-in-law.    The patient was last seen in clinic on 5/30/2019.  She returns today to start liver transplant evaluation.    Patient initially diagnosed with cirrhosis in September 2017 after presenting with ascites and BLE edema.  Despite a persistently low MELD score, the patient has had increasing complications of portal hypertension.  Currently on lasix 80mg and spironolactone 200mg daily with increasing LVP requirement; now at weekly.  She reports compliance with low sodium diet.  Reports a baseline weight on 231 lbs following LVP. The patient also has HE requiring lactulose with 3-4 bowel movements daily and rifaximin.  Continues to have intermittent confusion with appropriate medical management.     Recent external hospitalization requiring 4 unit blood transfusion.  No current notes for review at this time.      Patient presents to clinic ambulating independently with a shuffling gait.  She reports good po intake and using 3 protein shakes daily.  She is also receiving HHPT.      Review of Systems   Constitutional: Positive for activity change and fatigue. Negative for appetite change, chills, fever and unexpected weight change.   HENT: Negative for hearing loss, rhinorrhea and trouble swallowing.    Eyes: Negative for visual disturbance.   Respiratory: Negative for shortness of breath.    Cardiovascular: Positive for leg swelling. Negative for chest pain.   Gastrointestinal: Positive for abdominal distention.  Negative for abdominal pain, nausea and vomiting.   Endocrine: Negative for cold intolerance and heat intolerance.   Musculoskeletal: Positive for gait problem.   Skin: Negative for rash.   Neurological: Negative for weakness and headaches.   Hematological: Negative for adenopathy. Does not bruise/bleed easily.   Psychiatric/Behavioral: Positive for confusion and decreased concentration.       Objective:   Physical Exam   Constitutional: She is oriented to person, place, and time. She appears well-developed and well-nourished. No distress.   Gets onto exam table independently   HENT:   Head: Normocephalic and atraumatic.   Mouth/Throat: Oropharynx is clear and moist. No oropharyngeal exudate.   Eyes: Pupils are equal, round, and reactive to light. EOM are normal. No scleral icterus.   Neck: Normal range of motion. Neck supple. No thyromegaly present.   Cardiovascular: Normal rate, regular rhythm and normal heart sounds. Exam reveals no gallop and no friction rub.   No murmur heard.  Pulmonary/Chest: Effort normal. No respiratory distress. She has no wheezes. She has no rales.   Abdominal: Soft. Bowel sounds are normal. She exhibits distension. There is no tenderness.   Musculoskeletal: Normal range of motion. She exhibits edema.   Lymphadenopathy:     She has no cervical adenopathy.   Neurological: She is alert and oriented to person, place, and time. No cranial nerve deficit.   Slow mentation but appropriate   Skin: Skin is warm and dry. No rash noted.   Psychiatric: She has a normal mood and affect. Her behavior is normal.   Vitals reviewed.       MELD-Na score: 9 at 8/8/2019  7:40 AM  MELD score: 9 at 8/8/2019  7:40 AM  Calculated from:  Serum Creatinine: 0.9 mg/dL (Rounded to 1 mg/dL) at 8/8/2019  7:40 AM  Serum Sodium: 139 mmol/L (Rounded to 137 mmol/L) at 8/8/2019  7:40 AM  Total Bilirubin: 0.8 mg/dL (Rounded to 1 mg/dL) at 8/8/2019  7:40 AM  INR(ratio): 1.3 at 8/8/2019  7:40 AM  Age: 69 years     Lab Results    Component Value Date     08/08/2019    BUN 18 08/08/2019    CREATININE 0.9 08/08/2019    CALCIUM 8.9 08/08/2019     08/08/2019    K 4.1 08/08/2019     08/08/2019    PROT 6.4 08/08/2019    CO2 25 08/08/2019    WBC 4.14 08/08/2019    WBC 5.4 11/29/2018    RBC 3.36 (L) 08/08/2019    HGB 9.5 (L) 08/08/2019    HCT 32.0 (L) 08/08/2019     08/08/2019     Lab Results   Component Value Date     (H) 07/26/2019     11/12/2018    CHOL 110 (L) 07/09/2019    TRIG 83 07/09/2019    HDL 21 (L) 07/09/2019    CHOLHDL 19.1 (L) 07/09/2019    TOTALCHOLEST 5.2 (H) 07/09/2019    ALBUMIN 2.3 (L) 08/08/2019    BILITOT 0.8 08/08/2019    AST 45 (H) 08/08/2019    ALT 24 08/08/2019    ALKPHOS 232 (H) 08/08/2019    MG 2.0 07/10/2019    LABPROT 13.3 (H) 08/08/2019    INR 1.3 (H) 08/08/2019       Diagnostics: EMR reviewed    Transplant Hepatology - Candidacy   Assessment/Plan:     Transplant Candidacy: Sejal Matamoros is a 69 y.o. female with ESLD secondary to nonalcoholic steatohepatitis here for evaluation for possible OLT.  In my opinion, it is unclear if she is a good candidate for liver transplant.  She will be presented to selection committee after all tests and evaluations are complete.    Patient is undergoing transplant evaluations.  No absolute contraindications at this time.  There is concern for advanced age, central obesity, decreased functional status and low MELD.  Despite her low MELD, there are obvious complications of portal hypertension which are not adequately controlled with maximal medical management.  She is requiring weekly LVP with diuretic therapy and low sodium diet.  HE and possible pulmonary hypertension limits the use of TIPS.      Given the concerns for candidacy, we will discuss case in selection this week.  Based on discussion, will determine if proceeding with RHC and management of possible pulmonary hypertension is warranted.      *  Need to obtain LHC from  [facility] which was performed within 1 year    *  Notes from recent hospitalization     RTC in 2-3 months     MD JADE SummersOS Patient Status  Functional Status: 50% - Requires considerable assistance and frequent medical care  Physical Capacity: Limited Mobility    Outside Records Request: Obtain [facility] report from [place]; performed 9/2018

## 2019-08-08 NOTE — PATIENT INSTRUCTIONS
Your current MELD score is 8.    We will proceed with liver transplant evaluation.    Continue high protein and low sodium diet.    Return to clinic in 2-3 months

## 2019-08-08 NOTE — PROGRESS NOTES
PRE EDUCATION TEACHING NOTE    Sejal Matamoros was seen in clinic today.  Handbook on pre-liver transplant information (see outline below) was given to the patient and time was allowed for questions.  Patient's daughter and brother (Danitza Werner) accompanied her.  Informed consent signed and written information given on selection criteria.      LIVER TRANSPLANT WORK-UP EDUCATION  I. NATIONAL REGISTRY LISTING  A. Information for listing  B. Regions  C. Per UNOS, can be listed at more than one center  II. SURGERY  A. Length  B. Complications: bleeding, infection  C. Central lines, drains, Stringer catheter, incision, endotracheal tube, NG tube  D. Transfusions, cell saver  III. SHORT TERM RECOVERY  A. ICU, PICU, Hospital stay  IV. LONG TERM RECOVERY  A. Labs at home  B. Clinic visits  C. Complications: infection, rejection, readmissions  D. Normal immunity and immunosuppression  E. Incidence of re-admit in 1st year  V. REJECTION  A. Incidence  B. Treatment: Solumedrol, Prograf, Thymoglobulin (actions and side effects)  VI. IMMUNOSUPPRESSIVES  A. Prednisone  B. Imuran/Cellcept  C. Cyclosporin/Prograf  D. Rapamune  E. Need for lifetime compliance  F. Actions and side effects  G. Costs  VII. RECURRENCE OF VIRAL HEPATITIS

## 2019-08-08 NOTE — PROGRESS NOTES
Transplant Surgery Liver Transplant Recipient Evaluation    Referring Physician: Jim Jolley  Corresponding Physician: Jim Jolley    Subjective:     Reason for Visit: evaluate liver transplant candidacy    History of Present Illness: Sejal Matamoros is a 69 y.o. year old female who is being evaluated for liver transplantation.    Transplant History: N/A    Native Liver Disease (UNOS terminology): Cirrhosis: Fatty Liver (Rosa) (based on medical records from referral).    Manifestations of liver disease: ascites (recurrent paracentesis needed), edema, encephalopathy, fatigue, muscle wasting and thrombocytopenia  MELD-Na score: 9 at 8/8/2019  7:40 AM  MELD score: 9 at 8/8/2019  7:40 AM  Calculated from:  Serum Creatinine: 0.9 mg/dL (Rounded to 1 mg/dL) at 8/8/2019  7:40 AM  Serum Sodium: 139 mmol/L (Rounded to 137 mmol/L) at 8/8/2019  7:40 AM  Total Bilirubin: 0.8 mg/dL (Rounded to 1 mg/dL) at 8/8/2019  7:40 AM  INR(ratio): 1.3 at 8/8/2019  7:40 AM  Age: 69 years    PMH: reviewed  PSH: reviewed    Review of Systems      Objective:     Physical Exam:  Constitutional:   Vitals reviewed: yes   Well-nourished and well-groomed: yes  Eyes:   Sclerae icteric: no   Extraocular movements intact: yes  GI:    Bowel sounds normal: yes   Tenderness: no    If yes, quadrant/location: not applicable   Palpable masses: no    If yes, quadrant/location: not applicable   Hepatosplenomegaly: no   Ascites: yes abundant,    Hernia: no    If yes, type/location: not applicable   Surgical scars: yes    If yes, type/location: laparoscopic port sites    Resp:   Effort normal: yes   Breath sounds normal: yes    CV:   Regular rate and rhythm: yes   Heart sounds normal: yes   Femoral pulses normal: yes   Extremities edematous: no  Skin:   Rashes or lesions present: no    If yes, describe:not applicable   Jaundice:: no    Musculoskeletal:   Gait normal: no, shuffling   Strength normal: yes  Psych:   Oriented to person, place, and  time: yes   Affect and mood normal: yes    Additional comments: markedly obese abdomen / hips         Transplant Surgery - Candidacy   Assessment/Plan:   I see no surgical contraindication to liver transplantation. Based on available information, Sejal Matamoros is a high-risk liver transplant candidate. Final determination of transplant candidacy will be made once evaluation is complete and reviewed by the Liver Transplant Selection Committee.    High risk operation due to frailty and obesity and also has low meld, but significant decompensating symptoms.     Devin Malcolm Jr, MD             Counseling: We provided Sejal Matamoros with a group education session today.  We discussed liver transplantation at length with her, including risks, potential complications, and alternatives in the management of her liver disease.  The discussion included complications related to anesthesia, bleeding, infection, primary nonfunction, and vascular thrombosis.  I discussed the typical postoperative course, length of hospitalization, the need for long-term immunosuppression, and the need for long-term routine follow-up.  I discussed living-donor and -donor transplantation and the relative advantages and disadvantages of each.  I also discussed average waiting times for both living donation and  donation.  I discussed national and center-specific survival rates.  I also mentioned the potential benefit of multicenter listing to candidates listed with centers within more than one organ procurement organization.  All questions were answered.    PHS: I discussed the use of organs from donors with PHS increased-risk behavior, including the testing protocols utilized, as well as data from the literature regarding the likelihood of transmission of hepatitis or HIV.  The patient is willing to consider such grafts.  DCD: I discussed the use of organs recovered by donation after cardiac death (DCD),  including slightly decreased graft survival and greater risk of arterial and biliary complications. The potential advantage to the recipient is possibly receiving a transplant sooner by accepting such an organ. The patient is willing to consider such grafts.  HBcAb: I discussed the use of organs from donors with HBcAb in conjunction with long term use of HBV antiviral drugs, such as lamivudine. The small but measurable risk of hepatitis B seroconversion was discussed as well as the potentially life long need to continue antiviral drugs. The patient is willing to consider such grafts.  HCV Non-viremic recipient: I discussed the use of HCV-positive organs in naive recipients, including the risk of viral transmission to the patients or others, potential insurance barriers for antiviral medication coverage, risk for fibrosing cholestatic hepatitis, death or graft loss. The potential advantage to the recipient is the possibility of receiving a transplant sooner with decreased mortality risk by accepting such an organ. The patient is willing to consider such grafts.  LDLT: I discussed the nature of living donor liver transplant, including donor risks and more frequent recipient complications. The patient is willing to consider such grafts.

## 2019-08-08 NOTE — PROGRESS NOTES
PHARM.D. PRE-TRANSPLANT NOTE:    This patient's medication therapy was evaluated as part of her pre-transplant evaluation.      The following general pharmacologic concerns were noted: None    The following pharmacologic concerns related to HCV therapy were noted: Patient currently on rivaroxaban and clonazepam.      This patient's medication profile was reviewed for contraindications for DAA Hepatitis C therapy:    [X]  No current inducers of CYP 3A4 or PGP  [X]  No amiodarone on this patient's EMR profile in the last 24 months  [YES]  No past or current atrial fibrillation on this patient's EMR profile       Current Outpatient Medications   Medication Sig Dispense Refill    aspirin (ECOTRIN) 81 MG EC tablet Take 81 mg by mouth once daily.      clonazePAM (KLONOPIN) 0.5 MG tablet Take 0.5 mg by mouth every evening.      cyanocobalamin (VITAMIN B-12) 500 MCG tablet Take 500 mcg by mouth once daily.      ergocalciferol (ERGOCALCIFEROL) 50,000 unit Cap Take 2 tablets weekly (Patient taking differently: Take 50,000 Units by mouth every 7 days. Friday) 24 capsule 4    escitalopram oxalate (LEXAPRO) 20 MG tablet Take 20 mg by mouth once daily.      ferrous gluconate (FERGON) 324 MG tablet Take 324 mg by mouth daily with breakfast.      furosemide (LASIX) 40 MG tablet Take 80 mg by mouth once daily.      gabapentin (NEURONTIN) 600 MG tablet Take 600 mg by mouth 2 (two) times daily.      insulin aspart U-100 (NOVOLOG FLEXPEN U-100 INSULIN) 100 unit/mL (3 mL) InPn pen 30 units before each meal + correction Max  u 60 mL 3    insulin glargine (LANTUS) 100 unit/mL injection Inject 60 Units into the skin once daily. 45 mL 3    ketoconazole (NIZORAL) 2 % cream Apply topically once daily. 60 Tube 3    lactulose (CHRONULAC) 20 gram/30 mL Soln Take 30 mLs (20 g total) by mouth 3 (three) times daily. Adjust dose to have  3-4 BM's daily (Patient taking differently: Take 20 g by mouth once daily. Adjust dose to have   3-4 BM's daily) 5000 mL 5    metoprolol succinate (TOPROL XL) 25 MG 24 hr tablet Take 25 mg by mouth once daily.      omeprazole (PRILOSEC) 40 MG capsule Take 40 mg by mouth once daily.      potassium chloride (MICRO-K) 10 MEQ CpSR Take 10 mEq by mouth once daily.      rifAXIMin (XIFAXAN) 550 mg Tab Take 1 tablet (550 mg total) by mouth 2 (two) times daily. 60 tablet 11    rosuvastatin (CRESTOR) 10 MG tablet Take 10 mg by mouth once daily.      spironolactone (ALDACTONE) 100 MG tablet Take 200 mg by mouth once daily.      XARELTO 20 mg Tab Take 20 mg by mouth once daily.      zinc gluconate 50 mg tablet Take 50 mg by mouth once daily.       No current facility-administered medications for this visit.          Currently the patient is responsible for preparing / administering this patient's medications on a daily basis.  I am available for consultation and can be contacted, as needed by the other members of the Liver Transplant team.

## 2019-08-08 NOTE — PROGRESS NOTES
Patient seen in clinic with caregivers.  Consents reviewed and signatures obtained.  Patient appointments reviewed along with location and special instructions.  Patient portal reviewed with the patient and caregiver.  Questions answered and concerns addressed

## 2019-08-08 NOTE — LETTER
August 12, 2019        Jim Jolley  [address] [place] [address]  Phone: [phone]  Fax: [phone]             [facility]  [address]  [facility] [address]  Phone: [phone]   Patient: Sejal Matamoros   MR Number: [id number]   YOB: 1950   Date of Visit: 8/8/2019       Dear Dr. Jim Jolley    Thank you for referring Sejal Matamoros to me for evaluation. Attached you will find relevant portions of my assessment and plan of care.    If you have questions, please do not hesitate to call me. I look forward to following Sejal Matamoros along with you.    Sincerely,    Devin Malcolm Jr, MD    Enclosure    If you would like to receive this communication electronically, please contact [email or web address] or [phone] to request [employer] Link access.    [employer] Link is a tool which provides read-only access to select patient information with whom you have a relationship. Its easy to use and provides real time access to review your patients record including encounter summaries, notes, results, and demographic information.    If you feel you have received this communication in error or would no longer like to receive these types of communications, please e-mail [email or web address]

## 2019-08-08 NOTE — PROGRESS NOTES
"Transplant Recipient Adult Psychosocial Assessment    Sejal Matamoros  [address]  [place] [address]  Telephone Information:   Mobile [phone]   Home  [phone] (home)  Work  There is no work phone number on file.  E-mail  No e-mail address on record    Sex: female  YOB: 1950  Age: 69 y.o.    Encounter Date: 2019  [place] Citizen: yes  Primary Language: English   Needed: no    Emergency Contact:  Name: Danitza Buchanan  Relationship: daughter  Address: [address], [place] [address]  Phone Numbers:   [phone] (mobile)    Family/Social Support:   Number of dependents/: none  Marital history: Patient  to  for 51 years.  Though patient reports he will not be part of caregiver plan, he is not dependable and does not leave home much.  Other family dynamics: Patient with good support from her 2 living children and daughter in law.  Patient has one  daughter, Jane who passed away in 2016 from pancreatic cancer 7 weeks after diagnosis.   Patient has a large number of grandchildren who are adults and willing to assist if needed.  Patient with one living sister, Rita Ormond, age 71, lives near patient and drives.  Patient with one living brother.   Pt with two  brothers, one who passed away from Fatty liver disease.  Patient's parents .  Family reports patient has a niece who has fatty liver disease as well.   Most of the family all lives near each other [place] the same [place].    Patient's daughter Danitza Buchanan diagnosed with non-hodgkin's lymphoma in 2016.  She has one last round chemotherapy and a PET Scan this month and she should be completed with treatment.     Household Composition:  Name: Sejal Matamoros (Marie)  Age: 69  Relationship: patient  Does person drive? no    Name: Alphonso Matamoros  Age: 71  Relationship:   Does person drive? yes   Phone [phone]    Do you and your caregivers have access to reliable " transportation? yes  PRIMARY CAREGIVER: Danitza Buchanan, pts daughter, will be primary caregiver, phone number [phone].      provided in-depth information to patient and caregiver regarding pre- and post-transplant caregiver role.   strongly encourages patient and caregiver to have concrete plan regarding post-transplant care giving, including back-up caregiver(s) to ensure care giving needs are met as needed.    Patient and Caregiver states understanding all aspects of caregiver role/commitment and is able/willing/committed to being caregiver to the fullest extent necessary.    Patient and Caregiver verbalizes understanding of the education provided today and caregiver responsibilities.         remains available. Patient and Caregiver agree to contact  in a timely manner if concerns arise.      Able to take time off work without financial concerns: yes , not working at this time.  Brother who works off shore would take leave to come home. Son Alphonso does work out of the country for one month and home for one month.    Additional Significant Others who will Assist with Transplant:  Name: Alphonso Matamoros Jr.  Age: 43  City: [place] State: [place]  Relationship: son  Does person drive? yes   Phone: [phone]    Name: Liliana Matamoros  Age: 49  City: [place]: [place]  Relationship: DIL  Does person drive? no   Phone: [phone]    Living Will: no  Healthcare Power of : no   Family did express concern of not wanting patient's  to be decision maker, they understand education provided about Advance Directives today.  Advance Directives on file: <<no information> per medical record.  Verbally reviewed LW/HCPA information.   provided patient with copy of LW/HCPA documents and provided education on completion of forms.    Living Donors: No.    Highest Education Level: Attended College/Technical School  Reading Ability: college  Reports  difficulty with: comprehension and memory and slow cognition at times due to encephalopathy  Learns Best By:  Combination of written information and hands on learning     Status: no  VA Benefits: no     Working for Income: No  If no, reason not working: Disability  Patient is disabled.  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  The next step for them was retirement...    Spouse/Significant Other Employment: Retired    Disabled: yes: date disability began: , due to: mental health concerns.   Depression/Anxiety.    Monthly Income:  SS Disability: $726  SS halfway-: $1430  Able to afford all costs now and if transplanted, including medications: Most times able to afford expenses now, but worried about additional medication costs related to transplant.  Patient's family will contact [facility] to find out more information about out of pocket prescription expenses.  Pt with zero co-pay on Teir 1, 2 and 3 prescriptions, but has co-pay for tier 4 and specialty, just not sure what the co-pays look like.   Family interested in fundrasing.  SW provided resource information and ideas for fundraising and opening fundraising bank account.  SW recommended pt and family look into Utility Assistance in their county to see if patient and  qualify.  Discussed looking into area food bill for assistance as well, in order to save for other expenses.      Patient and Caregiver verbalizes understanding of personal responsibilities related to transplant costs and the importance of having a financial plan to ensure that patients transplant costs are fully covered.       provided fundraising information/education. Patient and Caregiververbalizes understanding.   remains available.    Insurance:   Payor/Plan Subscr  Sex Relation Sub. Ins. ID Effective Group Num   1. HU[employer] MANAGE* JACQUES GANDARA* 1950 Female   [id number] 1/1/17 [id number]                                   [address] [address]     Primary Insurance (for UNOS reporting): Public Insurance - Medicare FFS (Fee For Service)  Secondary Insurance (for UNOS reporting): None  Patient and Caregiver verbalizes clear understanding that patient may experience difficulty obtaining and/or be denied insurance coverage post-surgery. This includes and is not limited to disability insurance, life insurance, health insurance, burial insurance, long term care insurance, and other insurances.      Patient and Caregiver also reports understanding that future health concerns related to or unrelated to transplantation may not be covered by patient's insurance.  Resources and information provided and reviewed.     Patient and Caregiver provides verbal permission to release any necessary information to outside resources for patient care and discharge planning.  Resources and information provided are reviewed.      Dialysis Adherence: Patient and Caregiver reports none.      Infusion Service: patient utilizing? no  Home Health: patient utilizing? yes , [facility] for SN/PT  DME: RW, WC, BSC but not using  Pulmonary/Cardiac Rehab: none   ADLS:  Pt reports independence with most all ADL's at this time, she does have trouble with weakness in legs and difficulty walking at times.  Pt is not driving due to encephalopathy    Adherence:   Pt reports following all healthcare recommendations.  Adherence education and counseling provided.     Per History Section:  Past Medical History:   Diagnosis Date    A-fib     Anxiety     Cirrhosis     DDD (degenerative disc disease), cervical 3/21/2018    DDD (degenerative disc disease), lumbar 3/21/2018    Decompensated hepatic cirrhosis 12/26/2018    Depression     Diabetes mellitus, type 2     Hepatic encephalopathy 12/26/2018    HLD (hyperlipidemia)     HTN (hypertension)     Hypoalbuminemia 12/26/2018    Hypoglycemia associated with  type 2 diabetes mellitus 12/26/2018    Morbid obesity with BMI of 45.0-49.9, adult 12/7/2018    Obesity     Other ascites 2/20/2019    Portal hypertension 12/26/2018    Type 2 diabetes mellitus with hyperglycemia, with long-term current use of insulin 12/6/2018     Social History     Tobacco Use    Smoking status: Never Smoker    Smokeless tobacco: Never Used   Substance Use Topics    Alcohol use: No     Frequency: Never     Social History     Substance and Sexual Activity   Drug Use No     Social History     Substance and Sexual Activity   Sexual Activity Not on file       Per Today's Psychosocial:  Tobacco: none, patient denies any use.  Alcohol: none, patient denies any use.  Illicit Drugs/Non-prescribed Medications: none, patient denies any use.    Patient and Caregiver states clear understanding of the potential impact of substance use as it relates to transplant candidacy and is aware of possible random substance screening.  Substance abstinence/cessation counseling, education and resources provided and reviewed.     Arrests/DWI/Treatment/Rehab: patient denies     Patient with pain from degenerative disc disease diagnosed after liver disease but she does not take any pain medications for this because of the liver disease.    Psychiatric History:    Mental Health: depression and anxiety; symptoms mainly managed with medications.  Pt does report feeling overwhelmed at times with medical issues and need for transplant.   Patient initially diagnosed with mental health concerns in 2005.  She was able to get off medications, then in 2016 had a difficulty time again after death of daughter from pancreatic cancer and her PCP started her back on medications.     Psychiatrist/Counselor: Patient has never received any formal treatment for depression or anxiety from a counselor or psychiatrist.   Medications:  Lexapro and Klonopin, works.    Patient allergic to Zoloft.  Suicide/Homicide Issues: Patient denies any  current and/or past SI/HI.    Safety at home: Patient denies any safety issues in home setting now or during childhood    Knowledge: Patient and Caregiver states having clear understanding and realistic expectations regarding the potential risks and potential benefits of organ transplantation and organ donation and agrees to discuss with health care team members and support system members, as well as to utilize available resources and express questions and/or concerns in order to further facilitate the pt informed decision-making.  Resources and information provided and reviewed.    Patient and Caregiver is aware of [facility]'s affiliation and/or partnership with agencies in home health care, LTAC, SNF, [facility], and other hospitals and clinics.    Understanding: Patient and Caregiver reports having a clear understanding of the many lifetime commitments involved with being a transplant recipient, including costs, compliance, medications, lab work, procedures, appointments, concrete and financial planning, preparedness, timely and appropriate communication of concerns, abstinence (ETOH, tobacco, illicit non-prescribed drugs), adherence to all health care team recommendations, support system and caregiver involvement, appropriate and timely resource utilization and follow-through, mental health counseling as needed/recommended, and patient and caregiver responsibilities.  Social Service Handbook, resources and detailed educational information provided and reviewed.  Educational information provided.    Patient and Caregiver also reports current and expected compliance with health care regime and states having a clear understanding of the importance of compliance.      Patient and Caregiver reports a clear understanding that risks and benefits may be involved with organ transplantation and with organ donation.       Patient and Caregiver also reports clear understanding that psychosocial risk factors may affect patient, and  include but are not limited to feelings of depression, generalized anxiety, anxiety regarding dependence on others, post traumatic stress disorder, feelings of guilt and other emotional and/or mental concerns, and/or exacerbation of existing mental health concerns.  Detailed resources provided and discussed.      Patient and Caregiver agrees to access appropriate resources in a timely manner as needed and/or as recommended, and to communicate concerns appropriately.  Patient and Caregiver also reports a clear understanding of treatment options available.     Patient and Caregiver received education in a group setting.   reviewed education, provided additional information, and answered questions.    Feelings or Concerns: Patient reports worried about liver transplant, gets overwhelmed at times    Coping: Patient coping appropriately with support from family and too.  She belongs to a [employer] at home.     Goals: Take a Cruise, travel with family.  She her grandchildren grow up and graduate and get .   She has 14 great grandchildren as well.      Interview Behavior: Patient and Caregiver presents as alert and oriented x 4, pleasant, good eye contact, well groomed, concentration/judgement good, calm, communicative, cooperative and asking and answering questions appropriately.          Transplant Social Work - Candidacy  Assessment/Plan:     Psychosocial Suitability: Patient presents as a good candidate for liver transplant at this time. Based on psychosocial risk factors, patient presents as medium risk, due to financial concerns related to transplant medical expenses.  Patient has limited group home income, but patient and family willing to initiate fundraisiing efforts to assist with all financial expenses related to transplant, as well as look into community resources to save in other areas.  Patient with mental health concerns of depression and anxiety that are treated with medications  and manageable at this time.  Patient with adquate support system and caregiver plan.  Patient with adequate insurance coverage. Patient with no substance abuse history.    Recommendations/Additional Comments:   Fundraising  Family to call insurance about prescription co-payments  Local lodging at [employer] [facility] transplant apartments  Continue to monitor depression and anxiety that it is still managed with medications prescribed by PCP    Coral Quan

## 2019-08-09 ENCOUNTER — OFFICE VISIT (OUTPATIENT)
Dept: OBSTETRICS AND GYNECOLOGY | Facility: CLINIC | Age: 69
End: 2019-08-09
Payer: MEDICARE

## 2019-08-09 ENCOUNTER — HOSPITAL ENCOUNTER (OUTPATIENT)
Dept: CARDIOLOGY | Facility: CLINIC | Age: 69
Discharge: HOME | End: 2019-08-09
Attending: INTERNAL MEDICINE
Payer: MEDICARE

## 2019-08-09 ENCOUNTER — HOSPITAL ENCOUNTER (OUTPATIENT)
Dept: RADIOLOGY | Facility: CLINIC | Age: 69
Discharge: HOME | End: 2019-08-09
Attending: INTERNAL MEDICINE
Payer: MEDICARE

## 2019-08-09 ENCOUNTER — HOSPITAL ENCOUNTER (OUTPATIENT)
Dept: INTERVENTIONAL RADIOLOGY/VASCULAR | Facility: HOSPITAL | Age: 69
Discharge: HOME | End: 2019-08-09
Attending: INTERNAL MEDICINE
Payer: MEDICARE

## 2019-08-09 VITALS
BODY MASS INDEX: 43.46 KG/M2 | SYSTOLIC BLOOD PRESSURE: 127 MMHG | HEART RATE: 70 BPM | HEIGHT: 62 IN | WEIGHT: 236.19 LBS | DIASTOLIC BLOOD PRESSURE: 77 MMHG

## 2019-08-09 VITALS
HEART RATE: 82 BPM | SYSTOLIC BLOOD PRESSURE: 181 MMHG | OXYGEN SATURATION: 100 % | DIASTOLIC BLOOD PRESSURE: 76 MMHG | RESPIRATION RATE: 20 BRPM

## 2019-08-09 VITALS
HEIGHT: 62 IN | BODY MASS INDEX: 44.72 KG/M2 | SYSTOLIC BLOOD PRESSURE: 181 MMHG | DIASTOLIC BLOOD PRESSURE: 76 MMHG | WEIGHT: 243 LBS | HEART RATE: 71 BPM

## 2019-08-09 DIAGNOSIS — R18.8 OTHER ASCITES: ICD-10-CM

## 2019-08-09 DIAGNOSIS — Z01.419 ENCOUNTER FOR ROUTINE GYNECOLOGIC EXAMINATION IN MEDICARE PATIENT: ICD-10-CM

## 2019-08-09 DIAGNOSIS — Z01.419 ENCOUNTER FOR GYNECOLOGICAL EXAMINATION WITHOUT ABNORMAL FINDING: Primary | ICD-10-CM

## 2019-08-09 DIAGNOSIS — Z78.0 ASYMPTOMATIC MENOPAUSAL STATE: ICD-10-CM

## 2019-08-09 DIAGNOSIS — Z76.82 ORGAN TRANSPLANT CANDIDATE: ICD-10-CM

## 2019-08-09 DIAGNOSIS — K74.60 DECOMPENSATED HEPATIC CIRRHOSIS: ICD-10-CM

## 2019-08-09 DIAGNOSIS — K72.90 DECOMPENSATED HEPATIC CIRRHOSIS: ICD-10-CM

## 2019-08-09 DIAGNOSIS — K76.6 PORTAL HYPERTENSION: ICD-10-CM

## 2019-08-09 DIAGNOSIS — I10 ESSENTIAL HYPERTENSION: ICD-10-CM

## 2019-08-09 DIAGNOSIS — E11.65 TYPE 2 DIABETES MELLITUS WITH HYPERGLYCEMIA, WITH LONG-TERM CURRENT USE OF INSULIN: ICD-10-CM

## 2019-08-09 DIAGNOSIS — Z79.4 TYPE 2 DIABETES MELLITUS WITH HYPERGLYCEMIA, WITH LONG-TERM CURRENT USE OF INSULIN: ICD-10-CM

## 2019-08-09 LAB
APPEARANCE FLD: NORMAL
ASCENDING AORTA: 2.47 CM
AV INDEX (PROSTH): 0.57
AV MEAN GRADIENT: 6 MMHG
AV PEAK GRADIENT: 12 MMHG
AV VALVE AREA: 1.86 CM2
AV VELOCITY RATIO: 0.53
BODY FLD TYPE: NORMAL
BSA FOR ECHO PROCEDURE: 2.2 M2
COLOR FLD: YELLOW
CV ECHO LV RWT: 0.33 CM
DOP CALC AO PEAK VEL: 1.72 M/S
DOP CALC AO VTI: 40.32 CM
DOP CALC LVOT AREA: 3.3 CM2
DOP CALC LVOT DIAMETER: 2.04 CM
DOP CALC LVOT PEAK VEL: 0.91 M/S
DOP CALC LVOT STROKE VOLUME: 75.11 CM3
DOP CALCLVOT PEAK VEL VTI: 22.99 CM
E WAVE DECELERATION TIME: 184.16 MSEC
E/A RATIO: 2.28
E/E' RATIO: 17.41 M/S
ECHO LV POSTERIOR WALL: 0.9 CM (ref 0.6–1.1)
FRACTIONAL SHORTENING: 29 % (ref 28–44)
INTERVENTRICULAR SEPTUM: 0.65 CM (ref 0.6–1.1)
IVRT: 0.04 MSEC
LA MAJOR: 6.24 CM
LA MINOR: 6.24 CM
LA WIDTH: 4.36 CM
LEFT ATRIUM SIZE: 4.36 CM
LEFT ATRIUM VOLUME INDEX: 48.6 ML/M2
LEFT ATRIUM VOLUME: 100.83 CM3
LEFT INTERNAL DIMENSION IN SYSTOLE: 3.83 CM (ref 2.1–4)
LEFT VENTRICLE DIASTOLIC VOLUME INDEX: 67.64 ML/M2
LEFT VENTRICLE DIASTOLIC VOLUME: 140.46 ML
LEFT VENTRICLE MASS INDEX: 71 G/M2
LEFT VENTRICLE SYSTOLIC VOLUME INDEX: 30.5 ML/M2
LEFT VENTRICLE SYSTOLIC VOLUME: 63.29 ML
LEFT VENTRICULAR INTERNAL DIMENSION IN DIASTOLE: 5.39 CM (ref 3.5–6)
LEFT VENTRICULAR MASS: 148.42 G
LV LATERAL E/E' RATIO: 14.8 M/S
LV SEPTAL E/E' RATIO: 21.14 M/S
LYMPHOCYTES NFR FLD MANUAL: 68 %
MONOS+MACROS NFR FLD MANUAL: 27 %
MV PEAK A VEL: 0.65 M/S
MV PEAK E VEL: 1.48 M/S
NEUTROPHILS NFR FLD MANUAL: 5 %
PISA TR MAX VEL: 3.7 M/S
PULM VEIN S/D RATIO: 0.49
PV PEAK D VEL: 1.02 M/S
PV PEAK S VEL: 0.5 M/S
PV PEAK VELOCITY: 1.12 CM/S
RA MAJOR: 5.36 CM
RA PRESSURE: 3 MMHG
RA WIDTH: 3.86 CM
RIGHT VENTRICULAR END-DIASTOLIC DIMENSION: 3.11 CM
RV TISSUE DOPPLER FREE WALL SYSTOLIC VELOCITY 1 (APICAL 4 CHAMBER VIEW): 14.48 CM/S
SINUS: 2.56 CM
STJ: 2.22 CM
TDI LATERAL: 0.1 M/S
TDI SEPTAL: 0.07 M/S
TDI: 0.09 M/S
TR MAX PG: 55 MMHG
TRICUSPID ANNULAR PLANE SYSTOLIC EXCURSION: 2.28 CM
TV REST PULMONARY ARTERY PRESSURE: 58 MMHG
WBC # FLD: 208 /CU MM

## 2019-08-09 PROCEDURE — 77080 DXA BONE DENSITY AXIAL: CPT | Mod: 26,,, | Performed by: INTERNAL MEDICINE

## 2019-08-09 PROCEDURE — 77080 DEXA BONE DENSITY SPINE HIP: ICD-10-PCS | Mod: 26,,, | Performed by: INTERNAL MEDICINE

## 2019-08-09 PROCEDURE — 49083 ABD PARACENTESIS W/IMAGING: CPT | Mod: GC,NTX,, | Performed by: STUDENT IN AN ORGANIZED HEALTH CARE EDUCATION/TRAINING PROGRAM

## 2019-08-09 PROCEDURE — 99999 PR PBB SHADOW E&M-EST. PATIENT-LVL III: CPT | Mod: PBBFAC,,, | Performed by: OBSTETRICS & GYNECOLOGY

## 2019-08-09 PROCEDURE — 93306 TTE W/DOPPLER COMPLETE: CPT | Mod: 26,,, | Performed by: INTERNAL MEDICINE

## 2019-08-09 PROCEDURE — 93306 TTE W/DOPPLER COMPLETE: CPT | Mod: TXP

## 2019-08-09 PROCEDURE — 89051 BODY FLUID CELL COUNT: CPT | Mod: NTX

## 2019-08-09 PROCEDURE — 77080 DXA BONE DENSITY AXIAL: CPT | Mod: TC,TXP

## 2019-08-09 PROCEDURE — 93306 TRANSTHORACIC ECHO (TTE) COMPLETE (CUPID ONLY): ICD-10-PCS | Mod: 26,,, | Performed by: INTERNAL MEDICINE

## 2019-08-09 PROCEDURE — 49083 IR PARACENTESIS WITH IMAGING: ICD-10-PCS | Mod: GC,NTX,, | Performed by: STUDENT IN AN ORGANIZED HEALTH CARE EDUCATION/TRAINING PROGRAM

## 2019-08-09 PROCEDURE — 99999 PR PBB SHADOW E&M-EST. PATIENT-LVL III: ICD-10-PCS | Mod: PBBFAC,,, | Performed by: OBSTETRICS & GYNECOLOGY

## 2019-08-09 PROCEDURE — G0101 PR CA SCREEN;PELVIC/BREAST EXAM: ICD-10-PCS | Mod: S$GLB,,, | Performed by: OBSTETRICS & GYNECOLOGY

## 2019-08-09 PROCEDURE — 49083 ABD PARACENTESIS W/IMAGING: CPT

## 2019-08-09 PROCEDURE — G0101 CA SCREEN;PELVIC/BREAST EXAM: HCPCS | Mod: S$GLB,,, | Performed by: OBSTETRICS & GYNECOLOGY

## 2019-08-09 NOTE — H&P
Radiology History & Physical      SUBJECTIVE:     Chief Complaint: ascites     History of Present Illness:  Sejal Matamoros is a 69 y.o. female who presents for paracentesis.     Past Medical History:   Diagnosis Date    A-fib     Anxiety     Cirrhosis     DDD (degenerative disc disease), cervical 3/21/2018    DDD (degenerative disc disease), lumbar 3/21/2018    Decompensated hepatic cirrhosis 12/26/2018    Depression     Diabetes mellitus, type 2     Hepatic encephalopathy 12/26/2018    HLD (hyperlipidemia)     HTN (hypertension)     Hypoalbuminemia 12/26/2018    Hypoglycemia associated with type 2 diabetes mellitus 12/26/2018    Morbid obesity with BMI of 45.0-49.9, adult 12/7/2018    Obesity     Other ascites 2/20/2019    Portal hypertension 12/26/2018    Type 2 diabetes mellitus with hyperglycemia, with long-term current use of insulin 12/6/2018     Past Surgical History:   Procedure Laterality Date    arm surgery      left    CARPAL TUNNEL RELEASE      left    CHOLECYSTECTOMY  2016    laparoscopic    COLONOSCOPY N/A 7/10/2019    [facility] by [facility]    EGD (ESOPHAGOGASTRODUODENOSCOPY) N/A 7/9/2019    Per[facility] by [facility] at [facility]    HYSTERECTOMY      ROTATOR CUFF REPAIR      TOTAL KNEE ARTHROPLASTY         Home Meds:   Prior to Admission medications    Medication Sig Start Date End Date Taking? Authorizing Provider   aspirin (ECOTRIN) 81 MG EC tablet Take 81 mg by mouth once daily.    Historical Provider, MD   clonazePAM (KLONOPIN) 0.5 MG tablet Take 0.5 mg by mouth every evening.    Historical Provider, MD   cyanocobalamin (VITAMIN B-12) 500 MCG tablet Take 500 mcg by mouth once daily.    Historical Provider, MD   ergocalciferol (ERGOCALCIFEROL) 50,000 unit Cap Take 2 tablets weekly  Patient taking differently: Take 50,000 Units by mouth every 7 days. Friday 5/22/19   Mary Agudelo, WINSTON, NP   escitalopram oxalate (LEXAPRO) 20 MG  tablet Take 20 mg by mouth once daily. 10/31/18   Historical Provider, MD   ferrous gluconate (FERGON) 324 MG tablet Take 324 mg by mouth daily with breakfast.    Historical Provider, MD   furosemide (LASIX) 40 MG tablet Take 80 mg by mouth once daily.    Historical Provider, MD   gabapentin (NEURONTIN) 600 MG tablet Take 600 mg by mouth 2 (two) times daily.    Historical Provider, MD   insulin aspart U-100 (NOVOLOG FLEXPEN U-100 INSULIN) 100 unit/mL (3 mL) InPn pen 30 units before each meal + correction Max  u 5/22/19   Mary Agudelo, DNP, NP   insulin glargine (LANTUS) 100 unit/mL injection Inject 60 Units into the skin once daily. 7/28/19   Berto Crook MD   ketoconazole (NIZORAL) 2 % cream Apply topically once daily. 2/7/19   Carlos Eduardo Ley, DPASMITA   lactulose (CHRONULAC) 20 gram/30 mL Soln Take 30 mLs (20 g total) by mouth 3 (three) times daily. Adjust dose to have  3-4 BM's daily  Patient taking differently: Take 20 g by mouth once daily. Adjust dose to have  3-4 BM's daily 3/21/19   Deepika Plunkett, TARUN   metoprolol succinate (TOPROL XL) 25 MG 24 hr tablet Take 25 mg by mouth once daily. 10/3/18 10/3/19  Historical Provider, MD   omeprazole (PRILOSEC) 40 MG capsule Take 40 mg by mouth once daily. 10/17/18   Historical Provider, MD   potassium chloride (MICRO-K) 10 MEQ CpSR Take 10 mEq by mouth once daily.    Historical Provider, MD   rifAXIMin (XIFAXAN) 550 mg Tab Take 1 tablet (550 mg total) by mouth 2 (two) times daily. 4/26/19 4/25/20  Bety Cerrato MD   rosuvastatin (CRESTOR) 10 MG tablet Take 10 mg by mouth once daily. 8/13/18   Yaniv Provider, MD   spironolactone (ALDACTONE) 100 MG tablet Take 200 mg by mouth once daily.    Historical Provider, MD   XARELTO 20 mg Tab Take 20 mg by mouth once daily. 11/30/18   Historical Provider, MD   zinc gluconate 50 mg tablet Take 50 mg by mouth once daily.    Historical Provider, MD     Anticoagulants/Antiplatelets: aspirin    Allergies:   Review  of patient's allergies indicates:   Allergen Reactions    Iodinated contrast- oral and iv dye Hives    Zoloft [sertraline]      Sedation History:  no adverse reactions    Review of Systems:   Hematological: no known coagulopathies  Respiratory: no shortness of breath  Cardiovascular: no chest pain  Gastrointestinal: no abdominal pain  Genito-Urinary: no dysuria  Musculoskeletal: negative  Neurological: no TIA or stroke symptoms         OBJECTIVE:     Vital Signs (Most Recent)  Pulse: 82 (08/09/19 0816)  Resp: 20 (08/09/19 0816)  BP: (!) 181/76 (08/09/19 0816)  SpO2: 100 % (08/09/19 0816)    Physical Exam:  ASA: 2  Mallampati: 2    General: no acute distress  Mental Status: alert and oriented to person, place and time  HEENT: normocephalic, atraumatic  Chest: unlabored breathing  Heart: regular heart rate  Abdomen: nondistended  Extremity: moves all extremities    Laboratory  Lab Results   Component Value Date    INR 1.3 (H) 08/08/2019       Lab Results   Component Value Date    WBC 4.14 08/08/2019    HGB 9.5 (L) 08/08/2019    HCT 32.0 (L) 08/08/2019    MCV 95 08/08/2019     08/08/2019      Lab Results   Component Value Date     08/08/2019     08/08/2019    K 4.1 08/08/2019     08/08/2019    CO2 25 08/08/2019    BUN 18 08/08/2019    CREATININE 0.9 08/08/2019    CALCIUM 8.9 08/08/2019    MG 2.0 07/10/2019    ALT 24 08/08/2019    AST 45 (H) 08/08/2019    ALBUMIN 2.3 (L) 08/08/2019    BILITOT 0.8 08/08/2019    BILIDIR 0.5 (H) 08/08/2019       ASSESSMENT/PLAN:     Sedation Plan: none  Patient will undergo paracentesis.    Arleth Singer  Diagnostic and interventional radiology  [phone]

## 2019-08-09 NOTE — TELEPHONE ENCOUNTER
MRI of abd w/ w/o contrast done Feb 2019.  Reviewed by Kyler Cerrato and Yun.  Imaging sufficient....no need for CT at this time.  CT scheduled for tomorrow cancelled as recommended.  Patient/ pt's daughter notified.  Understanding verbalized that iodine prep no longer needed since CT cancelled.  They deny any questions at this time.

## 2019-08-09 NOTE — PROGRESS NOTES
Subjective:       Patient ID: Sejal Matamoros is a 69 y.o. female.    Chief Complaint:  Well Woman      History of Present Illness  HPI  Sejal Matamoros is a 69 y.o. female  NEW TO ME here for her GYN exam as part of a pretransplant workup .     denies vaginal itching or irritation.  Denies vaginal discharge.  She is not sexually active.    History of abnormal pap: Never  Last Pap: was normal (stopped having paps about age 60, had JOHNNY age 26)  Last MMG: normal--routine follow-up in 12 months  Last Colonoscopy:  colonoscopy 1 years ago without abnormalities.  denies domestic violence. She does feel safe at home.     Past Medical History:   Diagnosis Date    A-fib     Anxiety     Cirrhosis     DDD (degenerative disc disease), cervical 3/21/2018    DDD (degenerative disc disease), lumbar 3/21/2018    Decompensated hepatic cirrhosis 2018    Depression     Diabetes mellitus, type 2     Hepatic encephalopathy 2018    HLD (hyperlipidemia)     HTN (hypertension)     Hypoalbuminemia 2018    Hypoglycemia associated with type 2 diabetes mellitus 2018    Morbid obesity with BMI of 45.0-49.9, adult 2018    Obesity     Other ascites 2019    Portal hypertension 2018    Type 2 diabetes mellitus with hyperglycemia, with long-term current use of insulin 2018     Past Surgical History:   Procedure Laterality Date    arm surgery      left    CARPAL TUNNEL RELEASE      left    CHOLECYSTECTOMY  2016    laparoscopic    COLONOSCOPY N/A 7/10/2019    Performed by De eDee Anthony MD at [facility] ENDO    EGD (ESOPHAGOGASTRODUODENOSCOPY) N/A 2019    [facility] END[facility]    ROTATOR CUFF REPAIR      TOTAL ABDOMINAL HYSTERECTOMY  1976    TOTAL KNEE ARTHROPLASTY       Social History     Socioeconomic History    Marital status:      Spouse name: Not on file    Number of children: Not on file    Years of education:  "Not on file    Highest education level: Not on file   Occupational History    Not on file   Social Needs    Financial resource strain: Not on file    Food insecurity:     Worry: Not on file     Inability: Not on file    Transportation needs:     Medical: Not on file     Non-medical: Not on file   Tobacco Use    Smoking status: Never Smoker    Smokeless tobacco: Never Used   Substance and Sexual Activity    Alcohol use: No     Frequency: Never    Drug use: No    Sexual activity: Not Currently     Comment:  x 51 years   Lifestyle    Physical activity:     Days per week: Not on file     Minutes per session: Not on file    Stress: Not on file   Relationships    Social connections:     Talks on phone: Not on file     Gets together: Not on file     Attends Rastafari service: Not on file     Active member of club or organization: Not on file     Attends meetings of clubs or organizations: Not on file     Relationship status: Not on file   Other Topics Concern    Not on file   Social History Narrative    Not on file     Family History   Problem Relation Age of Onset    Cirrhosis Brother         RHOADES cirrhosis    Diabetes Father     Hypertension Father     Hypertension Mother     Stroke Mother     Breast cancer Maternal Aunt     Colon cancer Neg Hx     Ovarian cancer Neg Hx      OB History        4    Para   3    Term   3       0    AB   1    Living   3       SAB   1    TAB   0    Ectopic   0    Multiple   0    Live Births   3                 /77 (BP Location: Right arm, Patient Position: Sitting)   Pulse 70   Ht 5' 2" (1.575 m)   Wt 107.1 kg (236 lb 3.2 oz)   LMP  (LMP Unknown)   BMI 43.20 kg/m²         GYN & OB History    Date of Last Pap: No result found    OB History    Para Term  AB Living   4 3 3 0 1 3   SAB TAB Ectopic Multiple Live Births   1 0 0 0 3      # Outcome Date GA Lbr Pradeep/2nd Weight Sex Delivery Anes PTL Lv   4 Term      Vag-Spont   SYDNIE   3 " Term      Vag-Spont   SYDNIE   2 Term      Vag-Spont   SYDNIE   1 SAB                Review of Systems  Review of Systems   Constitutional: Positive for activity change and fatigue. Negative for appetite change and unexpected weight change.   HENT: Negative.    Eyes: Negative for visual disturbance.   Respiratory: Positive for shortness of breath. Negative for wheezing.    Cardiovascular: Positive for leg swelling. Negative for chest pain and palpitations.   Gastrointestinal: Positive for bloating. Negative for abdominal pain and blood in stool.   Endocrine: Negative for diabetes, hair loss and hot flashes.   Genitourinary: Negative for bladder incontinence, dyspareunia, frequency, hot flashes, postmenopausal bleeding and vaginal dryness.   Musculoskeletal: Negative for back pain and joint swelling.   Integumentary:  Negative for acne, hair changes and nipple discharge.   Neurological: Negative for headaches.   Hematological: Does not bruise/bleed easily.   Psychiatric/Behavioral: Negative for depression and sleep disturbance. The patient is not nervous/anxious.    Breast: Negative for mastodynia and nipple discharge          Objective:      Physical Exam:               Genitourinary: Pelvic exam was performed with patient supine.   Genitourinary Comments: PELVIC: Normal external female genitalia without lesions. Normal hair distribution. Adequate perineal body, normal urethral meatus. Vagina atrophic without lesions or discharge. No significant cystocele or rectocele. Bimanual exam shows uterus and cervix to be surgically absent. Adnexa without masses or tenderness.  RECTAL: Deferred                              Assessment:        1. Encounter for gynecological examination without abnormal finding    2. Encounter for routine gynecologic examination in Medicare patient                Plan:      1. Encounter for gynecological examination without abnormal finding  COUNSELING:  The Patient was counseled today on the new ACS  guidelines for cervical cytology screening as well as the current recommendations for breast cancer screening.(No paps are needed after a Hysterectomy for Benign disease if there is no history of prior abnormal Pap smears.  Counseling session lasted approximately 10 minutes, and all her questions were answered. She was advised to see her primary care physician for all other health maintenance.   FOLLOW-UP with me for next routine visit.         2. Encounter for routine gynecologic examination in Medicare patient           Follow up in about 2 years (around 8/9/2021).

## 2019-08-09 NOTE — PROGRESS NOTES
Paracentesis complete. 4500 mLs peritoneal fluid drained. Pt tolerated well. Dressing to left abd clean, dry, and intact.. Specimens sent per lab order. Discharge plans reviewed. Pt discharged

## 2019-08-13 DIAGNOSIS — Z76.82 ORGAN TRANSPLANT CANDIDATE: ICD-10-CM

## 2019-08-13 DIAGNOSIS — K74.60 LIVER CIRRHOSIS SECONDARY TO NASH: ICD-10-CM

## 2019-08-13 DIAGNOSIS — R18.8 OTHER ASCITES: Primary | ICD-10-CM

## 2019-08-13 DIAGNOSIS — K75.81 LIVER CIRRHOSIS SECONDARY TO NASH: ICD-10-CM

## 2019-08-14 ENCOUNTER — LAB VISIT (OUTPATIENT)
Dept: LAB | Facility: HOSPITAL | Age: 69
End: 2019-08-14
Attending: NURSE PRACTITIONER
Payer: MEDICARE

## 2019-08-14 ENCOUNTER — DOCUMENTATION ONLY (OUTPATIENT)
Dept: TRANSPLANT | Facility: CLINIC | Age: 69
End: 2019-08-14

## 2019-08-14 ENCOUNTER — COMMITTEE REVIEW (OUTPATIENT)
Dept: TRANSPLANT | Facility: CLINIC | Age: 69
End: 2019-08-14

## 2019-08-14 DIAGNOSIS — E11.42 TYPE 2 DIABETES MELLITUS WITH DIABETIC POLYNEUROPATHY, WITH LONG-TERM CURRENT USE OF INSULIN: ICD-10-CM

## 2019-08-14 DIAGNOSIS — Z79.4 TYPE 2 DIABETES MELLITUS WITH DIABETIC POLYNEUROPATHY, WITH LONG-TERM CURRENT USE OF INSULIN: ICD-10-CM

## 2019-08-14 DIAGNOSIS — E55.9 VITAMIN D DEFICIENCY: ICD-10-CM

## 2019-08-14 LAB
25(OH)D3+25(OH)D2 SERPL-MCNC: 17 NG/ML (ref 30–96)
ALBUMIN SERPL BCP-MCNC: 2 G/DL (ref 3.5–5.2)
ALP SERPL-CCNC: 224 U/L (ref 55–135)
ALT SERPL W/O P-5'-P-CCNC: 28 U/L (ref 10–44)
ANION GAP SERPL CALC-SCNC: 12 MMOL/L (ref 8–16)
AST SERPL-CCNC: 60 U/L (ref 10–40)
BILIRUB SERPL-MCNC: 0.7 MG/DL (ref 0.1–1)
BUN SERPL-MCNC: 16 MG/DL (ref 8–23)
CALCIUM SERPL-MCNC: 8.6 MG/DL (ref 8.7–10.5)
CHLORIDE SERPL-SCNC: 104 MMOL/L (ref 95–110)
CO2 SERPL-SCNC: 20 MMOL/L (ref 23–29)
CREAT SERPL-MCNC: 1.1 MG/DL (ref 0.5–1.4)
EST. GFR  (AFRICAN AMERICAN): 59.2 ML/MIN/1.73 M^2
EST. GFR  (NON AFRICAN AMERICAN): 51.3 ML/MIN/1.73 M^2
ESTIMATED AVG GLUCOSE: 143 MG/DL (ref 68–131)
GLUCOSE SERPL-MCNC: 215 MG/DL (ref 70–110)
HBA1C MFR BLD HPLC: 6.6 % (ref 4–5.6)
POTASSIUM SERPL-SCNC: 4.4 MMOL/L (ref 3.5–5.1)
PROT SERPL-MCNC: 6.1 G/DL (ref 6–8.4)
SODIUM SERPL-SCNC: 136 MMOL/L (ref 136–145)

## 2019-08-14 PROCEDURE — 83036 HEMOGLOBIN GLYCOSYLATED A1C: CPT

## 2019-08-14 PROCEDURE — 80053 COMPREHEN METABOLIC PANEL: CPT

## 2019-08-14 PROCEDURE — 36415 COLL VENOUS BLD VENIPUNCTURE: CPT | Mod: PO

## 2019-08-14 PROCEDURE — 82306 VITAMIN D 25 HYDROXY: CPT

## 2019-08-14 NOTE — COMMITTEE REVIEW
Sejal Matamoros's case presented to selection committee.  Patient has been declined for transplant. Patient has multiple comorbidities. The risk of transplant surgery outweighs the benefits.     I was present at the committee meeting and attest to the decision of the committee.    Luis Antonio Benjamin MD  Hepatology and Liver Transplant  [facility]

## 2019-08-21 ENCOUNTER — OFFICE VISIT (OUTPATIENT)
Dept: ENDOCRINOLOGY | Facility: CLINIC | Age: 69
End: 2019-08-21
Payer: MEDICARE

## 2019-08-21 VITALS
BODY MASS INDEX: 47.15 KG/M2 | TEMPERATURE: 98 F | WEIGHT: 256.19 LBS | HEIGHT: 62 IN | DIASTOLIC BLOOD PRESSURE: 64 MMHG | SYSTOLIC BLOOD PRESSURE: 120 MMHG | HEART RATE: 65 BPM

## 2019-08-21 DIAGNOSIS — I10 ESSENTIAL HYPERTENSION: ICD-10-CM

## 2019-08-21 DIAGNOSIS — E78.2 MIXED HYPERLIPIDEMIA: ICD-10-CM

## 2019-08-21 DIAGNOSIS — Z79.4 TYPE 2 DIABETES MELLITUS WITH HYPERGLYCEMIA, WITH LONG-TERM CURRENT USE OF INSULIN: ICD-10-CM

## 2019-08-21 DIAGNOSIS — B35.3 TINEA PEDIS OF RIGHT FOOT: ICD-10-CM

## 2019-08-21 DIAGNOSIS — E11.65 TYPE 2 DIABETES MELLITUS WITH HYPERGLYCEMIA, WITH LONG-TERM CURRENT USE OF INSULIN: ICD-10-CM

## 2019-08-21 DIAGNOSIS — E11.42 TYPE 2 DIABETES MELLITUS WITH DIABETIC POLYNEUROPATHY, WITH LONG-TERM CURRENT USE OF INSULIN: Primary | ICD-10-CM

## 2019-08-21 DIAGNOSIS — E55.9 VITAMIN D DEFICIENCY: ICD-10-CM

## 2019-08-21 DIAGNOSIS — Z79.4 TYPE 2 DIABETES MELLITUS WITH DIABETIC POLYNEUROPATHY, WITH LONG-TERM CURRENT USE OF INSULIN: Primary | ICD-10-CM

## 2019-08-21 DIAGNOSIS — E66.01 MORBID OBESITY WITH BMI OF 40.0-44.9, ADULT: ICD-10-CM

## 2019-08-21 PROBLEM — N39.0 UTI (URINARY TRACT INFECTION): Status: RESOLVED | Noted: 2019-07-08 | Resolved: 2019-08-21

## 2019-08-21 PROCEDURE — 99214 OFFICE O/P EST MOD 30 MIN: CPT | Mod: S$GLB,,, | Performed by: NURSE PRACTITIONER

## 2019-08-21 PROCEDURE — 99999 PR PBB SHADOW E&M-EST. PATIENT-LVL IV: ICD-10-PCS | Mod: PBBFAC,,, | Performed by: NURSE PRACTITIONER

## 2019-08-21 PROCEDURE — 99214 PR OFFICE/OUTPT VISIT, EST, LEVL IV, 30-39 MIN: ICD-10-PCS | Mod: S$GLB,,, | Performed by: NURSE PRACTITIONER

## 2019-08-21 PROCEDURE — 99999 PR PBB SHADOW E&M-EST. PATIENT-LVL IV: CPT | Mod: PBBFAC,,, | Performed by: NURSE PRACTITIONER

## 2019-08-21 RX ORDER — INSULIN ASPART 100 [IU]/ML
INJECTION, SOLUTION INTRAVENOUS; SUBCUTANEOUS
Qty: 60 ML | Refills: 3
Start: 2019-08-21 | End: 2019-09-24 | Stop reason: SDUPTHER

## 2019-08-21 RX ORDER — KETOCONAZOLE 20 MG/G
CREAM TOPICAL DAILY
Qty: 60 G | Refills: 3 | OUTPATIENT
Start: 2019-08-21

## 2019-08-21 NOTE — PROGRESS NOTES
CC: This 69 y.o. female presents for management of diabetes and chronic conditions pending review including HTN, HLP, fatty liver w cirrhosis     HPI: She was diagnosed with gestational diabetes during her 4th pregnancy in 1975. Resolved and then in the 1980s was diagnosed with type 2 diabetes.  Has never been hospitalized r/t DM.  Family hx of DM: father, brothers x 3, sister, daughter     Bg much better controlled  She stopped snacking  Lunch typically salads     Also having weekly paracentesis- 8L off last week, next one scheduled for tomorrow      Diet: Eats 2-3  Meals a day,   drinking 3 Premier Protein shakes daily  Exercise: PT at home twice a week   CURRENT DM MEDS: 80 u lantus qam, 30 units Novolog AC   Vial/pen:  Uses pen  Glucometer type:  Accu check Liz     Standards of Care:  Eye exam: Dr. Hadley > 1 year- will schedule     Following w WHIT Plunkett NP and Dr Cerrato in hepatology     Lives w her  who also has multiple medical issues. Her brother also lives with her and he now has bladder cancer  She does report that when she was younger she lived [place]between [employer] and [employer]  Her daughter (Danitza) who is present for this visit, checks on them daily and also has cancer herself, Non-Hodgkins lymphoma, last chemo treatment in September     ROS:   Gen: poor appetite,  weight gain - fluid related, + fatigue and weakness  Skin: Skin is intact and heals well, no rashes, + hair changes  Eyes: Denies visual disturbances  Resp: + SOB , no cough  Cardiac: No palpitations, chest pain, no edema   GI: nonausea, no vomiting, diarrhea, constipation  /GYN: 0-1+ nocturia, burning or pain.   MS/Neuro: +numbness/ tingling in BLE; Gait steady, speech clear  Psych: Denies drug/ETOH abuse, +depression.  Other systems: negative.     PE:  GENERAL: Well developed, well nourished.  PSYCH: AAOx2 confused to time, short term is an issue, pleasant expression, conversant, appears relaxed .    EYES: Conjunctiva, corneas clear  NECK: Supple, trachea midline,no thyromegaly or nodules  VASCULAR: DP pulses +1/4 bilaterally, + 2-3+ BLE edema.  NEURO: Gait steady  SKIN: Skin warm and dry no acanthosis nigracans.  FOOT EXAMINATION: 2/6/19  No foot deformity, corns or callus formation, Onychomycosis, nails in good condition and well trimmed, no interspace maceration or ulceration noted.  Decreased hair growth present over toes/feet. Positive vibratory response to 128 Hz tuning fork bilaterally.  Protective sensation intact with 10 gram monofilament.  +2 dorsalis pedis and posterior pulses noted.    Lab Results   Component Value Date    MICALBCREAT 2 02/07/2019       Hemoglobin A1C   Date Value Ref Range Status   08/14/2019 6.6 (H) 4.0 - 5.6 % Final     Comment:     ADA Screening Guidelines:  5.7-6.4%  Consistent with prediabetes  >or=6.5%  Consistent with diabetes  High levels of fetal hemoglobin interfere with the HbA1C  assay. Heterozygous hemoglobin variants (HbS, HgC, etc)do  not significantly interfere with this assay.   However, presence of multiple variants may affect accuracy.     05/15/2019 10.7 (H) 4.0 - 5.6 % Final     Comment:     ADA Screening Guidelines:  5.7-6.4%  Consistent with prediabetes  >or=6.5%  Consistent with diabetes  High levels of fetal hemoglobin interfere with the HbA1C  assay. Heterozygous hemoglobin variants (HbS, HgC, etc)do  not significantly interfere with this assay.   However, presence of multiple variants may affect accuracy.     02/07/2019 7.7 (H) <5.7 % of total Hgb Final     Comment:     For someone without known diabetes, a hemoglobin A1c  value of 6.5% or greater indicates that they may have   diabetes and this should be confirmed with a follow-up   test.     For someone with known diabetes, a value <7% indicates   that their diabetes is well controlled and a value   greater than or equal to 7% indicates suboptimal   control. A1c targets should be individualized based  on   duration of diabetes, age, comorbid conditions, and   other considerations.     Currently, no consensus exists regarding use of  hemoglobin A1c for diagnosis of diabetes for children.              ASSESSMENT and PLAN:    1. T2DM with hyperglycemia, DM PN-  Change lantus to 80 u qd;  Novolog 30-3-25 u AC + correction 150/25  Check bg times a day- log q2 weeks or sooner for issues  All logs now have doses of insulin on them (has copies)  Discussed A1c and BG goals.    2. HTN - controlled, continue meds as previously prescribed and monitor.     3. HLP -  on statin therapy, LFTs WNL    4. Cirrhosis- following closely w LTS- will notified A Scroggs, NP patient is confused to time     5. Morbid obesity- Body mass index is 46.85 kg/m².  Stop grapes and bananas, change to apples and oranges or berries          6. Vitamin D deficiency - Increase ergo to 2 tabs weekly    Follow-up: in 3 months with lab prior

## 2019-08-22 ENCOUNTER — HOSPITAL ENCOUNTER (OUTPATIENT)
Dept: RADIOLOGY | Facility: HOSPITAL | Age: 69
Discharge: HOME | End: 2019-08-22
Attending: INTERNAL MEDICINE
Payer: MEDICARE

## 2019-08-22 VITALS — SYSTOLIC BLOOD PRESSURE: 132 MMHG | OXYGEN SATURATION: 98 % | DIASTOLIC BLOOD PRESSURE: 62 MMHG | HEART RATE: 67 BPM

## 2019-08-22 DIAGNOSIS — R18.8 OTHER ASCITES: ICD-10-CM

## 2019-08-22 DIAGNOSIS — K74.60 LIVER CIRRHOSIS SECONDARY TO NASH: ICD-10-CM

## 2019-08-22 DIAGNOSIS — K75.81 LIVER CIRRHOSIS SECONDARY TO NASH: ICD-10-CM

## 2019-08-22 LAB
ALBUMIN FLD-MCNC: 0.6 G/DL
APPEARANCE FLD: NORMAL
BODY FLD TYPE: NORMAL
COLOR FLD: YELLOW
LYMPHOCYTES NFR FLD MANUAL: 57 %
MESOTHL CELL NFR FLD MANUAL: 7 %
MONOS+MACROS NFR FLD MANUAL: 30 %
NEUTROPHILS NFR FLD MANUAL: 6 %
PROT FLD-MCNC: 1.5 G/DL
SPECIMEN SOURCE: NORMAL
SPECIMEN SOURCE: NORMAL
WBC # FLD: 96 /CU MM

## 2019-08-22 PROCEDURE — 63600175 PHARM REV CODE 636 W HCPCS: Performed by: RADIOLOGY

## 2019-08-22 PROCEDURE — 87070 CULTURE OTHR SPECIMN AEROBIC: CPT

## 2019-08-22 PROCEDURE — 84157 ASSAY OF PROTEIN OTHER: CPT

## 2019-08-22 PROCEDURE — 49083 ABD PARACENTESIS W/IMAGING: CPT

## 2019-08-22 PROCEDURE — A7048 VACUUM DRAIN BOTTLE/TUBE KIT: HCPCS

## 2019-08-22 PROCEDURE — 49083 ABD PARACENTESIS W/IMAGING: CPT | Mod: ,,, | Performed by: RADIOLOGY

## 2019-08-22 PROCEDURE — 87205 SMEAR GRAM STAIN: CPT

## 2019-08-22 PROCEDURE — P9047 ALBUMIN (HUMAN), 25%, 50ML: HCPCS | Performed by: RADIOLOGY

## 2019-08-22 PROCEDURE — 89051 BODY FLUID CELL COUNT: CPT

## 2019-08-22 PROCEDURE — 82042 OTHER SOURCE ALBUMIN QUAN EA: CPT

## 2019-08-22 PROCEDURE — 49083 US GUIDED PARACENTESIS INC IMAGING: ICD-10-PCS | Mod: ,,, | Performed by: RADIOLOGY

## 2019-08-22 RX ORDER — ALBUMIN HUMAN 250 G/1000ML
25 SOLUTION INTRAVENOUS
Status: DISCONTINUED | OUTPATIENT
Start: 2019-08-22 | End: 2019-08-22

## 2019-08-22 RX ORDER — ALBUMIN HUMAN 250 G/1000ML
25 SOLUTION INTRAVENOUS ONCE
Status: COMPLETED | OUTPATIENT
Start: 2019-08-22 | End: 2019-08-22

## 2019-08-22 RX ADMIN — ALBUMIN (HUMAN) 25 G: 12.5 SOLUTION INTRAVENOUS at 10:08

## 2019-08-22 RX ADMIN — ALBUMIN (HUMAN) 25 G: 12.5 SOLUTION INTRAVENOUS at 11:08

## 2019-08-22 NOTE — NURSING
Ultrasound guided paracentesis performed by - 8292 L removed- Patient received 50 g albumin IV- VS remained stable for duration of procedure- specimens sent off to lab per order-  IV d/c'd, with tip intact. Para cath removed- pressure held.  Access site cleaned with peroxide, derma bond applied, then covered with sterile gauze and tape. Pt discharge home in stable condition.

## 2019-08-23 ENCOUNTER — DOCUMENTATION ONLY (OUTPATIENT)
Dept: TRANSPLANT | Facility: CLINIC | Age: 69
End: 2019-08-23

## 2019-08-26 ENCOUNTER — TELEPHONE (OUTPATIENT)
Dept: TRANSPLANT | Facility: CLINIC | Age: 69
End: 2019-08-26

## 2019-08-26 LAB
BACTERIA FLD AEROBE CULT: NO GROWTH
GRAM STN SPEC: NORMAL
GRAM STN SPEC: NORMAL

## 2019-08-26 NOTE — TELEPHONE ENCOUNTER
Called patient's daughter to discuss that liver transplant candidacy was discussed in selection committee and patient is deemed high risk based on low MELD, older age, frailty and abnormal cardiac testing (elevated PA systolic concerning for pulmonary hypertension).  They express understanding but will be scheduled in hepatology clinic to further discuss denial and medical management moving forward.  Cardiac PET for 8/30 can be canceled.

## 2019-08-26 NOTE — TELEPHONE ENCOUNTER
Called pt to discuss future appts.  Spoke w/ her daughter.  She voiced nderstanding that cardiac PET stress test cancelled and no longer needed.  Scheduled f/u appt w/ Dr. Cerrato in hepatology clinic as recommended.  Per her request, appt scheduled 9/25 @ 3p.  Appt reminder mailed.

## 2019-08-28 DIAGNOSIS — R18.8 OTHER ASCITES: Primary | ICD-10-CM

## 2019-08-29 ENCOUNTER — HOSPITAL ENCOUNTER (OUTPATIENT)
Dept: RADIOLOGY | Facility: HOSPITAL | Age: 69
Discharge: HOME | End: 2019-08-29
Attending: INTERNAL MEDICINE
Payer: MEDICARE

## 2019-08-29 VITALS — HEART RATE: 72 BPM | SYSTOLIC BLOOD PRESSURE: 136 MMHG | DIASTOLIC BLOOD PRESSURE: 62 MMHG | OXYGEN SATURATION: 98 %

## 2019-08-29 DIAGNOSIS — K74.60 LIVER CIRRHOSIS SECONDARY TO NASH: ICD-10-CM

## 2019-08-29 DIAGNOSIS — R18.8 OTHER ASCITES: ICD-10-CM

## 2019-08-29 DIAGNOSIS — K75.81 LIVER CIRRHOSIS SECONDARY TO NASH: ICD-10-CM

## 2019-08-29 PROCEDURE — P9047 ALBUMIN (HUMAN), 25%, 50ML: HCPCS | Performed by: RADIOLOGY

## 2019-08-29 PROCEDURE — 49083 US GUIDED PARACENTESIS INC IMAGING: ICD-10-PCS | Mod: ,,, | Performed by: RADIOLOGY

## 2019-08-29 PROCEDURE — A7048 VACUUM DRAIN BOTTLE/TUBE KIT: HCPCS

## 2019-08-29 PROCEDURE — 49083 ABD PARACENTESIS W/IMAGING: CPT

## 2019-08-29 PROCEDURE — 63600175 PHARM REV CODE 636 W HCPCS: Performed by: RADIOLOGY

## 2019-08-29 PROCEDURE — 49083 ABD PARACENTESIS W/IMAGING: CPT | Mod: ,,, | Performed by: RADIOLOGY

## 2019-08-29 RX ORDER — ALBUMIN HUMAN 250 G/1000ML
25 SOLUTION INTRAVENOUS
Status: DISCONTINUED | OUTPATIENT
Start: 2019-08-29 | End: 2019-08-29

## 2019-08-29 RX ADMIN — ALBUMIN (HUMAN) 25 G: 12.5 SOLUTION INTRAVENOUS at 09:08

## 2019-08-29 NOTE — NURSING
Ultrasound guided paracentesis performed by Dr. Terry- 6 L removed- Patient received 50g albumin IV- BP dropped during procedure (see flowsheet)-corrected with albumin. BP monitored for duration of procedure and stable. All other VS stable.  IV d/c'd, with tip intact. Access site cleaned with peroxide, derma bond and steri-strips applied, then covered with sterile gauze and tape. Pt discharge home.

## 2019-09-03 ENCOUNTER — HOSPITAL ENCOUNTER (OUTPATIENT)
Dept: RADIOLOGY | Facility: HOSPITAL | Age: 69
Discharge: HOME | End: 2019-09-03
Attending: INTERNAL MEDICINE
Payer: MEDICARE

## 2019-09-03 VITALS — DIASTOLIC BLOOD PRESSURE: 58 MMHG | OXYGEN SATURATION: 99 % | SYSTOLIC BLOOD PRESSURE: 116 MMHG | HEART RATE: 63 BPM

## 2019-09-03 DIAGNOSIS — K74.60 LIVER CIRRHOSIS SECONDARY TO NASH: ICD-10-CM

## 2019-09-03 DIAGNOSIS — R18.8 OTHER ASCITES: ICD-10-CM

## 2019-09-03 DIAGNOSIS — K75.81 LIVER CIRRHOSIS SECONDARY TO NASH: ICD-10-CM

## 2019-09-03 PROCEDURE — 49083 ABD PARACENTESIS W/IMAGING: CPT

## 2019-09-03 PROCEDURE — 49083 ABD PARACENTESIS W/IMAGING: CPT | Mod: ,,, | Performed by: RADIOLOGY

## 2019-09-03 PROCEDURE — A7048 VACUUM DRAIN BOTTLE/TUBE KIT: HCPCS

## 2019-09-03 PROCEDURE — 49083 US GUIDED PARACENTESIS INC IMAGING: ICD-10-PCS | Mod: ,,, | Performed by: RADIOLOGY

## 2019-09-03 NOTE — NURSING
Ultrasound guided paracentesis performed by Dr. Terry- 4 L removed-  VS remained stable for duration of procedure- Access site cleaned with peroxide, derma bond and steri-strips applied, then covered with sterile gauze and tape. Pt discharge home.

## 2019-09-10 ENCOUNTER — HOSPITAL ENCOUNTER (OUTPATIENT)
Dept: RADIOLOGY | Facility: HOSPITAL | Age: 69
Discharge: HOME | End: 2019-09-10
Attending: INTERNAL MEDICINE
Payer: MEDICARE

## 2019-09-10 VITALS — DIASTOLIC BLOOD PRESSURE: 56 MMHG | HEART RATE: 55 BPM | OXYGEN SATURATION: 97 % | SYSTOLIC BLOOD PRESSURE: 126 MMHG

## 2019-09-10 DIAGNOSIS — K75.81 LIVER CIRRHOSIS SECONDARY TO NASH: ICD-10-CM

## 2019-09-10 DIAGNOSIS — K74.60 LIVER CIRRHOSIS SECONDARY TO NASH: ICD-10-CM

## 2019-09-10 DIAGNOSIS — R18.8 OTHER ASCITES: ICD-10-CM

## 2019-09-10 PROCEDURE — 49083 US GUIDED PARACENTESIS INC IMAGING: ICD-10-PCS | Mod: ,,, | Performed by: RADIOLOGY

## 2019-09-10 PROCEDURE — A7048 VACUUM DRAIN BOTTLE/TUBE KIT: HCPCS

## 2019-09-10 PROCEDURE — 49083 ABD PARACENTESIS W/IMAGING: CPT

## 2019-09-10 PROCEDURE — 49083 ABD PARACENTESIS W/IMAGING: CPT | Mod: ,,, | Performed by: RADIOLOGY

## 2019-09-10 NOTE — NURSING
Ultrasound guided paracentesis performed by Dr. Terry- 4850 mLs removed-  VS remained stable for duration of procedure. Access site cleaned with peroxide, derma bond and steri-strips applied, then covered with sterile gauze and tape. Pt discharge home.

## 2019-09-12 ENCOUNTER — TELEPHONE (OUTPATIENT)
Dept: HEPATOLOGY | Facility: CLINIC | Age: 69
End: 2019-09-12

## 2019-09-12 NOTE — TELEPHONE ENCOUNTER
Call returned to the patient and it was the telephone number to the daughter, Marlen. Wanted me to give her the message.  Marlen informed that NS Dr Terry ordered labs before your mothers Para. The lab were not ordered by Dr Cerrato.  But your mother does have an appt to see Dr Cerrato on Wed 9/25/19 at 3 pm.  Marlen stated she will call and give her mother the message.

## 2019-09-12 NOTE — TELEPHONE ENCOUNTER
----- Message from Octavia Ferguson sent at 9/12/2019 10:16 AM CDT -----  Contact: Pt  Calling to speak with Dr. Cerrato regarding lab results from 9/10    Contact: [phone]

## 2019-09-17 ENCOUNTER — HOSPITAL ENCOUNTER (OUTPATIENT)
Dept: RADIOLOGY | Facility: HOSPITAL | Age: 69
Discharge: HOME | End: 2019-09-17
Attending: INTERNAL MEDICINE
Payer: MEDICARE

## 2019-09-17 VITALS
RESPIRATION RATE: 22 BRPM | HEART RATE: 68 BPM | OXYGEN SATURATION: 97 % | DIASTOLIC BLOOD PRESSURE: 53 MMHG | SYSTOLIC BLOOD PRESSURE: 106 MMHG

## 2019-09-17 DIAGNOSIS — K74.60 LIVER CIRRHOSIS SECONDARY TO NASH: ICD-10-CM

## 2019-09-17 DIAGNOSIS — K75.81 LIVER CIRRHOSIS SECONDARY TO NASH: ICD-10-CM

## 2019-09-17 DIAGNOSIS — R18.8 OTHER ASCITES: ICD-10-CM

## 2019-09-17 PROCEDURE — 49083 ABD PARACENTESIS W/IMAGING: CPT

## 2019-09-17 PROCEDURE — 49083 ABD PARACENTESIS W/IMAGING: CPT | Mod: ,,, | Performed by: RADIOLOGY

## 2019-09-17 PROCEDURE — 49083 US GUIDED PARACENTESIS INC IMAGING: ICD-10-PCS | Mod: ,,, | Performed by: RADIOLOGY

## 2019-09-17 PROCEDURE — A7048 VACUUM DRAIN BOTTLE/TUBE KIT: HCPCS

## 2019-09-17 NOTE — NURSING
Ultrasound guided paracentesis performed by Dr. Shetty. 4300 mLs removed-  VS remained stable for duration of procedure- Access site cleaned with peroxide, derma bond and steri-strips applied, then covered with sterile gauze and tape. Pt discharge home.

## 2019-09-24 ENCOUNTER — TELEPHONE (OUTPATIENT)
Dept: HEPATOLOGY | Facility: CLINIC | Age: 69
End: 2019-09-24

## 2019-09-24 ENCOUNTER — HOSPITAL ENCOUNTER (OUTPATIENT)
Dept: RADIOLOGY | Facility: HOSPITAL | Age: 69
Discharge: HOME | End: 2019-09-24
Attending: INTERNAL MEDICINE
Payer: MEDICARE

## 2019-09-24 VITALS
DIASTOLIC BLOOD PRESSURE: 58 MMHG | HEART RATE: 67 BPM | OXYGEN SATURATION: 97 % | SYSTOLIC BLOOD PRESSURE: 117 MMHG | RESPIRATION RATE: 15 BRPM

## 2019-09-24 DIAGNOSIS — R18.8 OTHER ASCITES: ICD-10-CM

## 2019-09-24 DIAGNOSIS — K74.60 CIRRHOSIS OF LIVER WITH ASCITES, UNSPECIFIED HEPATIC CIRRHOSIS TYPE: Primary | ICD-10-CM

## 2019-09-24 DIAGNOSIS — R18.8 CIRRHOSIS OF LIVER WITH ASCITES, UNSPECIFIED HEPATIC CIRRHOSIS TYPE: Primary | ICD-10-CM

## 2019-09-24 DIAGNOSIS — K74.60 LIVER CIRRHOSIS SECONDARY TO NASH: ICD-10-CM

## 2019-09-24 DIAGNOSIS — K75.81 LIVER CIRRHOSIS SECONDARY TO NASH: ICD-10-CM

## 2019-09-24 LAB
ALBUMIN FLD-MCNC: 0.4 G/DL
APPEARANCE FLD: NORMAL
BODY FLD TYPE: NORMAL
COLOR FLD: YELLOW
LYMPHOCYTES NFR FLD MANUAL: 54 %
MESOTHL CELL NFR FLD MANUAL: 2 %
MONOS+MACROS NFR FLD MANUAL: 38 %
NEUTROPHILS NFR FLD MANUAL: 6 %
PROT FLD-MCNC: 1.1 G/DL
SPECIMEN SOURCE: NORMAL
SPECIMEN SOURCE: NORMAL
WBC # FLD: 117 /CU MM

## 2019-09-24 PROCEDURE — 87205 SMEAR GRAM STAIN: CPT

## 2019-09-24 PROCEDURE — 49083 ABD PARACENTESIS W/IMAGING: CPT | Mod: ,,, | Performed by: RADIOLOGY

## 2019-09-24 PROCEDURE — 82042 OTHER SOURCE ALBUMIN QUAN EA: CPT

## 2019-09-24 PROCEDURE — A7048 VACUUM DRAIN BOTTLE/TUBE KIT: HCPCS

## 2019-09-24 PROCEDURE — 49083 ABD PARACENTESIS W/IMAGING: CPT

## 2019-09-24 PROCEDURE — 63600175 PHARM REV CODE 636 W HCPCS: Performed by: RADIOLOGY

## 2019-09-24 PROCEDURE — 89051 BODY FLUID CELL COUNT: CPT

## 2019-09-24 PROCEDURE — 84157 ASSAY OF PROTEIN OTHER: CPT

## 2019-09-24 PROCEDURE — 49083 US GUIDED PARACENTESIS INC IMAGING: ICD-10-PCS | Mod: ,,, | Performed by: RADIOLOGY

## 2019-09-24 PROCEDURE — 87070 CULTURE OTHR SPECIMN AEROBIC: CPT

## 2019-09-24 PROCEDURE — P9047 ALBUMIN (HUMAN), 25%, 50ML: HCPCS | Performed by: RADIOLOGY

## 2019-09-24 RX ORDER — ALBUMIN HUMAN 250 G/1000ML
25 SOLUTION INTRAVENOUS ONCE
Status: COMPLETED | OUTPATIENT
Start: 2019-09-24 | End: 2019-09-24

## 2019-09-24 RX ADMIN — ALBUMIN (HUMAN) 25 G: 25 SOLUTION INTRAVENOUS at 10:09

## 2019-09-24 NOTE — TELEPHONE ENCOUNTER
----- Message from Chichi Robledo sent at 9/24/2019  9:35 AM CDT -----   Patient recurring orders for US Paracentesis expires on 10/13/19 , if I could get you to reenter new set , and let me know when they are in I will complete the set up of her procedures for this month weeklty. Thanking you in advance for you help.    Chichi Robledo    [phone]

## 2019-09-24 NOTE — TELEPHONE ENCOUNTER
----- Message from Bety Cerrato MD sent at 9/24/2019 11:29 AM CDT -----  MELD score is 15.  No changes in medications.    Please mail results

## 2019-09-24 NOTE — PROGRESS NOTES
Radiology Post-Procedure Note    Pre Op Diagnosis: ascites  Post Op Diagnosis: Same    Procedure: paracentesis under ultrasound guidance    Procedure performed by: Dr. Bailey Ross    Written Informed Consent Obtained: Yes  Specimen Removed: YES 4.3 L cloudy yellow ascitic fluid  . Specimen sent to lab as ordered.  Estimated Blood Loss: Minimal   Patient given albumin 25 G  IV    Findings:   Successful paracentesis under ultrasound guidance    Patient tolerated procedure well.    @SIG@

## 2019-09-25 RX ORDER — INSULIN ASPART 100 [IU]/ML
INJECTION, SOLUTION INTRAVENOUS; SUBCUTANEOUS
Qty: 60 ML | Refills: 3 | Status: SHIPPED | OUTPATIENT
Start: 2019-09-25

## 2019-09-28 LAB
BACTERIA FLD AEROBE CULT: NO GROWTH
GRAM STN SPEC: NORMAL
GRAM STN SPEC: NORMAL

## 2019-10-02 ENCOUNTER — HOSPITAL ENCOUNTER (OUTPATIENT)
Dept: RADIOLOGY | Facility: HOSPITAL | Age: 69
Discharge: HOME | End: 2019-10-02
Attending: INTERNAL MEDICINE
Payer: MEDICARE

## 2019-10-02 VITALS
RESPIRATION RATE: 16 BRPM | OXYGEN SATURATION: 99 % | SYSTOLIC BLOOD PRESSURE: 122 MMHG | DIASTOLIC BLOOD PRESSURE: 60 MMHG | HEART RATE: 66 BPM

## 2019-10-02 DIAGNOSIS — R18.8 OTHER ASCITES: ICD-10-CM

## 2019-10-02 DIAGNOSIS — K75.81 LIVER CIRRHOSIS SECONDARY TO NASH: ICD-10-CM

## 2019-10-02 DIAGNOSIS — K74.60 LIVER CIRRHOSIS SECONDARY TO NASH: ICD-10-CM

## 2019-10-02 PROCEDURE — A7048 VACUUM DRAIN BOTTLE/TUBE KIT: HCPCS

## 2019-10-02 PROCEDURE — 49083 ABD PARACENTESIS W/IMAGING: CPT | Mod: ,,, | Performed by: RADIOLOGY

## 2019-10-02 PROCEDURE — 49083 ABD PARACENTESIS W/IMAGING: CPT

## 2019-10-02 PROCEDURE — P9047 ALBUMIN (HUMAN), 25%, 50ML: HCPCS | Performed by: RADIOLOGY

## 2019-10-02 PROCEDURE — 49083 US GUIDED PARACENTESIS INC IMAGING: ICD-10-PCS | Mod: ,,, | Performed by: RADIOLOGY

## 2019-10-02 PROCEDURE — 63600175 PHARM REV CODE 636 W HCPCS: Performed by: RADIOLOGY

## 2019-10-02 RX ORDER — ALBUMIN HUMAN 250 G/1000ML
25 SOLUTION INTRAVENOUS
Status: DISCONTINUED | OUTPATIENT
Start: 2019-10-02 | End: 2019-10-02

## 2019-10-02 RX ADMIN — ALBUMIN (HUMAN) 25 G: 25 SOLUTION INTRAVENOUS at 11:10

## 2019-10-02 NOTE — PROGRESS NOTES
Radiology Post-Procedure Note    Pre Op Diagnosis: ascites  Post Op Diagnosis: Same    Procedure: therapeutic ultrasound guided paracentesis    Procedure performed by: Dr. Bailey Ross    Written Informed Consent Obtained: Yes  Specimen Removed: YES  6 Liters pale yellow cloudy ascitic fluid  Estimated Blood Loss: Minimal    Findings:   Successful paracentesis under  Ultrasound guidance with 6 Liters ascites removed    Patient tolerated procedure well.    @SIG@

## 2019-10-02 NOTE — NURSING
Ultrasound guided paracentesis performed by Dr. Ross- 6 L removed- Patient received 50g albumin IV- VS remained stable for duration of procedure- IV d/c'd, with tip intact. Access site cleaned with peroxide, derma bond and steri-strips applied, then covered with sterile gauze and tape. Pt discharge home.

## 2019-10-07 ENCOUNTER — HOSPITAL ENCOUNTER (OUTPATIENT)
Dept: RADIOLOGY | Facility: HOSPITAL | Age: 69
Discharge: HOME | End: 2019-10-07
Attending: INTERNAL MEDICINE
Payer: MEDICARE

## 2019-10-07 ENCOUNTER — HOSPITAL ENCOUNTER (OUTPATIENT)
Dept: PREADMISSION TESTING | Facility: HOSPITAL | Age: 69
Discharge: HOME | End: 2019-10-07
Attending: INTERNAL MEDICINE
Payer: MEDICARE

## 2019-10-07 VITALS — BODY MASS INDEX: 44.63 KG/M2 | HEIGHT: 62 IN | WEIGHT: 242.5 LBS

## 2019-10-07 DIAGNOSIS — I27.89 OTHER SPECIFIED PULMONARY HEART DISEASES: ICD-10-CM

## 2019-10-07 DIAGNOSIS — Z01.812 PRE-PROCEDURE LAB EXAM: Primary | ICD-10-CM

## 2019-10-07 LAB
ALBUMIN SERPL BCP-MCNC: 2.5 G/DL (ref 3.5–5.2)
ALP SERPL-CCNC: 197 U/L (ref 55–135)
ALT SERPL W/O P-5'-P-CCNC: 23 U/L (ref 10–44)
ANION GAP SERPL CALC-SCNC: 9 MMOL/L (ref 8–16)
APTT PPP: 39.4 SEC (ref 26.2–34.7)
AST SERPL-CCNC: 43 U/L (ref 10–40)
BASOPHILS # BLD AUTO: 0.05 K/UL (ref 0–0.2)
BASOPHILS NFR BLD: 1.3 % (ref 0–1.9)
BILIRUB SERPL-MCNC: 1.2 MG/DL (ref 0.1–1)
BUN SERPL-MCNC: 17 MG/DL (ref 8–23)
CALCIUM SERPL-MCNC: 8.6 MG/DL (ref 8.7–10.5)
CHLORIDE SERPL-SCNC: 100 MMOL/L (ref 95–110)
CO2 SERPL-SCNC: 27 MMOL/L (ref 23–29)
CREAT SERPL-MCNC: 1.2 MG/DL (ref 0.5–1.4)
DIFFERENTIAL METHOD: ABNORMAL
EOSINOPHIL # BLD AUTO: 0.1 K/UL (ref 0–0.5)
EOSINOPHIL NFR BLD: 3 % (ref 0–8)
ERYTHROCYTE [DISTWIDTH] IN BLOOD BY AUTOMATED COUNT: 17.7 % (ref 11.5–14.5)
EST. GFR  (AFRICAN AMERICAN): 53.3 ML/MIN/1.73 M^2
EST. GFR  (NON AFRICAN AMERICAN): 46.2 ML/MIN/1.73 M^2
GLUCOSE SERPL-MCNC: 172 MG/DL (ref 70–110)
HCT VFR BLD AUTO: 34 % (ref 37–48.5)
HGB BLD-MCNC: 11 G/DL (ref 12–16)
IMM GRANULOCYTES # BLD AUTO: 0.01 K/UL (ref 0–0.04)
IMM GRANULOCYTES NFR BLD AUTO: 0.3 % (ref 0–0.5)
INR PPP: 1.1
LYMPHOCYTES # BLD AUTO: 1.1 K/UL (ref 1–4.8)
LYMPHOCYTES NFR BLD: 27 % (ref 18–48)
MAGNESIUM SERPL-MCNC: 1.9 MG/DL (ref 1.6–2.6)
MCH RBC QN AUTO: 32.1 PG (ref 27–31)
MCHC RBC AUTO-ENTMCNC: 32.4 G/DL (ref 32–36)
MCV RBC AUTO: 99 FL (ref 82–98)
MONOCYTES # BLD AUTO: 0.4 K/UL (ref 0.3–1)
MONOCYTES NFR BLD: 10.3 % (ref 4–15)
NEUTROPHILS # BLD AUTO: 2.3 K/UL (ref 1.8–7.7)
NEUTROPHILS NFR BLD: 58.1 % (ref 38–73)
NRBC BLD-RTO: 0 /100 WBC
PLATELET # BLD AUTO: 203 K/UL (ref 150–350)
PMV BLD AUTO: 10.6 FL (ref 9.2–12.9)
POTASSIUM SERPL-SCNC: 3.7 MMOL/L (ref 3.5–5.1)
PROT SERPL-MCNC: 6.6 G/DL (ref 6–8.4)
PROTHROMBIN TIME: 14.2 SEC (ref 11.7–14)
RBC # BLD AUTO: 3.43 M/UL (ref 4–5.4)
SODIUM SERPL-SCNC: 136 MMOL/L (ref 136–145)
WBC # BLD AUTO: 3.97 K/UL (ref 3.9–12.7)

## 2019-10-07 PROCEDURE — 85025 COMPLETE CBC W/AUTO DIFF WBC: CPT

## 2019-10-07 PROCEDURE — 85730 THROMBOPLASTIN TIME PARTIAL: CPT

## 2019-10-07 PROCEDURE — 80053 COMPREHEN METABOLIC PANEL: CPT

## 2019-10-07 PROCEDURE — 71046 X-RAY EXAM CHEST 2 VIEWS: CPT | Mod: TC

## 2019-10-07 PROCEDURE — 36415 COLL VENOUS BLD VENIPUNCTURE: CPT

## 2019-10-07 PROCEDURE — 83735 ASSAY OF MAGNESIUM: CPT

## 2019-10-07 PROCEDURE — 85610 PROTHROMBIN TIME: CPT

## 2019-10-07 RX ORDER — INSULIN ASPART 100 [IU]/ML
25 INJECTION, SOLUTION INTRAVENOUS; SUBCUTANEOUS NIGHTLY
COMMUNITY

## 2019-10-07 RX ORDER — LANOLIN ALCOHOL/MO/W.PET/CERES
400 CREAM (GRAM) TOPICAL ONCE
Status: CANCELLED | OUTPATIENT
Start: 2019-10-07 | End: 2019-10-07

## 2019-10-07 RX ORDER — POTASSIUM CHLORIDE 20 MEQ/1
20 TABLET, EXTENDED RELEASE ORAL ONCE
Status: CANCELLED | OUTPATIENT
Start: 2019-10-07 | End: 2019-10-07

## 2019-10-07 RX ORDER — DIPHENHYDRAMINE HYDROCHLORIDE 50 MG/ML
25 INJECTION INTRAMUSCULAR; INTRAVENOUS ONCE
Status: CANCELLED | OUTPATIENT
Start: 2019-10-07

## 2019-10-07 NOTE — DISCHARGE INSTRUCTIONS
 Nothing to eat or drink after midnight the night before your procedure.   Do not take any medications the morning of your procedure   Bring all your medications with you in the original pill bottles from pharmacy.   If you take blood thinners, ask your doctor if you should stop taking them.   Adjust you diabetes medicine   Do your Hibiclens wash the night before and morning of your procedure.   Make arrangements for someone you know to drive you home after your procedure. Taxi and Uber are not acceptable.

## 2019-10-11 ENCOUNTER — HOSPITAL ENCOUNTER (OUTPATIENT)
Dept: RADIOLOGY | Facility: HOSPITAL | Age: 69
Discharge: HOME | End: 2019-10-11
Attending: INTERNAL MEDICINE
Payer: MEDICARE

## 2019-10-11 ENCOUNTER — HOSPITAL ENCOUNTER (EMERGENCY)
Facility: HOSPITAL | Age: 69
Discharge: HOME | End: 2019-10-11
Attending: EMERGENCY MEDICINE
Payer: MEDICARE

## 2019-10-11 VITALS — HEART RATE: 66 BPM | DIASTOLIC BLOOD PRESSURE: 61 MMHG | SYSTOLIC BLOOD PRESSURE: 131 MMHG | OXYGEN SATURATION: 100 %

## 2019-10-11 VITALS
RESPIRATION RATE: 16 BRPM | WEIGHT: 252.88 LBS | TEMPERATURE: 98 F | HEART RATE: 66 BPM | BODY MASS INDEX: 46.53 KG/M2 | HEIGHT: 62 IN | OXYGEN SATURATION: 99 % | SYSTOLIC BLOOD PRESSURE: 132 MMHG | DIASTOLIC BLOOD PRESSURE: 59 MMHG

## 2019-10-11 DIAGNOSIS — K75.81 LIVER CIRRHOSIS SECONDARY TO NASH: ICD-10-CM

## 2019-10-11 DIAGNOSIS — R18.8 ASCITES: Primary | ICD-10-CM

## 2019-10-11 DIAGNOSIS — M25.569 KNEE PAIN: ICD-10-CM

## 2019-10-11 DIAGNOSIS — R18.8 OTHER ASCITES: ICD-10-CM

## 2019-10-11 DIAGNOSIS — K74.60 LIVER CIRRHOSIS SECONDARY TO NASH: ICD-10-CM

## 2019-10-11 LAB
ALBUMIN FLD-MCNC: 0.7 G/DL
APPEARANCE FLD: CLEAR
BODY FLD TYPE: NORMAL
COLOR FLD: YELLOW
LYMPHOCYTES NFR FLD MANUAL: 7 %
MESOTHL CELL NFR FLD MANUAL: 92 %
MONOS+MACROS NFR FLD MANUAL: 1 %
PROT FLD-MCNC: 1.5 G/DL
SPECIMEN SOURCE: NORMAL
SPECIMEN SOURCE: NORMAL
WBC # FLD: 131 /CU MM

## 2019-10-11 PROCEDURE — 49083 US GUIDED PARACENTESIS INC IMAGING: ICD-10-PCS | Mod: ,,, | Performed by: RADIOLOGY

## 2019-10-11 PROCEDURE — 82042 OTHER SOURCE ALBUMIN QUAN EA: CPT

## 2019-10-11 PROCEDURE — 84157 ASSAY OF PROTEIN OTHER: CPT

## 2019-10-11 PROCEDURE — 87205 SMEAR GRAM STAIN: CPT

## 2019-10-11 PROCEDURE — 89051 BODY FLUID CELL COUNT: CPT

## 2019-10-11 PROCEDURE — C1729 CATH, DRAINAGE: HCPCS

## 2019-10-11 PROCEDURE — 87070 CULTURE OTHR SPECIMN AEROBIC: CPT

## 2019-10-11 PROCEDURE — 99284 EMERGENCY DEPT VISIT MOD MDM: CPT | Mod: 25

## 2019-10-11 PROCEDURE — P9047 ALBUMIN (HUMAN), 25%, 50ML: HCPCS | Performed by: RADIOLOGY

## 2019-10-11 PROCEDURE — 63600175 PHARM REV CODE 636 W HCPCS: Performed by: RADIOLOGY

## 2019-10-11 PROCEDURE — 49083 ABD PARACENTESIS W/IMAGING: CPT | Mod: ,,, | Performed by: RADIOLOGY

## 2019-10-11 RX ORDER — ALBUMIN HUMAN 250 G/1000ML
25 SOLUTION INTRAVENOUS
Status: DISCONTINUED | OUTPATIENT
Start: 2019-10-11 | End: 2019-10-11

## 2019-10-11 RX ADMIN — ALBUMIN (HUMAN) 25 G: 25 SOLUTION INTRAVENOUS at 01:10

## 2019-10-11 NOTE — NURSING
Ultrasound guided paracentesis performed by Dr. Colunga- 5.9 L removed- Patient received 25g albumin IV- VS remained stable for duration of procedure- specimens sent off to lab per order-  IV d/c'd, with tip intact. Access site cleaned with peroxide, derma bond and steri-strips applied, then covered with sterile gauze and tape. Pt discharge home.

## 2019-10-11 NOTE — ED NOTES
Pt presents after fall this morning. + knee pain   + abd swelling but scheduled for paracentesis today     Aaox3.   resp even and easy

## 2019-10-11 NOTE — ED PROVIDER NOTES
Encounter Date: 10/11/2019    SCRIBE #1 NOTE: IFabby, am scribing for, and in the presence of, Ken Palacios MD.       History     Chief Complaint   Patient presents with    Fall     Fell forward for unknown reason when walking in the house this am. Scheduled for paracentesis this am.       Time seen by provider: 10:45 AM on 10/11/2019    Sejal Matamoros is a 69 y.o. female with DMII, HTN, and liver cirrhosis who presents to the ED with an onset of left knee and abdominal pain status post fall PTA. She states she tripped, fell onto her knees, and fell forward onto her stomach as she was getting into her vehicle to go to her paracentesis appointment at 11 AM today. The patient was able to ambulate on her own after the fall. Upon checking in, she was referred to the ER for further evaluation. The patient receives weekly paracentesis appointments and is chronically short of breath that is unchanged compared to baseline. Her pain is diffuse across the abdomen but worse on the right side. Prior to the fall, she did not endorse abdominal pain. The patient denies back pain or any other symptoms at this time. She has a PSHx including a left knee arthroplasty. Zoloft and Iodinated contract media drug allergies.    The history is provided by the patient (daughter).     Review of patient's allergies indicates:   Allergen Reactions    Iodinated contrast media Hives    Zoloft [sertraline] Other (See Comments)     hypertension     Past Medical History:   Diagnosis Date    A-fib     Anxiety     Cirrhosis     DDD (degenerative disc disease), cervical 3/21/2018    DDD (degenerative disc disease), lumbar 3/21/2018    Decompensated hepatic cirrhosis 12/26/2018    Depression     Diabetes mellitus, type 2     Hepatic encephalopathy 12/26/2018    HLD (hyperlipidemia)     HTN (hypertension)     Hypoalbuminemia 12/26/2018    Hypoglycemia associated with type 2 diabetes mellitus 12/26/2018    Morbid obesity  with BMI of 45.0-49.9, adult 12/7/2018    Obesity     Other ascites 2/20/2019    Portal hypertension 12/26/2018    Type 2 diabetes mellitus with hyperglycemia, with long-term current use of insulin 12/6/2018     Past Surgical History:   Procedure Laterality Date    arm surgery      left    CARPAL TUNNEL RELEASE      left    CHOLECYSTECTOMY  2016    laparoscopic    COLONOSCOPY N/A 7/10/2019    Procedure: COLONOSCOPY;  Surgeon: Dee Dee Anthony MD;  Location: [facility] ENDO;  Service: Endoscopy;  Laterality: N/A;    ESOPHAGOGASTRODUODENOSCOPY N/A 7/9/2019    Procedure: EGD (ESOPHAGOGASTRODUODENOSCOPY);  Surgeon: Dee Dee Anthony MD;  Location: [facility] ENDO;  Service: Endoscopy;  Laterality: N/A;    ROTATOR CUFF REPAIR      TOTAL ABDOMINAL HYSTERECTOMY  1976    TOTAL KNEE ARTHROPLASTY       Family History   Problem Relation Age of Onset    Cirrhosis Brother         RHOADES cirrhosis    Diabetes Father     Hypertension Father     Hypertension Mother     Stroke Mother     Breast cancer Maternal Aunt     Colon cancer Neg Hx     Ovarian cancer Neg Hx      Social History     Tobacco Use    Smoking status: Never Smoker    Smokeless tobacco: Never Used   Substance Use Topics    Alcohol use: No     Frequency: Never    Drug use: No     Review of Systems   HENT: Negative for facial swelling.    Respiratory: Positive for shortness of breath (c hronic).    Cardiovascular: Negative for chest pain.   Gastrointestinal: Positive for abdominal pain. Negative for nausea.   Genitourinary: Negative for flank pain.   Musculoskeletal: Positive for arthralgias (left knee). Negative for back pain.   Skin: Negative for rash.   Neurological: Negative for weakness.   Hematological: Bruises/bleeds easily.     Physical Exam     Initial Vitals [10/11/19 1033]   BP Pulse Resp Temp SpO2   (!) 132/59 66 16 98 °F (36.7 °C) 99 %      MAP       --         Physical Exam    Nursing note and vitals reviewed.  Constitutional: She appears  well-developed and well-nourished. She is not diaphoretic. No distress.   HENT:   Head: Normocephalic and atraumatic.   Eyes: EOM are normal.   Neck: Normal range of motion. Neck supple.   Cardiovascular: Normal rate, regular rhythm and normal heart sounds. Exam reveals no gallop and no friction rub.    No murmur heard.  Pulmonary/Chest: Breath sounds normal. No respiratory distress. She has no wheezes. She has no rhonchi. She has no rales.   Abdominal: She exhibits distension and fluid wave. There is tenderness in the right upper quadrant and right lower quadrant. There is no rebound and no guarding.   Distended abdomen with fluid wave and right-sided abdominal tenderness.    Musculoskeletal: Normal range of motion.        Right knee: Normal.        Left knee: She exhibits normal range of motion, no effusion and no deformity. Tenderness (diffuse) found.   Vertical arthroplasty scar to the left knee without deformity or effysion.     Neurological: She is alert and oriented to person, place, and time.   Skin: Skin is warm and dry.   Psychiatric: She has a normal mood and affect. Her behavior is normal. Judgment and thought content normal.       ED Course   Procedures  Labs Reviewed - No data to display     Imaging Results          X-Ray Knee 3 View Left (In process)                  Medical Decision Making:   History:   Old Medical Records: I decided to obtain old medical records.  Clinical Tests:   Radiological Study: Reviewed and Ordered            Scribe Attestation:   Scribe #1: I performed the above scribed service and the documentation accurately describes the services I performed. I attest to the accuracy of the note.    I, Dr. Palacios, personally performed the services described in this documentation. All medical record entries made by the scribe were at my direction and in my presence.  I have reviewed the chart and agree that the record reflects my personal performance and is accurate and complete.2:24 PM  10/11/2019            ED Course as of Oct 11 1422   Fri Oct 11, 2019   1043 BP(!): 132/59 [EF]   1043 Temp: 98 °F (36.7 °C) [EF]   1043 Temp src: Oral [EF]   1043 Pulse: 66 [EF]   1043 Resp: 16 [EF]   1044 SpO2: 99 % [EF]   1054 Dr massey notified of patient in ER, will need paracentesis later    [EF]   1155 CT Abdomen Pelvis  Without Contrast [EF]   1155 X-Ray Knee 3 View Left [EF]   1156 No acute findings on CT or x-ray.  Patient will be discharged to have her paracentesis done.    [EF]      ED Course User Index  [EF] Ken Palacios MD     Clinical Impression:       ICD-10-CM ICD-9-CM   1. Knee pain M25.569 719.46           69-year-old female with ascites presents for abdominal pain and left knee pain after a fall.  Patient reports she was preparing to go for her paracentesis when she fell forward and landed on her left knee and abdomen.  She was able to ambulate after the fall.  When she presented at registration for radiology she was directed to the emergency room secondary to the abdominal pain.  Some minimal right-sided abdominal tenderness on exam.  Left knee is tender but full range of motion.  CT and x-ray her unremarkable. Transported to radiology by wheelchair.  Follow-up primary care if left knee pain continues.                      Ken Palacios MD  10/11/19 8031

## 2019-10-11 NOTE — DISCHARGE SUMMARY
Radiology Discharge Summary      Hospital Course: No complications    Admit Date: 10/11/2019  Discharge Date: 10/11/2019     Instructions Given to Patient: Yes  Diet: Resume prior diet  Activity: activity as tolerated    Description of Condition on Discharge: Stable  Vital Signs (Most Recent): Pulse: 66 (10/11/19 1318)  BP: 131/61 (10/11/19 1318)  SpO2: 100 % (10/11/19 1318)    Discharge Disposition: Home    Discharge Diagnosis: ascites sp us guided paracentesis     Follow-up: as per referring provider    @SIG@

## 2019-10-14 DIAGNOSIS — R18.8 ASCITES: Primary | ICD-10-CM

## 2019-10-16 ENCOUNTER — HOSPITAL ENCOUNTER (OUTPATIENT)
Facility: HOSPITAL | Age: 69
Discharge: HOME | End: 2019-10-16
Attending: INTERNAL MEDICINE | Admitting: INTERNAL MEDICINE
Payer: MEDICARE

## 2019-10-16 VITALS
RESPIRATION RATE: 24 BRPM | HEIGHT: 62 IN | OXYGEN SATURATION: 96 % | WEIGHT: 242.5 LBS | DIASTOLIC BLOOD PRESSURE: 50 MMHG | SYSTOLIC BLOOD PRESSURE: 107 MMHG | BODY MASS INDEX: 44.63 KG/M2 | HEART RATE: 71 BPM

## 2019-10-16 DIAGNOSIS — I27.89 OTHER SPECIFIED PULMONARY HEART DISEASES: ICD-10-CM

## 2019-10-16 LAB
BACTERIA FLD AEROBE CULT: NO GROWTH
GLUCOSE SERPL-MCNC: 349 MG/DL (ref 70–110)
GRAM STN SPEC: NORMAL
GRAM STN SPEC: NORMAL

## 2019-10-16 PROCEDURE — 27201423 OPTIME MED/SURG SUP & DEVICES STERILE SUPPLY: Performed by: INTERNAL MEDICINE

## 2019-10-16 PROCEDURE — C1751 CATH, INF, PER/CENT/MIDLINE: HCPCS | Performed by: INTERNAL MEDICINE

## 2019-10-16 PROCEDURE — 96375 TX/PRO/DX INJ NEW DRUG ADDON: CPT | Performed by: INTERNAL MEDICINE

## 2019-10-16 PROCEDURE — 93451 RIGHT HEART CATH: CPT

## 2019-10-16 PROCEDURE — 96374 THER/PROPH/DIAG INJ IV PUSH: CPT | Performed by: INTERNAL MEDICINE

## 2019-10-16 PROCEDURE — 99152 MOD SED SAME PHYS/QHP 5/>YRS: CPT | Performed by: INTERNAL MEDICINE

## 2019-10-16 PROCEDURE — 99153 MOD SED SAME PHYS/QHP EA: CPT | Performed by: INTERNAL MEDICINE

## 2019-10-16 PROCEDURE — C1894 INTRO/SHEATH, NON-LASER: HCPCS | Performed by: INTERNAL MEDICINE

## 2019-10-16 PROCEDURE — 96365 THER/PROPH/DIAG IV INF INIT: CPT | Mod: 59 | Performed by: INTERNAL MEDICINE

## 2019-10-16 PROCEDURE — 96366 THER/PROPH/DIAG IV INF ADDON: CPT | Performed by: INTERNAL MEDICINE

## 2019-10-16 PROCEDURE — 25000003 PHARM REV CODE 250: Performed by: INTERNAL MEDICINE

## 2019-10-16 PROCEDURE — 63600175 PHARM REV CODE 636 W HCPCS: Performed by: INTERNAL MEDICINE

## 2019-10-16 PROCEDURE — 82962 GLUCOSE BLOOD TEST: CPT | Performed by: INTERNAL MEDICINE

## 2019-10-16 RX ORDER — LANOLIN ALCOHOL/MO/W.PET/CERES
400 CREAM (GRAM) TOPICAL ONCE
Status: COMPLETED | OUTPATIENT
Start: 2019-10-16 | End: 2019-10-16

## 2019-10-16 RX ORDER — PROMETHAZINE HYDROCHLORIDE 25 MG/1
25 TABLET ORAL EVERY 6 HOURS PRN
Status: DISCONTINUED | OUTPATIENT
Start: 2019-10-16 | End: 2019-10-16 | Stop reason: HOSPADM

## 2019-10-16 RX ORDER — MIDAZOLAM HYDROCHLORIDE 1 MG/ML
INJECTION INTRAMUSCULAR; INTRAVENOUS
Status: DISCONTINUED | OUTPATIENT
Start: 2019-10-16 | End: 2019-10-16 | Stop reason: HOSPADM

## 2019-10-16 RX ORDER — ACETAMINOPHEN 325 MG/1
650 TABLET ORAL EVERY 4 HOURS PRN
Status: DISCONTINUED | OUTPATIENT
Start: 2019-10-16 | End: 2019-10-16

## 2019-10-16 RX ORDER — FENTANYL CITRATE 50 UG/ML
INJECTION, SOLUTION INTRAMUSCULAR; INTRAVENOUS
Status: DISCONTINUED | OUTPATIENT
Start: 2019-10-16 | End: 2019-10-16 | Stop reason: HOSPADM

## 2019-10-16 RX ORDER — IBUPROFEN 400 MG/1
400 TABLET ORAL EVERY 6 HOURS PRN
Status: DISCONTINUED | OUTPATIENT
Start: 2019-10-16 | End: 2019-10-16 | Stop reason: HOSPADM

## 2019-10-16 RX ORDER — METHYLPREDNISOLONE SOD SUCC 125 MG
125 VIAL (EA) INJECTION ONCE
Status: DISCONTINUED | OUTPATIENT
Start: 2019-10-16 | End: 2019-10-16

## 2019-10-16 RX ORDER — LIDOCAINE HYDROCHLORIDE 10 MG/ML
INJECTION, SOLUTION EPIDURAL; INFILTRATION; INTRACAUDAL; PERINEURAL
Status: DISCONTINUED | OUTPATIENT
Start: 2019-10-16 | End: 2019-10-16 | Stop reason: HOSPADM

## 2019-10-16 RX ORDER — POTASSIUM CHLORIDE 20 MEQ/1
20 TABLET, EXTENDED RELEASE ORAL ONCE
Status: COMPLETED | OUTPATIENT
Start: 2019-10-16 | End: 2019-10-16

## 2019-10-16 RX ORDER — DIPHENHYDRAMINE HYDROCHLORIDE 50 MG/ML
25 INJECTION INTRAMUSCULAR; INTRAVENOUS ONCE
Status: COMPLETED | OUTPATIENT
Start: 2019-10-16 | End: 2019-10-16

## 2019-10-16 RX ADMIN — DIPHENHYDRAMINE HYDROCHLORIDE 25 MG: 50 INJECTION INTRAMUSCULAR; INTRAVENOUS at 06:10

## 2019-10-16 RX ADMIN — HYDROCORTISONE SODIUM SUCCINATE 125 MG: 100 INJECTION, POWDER, FOR SOLUTION INTRAMUSCULAR; INTRAVENOUS at 06:10

## 2019-10-16 RX ADMIN — SODIUM BICARBONATE: 84 INJECTION, SOLUTION INTRAVENOUS at 06:10

## 2019-10-16 RX ADMIN — POTASSIUM CHLORIDE 20 MEQ: 20 TABLET, EXTENDED RELEASE ORAL at 06:10

## 2019-10-16 RX ADMIN — MAGNESIUM OXIDE 400 MG: 400 TABLET ORAL at 06:10

## 2019-10-18 ENCOUNTER — HOSPITAL ENCOUNTER (OUTPATIENT)
Dept: RADIOLOGY | Facility: HOSPITAL | Age: 69
Discharge: HOME | End: 2019-10-18
Attending: INTERNAL MEDICINE
Payer: MEDICARE

## 2019-10-18 VITALS
SYSTOLIC BLOOD PRESSURE: 148 MMHG | OXYGEN SATURATION: 100 % | DIASTOLIC BLOOD PRESSURE: 62 MMHG | HEART RATE: 82 BPM | RESPIRATION RATE: 17 BRPM

## 2019-10-18 DIAGNOSIS — R18.8 ASCITES: ICD-10-CM

## 2019-10-18 PROCEDURE — 49083 US GUIDED PARACENTESIS INC IMAGING: ICD-10-PCS | Mod: ,,, | Performed by: RADIOLOGY

## 2019-10-18 PROCEDURE — 49083 ABD PARACENTESIS W/IMAGING: CPT

## 2019-10-18 PROCEDURE — 63600175 PHARM REV CODE 636 W HCPCS: Performed by: RADIOLOGY

## 2019-10-18 PROCEDURE — A7048 VACUUM DRAIN BOTTLE/TUBE KIT: HCPCS

## 2019-10-18 PROCEDURE — 49083 ABD PARACENTESIS W/IMAGING: CPT | Mod: ,,, | Performed by: RADIOLOGY

## 2019-10-18 PROCEDURE — P9047 ALBUMIN (HUMAN), 25%, 50ML: HCPCS | Performed by: RADIOLOGY

## 2019-10-18 RX ORDER — ALBUMIN HUMAN 250 G/1000ML
25 SOLUTION INTRAVENOUS ONCE
Status: COMPLETED | OUTPATIENT
Start: 2019-10-18 | End: 2019-10-18

## 2019-10-18 RX ADMIN — ALBUMIN (HUMAN) 25 G: 12.5 SOLUTION INTRAVENOUS at 10:10

## 2019-10-18 NOTE — NURSING
Ultrasound guided paracentesis performed by - 4,550 mL removed- Patient received 25 g albumin IV- VS remained stable for duration of procedure- specimens sent off to lab per order-  IV d/c'd, with tip intact. Access site cleaned with peroxide, derma bond and steri-strips applied, then covered with sterile gauze and tape. Pt discharge home in stable condition

## 2019-10-18 NOTE — DISCHARGE SUMMARY
Radiology Discharge Summary      Hospital Course: No complications    Admit Date: 10/18/2019  Discharge Date: 10/18/2019     Instructions Given to Patient: Yes  Diet: Resume prior diet  Activity: activity as tolerated    Description of Condition on Discharge: Stable  Vital Signs (Most Recent): Pulse: 82 (10/18/19 1000)  Resp: 17 (10/18/19 1000)  BP: (!) 148/62 (10/18/19 1000)  SpO2: 100 % (10/18/19 1000)    Discharge Disposition: Home    Discharge Diagnosis: ascites status post US guided paracentesis     Follow-up: as per referring provider    @SIG@

## 2019-10-25 ENCOUNTER — HOSPITAL ENCOUNTER (OUTPATIENT)
Dept: RADIOLOGY | Facility: HOSPITAL | Age: 69
Discharge: HOME | End: 2019-10-25
Attending: INTERNAL MEDICINE
Payer: MEDICARE

## 2019-10-25 VITALS
SYSTOLIC BLOOD PRESSURE: 128 MMHG | DIASTOLIC BLOOD PRESSURE: 54 MMHG | HEART RATE: 85 BPM | RESPIRATION RATE: 18 BRPM | OXYGEN SATURATION: 100 %

## 2019-10-25 DIAGNOSIS — R18.8 ASCITES: ICD-10-CM

## 2019-10-25 PROCEDURE — P9047 ALBUMIN (HUMAN), 25%, 50ML: HCPCS | Performed by: RADIOLOGY

## 2019-10-25 PROCEDURE — 49083 ABD PARACENTESIS W/IMAGING: CPT

## 2019-10-25 PROCEDURE — 49083 ABD PARACENTESIS W/IMAGING: CPT | Mod: ,,, | Performed by: RADIOLOGY

## 2019-10-25 PROCEDURE — 63600175 PHARM REV CODE 636 W HCPCS: Performed by: RADIOLOGY

## 2019-10-25 PROCEDURE — 49083 US GUIDED PARACENTESIS INC IMAGING: ICD-10-PCS | Mod: ,,, | Performed by: RADIOLOGY

## 2019-10-25 RX ORDER — ALBUMIN HUMAN 250 G/1000ML
25 SOLUTION INTRAVENOUS ONCE
Status: COMPLETED | OUTPATIENT
Start: 2019-10-25 | End: 2019-10-25

## 2019-10-25 RX ADMIN — ALBUMIN (HUMAN) 25 G: 25 SOLUTION INTRAVENOUS at 12:10

## 2019-10-25 NOTE — NURSING
Ultrasound guided paracentesis performed by - 5050 mL removed- Patient received 25g albumin IV- VS remained stable for duration of procedure--  IV d/c'd, with tip intact. Access site cleaned with peroxide, derma bond and steri-strips applied, then covered with sterile gauze and tape. Pt discharge home in stable condition

## 2019-10-25 NOTE — PROGRESS NOTES
Radiology Post-Procedure Note    Pre Op Diagnosis: ascites  Post Op Diagnosis: Same    Procedure: paracentesis under ultrasound guidance    Procedure performed by: Dr. Bailey Ross    Written Informed Consent Obtained: Yes  Specimen Removed: YES 5050 cc cloudy light  yellow ascites  Estimated Blood Loss: Minimal  Patient given albumin 25 grams IV, to follow up with her MD, Dr. Jolley  No immediate complication    Findings:   Successful paracentesis under ultrasound guidance.    Patient tolerated procedure well.    @SIG@

## 2019-10-28 DIAGNOSIS — R18.8 OTHER ASCITES: Primary | ICD-10-CM

## 2019-11-01 ENCOUNTER — HOSPITAL ENCOUNTER (OUTPATIENT)
Dept: RADIOLOGY | Facility: HOSPITAL | Age: 69
Discharge: HOME | End: 2019-11-01
Attending: INTERNAL MEDICINE
Payer: MEDICARE

## 2019-11-01 VITALS — DIASTOLIC BLOOD PRESSURE: 52 MMHG | HEART RATE: 76 BPM | SYSTOLIC BLOOD PRESSURE: 123 MMHG | OXYGEN SATURATION: 99 %

## 2019-11-01 DIAGNOSIS — R18.8 ASCITES: ICD-10-CM

## 2019-11-01 PROCEDURE — 49083 ABD PARACENTESIS W/IMAGING: CPT

## 2019-11-01 PROCEDURE — 49083 ABD PARACENTESIS W/IMAGING: CPT | Mod: ,,, | Performed by: RADIOLOGY

## 2019-11-01 PROCEDURE — 49083 US GUIDED PARACENTESIS INC IMAGING: ICD-10-PCS | Mod: ,,, | Performed by: RADIOLOGY

## 2019-11-01 PROCEDURE — P9047 ALBUMIN (HUMAN), 25%, 50ML: HCPCS | Performed by: RADIOLOGY

## 2019-11-01 PROCEDURE — A7048 VACUUM DRAIN BOTTLE/TUBE KIT: HCPCS

## 2019-11-01 PROCEDURE — 63600175 PHARM REV CODE 636 W HCPCS: Performed by: RADIOLOGY

## 2019-11-01 RX ORDER — ALBUMIN HUMAN 250 G/1000ML
25 SOLUTION INTRAVENOUS
Status: DISCONTINUED | OUTPATIENT
Start: 2019-11-01 | End: 2019-11-01

## 2019-11-01 RX ADMIN — ALBUMIN (HUMAN) 25 G: 25 SOLUTION INTRAVENOUS at 10:11

## 2019-11-01 NOTE — NURSING
Ultrasound guided paracentesis performed by Dr. Colunga- 5650 mLs removed- Patient received 25g albumin IV- VS remained stable for duration of procedure-  IV d/c'd, with tip intact. Access site cleaned with peroxide, derma bond and steri-strips applied, then covered with sterile gauze and tape. Pt discharge home.

## 2019-11-01 NOTE — DISCHARGE SUMMARY
Radiology Discharge Summary      Hospital Course: No complications    Admit Date: 11/1/2019  Discharge Date: 11/01/2019     Instructions Given to Patient: Yes  Diet: Resume prior diet  Activity: activity as tolerated    Description of Condition on Discharge: Stable  Vital Signs (Most Recent): Pulse: 76 (11/01/19 1051)  BP: (!) 123/52 (11/01/19 1051)  SpO2: 99 % (11/01/19 1051)    Discharge Disposition: Home    Discharge Diagnosis: ascites status post US guided paracentesis     Follow-up: As per referring provider    @SIG@

## 2019-11-08 ENCOUNTER — HOSPITAL ENCOUNTER (OUTPATIENT)
Dept: RADIOLOGY | Facility: HOSPITAL | Age: 69
Discharge: HOME | End: 2019-11-08
Attending: INTERNAL MEDICINE
Payer: MEDICARE

## 2019-11-08 VITALS — HEART RATE: 100 BPM | DIASTOLIC BLOOD PRESSURE: 57 MMHG | SYSTOLIC BLOOD PRESSURE: 112 MMHG | OXYGEN SATURATION: 95 %

## 2019-11-08 DIAGNOSIS — R18.8 ASCITES: ICD-10-CM

## 2019-11-08 PROCEDURE — 49083 US GUIDED PARACENTESIS INC IMAGING: ICD-10-PCS | Mod: ,,, | Performed by: RADIOLOGY

## 2019-11-08 PROCEDURE — 63600175 PHARM REV CODE 636 W HCPCS: Performed by: RADIOLOGY

## 2019-11-08 PROCEDURE — P9047 ALBUMIN (HUMAN), 25%, 50ML: HCPCS | Performed by: RADIOLOGY

## 2019-11-08 PROCEDURE — A7048 VACUUM DRAIN BOTTLE/TUBE KIT: HCPCS

## 2019-11-08 PROCEDURE — 49083 ABD PARACENTESIS W/IMAGING: CPT

## 2019-11-08 PROCEDURE — 49083 ABD PARACENTESIS W/IMAGING: CPT | Mod: ,,, | Performed by: RADIOLOGY

## 2019-11-08 RX ORDER — ALBUMIN HUMAN 250 G/1000ML
25 SOLUTION INTRAVENOUS ONCE
Status: COMPLETED | OUTPATIENT
Start: 2019-11-08 | End: 2019-11-08

## 2019-11-08 RX ADMIN — ALBUMIN (HUMAN) 25 G: 25 SOLUTION INTRAVENOUS at 11:11

## 2019-11-08 NOTE — NURSING
Ultrasound guided paracentesis performed by Dr. Terry. 5250 L removed in the left side abdomen. Patient received 25 g albumin IV- VS remained stable for duration. IV d/c'd, with tip intact. Access site cleaned with peroxide  applied, then covered with sterile gauze and tape. Pt discharge home.

## 2019-11-15 ENCOUNTER — HOSPITAL ENCOUNTER (OUTPATIENT)
Dept: RADIOLOGY | Facility: HOSPITAL | Age: 69
Discharge: HOME | End: 2019-11-15
Attending: INTERNAL MEDICINE
Payer: MEDICARE

## 2019-11-15 VITALS — DIASTOLIC BLOOD PRESSURE: 57 MMHG | OXYGEN SATURATION: 97 % | SYSTOLIC BLOOD PRESSURE: 112 MMHG | HEART RATE: 93 BPM

## 2019-11-15 DIAGNOSIS — R18.8 ASCITES: ICD-10-CM

## 2019-11-15 PROCEDURE — A7048 VACUUM DRAIN BOTTLE/TUBE KIT: HCPCS

## 2019-11-15 PROCEDURE — 63600175 PHARM REV CODE 636 W HCPCS: Performed by: RADIOLOGY

## 2019-11-15 PROCEDURE — P9047 ALBUMIN (HUMAN), 25%, 50ML: HCPCS | Performed by: RADIOLOGY

## 2019-11-15 PROCEDURE — 49083 ABD PARACENTESIS W/IMAGING: CPT | Mod: ,,, | Performed by: RADIOLOGY

## 2019-11-15 PROCEDURE — 49083 US GUIDED PARACENTESIS INC IMAGING: ICD-10-PCS | Mod: ,,, | Performed by: RADIOLOGY

## 2019-11-15 PROCEDURE — 49083 ABD PARACENTESIS W/IMAGING: CPT

## 2019-11-15 RX ORDER — ALBUMIN HUMAN 250 G/1000ML
25 SOLUTION INTRAVENOUS ONCE
Status: COMPLETED | OUTPATIENT
Start: 2019-11-15 | End: 2019-11-15

## 2019-11-15 RX ADMIN — ALBUMIN (HUMAN) 25 G: 25 SOLUTION INTRAVENOUS at 10:11

## 2019-11-15 NOTE — NURSING
Ultrasound guided paracentesis performed by Dr. Shetty- 5.6 L removed- Patient received 25g albumin IV- VS remained stable for duration of procedure. IV d/c'd, with tip intact. Access site cleaned with peroxide, derma bond, and then covered with sterile gauze and tape. Pt discharged home, voicing no complaints at this time.

## 2019-11-20 ENCOUNTER — HOSPITAL ENCOUNTER (OUTPATIENT)
Dept: RADIOLOGY | Facility: HOSPITAL | Age: 69
Discharge: HOME | End: 2019-11-20
Attending: INTERNAL MEDICINE
Payer: MEDICARE

## 2019-11-20 VITALS — HEART RATE: 92 BPM | DIASTOLIC BLOOD PRESSURE: 61 MMHG | SYSTOLIC BLOOD PRESSURE: 130 MMHG | OXYGEN SATURATION: 99 %

## 2019-11-20 DIAGNOSIS — R18.8 ASCITES: ICD-10-CM

## 2019-11-20 PROCEDURE — 49083 US GUIDED PARACENTESIS INC IMAGING: ICD-10-PCS | Mod: ,,, | Performed by: RADIOLOGY

## 2019-11-20 PROCEDURE — 49083 ABD PARACENTESIS W/IMAGING: CPT

## 2019-11-20 PROCEDURE — A7048 VACUUM DRAIN BOTTLE/TUBE KIT: HCPCS

## 2019-11-20 PROCEDURE — 49083 ABD PARACENTESIS W/IMAGING: CPT | Mod: ,,, | Performed by: RADIOLOGY

## 2019-11-20 NOTE — NURSING
Ultrasound guided paracentesis performed by Dr. Terry- 4.5 L removed- VS remained stable for duration of procedure.. Access site cleaned with peroxide, derma bond and steri-strips applied, then covered with sterile gauze and tape. Pt discharge home.

## 2019-11-29 ENCOUNTER — HOSPITAL ENCOUNTER (OUTPATIENT)
Dept: RADIOLOGY | Facility: HOSPITAL | Age: 69
Discharge: HOME | End: 2019-11-29
Attending: INTERNAL MEDICINE
Payer: MEDICARE

## 2019-11-29 VITALS — OXYGEN SATURATION: 99 % | SYSTOLIC BLOOD PRESSURE: 136 MMHG | DIASTOLIC BLOOD PRESSURE: 60 MMHG | HEART RATE: 91 BPM

## 2019-11-29 DIAGNOSIS — R18.8 ASCITES: ICD-10-CM

## 2019-11-29 PROCEDURE — P9047 ALBUMIN (HUMAN), 25%, 50ML: HCPCS | Performed by: RADIOLOGY

## 2019-11-29 PROCEDURE — 49083 US GUIDED PARACENTESIS INC IMAGING: ICD-10-PCS | Mod: ,,, | Performed by: RADIOLOGY

## 2019-11-29 PROCEDURE — 49083 ABD PARACENTESIS W/IMAGING: CPT

## 2019-11-29 PROCEDURE — 49083 ABD PARACENTESIS W/IMAGING: CPT | Mod: ,,, | Performed by: RADIOLOGY

## 2019-11-29 PROCEDURE — A7048 VACUUM DRAIN BOTTLE/TUBE KIT: HCPCS

## 2019-11-29 PROCEDURE — 63600175 PHARM REV CODE 636 W HCPCS: Performed by: RADIOLOGY

## 2019-11-29 RX ORDER — ALBUMIN HUMAN 250 G/1000ML
25 SOLUTION INTRAVENOUS ONCE
Status: COMPLETED | OUTPATIENT
Start: 2019-11-29 | End: 2019-11-29

## 2019-11-29 RX ADMIN — ALBUMIN (HUMAN) 25 G: 25 SOLUTION INTRAVENOUS at 10:11

## 2019-11-29 NOTE — NURSING
Ultrasound guided paracentesis performed by Dr. Colunga- 5.4 L removed- Patient received 25g albumin IV- VS remained stable for duration of procedure- IV d/c'd, with tip intact. Access site cleaned with peroxide, derma bond and steri-strips applied, then covered with sterile gauze and tape. Pt discharge home.

## 2019-12-02 DIAGNOSIS — R18.8 OTHER ASCITES: Primary | ICD-10-CM

## 2019-12-06 ENCOUNTER — HOSPITAL ENCOUNTER (OUTPATIENT)
Dept: RADIOLOGY | Facility: HOSPITAL | Age: 69
Discharge: HOME | End: 2019-12-06
Attending: INTERNAL MEDICINE
Payer: MEDICARE

## 2019-12-06 VITALS — DIASTOLIC BLOOD PRESSURE: 64 MMHG | HEART RATE: 86 BPM | SYSTOLIC BLOOD PRESSURE: 120 MMHG | OXYGEN SATURATION: 99 %

## 2019-12-06 DIAGNOSIS — R18.8 ASCITES: ICD-10-CM

## 2019-12-06 PROCEDURE — A7048 VACUUM DRAIN BOTTLE/TUBE KIT: HCPCS

## 2019-12-06 PROCEDURE — P9047 ALBUMIN (HUMAN), 25%, 50ML: HCPCS | Performed by: RADIOLOGY

## 2019-12-06 PROCEDURE — 49083 US GUIDED PARACENTESIS INC IMAGING: ICD-10-PCS | Mod: ,,, | Performed by: RADIOLOGY

## 2019-12-06 PROCEDURE — 49083 ABD PARACENTESIS W/IMAGING: CPT | Mod: ,,, | Performed by: RADIOLOGY

## 2019-12-06 PROCEDURE — 63600175 PHARM REV CODE 636 W HCPCS: Performed by: RADIOLOGY

## 2019-12-06 PROCEDURE — 49083 ABD PARACENTESIS W/IMAGING: CPT

## 2019-12-06 RX ORDER — ALBUMIN HUMAN 250 G/1000ML
25 SOLUTION INTRAVENOUS ONCE
Status: COMPLETED | OUTPATIENT
Start: 2019-12-06 | End: 2019-12-06

## 2019-12-06 RX ADMIN — ALBUMIN (HUMAN) 25 G: 25 SOLUTION INTRAVENOUS at 11:12

## 2019-12-06 NOTE — NURSING
Ultrasound guided paracentesis performed by Dr. Shetty- 6.2 L removed- Patient received 25g albumin IV- VS remained stable for duration of procedure-  IV d/c'd, with tip intact. Access site cleaned with peroxide, derma bond and steri-strips applied, then covered with sterile gauze and tape. Pt discharge home.

## 2019-12-13 ENCOUNTER — HOSPITAL ENCOUNTER (OUTPATIENT)
Dept: RADIOLOGY | Facility: HOSPITAL | Age: 69
Discharge: HOME | End: 2019-12-13
Attending: INTERNAL MEDICINE
Payer: MEDICARE

## 2019-12-13 VITALS — HEART RATE: 87 BPM | DIASTOLIC BLOOD PRESSURE: 58 MMHG | OXYGEN SATURATION: 100 % | SYSTOLIC BLOOD PRESSURE: 135 MMHG

## 2019-12-13 DIAGNOSIS — R18.8 ASCITES: ICD-10-CM

## 2019-12-13 PROCEDURE — 63600175 PHARM REV CODE 636 W HCPCS: Performed by: RADIOLOGY

## 2019-12-13 PROCEDURE — 49083 ABD PARACENTESIS W/IMAGING: CPT | Mod: ,,, | Performed by: RADIOLOGY

## 2019-12-13 PROCEDURE — 49083 US GUIDED PARACENTESIS INC IMAGING: ICD-10-PCS | Mod: ,,, | Performed by: RADIOLOGY

## 2019-12-13 PROCEDURE — 49083 ABD PARACENTESIS W/IMAGING: CPT

## 2019-12-13 PROCEDURE — P9047 ALBUMIN (HUMAN), 25%, 50ML: HCPCS | Performed by: RADIOLOGY

## 2019-12-13 RX ORDER — ALBUMIN HUMAN 250 G/1000ML
25 SOLUTION INTRAVENOUS
Status: DISCONTINUED | OUTPATIENT
Start: 2019-12-13 | End: 2019-12-14 | Stop reason: HOSPADM

## 2019-12-13 RX ADMIN — ALBUMIN (HUMAN) 25 G: 25 SOLUTION INTRAVENOUS at 11:12

## 2019-12-13 RX ADMIN — ALBUMIN (HUMAN) 25 G: 25 SOLUTION INTRAVENOUS at 10:12

## 2019-12-18 DIAGNOSIS — R18.8 OTHER ASCITES: Primary | ICD-10-CM

## 2019-12-19 DIAGNOSIS — K74.60 CIRRHOSIS: Primary | ICD-10-CM

## 2019-12-19 DIAGNOSIS — K74.60 UNSPECIFIED CIRRHOSIS OF LIVER: ICD-10-CM

## 2019-12-20 ENCOUNTER — HOSPITAL ENCOUNTER (OUTPATIENT)
Dept: RADIOLOGY | Facility: HOSPITAL | Age: 69
Discharge: HOME | End: 2019-12-20
Attending: INTERNAL MEDICINE
Payer: MEDICARE

## 2019-12-20 VITALS — DIASTOLIC BLOOD PRESSURE: 63 MMHG | OXYGEN SATURATION: 100 % | HEART RATE: 83 BPM | SYSTOLIC BLOOD PRESSURE: 130 MMHG

## 2019-12-20 DIAGNOSIS — R18.8 ASCITES: ICD-10-CM

## 2019-12-20 PROCEDURE — 63600175 PHARM REV CODE 636 W HCPCS: Performed by: RADIOLOGY

## 2019-12-20 PROCEDURE — P9047 ALBUMIN (HUMAN), 25%, 50ML: HCPCS | Performed by: RADIOLOGY

## 2019-12-20 PROCEDURE — 49083 ABD PARACENTESIS W/IMAGING: CPT

## 2019-12-20 PROCEDURE — 49083 US GUIDED PARACENTESIS INC IMAGING: ICD-10-PCS | Mod: ,,, | Performed by: RADIOLOGY

## 2019-12-20 PROCEDURE — A7048 VACUUM DRAIN BOTTLE/TUBE KIT: HCPCS

## 2019-12-20 PROCEDURE — 49083 ABD PARACENTESIS W/IMAGING: CPT | Mod: ,,, | Performed by: RADIOLOGY

## 2019-12-20 RX ORDER — ALBUMIN HUMAN 250 G/1000ML
25 SOLUTION INTRAVENOUS ONCE
Status: COMPLETED | OUTPATIENT
Start: 2019-12-20 | End: 2019-12-20

## 2019-12-20 RX ADMIN — ALBUMIN (HUMAN) 25 G: 25 SOLUTION INTRAVENOUS at 10:12

## 2019-12-20 NOTE — NURSING
Ultrasound guided paracentesis performed by Dr. Colunga- 5.8 L removed- Patient received 25 g albumin IV- VS remained stable for duration of procedure-  IV d/c'd, with tip intact. Access site cleaned with peroxide, derma bond and steri-strips applied, then covered with sterile gauze and tape. Pt discharge home.

## 2019-12-27 ENCOUNTER — HOSPITAL ENCOUNTER (OUTPATIENT)
Dept: RADIOLOGY | Facility: HOSPITAL | Age: 69
Discharge: HOME | End: 2019-12-27
Attending: INTERNAL MEDICINE
Payer: MEDICARE

## 2019-12-27 VITALS — HEART RATE: 70 BPM | OXYGEN SATURATION: 100 % | SYSTOLIC BLOOD PRESSURE: 104 MMHG | DIASTOLIC BLOOD PRESSURE: 54 MMHG

## 2019-12-27 DIAGNOSIS — R18.8 ASCITES: ICD-10-CM

## 2019-12-27 DIAGNOSIS — R18.8 OTHER ASCITES: Primary | ICD-10-CM

## 2019-12-27 PROCEDURE — 49083 ABD PARACENTESIS W/IMAGING: CPT | Mod: ,,, | Performed by: RADIOLOGY

## 2019-12-27 PROCEDURE — 63600175 PHARM REV CODE 636 W HCPCS: Performed by: RADIOLOGY

## 2019-12-27 PROCEDURE — 49083 ABD PARACENTESIS W/IMAGING: CPT

## 2019-12-27 PROCEDURE — 49083 US GUIDED PARACENTESIS INC IMAGING: ICD-10-PCS | Mod: ,,, | Performed by: RADIOLOGY

## 2019-12-27 PROCEDURE — P9047 ALBUMIN (HUMAN), 25%, 50ML: HCPCS | Performed by: RADIOLOGY

## 2019-12-27 RX ORDER — ALBUMIN HUMAN 250 G/1000ML
25 SOLUTION INTRAVENOUS ONCE
Status: COMPLETED | OUTPATIENT
Start: 2019-12-27 | End: 2019-12-27

## 2019-12-27 RX ADMIN — ALBUMIN (HUMAN) 25 G: 25 SOLUTION INTRAVENOUS at 11:12

## 2020-01-01 ENCOUNTER — HOSPITAL ENCOUNTER (OUTPATIENT)
Dept: RADIOLOGY | Facility: HOSPITAL | Age: 70
Discharge: HOME | End: 2020-12-02
Attending: INTERNAL MEDICINE
Payer: MEDICARE

## 2020-01-01 ENCOUNTER — HOSPITAL ENCOUNTER (OUTPATIENT)
Dept: RADIOLOGY | Facility: HOSPITAL | Age: 70
Discharge: HOME | End: 2020-12-30
Attending: INTERNAL MEDICINE
Payer: MEDICARE

## 2020-01-01 ENCOUNTER — HOSPITAL ENCOUNTER (OUTPATIENT)
Dept: RADIOLOGY | Facility: HOSPITAL | Age: 70
Discharge: HOME | End: 2020-12-16
Attending: INTERNAL MEDICINE
Payer: MEDICARE

## 2020-01-01 VITALS
HEART RATE: 62 BPM | HEART RATE: 63 BPM | DIASTOLIC BLOOD PRESSURE: 58 MMHG | SYSTOLIC BLOOD PRESSURE: 113 MMHG | DIASTOLIC BLOOD PRESSURE: 59 MMHG | SYSTOLIC BLOOD PRESSURE: 124 MMHG

## 2020-01-01 VITALS — SYSTOLIC BLOOD PRESSURE: 110 MMHG | HEART RATE: 65 BPM | DIASTOLIC BLOOD PRESSURE: 58 MMHG

## 2020-01-01 DIAGNOSIS — K74.60 CIRRHOSIS OF LIVER: Primary | ICD-10-CM

## 2020-01-01 DIAGNOSIS — K74.60 LIVER CIRRHOSIS: ICD-10-CM

## 2020-01-01 DIAGNOSIS — K74.60 HEPATIC CIRRHOSIS, UNSPECIFIED HEPATIC CIRRHOSIS TYPE, UNSPECIFIED WHETHER ASCITES PRESENT: Primary | ICD-10-CM

## 2020-01-01 PROCEDURE — 49083 ABD PARACENTESIS W/IMAGING: CPT

## 2020-01-01 PROCEDURE — 63600175 PHARM REV CODE 636 W HCPCS: Performed by: RADIOLOGY

## 2020-01-01 PROCEDURE — P9047 ALBUMIN (HUMAN), 25%, 50ML: HCPCS | Performed by: RADIOLOGY

## 2020-01-01 PROCEDURE — 49083 US GUIDED PARACENTESIS INC IMAGING: ICD-10-PCS | Mod: ,,, | Performed by: RADIOLOGY

## 2020-01-01 PROCEDURE — 49083 ABD PARACENTESIS W/IMAGING: CPT | Mod: ,,, | Performed by: RADIOLOGY

## 2020-01-01 RX ORDER — ALBUMIN HUMAN 250 G/1000ML
25 SOLUTION INTRAVENOUS
Status: DISCONTINUED | OUTPATIENT
Start: 2020-01-01 | End: 2020-01-01 | Stop reason: HOSPADM

## 2020-01-01 RX ADMIN — ALBUMIN (HUMAN) 25 G: 0.25 INJECTION, SOLUTION INTRAVENOUS at 11:12

## 2020-01-03 ENCOUNTER — HOSPITAL ENCOUNTER (OUTPATIENT)
Dept: RADIOLOGY | Facility: HOSPITAL | Age: 70
Discharge: HOME | End: 2020-01-03
Attending: INTERNAL MEDICINE
Payer: MEDICARE

## 2020-01-03 VITALS — HEART RATE: 73 BPM | DIASTOLIC BLOOD PRESSURE: 62 MMHG | OXYGEN SATURATION: 100 % | SYSTOLIC BLOOD PRESSURE: 127 MMHG

## 2020-01-03 DIAGNOSIS — R18.8 OTHER ASCITES: ICD-10-CM

## 2020-01-03 DIAGNOSIS — K74.60 UNSPECIFIED CIRRHOSIS OF LIVER: ICD-10-CM

## 2020-01-03 LAB
ALBUMIN FLD-MCNC: 0.6 G/DL
APPEARANCE FLD: NORMAL
BODY FLD TYPE: NORMAL
COLOR FLD: YELLOW
LYMPHOCYTES NFR FLD MANUAL: 44 %
MESOTHL CELL NFR FLD MANUAL: 4 %
MONOS+MACROS NFR FLD MANUAL: 41 %
NEUTROPHILS NFR FLD MANUAL: 11 %
SPECIMEN SOURCE: NORMAL
WBC # FLD: 740 /CU MM

## 2020-01-03 PROCEDURE — 88112 CYTOPATH CELL ENHANCE TECH: CPT | Performed by: PATHOLOGY

## 2020-01-03 PROCEDURE — 49083 US GUIDED PARACENTESIS INC IMAGING: ICD-10-PCS | Mod: ,,, | Performed by: RADIOLOGY

## 2020-01-03 PROCEDURE — 88305 TISSUE EXAM BY PATHOLOGIST: CPT | Mod: 26,,, | Performed by: PATHOLOGY

## 2020-01-03 PROCEDURE — 89051 BODY FLUID CELL COUNT: CPT

## 2020-01-03 PROCEDURE — 88112 PR  CYTOPATH, CELL ENHANCE TECH: ICD-10-PCS | Mod: 26,,, | Performed by: PATHOLOGY

## 2020-01-03 PROCEDURE — 49083 ABD PARACENTESIS W/IMAGING: CPT

## 2020-01-03 PROCEDURE — P9047 ALBUMIN (HUMAN), 25%, 50ML: HCPCS | Performed by: RADIOLOGY

## 2020-01-03 PROCEDURE — 88305 TISSUE EXAM BY PATHOLOGIST: ICD-10-PCS | Mod: 26,,, | Performed by: PATHOLOGY

## 2020-01-03 PROCEDURE — A7048 VACUUM DRAIN BOTTLE/TUBE KIT: HCPCS

## 2020-01-03 PROCEDURE — 63600175 PHARM REV CODE 636 W HCPCS: Performed by: RADIOLOGY

## 2020-01-03 PROCEDURE — 88112 CYTOPATH CELL ENHANCE TECH: CPT | Mod: 26,,, | Performed by: PATHOLOGY

## 2020-01-03 PROCEDURE — 82042 OTHER SOURCE ALBUMIN QUAN EA: CPT

## 2020-01-03 PROCEDURE — 87070 CULTURE OTHR SPECIMN AEROBIC: CPT

## 2020-01-03 PROCEDURE — 49083 ABD PARACENTESIS W/IMAGING: CPT | Mod: ,,, | Performed by: RADIOLOGY

## 2020-01-03 PROCEDURE — 88305 TISSUE EXAM BY PATHOLOGIST: CPT | Performed by: PATHOLOGY

## 2020-01-03 RX ORDER — ALBUMIN HUMAN 250 G/1000ML
25 SOLUTION INTRAVENOUS
Status: DISCONTINUED | OUTPATIENT
Start: 2020-01-03 | End: 2020-01-04 | Stop reason: HOSPADM

## 2020-01-03 RX ADMIN — ALBUMIN (HUMAN) 25 G: 12.5 SOLUTION INTRAVENOUS at 11:01

## 2020-01-03 NOTE — NURSING
Ultrasound guided paracentesis performed by Dr. Ross- 4.6 L removed- Patient received 25g albumin IV- VS remained stable for duration of procedure- specimens sent off to lab per order-  IV d/c'd, with tip intact. Access site cleaned with peroxide, derma bond and steri-strips applied, then covered with sterile gauze and tape. Pt discharge home.

## 2020-01-08 LAB — BACTERIA FLD CULT: NORMAL

## 2020-01-09 LAB — FINAL PATHOLOGIC DIAGNOSIS: NORMAL

## 2020-01-10 ENCOUNTER — HOSPITAL ENCOUNTER (OUTPATIENT)
Dept: RADIOLOGY | Facility: HOSPITAL | Age: 70
Discharge: HOME | End: 2020-01-10
Attending: INTERNAL MEDICINE
Payer: MEDICARE

## 2020-01-10 VITALS — HEART RATE: 69 BPM | OXYGEN SATURATION: 97 % | DIASTOLIC BLOOD PRESSURE: 55 MMHG | SYSTOLIC BLOOD PRESSURE: 116 MMHG

## 2020-01-10 DIAGNOSIS — K74.60 UNSPECIFIED CIRRHOSIS OF LIVER: ICD-10-CM

## 2020-01-10 PROCEDURE — 49083 ABD PARACENTESIS W/IMAGING: CPT

## 2020-01-10 PROCEDURE — 49083 US GUIDED PARACENTESIS INC IMAGING: ICD-10-PCS | Mod: ,,, | Performed by: RADIOLOGY

## 2020-01-10 PROCEDURE — 49083 ABD PARACENTESIS W/IMAGING: CPT | Mod: ,,, | Performed by: RADIOLOGY

## 2020-01-10 PROCEDURE — A7048 VACUUM DRAIN BOTTLE/TUBE KIT: HCPCS

## 2020-01-10 NOTE — NURSING
Ultrasound guided paracentesis performed by Dr. Shetty-4.7 L removed- VS remained stable for duration of procedure.Access site cleaned with peroxide, derma bond, then covered with sterile gauze and tape. Pt discharge home.

## 2020-01-14 ENCOUNTER — HOSPITAL ENCOUNTER (EMERGENCY)
Facility: HOSPITAL | Age: 70
Discharge: HOME | End: 2020-01-14
Attending: EMERGENCY MEDICINE
Payer: MEDICARE

## 2020-01-14 VITALS
OXYGEN SATURATION: 94 % | WEIGHT: 242 LBS | BODY MASS INDEX: 44.53 KG/M2 | RESPIRATION RATE: 20 BRPM | TEMPERATURE: 98 F | SYSTOLIC BLOOD PRESSURE: 120 MMHG | DIASTOLIC BLOOD PRESSURE: 59 MMHG | HEIGHT: 62 IN | HEART RATE: 63 BPM

## 2020-01-14 DIAGNOSIS — K76.82 HEPATIC ENCEPHALOPATHY: Primary | ICD-10-CM

## 2020-01-14 DIAGNOSIS — R41.0 CONFUSION: ICD-10-CM

## 2020-01-14 LAB
ALBUMIN SERPL BCP-MCNC: 2.2 G/DL (ref 3.5–5.2)
ALP SERPL-CCNC: 305 U/L (ref 55–135)
ALT SERPL W/O P-5'-P-CCNC: 28 U/L (ref 10–44)
AMMONIA PLAS-SCNC: 53 UMOL/L (ref 10–50)
ANION GAP SERPL CALC-SCNC: 9 MMOL/L (ref 8–16)
AST SERPL-CCNC: 47 U/L (ref 10–40)
BASOPHILS # BLD AUTO: 0.04 K/UL (ref 0–0.2)
BASOPHILS NFR BLD: 0.8 % (ref 0–1.9)
BILIRUB SERPL-MCNC: 1.1 MG/DL (ref 0.1–1)
BILIRUB UR QL STRIP: NEGATIVE
BUN SERPL-MCNC: 15 MG/DL (ref 8–23)
CALCIUM SERPL-MCNC: 8.7 MG/DL (ref 8.7–10.5)
CHLORIDE SERPL-SCNC: 104 MMOL/L (ref 95–110)
CLARITY UR: CLEAR
CO2 SERPL-SCNC: 25 MMOL/L (ref 23–29)
COLOR UR: YELLOW
CREAT SERPL-MCNC: 1.3 MG/DL (ref 0.5–1.4)
DIFFERENTIAL METHOD: ABNORMAL
EOSINOPHIL # BLD AUTO: 0.1 K/UL (ref 0–0.5)
EOSINOPHIL NFR BLD: 2.3 % (ref 0–8)
ERYTHROCYTE [DISTWIDTH] IN BLOOD BY AUTOMATED COUNT: 15.7 % (ref 11.5–14.5)
EST. GFR  (AFRICAN AMERICAN): 48 ML/MIN/1.73 M^2
EST. GFR  (NON AFRICAN AMERICAN): 42 ML/MIN/1.73 M^2
GLUCOSE SERPL-MCNC: 230 MG/DL (ref 70–110)
GLUCOSE UR QL STRIP: NEGATIVE
HCT VFR BLD AUTO: 36.3 % (ref 37–48.5)
HGB BLD-MCNC: 12.1 G/DL (ref 12–16)
HGB UR QL STRIP: NEGATIVE
IMM GRANULOCYTES # BLD AUTO: 0.01 K/UL (ref 0–0.04)
INR PPP: 1 (ref 0.8–1.2)
KETONES UR QL STRIP: NEGATIVE
LEUKOCYTE ESTERASE UR QL STRIP: NEGATIVE
LYMPHOCYTES # BLD AUTO: 1.3 K/UL (ref 1–4.8)
LYMPHOCYTES NFR BLD: 27.8 % (ref 18–48)
MAGNESIUM SERPL-MCNC: 2 MG/DL (ref 1.6–2.6)
MCH RBC QN AUTO: 34.2 PG (ref 27–31)
MCHC RBC AUTO-ENTMCNC: 33.3 G/DL (ref 32–36)
MCV RBC AUTO: 103 FL (ref 82–98)
MONOCYTES # BLD AUTO: 0.5 K/UL (ref 0.3–1)
MONOCYTES NFR BLD: 10.7 % (ref 4–15)
NEUTROPHILS # BLD AUTO: 2.8 K/UL (ref 1.8–7.7)
NEUTROPHILS NFR BLD: 58.2 % (ref 38–73)
NITRITE UR QL STRIP: NEGATIVE
NRBC BLD-RTO: 0 /100 WBC
PH UR STRIP: 6 [PH] (ref 5–8)
PHOSPHATE SERPL-MCNC: 2.9 MG/DL (ref 2.7–4.5)
PLATELET # BLD AUTO: 162 K/UL (ref 150–350)
PMV BLD AUTO: 11.4 FL (ref 9.2–12.9)
POTASSIUM SERPL-SCNC: 4.5 MMOL/L (ref 3.5–5.1)
PROT SERPL-MCNC: 6.4 G/DL (ref 6–8.4)
PROT UR QL STRIP: NEGATIVE
PROTHROMBIN TIME: 10.7 SEC (ref 9–12.5)
RBC # BLD AUTO: 3.54 M/UL (ref 4–5.4)
SODIUM SERPL-SCNC: 138 MMOL/L (ref 136–145)
SP GR UR STRIP: 1.01 (ref 1–1.03)
URN SPEC COLLECT METH UR: ABNORMAL
UROBILINOGEN UR STRIP-ACNC: ABNORMAL EU/DL
WBC # BLD AUTO: 4.78 K/UL (ref 3.9–12.7)

## 2020-01-14 PROCEDURE — 82140 ASSAY OF AMMONIA: CPT

## 2020-01-14 PROCEDURE — 84100 ASSAY OF PHOSPHORUS: CPT

## 2020-01-14 PROCEDURE — 83735 ASSAY OF MAGNESIUM: CPT

## 2020-01-14 PROCEDURE — 85025 COMPLETE CBC W/AUTO DIFF WBC: CPT

## 2020-01-14 PROCEDURE — 99285 EMERGENCY DEPT VISIT HI MDM: CPT | Mod: 25

## 2020-01-14 PROCEDURE — 81003 URINALYSIS AUTO W/O SCOPE: CPT

## 2020-01-14 PROCEDURE — 36415 COLL VENOUS BLD VENIPUNCTURE: CPT

## 2020-01-14 PROCEDURE — 85610 PROTHROMBIN TIME: CPT

## 2020-01-14 PROCEDURE — 93005 ELECTROCARDIOGRAM TRACING: CPT

## 2020-01-14 PROCEDURE — 80053 COMPREHEN METABOLIC PANEL: CPT

## 2020-01-14 RX ORDER — LACTULOSE 10 G/15ML
20 SOLUTION ORAL 3 TIMES DAILY
Qty: 5000 ML | Refills: 5 | Status: SHIPPED | OUTPATIENT
Start: 2020-01-14

## 2020-01-14 NOTE — DISCHARGE INSTRUCTIONS
Take her medication as prescribed especially your lactulose to have 3-4 bowel movements a day to lower your ammonia.  Called Dr. Jolley tomorrow to ensure close follow-up.

## 2020-01-14 NOTE — ED PROVIDER NOTES
"Encounter Date: 1/14/2020    SCRIBE #1 NOTE: I, Fabby Cerrato, am scribing for, and in the presence of, Fabrizio Rodriguez MD.       History     Chief Complaint   Patient presents with    Weakness     paracentesis friday  / " there was blood in the fluid "    Abdominal Pain       Time seen by provider: 1:52 PM on 01/14/2020    Sejal Matamoros is a 69 y.o. female with DMII, HTN, A-fib, and cirrhosis who presents to the ED with an onset of confusion. Per daughter, the patient had a paracentesis 4 days ago on 1/10 and there was blood in the 4.7 L of fluid removed but was not tested. She states the patient is usually herself after the procedure but has been confused, having memory problems, unable to ambulated with her walker like usual, and slower speech. Her cirrhosis is due to RHOADES. Currently, the patient reports feeling SOB, but it is unclear whether it's worse than baseline or not. She denies recent falls, fever, or any other symptoms at this time. No pulmonary PSHx.     The history is provided by the patient.     Review of patient's allergies indicates:   Allergen Reactions    Iodinated contrast media Hives    Zoloft [sertraline] Other (See Comments)     hypertension     Past Medical History:   Diagnosis Date    A-fib     Anxiety     Cirrhosis     DDD (degenerative disc disease), cervical 3/21/2018    DDD (degenerative disc disease), lumbar 3/21/2018    Decompensated hepatic cirrhosis 12/26/2018    Depression     Diabetes mellitus, type 2     Hepatic encephalopathy 12/26/2018    HLD (hyperlipidemia)     HTN (hypertension)     Hypoalbuminemia 12/26/2018    Hypoglycemia associated with type 2 diabetes mellitus 12/26/2018    Morbid obesity with BMI of 45.0-49.9, adult 12/7/2018    Obesity     Other ascites 2/20/2019    Portal hypertension 12/26/2018    Type 2 diabetes mellitus with hyperglycemia, with long-term current use of insulin 12/6/2018     Past Surgical History:   Procedure Laterality " Date    arm surgery      left    CARPAL TUNNEL RELEASE      left    CHOLECYSTECTOMY  2016    laparoscopic    COLONOSCOPY N/A 7/10/2019    Procedure: COLONOSCOPY;  Surgeon: Dee Dee Anthony MD;  Location: [facility] ENDO;  Service: Endoscopy;  Laterality: N/A;    ESOPHAGOGASTRODUODENOSCOPY N/A 7/9/2019    Procedure: EGD (ESOPHAGOGASTRODUODENOSCOPY);  Surgeon: Dee Dee Anthony MD;  Location: [facility] ENDO;  Service: Endoscopy;  Laterality: N/A;    RIGHT HEART CATHETERIZATION Right 10/16/2019    Procedure: INSERTION, CATHETER, RIGHT HEART;  Surgeon: Aston Iyer MD;  Location: [facility] CATH/EP LAB;  Service: Cardiology;  Laterality: Right;    ROTATOR CUFF REPAIR      TOTAL ABDOMINAL HYSTERECTOMY  1976    TOTAL KNEE ARTHROPLASTY       Family History   Problem Relation Age of Onset    Cirrhosis Brother         RHOADES cirrhosis    Diabetes Father     Hypertension Father     Hypertension Mother     Stroke Mother     Breast cancer Maternal Aunt     Colon cancer Neg Hx     Ovarian cancer Neg Hx      Social History     Tobacco Use    Smoking status: Never Smoker    Smokeless tobacco: Never Used   Substance Use Topics    Alcohol use: No     Frequency: Never    Drug use: No     Review of Systems   Unable to perform ROS: Mental status change   Constitutional: Negative for fever.   Respiratory: Positive for shortness of breath.    Neurological: Positive for speech difficulty.   Psychiatric/Behavioral: Positive for confusion.     Physical Exam     Initial Vitals [01/14/20 1334]   BP Pulse Resp Temp SpO2   (!) 147/67 72 20 97.9 °F (36.6 °C) 97 %      MAP       --         Physical Exam    Nursing note and vitals reviewed.  Constitutional: She appears well-developed and well-nourished.  Non-toxic appearance. No distress.   HENT:   Head: Normocephalic and atraumatic.   Eyes: EOM are normal. Pupils are equal, round, and reactive to light.   Neck: Normal range of motion. Neck supple. No neck rigidity. No JVD present.    Cardiovascular: Normal rate, regular rhythm, normal heart sounds and intact distal pulses. Exam reveals no gallop and no friction rub.    No murmur heard.  Pulmonary/Chest: Breath sounds normal. She has no wheezes. She has no rhonchi. She has no rales.   Abdominal: Soft. Bowel sounds are normal. She exhibits no distension. There is no tenderness. There is no rebound and no guarding.   Soft, non-tender abdomen. No distention.    Musculoskeletal: Normal range of motion. She exhibits edema.   Bilateral pitting edema to BLE's.    Neurological: She is alert and oriented to person, place, and time. She has normal strength and normal reflexes. No cranial nerve deficit or sensory deficit. She exhibits normal muscle tone. Coordination normal. GCS eye subscore is 4. GCS verbal subscore is 5. GCS motor subscore is 6.   A&Ox4. Asterixis.    Skin: Skin is warm and dry.   Psychiatric: She has a normal mood and affect. Her speech is normal and behavior is normal. She is not actively hallucinating.       ED Course   Procedures  Labs Reviewed   CBC W/ AUTO DIFFERENTIAL - Abnormal; Notable for the following components:       Result Value    RBC 3.54 (*)     Hematocrit 36.3 (*)     Mean Corpuscular Volume 103 (*)     Mean Corpuscular Hemoglobin 34.2 (*)     RDW 15.7 (*)     All other components within normal limits   COMPREHENSIVE METABOLIC PANEL - Abnormal; Notable for the following components:    Glucose 230 (*)     Albumin 2.2 (*)     Total Bilirubin 1.1 (*)     Alkaline Phosphatase 305 (*)     AST 47 (*)     eGFR if  48 (*)     eGFR if non  42 (*)     All other components within normal limits   URINALYSIS, REFLEX TO URINE CULTURE - Abnormal; Notable for the following components:    Urobilinogen, UA 2.0-3.0 (*)     All other components within normal limits    Narrative:     Preferred Collection Type->Urine, Clean Catch   AMMONIA - Abnormal; Notable for the following components:    Ammonia 53 (*)      All other components within normal limits   PHOSPHORUS   MAGNESIUM   PROTIME-INR     EKG Readings: (Independently Interpreted)   Initial Reading: No STEMI.   Normal sinus rhythm at a rate of 64 bpm with normal axis, normal ST segments, normal T-waves, and low voltage QRS.      Imaging Results          X-Ray Chest AP Portable (Final result)  Result time 01/14/20 14:20:17    Final result by Mello Shetty Jr., MD (01/14/20 14:20:17)                 Impression:      Mild thoracic scoliosis otherwise negative portable chest      Electronically signed by: Mello Shetty MD  Date:    01/14/2020  Time:    14:20             Narrative:    EXAMINATION:  XR CHEST AP PORTABLE    CLINICAL HISTORY:  sob;    TECHNIQUE:  Single frontal view of the chest was performed.    COMPARISON:  Prior chest of October 7, 2019.    FINDINGS:  The mediastinal and cardiac size and contours are normal.  No intrapulmonary mass or infiltrate is seen.  No pneumothorax or pleural effusion is noted.  There is mild thoracic scoliosis convex to the right.                                 Medical Decision Making:   History:   Old Medical Records: I decided to obtain old medical records.  Initial Assessment:   Patient is a 69-year-old woman with a history of end-stage liver disease likely secondary to RHOADES who presents emergency department for confusion and generalized weakness.  No fever.  She is alert orient x3 without lethargy but does have asterixis.  She has no abdominal tenderness or significant distention to suspect SBP.  Her ammonia levels found to be slightly elevated at 53.  She has no leukocytosis.  She is not significantly anemic with a hemoglobin of 12 and 36.  I do not suspect she is having internal hemorrhage.  She is a elevated glucose of 230 with a normal CO2, I see no evidence of DKA or hyperosmolar nonketotic state.  She has no evidence of urinary tract infection.  I discuss her case with Dr. Higuera.  Patient has not been compliant  with her lactulose.  He suggest treating the patient with lactulose at home and having the patient call Dr. ruchi orozco tomorrow for close follow-up.  Her daughter is helping to provide care for her and will take her home today.  Return precautions were discussed.  She is discharged in no acute distress.  I discussed with daughter who will ensure that the patient is taking the lactulose appropriately.  Independently Interpreted Test(s):   I have ordered and independently interpreted EKG Reading(s) - see prior notes  Clinical Tests:   Lab Tests: Reviewed and Ordered  Radiological Study: Ordered and Reviewed  Medical Tests: Reviewed and Ordered            Scribe Attestation:   Scribe #1: I performed the above scribed service and the documentation accurately describes the services I performed. I attest to the accuracy of the note.     I, Michael Wolf, personally performed the services described in this documentation. All medical record entries made by the scribe were at my direction and in my presence.  I have reviewed the chart and agree that the record reflects my personal performance and is accurate and complete. Fabrizio Rodriguez MD.              Clinical Impression:       ICD-10-CM ICD-9-CM   1. Hepatic encephalopathy K72.90 572.2   2. Confusion R41.0 298.9         Disposition:   Disposition: Discharged  Condition: Stable                     Fabrizio Rodriguez MD  01/14/20 [id number]

## 2020-01-14 NOTE — ED NOTES
Pt awake alert oriented reports generalized weakness family reports pt is more confused than normal, pt denies chest pain or sob currently, family at bedside

## 2020-01-17 ENCOUNTER — HOSPITAL ENCOUNTER (OUTPATIENT)
Dept: RADIOLOGY | Facility: HOSPITAL | Age: 70
Discharge: HOME | End: 2020-01-17
Attending: INTERNAL MEDICINE
Payer: MEDICARE

## 2020-01-17 VITALS — DIASTOLIC BLOOD PRESSURE: 57 MMHG | SYSTOLIC BLOOD PRESSURE: 114 MMHG | OXYGEN SATURATION: 100 % | HEART RATE: 67 BPM

## 2020-01-17 DIAGNOSIS — K74.60 UNSPECIFIED CIRRHOSIS OF LIVER: ICD-10-CM

## 2020-01-17 DIAGNOSIS — R18.8 OTHER ASCITES: ICD-10-CM

## 2020-01-17 LAB
ALBUMIN FLD-MCNC: 0.5 G/DL
APPEARANCE FLD: NORMAL
BODY FLD TYPE: NORMAL
COLOR FLD: YELLOW
LYMPHOCYTES NFR FLD MANUAL: 33 %
MESOTHL CELL NFR FLD MANUAL: 9 %
MONOS+MACROS NFR FLD MANUAL: 52 %
NEUTROPHILS NFR FLD MANUAL: 6 %
SPECIMEN SOURCE: NORMAL
WBC # FLD: 407 /CU MM

## 2020-01-17 PROCEDURE — 88112 CYTOPATH CELL ENHANCE TECH: CPT | Performed by: PATHOLOGY

## 2020-01-17 PROCEDURE — 87070 CULTURE OTHR SPECIMN AEROBIC: CPT

## 2020-01-17 PROCEDURE — 88112 PR  CYTOPATH, CELL ENHANCE TECH: ICD-10-PCS | Mod: 26,,, | Performed by: PATHOLOGY

## 2020-01-17 PROCEDURE — 89051 BODY FLUID CELL COUNT: CPT

## 2020-01-17 PROCEDURE — 88305 TISSUE EXAM BY PATHOLOGIST: CPT | Performed by: PATHOLOGY

## 2020-01-17 PROCEDURE — 49083 ABD PARACENTESIS W/IMAGING: CPT

## 2020-01-17 PROCEDURE — 87205 SMEAR GRAM STAIN: CPT

## 2020-01-17 PROCEDURE — 88305 TISSUE EXAM BY PATHOLOGIST: CPT | Mod: 26,,, | Performed by: PATHOLOGY

## 2020-01-17 PROCEDURE — 49083 US GUIDED PARACENTESIS INC IMAGING: ICD-10-PCS | Mod: ,,, | Performed by: RADIOLOGY

## 2020-01-17 PROCEDURE — 88112 CYTOPATH CELL ENHANCE TECH: CPT | Mod: 26,,, | Performed by: PATHOLOGY

## 2020-01-17 PROCEDURE — 49083 ABD PARACENTESIS W/IMAGING: CPT | Mod: ,,, | Performed by: RADIOLOGY

## 2020-01-17 PROCEDURE — 82042 OTHER SOURCE ALBUMIN QUAN EA: CPT

## 2020-01-17 PROCEDURE — 88305 TISSUE EXAM BY PATHOLOGIST: ICD-10-PCS | Mod: 26,,, | Performed by: PATHOLOGY

## 2020-01-17 NOTE — NURSING
Ultrasound guided paracentesis performed by Dr. Shetty- 3 L removed- VS remained stable for duration of procedure. Access site cleaned with peroxide, derma bond and steri-strips applied, then covered with sterile gauze and tape. Pt discharge home.

## 2020-01-20 LAB — FINAL PATHOLOGIC DIAGNOSIS: NORMAL

## 2020-01-22 LAB
BACTERIA FLD AEROBE CULT: NO GROWTH
GRAM STN SPEC: NORMAL
GRAM STN SPEC: NORMAL

## 2020-01-24 ENCOUNTER — HOSPITAL ENCOUNTER (OUTPATIENT)
Dept: RADIOLOGY | Facility: HOSPITAL | Age: 70
Discharge: HOME | End: 2020-01-24
Attending: INTERNAL MEDICINE
Payer: MEDICARE

## 2020-01-24 VITALS — SYSTOLIC BLOOD PRESSURE: 125 MMHG | DIASTOLIC BLOOD PRESSURE: 58 MMHG | OXYGEN SATURATION: 97 % | HEART RATE: 92 BPM

## 2020-01-24 DIAGNOSIS — K74.60 UNSPECIFIED CIRRHOSIS OF LIVER: ICD-10-CM

## 2020-01-24 PROCEDURE — 63600175 PHARM REV CODE 636 W HCPCS: Performed by: RADIOLOGY

## 2020-01-24 PROCEDURE — 49083 ABD PARACENTESIS W/IMAGING: CPT

## 2020-01-24 PROCEDURE — P9047 ALBUMIN (HUMAN), 25%, 50ML: HCPCS | Performed by: RADIOLOGY

## 2020-01-24 PROCEDURE — A7048 VACUUM DRAIN BOTTLE/TUBE KIT: HCPCS

## 2020-01-24 PROCEDURE — 49083 US GUIDED PARACENTESIS INC IMAGING: ICD-10-PCS | Mod: ,,, | Performed by: RADIOLOGY

## 2020-01-24 PROCEDURE — 49083 ABD PARACENTESIS W/IMAGING: CPT | Mod: ,,, | Performed by: RADIOLOGY

## 2020-01-24 RX ORDER — ALBUMIN HUMAN 250 G/1000ML
25 SOLUTION INTRAVENOUS ONCE
Status: COMPLETED | OUTPATIENT
Start: 2020-01-24 | End: 2020-01-24

## 2020-01-24 RX ADMIN — ALBUMIN (HUMAN) 25 G: 12.5 SOLUTION INTRAVENOUS at 10:01

## 2020-01-24 NOTE — NURSING
Ultrasound guided paracentesis performed by Dr. Colunga- 4.4 L removed- Patient received 25g albumin IV- VS remained stable for duration of procedure- IV d/c'd, with tip intact. Access site cleaned with peroxide, derma bond and steri-strips applied, then covered with sterile gauze and tape. Pt discharge home.

## 2020-01-31 ENCOUNTER — HOSPITAL ENCOUNTER (OUTPATIENT)
Dept: RADIOLOGY | Facility: HOSPITAL | Age: 70
Discharge: HOME | End: 2020-01-31
Attending: INTERNAL MEDICINE
Payer: MEDICARE

## 2020-01-31 VITALS — SYSTOLIC BLOOD PRESSURE: 100 MMHG | DIASTOLIC BLOOD PRESSURE: 51 MMHG | HEART RATE: 67 BPM | OXYGEN SATURATION: 99 %

## 2020-01-31 DIAGNOSIS — K74.60 UNSPECIFIED CIRRHOSIS OF LIVER: ICD-10-CM

## 2020-01-31 DIAGNOSIS — R18.8 OTHER ASCITES: ICD-10-CM

## 2020-01-31 LAB
APPEARANCE FLD: NORMAL
BODY FLD TYPE: NORMAL
COLOR FLD: YELLOW
LYMPHOCYTES NFR FLD MANUAL: 56 %
MESOTHL CELL NFR FLD MANUAL: 7 %
MONOS+MACROS NFR FLD MANUAL: 28 %
NEUTROPHILS NFR FLD MANUAL: 9 %
WBC # FLD: 161 /CU MM

## 2020-01-31 PROCEDURE — 87070 CULTURE OTHR SPECIMN AEROBIC: CPT

## 2020-01-31 PROCEDURE — 88305 TISSUE EXAM BY PATHOLOGIST: CPT | Performed by: PATHOLOGY

## 2020-01-31 PROCEDURE — 88112 CYTOPATH CELL ENHANCE TECH: CPT | Performed by: PATHOLOGY

## 2020-01-31 PROCEDURE — 88305 TISSUE EXAM BY PATHOLOGIST: CPT | Mod: 26,,, | Performed by: PATHOLOGY

## 2020-01-31 PROCEDURE — 89051 BODY FLUID CELL COUNT: CPT

## 2020-01-31 PROCEDURE — 49083 US GUIDED PARACENTESIS INC IMAGING: ICD-10-PCS | Mod: ,,, | Performed by: RADIOLOGY

## 2020-01-31 PROCEDURE — 49083 ABD PARACENTESIS W/IMAGING: CPT

## 2020-01-31 PROCEDURE — 88112 CYTOPATH CELL ENHANCE TECH: CPT | Mod: 26,,, | Performed by: PATHOLOGY

## 2020-01-31 PROCEDURE — 82042 OTHER SOURCE ALBUMIN QUAN EA: CPT

## 2020-01-31 PROCEDURE — 49083 ABD PARACENTESIS W/IMAGING: CPT | Mod: ,,, | Performed by: RADIOLOGY

## 2020-01-31 PROCEDURE — 88305 TISSUE EXAM BY PATHOLOGIST: ICD-10-PCS | Mod: 26,,, | Performed by: PATHOLOGY

## 2020-01-31 PROCEDURE — 88112 PR  CYTOPATH, CELL ENHANCE TECH: ICD-10-PCS | Mod: 26,,, | Performed by: PATHOLOGY

## 2020-01-31 NOTE — NURSING
Ultrasound guided paracentesis performed by Dr. Terry- 4.4 L removed-  VS remained stable for duration of procedure- specimens sent off to lab per order. Access site cleaned with peroxide, derma bond and steri-strips applied, then covered with sterile gauze and tape. Pt discharge home.

## 2020-02-01 LAB
ALBUMIN FLD-MCNC: 0.5 G/DL
SPECIMEN SOURCE: NORMAL

## 2020-02-03 DIAGNOSIS — K74.60 HEPATIC CIRRHOSIS, UNSPECIFIED HEPATIC CIRRHOSIS TYPE, UNSPECIFIED WHETHER ASCITES PRESENT: Primary | ICD-10-CM

## 2020-02-04 LAB — FINAL PATHOLOGIC DIAGNOSIS: NORMAL

## 2020-02-05 LAB — BACTERIA FLD CULT: NORMAL

## 2020-02-07 ENCOUNTER — HOSPITAL ENCOUNTER (OUTPATIENT)
Dept: RADIOLOGY | Facility: HOSPITAL | Age: 70
Discharge: HOME | End: 2020-02-07
Attending: INTERNAL MEDICINE
Payer: MEDICARE

## 2020-02-07 VITALS — SYSTOLIC BLOOD PRESSURE: 127 MMHG | DIASTOLIC BLOOD PRESSURE: 60 MMHG | OXYGEN SATURATION: 100 % | HEART RATE: 62 BPM

## 2020-02-07 DIAGNOSIS — K74.60 UNSPECIFIED CIRRHOSIS OF LIVER: ICD-10-CM

## 2020-02-07 PROCEDURE — 49083 ABD PARACENTESIS W/IMAGING: CPT

## 2020-02-07 PROCEDURE — P9047 ALBUMIN (HUMAN), 25%, 50ML: HCPCS | Performed by: RADIOLOGY

## 2020-02-07 PROCEDURE — 49083 US GUIDED PARACENTESIS INC IMAGING: ICD-10-PCS | Mod: ,,, | Performed by: RADIOLOGY

## 2020-02-07 PROCEDURE — 49083 ABD PARACENTESIS W/IMAGING: CPT | Mod: ,,, | Performed by: RADIOLOGY

## 2020-02-07 PROCEDURE — 63600175 PHARM REV CODE 636 W HCPCS: Performed by: RADIOLOGY

## 2020-02-07 RX ORDER — ALBUMIN HUMAN 250 G/1000ML
25 SOLUTION INTRAVENOUS
Status: DISCONTINUED | OUTPATIENT
Start: 2020-02-07 | End: 2020-02-08 | Stop reason: HOSPADM

## 2020-02-07 RX ADMIN — ALBUMIN (HUMAN) 25 G: 12.5 SOLUTION INTRAVENOUS at 11:02

## 2020-02-07 NOTE — NURSING
Ultrasound guided paracentesis performed by - 4.8 L removed- Patient received 25g albumin IV- VS remained stable for duration of procedure-  IV d/c'd, with tip intact. Access site cleaned with peroxide, derma bond and steri-strips applied, then covered with sterile gauze and tape. Pt discharge home.

## 2020-02-14 ENCOUNTER — HOSPITAL ENCOUNTER (OUTPATIENT)
Dept: RADIOLOGY | Facility: HOSPITAL | Age: 70
Discharge: HOME | End: 2020-02-14
Attending: INTERNAL MEDICINE
Payer: MEDICARE

## 2020-02-14 DIAGNOSIS — K74.60 UNSPECIFIED CIRRHOSIS OF LIVER: ICD-10-CM

## 2020-02-14 PROCEDURE — 76705 ECHO EXAM OF ABDOMEN: CPT | Mod: 26,,, | Performed by: RADIOLOGY

## 2020-02-14 PROCEDURE — 76705 ECHO EXAM OF ABDOMEN: CPT | Mod: TC

## 2020-02-14 PROCEDURE — 76705 US ABDOMEN LIMITED: ICD-10-PCS | Mod: 26,,, | Performed by: RADIOLOGY

## 2020-02-14 NOTE — NURSING
Per Dr Colunga patient does not have enough ascites to safely drain. Patient scheduled to return next week.

## 2020-02-17 DIAGNOSIS — K74.60 LIVER CIRRHOSIS: Primary | ICD-10-CM

## 2020-02-17 DIAGNOSIS — K74.60 CIRRHOSIS OF LIVER: Primary | ICD-10-CM

## 2020-02-21 ENCOUNTER — HOSPITAL ENCOUNTER (OUTPATIENT)
Dept: RADIOLOGY | Facility: HOSPITAL | Age: 70
Discharge: HOME | End: 2020-02-21
Attending: INTERNAL MEDICINE
Payer: MEDICARE

## 2020-02-21 VITALS — HEART RATE: 72 BPM | DIASTOLIC BLOOD PRESSURE: 50 MMHG | SYSTOLIC BLOOD PRESSURE: 105 MMHG | OXYGEN SATURATION: 98 %

## 2020-02-21 DIAGNOSIS — R18.8 OTHER ASCITES: ICD-10-CM

## 2020-02-21 DIAGNOSIS — K74.60 CIRRHOSIS OF LIVER: ICD-10-CM

## 2020-02-21 LAB
ALBUMIN FLD-MCNC: 0.6 G/DL
APPEARANCE FLD: CLEAR
BODY FLD TYPE: NORMAL
COLOR FLD: YELLOW
LYMPHOCYTES NFR FLD MANUAL: 52 %
MESOTHL CELL NFR FLD MANUAL: 18 %
MONOS+MACROS NFR FLD MANUAL: 24 %
NEUTROPHILS NFR FLD MANUAL: 6 %
POCT GLUCOSE: 241 MG/DL (ref 70–110)
SPECIMEN SOURCE: NORMAL
WBC # FLD: 118 /CU MM

## 2020-02-21 PROCEDURE — 88305 TISSUE EXAM BY PATHOLOGIST: CPT | Performed by: PATHOLOGY

## 2020-02-21 PROCEDURE — P9047 ALBUMIN (HUMAN), 25%, 50ML: HCPCS | Performed by: RADIOLOGY

## 2020-02-21 PROCEDURE — 49083 ABD PARACENTESIS W/IMAGING: CPT | Mod: ,,, | Performed by: RADIOLOGY

## 2020-02-21 PROCEDURE — 88305 TISSUE EXAM BY PATHOLOGIST: ICD-10-PCS | Mod: 26,,, | Performed by: PATHOLOGY

## 2020-02-21 PROCEDURE — 88112 CYTOPATH CELL ENHANCE TECH: CPT | Mod: 26,,, | Performed by: PATHOLOGY

## 2020-02-21 PROCEDURE — 88112 CYTOPATH CELL ENHANCE TECH: CPT | Performed by: PATHOLOGY

## 2020-02-21 PROCEDURE — 88305 TISSUE EXAM BY PATHOLOGIST: CPT | Mod: 26,,, | Performed by: PATHOLOGY

## 2020-02-21 PROCEDURE — 82042 OTHER SOURCE ALBUMIN QUAN EA: CPT

## 2020-02-21 PROCEDURE — 63600175 PHARM REV CODE 636 W HCPCS: Performed by: RADIOLOGY

## 2020-02-21 PROCEDURE — 87070 CULTURE OTHR SPECIMN AEROBIC: CPT

## 2020-02-21 PROCEDURE — 49083 ABD PARACENTESIS W/IMAGING: CPT

## 2020-02-21 PROCEDURE — 49083 US GUIDED PARACENTESIS INC IMAGING: ICD-10-PCS | Mod: ,,, | Performed by: RADIOLOGY

## 2020-02-21 PROCEDURE — 88112 PR  CYTOPATH, CELL ENHANCE TECH: ICD-10-PCS | Mod: 26,,, | Performed by: PATHOLOGY

## 2020-02-21 PROCEDURE — 89051 BODY FLUID CELL COUNT: CPT

## 2020-02-21 RX ORDER — ALBUMIN HUMAN 250 G/1000ML
25 SOLUTION INTRAVENOUS ONCE
Status: COMPLETED | OUTPATIENT
Start: 2020-02-21 | End: 2020-02-21

## 2020-02-21 RX ADMIN — ALBUMIN (HUMAN) 25 G: 12.5 SOLUTION INTRAVENOUS at 10:02

## 2020-02-21 NOTE — NURSING
Ultrasound guided paracentesis performed by - 6.4 L removed- Patient received 25g albumin IV- VS remained stable for duration of procedure- specimens sent off to lab per order-  IV d/c'd, with tip intact. Access site cleaned with peroxide, derma bond and steri-strips applied, then covered with sterile gauze and tape. Pt discharge home.

## 2020-02-21 NOTE — NURSING
"0939: Patient voiced she was seeing black spots on wall and sudden onset of a headache, rated 7 out 10, to the top of her head. Patient voiced feeling weak and tired "all of a sudden". Patient falling asleep while talking to this nurse. BP: 121/58, PERRLA, +3  bilateral, no arm or leg drift. Notified Dr. Terry.    0949: Dr. Terry at bedside; he gave orders to check sugar. Blood Glucose 241. Dr. Terry performed neuro assessment; neurologically intact. Black spots subsided per patient and headache still 7 out of 10. Dr. Terry gave orders to continue paracentesis and check BP every 15 minutes. Patient voicing no other complaints at this time other than headache.     1040: Patient's headache now 3/10; no complaints of dizziness or weakness. Patient assisted to bathroom x stand by assist; tolerated well. Discharge home with daughter per Dr. Terry's orders.  "

## 2020-02-26 LAB
BACTERIA FLD CULT: NORMAL
FINAL PATHOLOGIC DIAGNOSIS: NORMAL

## 2020-02-28 ENCOUNTER — HOSPITAL ENCOUNTER (OUTPATIENT)
Dept: RADIOLOGY | Facility: HOSPITAL | Age: 70
Discharge: HOME | End: 2020-02-28
Attending: INTERNAL MEDICINE
Payer: MEDICARE

## 2020-02-28 VITALS — OXYGEN SATURATION: 97 % | DIASTOLIC BLOOD PRESSURE: 56 MMHG | SYSTOLIC BLOOD PRESSURE: 114 MMHG | HEART RATE: 66 BPM

## 2020-02-28 DIAGNOSIS — K74.60 LIVER CIRRHOSIS: ICD-10-CM

## 2020-02-28 PROCEDURE — 63600175 PHARM REV CODE 636 W HCPCS: Performed by: RADIOLOGY

## 2020-02-28 PROCEDURE — 49083 ABD PARACENTESIS W/IMAGING: CPT | Mod: ,,, | Performed by: RADIOLOGY

## 2020-02-28 PROCEDURE — 49083 ABD PARACENTESIS W/IMAGING: CPT

## 2020-02-28 PROCEDURE — P9047 ALBUMIN (HUMAN), 25%, 50ML: HCPCS | Performed by: RADIOLOGY

## 2020-02-28 PROCEDURE — 49083 US GUIDED PARACENTESIS INC IMAGING: ICD-10-PCS | Mod: ,,, | Performed by: RADIOLOGY

## 2020-02-28 RX ORDER — ALBUMIN HUMAN 250 G/1000ML
25 SOLUTION INTRAVENOUS ONCE
Status: COMPLETED | OUTPATIENT
Start: 2020-02-28 | End: 2020-02-28

## 2020-02-28 RX ADMIN — ALBUMIN (HUMAN) 25 G: 12.5 SOLUTION INTRAVENOUS at 10:02

## 2020-02-28 NOTE — NURSING
Ultrasound guided paracentesis performed by Dr. Ross- 3.8 L removed- Patient received 25 g albumin IV- VS remained stable for duration of procedure-  IV d/c'd, with tip intact. Access site cleaned with peroxide, derma bond and steri-strips applied, then covered with sterile gauze and tape. Pt discharge home.

## 2020-03-04 ENCOUNTER — HOSPITAL ENCOUNTER (OUTPATIENT)
Dept: RADIOLOGY | Facility: HOSPITAL | Age: 70
Discharge: HOME | End: 2020-03-04
Attending: INTERNAL MEDICINE
Payer: MEDICARE

## 2020-03-04 VITALS — OXYGEN SATURATION: 100 % | DIASTOLIC BLOOD PRESSURE: 58 MMHG | HEART RATE: 63 BPM | SYSTOLIC BLOOD PRESSURE: 121 MMHG

## 2020-03-04 DIAGNOSIS — K74.60 LIVER CIRRHOSIS: ICD-10-CM

## 2020-03-04 PROCEDURE — 49083 ABD PARACENTESIS W/IMAGING: CPT

## 2020-03-04 PROCEDURE — 63600175 PHARM REV CODE 636 W HCPCS: Performed by: RADIOLOGY

## 2020-03-04 PROCEDURE — P9047 ALBUMIN (HUMAN), 25%, 50ML: HCPCS | Performed by: RADIOLOGY

## 2020-03-04 PROCEDURE — 49083 US GUIDED PARACENTESIS INC IMAGING: ICD-10-PCS | Mod: ,,, | Performed by: RADIOLOGY

## 2020-03-04 PROCEDURE — 49083 ABD PARACENTESIS W/IMAGING: CPT | Mod: ,,, | Performed by: RADIOLOGY

## 2020-03-04 RX ORDER — ALBUMIN HUMAN 250 G/1000ML
25 SOLUTION INTRAVENOUS ONCE
Status: COMPLETED | OUTPATIENT
Start: 2020-03-04 | End: 2020-03-04

## 2020-03-04 RX ADMIN — ALBUMIN (HUMAN) 25 G: 12.5 SOLUTION INTRAVENOUS at 11:03

## 2020-03-04 NOTE — NURSING
Ultrasound guided paracentesis performed by Dr. Colunga- 3.6 L removed- Patient received 25g albumin IV- VS remained stable for duration of procedure-  IV d/c'd, with tip intact. Access site cleaned with peroxide, derma bond and steri-strips applied, then covered with sterile gauze and tape. Pt discharge home.

## 2020-03-10 DIAGNOSIS — K74.60 HEPATIC CIRRHOSIS, UNSPECIFIED HEPATIC CIRRHOSIS TYPE, UNSPECIFIED WHETHER ASCITES PRESENT: Primary | ICD-10-CM

## 2020-03-11 ENCOUNTER — HOSPITAL ENCOUNTER (OUTPATIENT)
Dept: RADIOLOGY | Facility: HOSPITAL | Age: 70
Discharge: HOME | End: 2020-03-11
Attending: INTERNAL MEDICINE
Payer: MEDICARE

## 2020-03-11 VITALS — OXYGEN SATURATION: 96 % | DIASTOLIC BLOOD PRESSURE: 54 MMHG | SYSTOLIC BLOOD PRESSURE: 109 MMHG | HEART RATE: 55 BPM

## 2020-03-11 DIAGNOSIS — K74.60 LIVER CIRRHOSIS: ICD-10-CM

## 2020-03-11 PROCEDURE — 49083 US GUIDED PARACENTESIS INC IMAGING: ICD-10-PCS | Mod: ,,, | Performed by: RADIOLOGY

## 2020-03-11 PROCEDURE — 49083 ABD PARACENTESIS W/IMAGING: CPT | Mod: ,,, | Performed by: RADIOLOGY

## 2020-03-11 PROCEDURE — 49083 ABD PARACENTESIS W/IMAGING: CPT

## 2020-03-11 NOTE — NURSING
Ultrasound guided paracentesis performed by Dr Shetty- 3.4L removed-VS remained stable for duration of procedure. Access site cleaned with peroxide, derma bond and steri-strips applied, then covered with sterile gauze and tape. Pt discharge home.

## 2020-03-23 ENCOUNTER — HOSPITAL ENCOUNTER (OUTPATIENT)
Dept: RADIOLOGY | Facility: HOSPITAL | Age: 70
Discharge: HOME | End: 2020-03-23
Payer: MEDICARE

## 2020-03-23 VITALS — SYSTOLIC BLOOD PRESSURE: 118 MMHG | DIASTOLIC BLOOD PRESSURE: 58 MMHG | OXYGEN SATURATION: 99 % | HEART RATE: 63 BPM

## 2020-03-23 DIAGNOSIS — K74.60 LIVER CIRRHOSIS: ICD-10-CM

## 2020-03-23 DIAGNOSIS — R18.8 OTHER ASCITES: ICD-10-CM

## 2020-03-23 LAB
ALBUMIN FLD-MCNC: 0.5 G/DL
APPEARANCE FLD: CLEAR
BODY FLD TYPE: NORMAL
COLOR FLD: YELLOW
LYMPHOCYTES NFR FLD MANUAL: 62 %
MESOTHL CELL NFR FLD MANUAL: 16 %
MONOS+MACROS NFR FLD MANUAL: 17 %
NEUTROPHILS NFR FLD MANUAL: 5 %
SPECIMEN SOURCE: NORMAL
WBC # FLD: 95 /CU MM

## 2020-03-23 PROCEDURE — 82042 OTHER SOURCE ALBUMIN QUAN EA: CPT

## 2020-03-23 PROCEDURE — P9047 ALBUMIN (HUMAN), 25%, 50ML: HCPCS | Performed by: RADIOLOGY

## 2020-03-23 PROCEDURE — 88112 PR  CYTOPATH, CELL ENHANCE TECH: ICD-10-PCS | Mod: 26,,, | Performed by: PATHOLOGY

## 2020-03-23 PROCEDURE — 88112 CYTOPATH CELL ENHANCE TECH: CPT | Performed by: PATHOLOGY

## 2020-03-23 PROCEDURE — 49083 ABD PARACENTESIS W/IMAGING: CPT | Mod: ,,, | Performed by: RADIOLOGY

## 2020-03-23 PROCEDURE — 49083 US GUIDED PARACENTESIS INC IMAGING: ICD-10-PCS | Mod: ,,, | Performed by: RADIOLOGY

## 2020-03-23 PROCEDURE — 88112 CYTOPATH CELL ENHANCE TECH: CPT | Mod: 26,,, | Performed by: PATHOLOGY

## 2020-03-23 PROCEDURE — 88305 TISSUE EXAM BY PATHOLOGIST: CPT | Performed by: PATHOLOGY

## 2020-03-23 PROCEDURE — 89051 BODY FLUID CELL COUNT: CPT

## 2020-03-23 PROCEDURE — 87070 CULTURE OTHR SPECIMN AEROBIC: CPT

## 2020-03-23 PROCEDURE — 63600175 PHARM REV CODE 636 W HCPCS: Performed by: RADIOLOGY

## 2020-03-23 PROCEDURE — 49083 ABD PARACENTESIS W/IMAGING: CPT

## 2020-03-23 PROCEDURE — 88305 TISSUE EXAM BY PATHOLOGIST: CPT | Mod: 26,,, | Performed by: PATHOLOGY

## 2020-03-23 PROCEDURE — 88305 TISSUE EXAM BY PATHOLOGIST: ICD-10-PCS | Mod: 26,,, | Performed by: PATHOLOGY

## 2020-03-23 RX ORDER — ALBUMIN HUMAN 250 G/1000ML
25 SOLUTION INTRAVENOUS ONCE
Status: COMPLETED | OUTPATIENT
Start: 2020-03-23 | End: 2020-03-23

## 2020-03-23 RX ADMIN — ALBUMIN (HUMAN) 25 G: 12.5 SOLUTION INTRAVENOUS at 02:03

## 2020-03-23 NOTE — NURSING
Ultrasound guided paracentesis performed by Dr. Terry- 5800 mL removed- Patient received 25g albumin IV- VS remained stable for duration of procedure- specimens sent off to lab per order-  IV d/c'd, with tip intact. Access site cleaned with peroxide, derma bond and steri-strips applied, then covered with sterile gauze and tape. Pt discharge home.

## 2020-03-25 LAB — FINAL PATHOLOGIC DIAGNOSIS: NORMAL

## 2020-03-28 LAB — BACTERIA FLD CULT: NORMAL

## 2020-03-30 ENCOUNTER — HOSPITAL ENCOUNTER (OUTPATIENT)
Dept: RADIOLOGY | Facility: HOSPITAL | Age: 70
Discharge: HOME | End: 2020-03-30
Payer: MEDICARE

## 2020-03-30 VITALS
HEART RATE: 75 BPM | DIASTOLIC BLOOD PRESSURE: 55 MMHG | SYSTOLIC BLOOD PRESSURE: 116 MMHG | RESPIRATION RATE: 18 BRPM | OXYGEN SATURATION: 99 %

## 2020-03-30 DIAGNOSIS — K74.60 LIVER CIRRHOSIS: ICD-10-CM

## 2020-03-30 PROCEDURE — 49083 ABD PARACENTESIS W/IMAGING: CPT | Mod: ,,, | Performed by: RADIOLOGY

## 2020-03-30 PROCEDURE — 63600175 PHARM REV CODE 636 W HCPCS: Performed by: RADIOLOGY

## 2020-03-30 PROCEDURE — P9047 ALBUMIN (HUMAN), 25%, 50ML: HCPCS | Performed by: RADIOLOGY

## 2020-03-30 PROCEDURE — 49083 ABD PARACENTESIS W/IMAGING: CPT

## 2020-03-30 PROCEDURE — 49083 US GUIDED PARACENTESIS INC IMAGING: ICD-10-PCS | Mod: ,,, | Performed by: RADIOLOGY

## 2020-03-30 RX ORDER — ALBUMIN HUMAN 250 G/1000ML
25 SOLUTION INTRAVENOUS
Status: DISCONTINUED | OUTPATIENT
Start: 2020-03-30 | End: 2020-03-31 | Stop reason: HOSPADM

## 2020-03-30 RX ADMIN — ALBUMIN (HUMAN) 25 G: 12.5 SOLUTION INTRAVENOUS at 11:03

## 2020-04-06 ENCOUNTER — HOSPITAL ENCOUNTER (OUTPATIENT)
Dept: RADIOLOGY | Facility: HOSPITAL | Age: 70
Discharge: HOME | End: 2020-04-06
Payer: MEDICARE

## 2020-04-06 VITALS — OXYGEN SATURATION: 98 % | SYSTOLIC BLOOD PRESSURE: 119 MMHG | DIASTOLIC BLOOD PRESSURE: 56 MMHG | HEART RATE: 69 BPM

## 2020-04-06 DIAGNOSIS — K74.60 LIVER CIRRHOSIS: ICD-10-CM

## 2020-04-06 PROCEDURE — 49083 ABD PARACENTESIS W/IMAGING: CPT

## 2020-04-06 PROCEDURE — 49083 US GUIDED PARACENTESIS INC IMAGING: ICD-10-PCS | Mod: ,,, | Performed by: RADIOLOGY

## 2020-04-06 PROCEDURE — 49083 ABD PARACENTESIS W/IMAGING: CPT | Mod: ,,, | Performed by: RADIOLOGY

## 2020-04-06 RX ORDER — ALBUMIN HUMAN 250 G/1000ML
25 SOLUTION INTRAVENOUS
Status: DISCONTINUED | OUTPATIENT
Start: 2020-04-06 | End: 2020-04-07 | Stop reason: HOSPADM

## 2020-04-06 NOTE — NURSING
Ultrasound guided paracentesis performed by Dr. Ross- 2.6 L removed- VS remained stable for duration of procedure. Access site cleaned with peroxide, derma bond and steri-strips applied, then covered with sterile gauze and tape. Pt discharge home.

## 2020-04-13 ENCOUNTER — HOSPITAL ENCOUNTER (OUTPATIENT)
Dept: RADIOLOGY | Facility: HOSPITAL | Age: 70
Discharge: HOME | End: 2020-04-13
Payer: MEDICARE

## 2020-04-13 VITALS
HEART RATE: 69 BPM | SYSTOLIC BLOOD PRESSURE: 123 MMHG | DIASTOLIC BLOOD PRESSURE: 60 MMHG | RESPIRATION RATE: 18 BRPM | OXYGEN SATURATION: 97 %

## 2020-04-13 DIAGNOSIS — K74.60 LIVER CIRRHOSIS: ICD-10-CM

## 2020-04-13 PROCEDURE — P9047 ALBUMIN (HUMAN), 25%, 50ML: HCPCS | Performed by: RADIOLOGY

## 2020-04-13 PROCEDURE — 63600175 PHARM REV CODE 636 W HCPCS: Performed by: RADIOLOGY

## 2020-04-13 PROCEDURE — 49083 ABD PARACENTESIS W/IMAGING: CPT

## 2020-04-13 PROCEDURE — 49083 ABD PARACENTESIS W/IMAGING: CPT | Mod: ,,, | Performed by: RADIOLOGY

## 2020-04-13 PROCEDURE — 49083 US GUIDED PARACENTESIS INC IMAGING: ICD-10-PCS | Mod: ,,, | Performed by: RADIOLOGY

## 2020-04-13 RX ORDER — ALBUMIN HUMAN 250 G/1000ML
25 SOLUTION INTRAVENOUS ONCE
Status: COMPLETED | OUTPATIENT
Start: 2020-04-13 | End: 2020-04-13

## 2020-04-13 RX ADMIN — ALBUMIN (HUMAN) 25 G: 12.5 SOLUTION INTRAVENOUS at 11:04

## 2020-04-13 NOTE — NURSING
Ultrasound guided paracentesis performed by - 2550 mL removed- Patient received 25 g albumin IV- VS remained stable for duration of procedure-  IV d/c'd, with tip intact. Access site cleaned with peroxide, derma bond and steri-strips applied, then covered with sterile gauze and tape. Pt discharge home in stable condition.

## 2020-04-21 ENCOUNTER — LAB VISIT (OUTPATIENT)
Dept: FAMILY MEDICINE | Facility: CLINIC | Age: 70
End: 2020-04-21
Payer: MEDICARE

## 2020-04-21 DIAGNOSIS — K74.60 LIVER CIRRHOSIS SECONDARY TO NASH: Primary | ICD-10-CM

## 2020-04-21 DIAGNOSIS — K74.60 HEPATIC CIRRHOSIS, UNSPECIFIED HEPATIC CIRRHOSIS TYPE, UNSPECIFIED WHETHER ASCITES PRESENT: Primary | ICD-10-CM

## 2020-04-21 DIAGNOSIS — K75.81 LIVER CIRRHOSIS SECONDARY TO NASH: Primary | ICD-10-CM

## 2020-04-21 DIAGNOSIS — K74.60 HEPATIC CIRRHOSIS, UNSPECIFIED HEPATIC CIRRHOSIS TYPE, UNSPECIFIED WHETHER ASCITES PRESENT: ICD-10-CM

## 2020-04-21 LAB — SARS-COV-2 RNA RESP QL NAA+PROBE: NOT DETECTED

## 2020-04-21 PROCEDURE — U0002 COVID-19 LAB TEST NON-CDC: HCPCS

## 2020-04-22 DIAGNOSIS — K74.60 HEPATIC CIRRHOSIS, UNSPECIFIED HEPATIC CIRRHOSIS TYPE, UNSPECIFIED WHETHER ASCITES PRESENT: Primary | ICD-10-CM

## 2020-04-23 ENCOUNTER — HOSPITAL ENCOUNTER (OUTPATIENT)
Dept: RADIOLOGY | Facility: HOSPITAL | Age: 70
Discharge: HOME | End: 2020-04-23
Payer: MEDICARE

## 2020-04-23 VITALS — HEART RATE: 65 BPM | OXYGEN SATURATION: 99 % | SYSTOLIC BLOOD PRESSURE: 113 MMHG | DIASTOLIC BLOOD PRESSURE: 56 MMHG

## 2020-04-23 DIAGNOSIS — K74.60 LIVER CIRRHOSIS: ICD-10-CM

## 2020-04-23 PROCEDURE — 49083 ABD PARACENTESIS W/IMAGING: CPT | Mod: ,,, | Performed by: RADIOLOGY

## 2020-04-23 PROCEDURE — 49083 ABD PARACENTESIS W/IMAGING: CPT

## 2020-04-23 PROCEDURE — 49083 US GUIDED PARACENTESIS INC IMAGING: ICD-10-PCS | Mod: ,,, | Performed by: RADIOLOGY

## 2020-04-23 NOTE — NURSING
Ultrasound guided paracentesis performed by Dr. Terry- 4.6 L removed- VS remained stable for duration of procedure. Access site cleaned with peroxide, derma bond and steri-strips applied, then covered with sterile gauze and tape. Pt discharge home.

## 2020-04-28 DIAGNOSIS — R18.8 OTHER ASCITES: Primary | ICD-10-CM

## 2020-04-28 DIAGNOSIS — Z01.818 PREOP TESTING: Primary | ICD-10-CM

## 2020-04-29 ENCOUNTER — LAB VISIT (OUTPATIENT)
Dept: FAMILY MEDICINE | Facility: CLINIC | Age: 70
End: 2020-04-29
Payer: MEDICARE

## 2020-04-29 DIAGNOSIS — Z01.818 PREOP TESTING: ICD-10-CM

## 2020-04-29 LAB — SARS-COV-2 RNA RESP QL NAA+PROBE: NOT DETECTED

## 2020-04-29 PROCEDURE — U0002 COVID-19 LAB TEST NON-CDC: HCPCS

## 2020-05-01 ENCOUNTER — HOSPITAL ENCOUNTER (OUTPATIENT)
Dept: RADIOLOGY | Facility: HOSPITAL | Age: 70
Discharge: HOME | End: 2020-05-01
Payer: MEDICARE

## 2020-05-01 VITALS — DIASTOLIC BLOOD PRESSURE: 55 MMHG | OXYGEN SATURATION: 96 % | HEART RATE: 66 BPM | SYSTOLIC BLOOD PRESSURE: 111 MMHG

## 2020-05-01 DIAGNOSIS — K74.60 LIVER CIRRHOSIS: ICD-10-CM

## 2020-05-01 PROCEDURE — 49083 ABD PARACENTESIS W/IMAGING: CPT | Mod: ,,, | Performed by: RADIOLOGY

## 2020-05-01 PROCEDURE — P9047 ALBUMIN (HUMAN), 25%, 50ML: HCPCS | Performed by: RADIOLOGY

## 2020-05-01 PROCEDURE — 63600175 PHARM REV CODE 636 W HCPCS: Performed by: RADIOLOGY

## 2020-05-01 PROCEDURE — 49083 ABD PARACENTESIS W/IMAGING: CPT

## 2020-05-01 PROCEDURE — 49083 US GUIDED PARACENTESIS INC IMAGING: ICD-10-PCS | Mod: ,,, | Performed by: RADIOLOGY

## 2020-05-01 RX ORDER — ALBUMIN HUMAN 250 G/1000ML
25 SOLUTION INTRAVENOUS
Status: DISCONTINUED | OUTPATIENT
Start: 2020-05-01 | End: 2020-05-02 | Stop reason: HOSPADM

## 2020-05-01 RX ORDER — ALBUMIN HUMAN 250 G/1000ML
SOLUTION INTRAVENOUS
Status: DISCONTINUED | OUTPATIENT
Start: 2020-05-01 | End: 2020-05-02 | Stop reason: HOSPADM

## 2020-05-01 RX ADMIN — ALBUMIN (HUMAN) 25 G: 12.5 SOLUTION INTRAVENOUS at 03:05

## 2020-05-01 NOTE — NURSING
Ultrasound guided paracentesis performed by Dr. Ross - 3.3 L removed- Patient received 25g of Albumin IVVS remained stable for duration of procedure. Access site cleaned with peroxide, derma bond and steri-strips applied, then covered with sterile gauze and tape. Pt discharge home.

## 2020-05-11 ENCOUNTER — HOSPITAL ENCOUNTER (OUTPATIENT)
Dept: RADIOLOGY | Facility: HOSPITAL | Age: 70
Discharge: HOME | End: 2020-05-11
Payer: MEDICARE

## 2020-05-11 VITALS
OXYGEN SATURATION: 98 % | RESPIRATION RATE: 16 BRPM | SYSTOLIC BLOOD PRESSURE: 106 MMHG | HEART RATE: 65 BPM | DIASTOLIC BLOOD PRESSURE: 57 MMHG

## 2020-05-11 DIAGNOSIS — K74.60 LIVER CIRRHOSIS: ICD-10-CM

## 2020-05-11 PROCEDURE — 49083 US GUIDED PARACENTESIS INC IMAGING: ICD-10-PCS | Mod: ,,, | Performed by: RADIOLOGY

## 2020-05-11 PROCEDURE — 63600175 PHARM REV CODE 636 W HCPCS: Performed by: RADIOLOGY

## 2020-05-11 PROCEDURE — P9047 ALBUMIN (HUMAN), 25%, 50ML: HCPCS | Performed by: RADIOLOGY

## 2020-05-11 PROCEDURE — 49083 ABD PARACENTESIS W/IMAGING: CPT

## 2020-05-11 PROCEDURE — 49083 ABD PARACENTESIS W/IMAGING: CPT | Mod: ,,, | Performed by: RADIOLOGY

## 2020-05-11 RX ORDER — ALBUMIN HUMAN 250 G/1000ML
25 SOLUTION INTRAVENOUS
Status: DISCONTINUED | OUTPATIENT
Start: 2020-05-11 | End: 2020-05-12 | Stop reason: HOSPADM

## 2020-05-11 RX ADMIN — ALBUMIN (HUMAN) 25 G: 12.5 SOLUTION INTRAVENOUS at 10:05

## 2020-05-11 NOTE — NURSING
Ultrasound guided paracentesis performed by ; procedure explained and consent obtained by . 3250mL removed- Patient received 25 g albumin IV- VS remained stable for duration of procedure. Specimens collected and sent to lab via orders. IV d/c'd, with tip intact. Access site cleaned with peroxide, derma bond and steri-strips applied, then covered with sterile gauze and tape. Pt discharge home in stable condition.

## 2020-05-13 DIAGNOSIS — M47.812 CERVICAL SPONDYLOSIS WITHOUT MYELOPATHY: Primary | ICD-10-CM

## 2020-05-18 ENCOUNTER — HOSPITAL ENCOUNTER (OUTPATIENT)
Dept: RADIOLOGY | Facility: HOSPITAL | Age: 70
Discharge: HOME | End: 2020-05-18
Attending: ORTHOPAEDIC SURGERY
Payer: MEDICARE

## 2020-05-18 DIAGNOSIS — M47.812 CERVICAL SPONDYLOSIS WITHOUT MYELOPATHY: ICD-10-CM

## 2020-05-18 PROCEDURE — 72141 MRI NECK SPINE W/O DYE: CPT | Mod: TC,PO

## 2020-05-19 DIAGNOSIS — K74.60 HEPATIC CIRRHOSIS, UNSPECIFIED HEPATIC CIRRHOSIS TYPE, UNSPECIFIED WHETHER ASCITES PRESENT: Primary | ICD-10-CM

## 2020-05-27 ENCOUNTER — HOSPITAL ENCOUNTER (OUTPATIENT)
Dept: RADIOLOGY | Facility: HOSPITAL | Age: 70
Discharge: HOME | End: 2020-05-27
Payer: MEDICARE

## 2020-05-27 VITALS — HEART RATE: 62 BPM | DIASTOLIC BLOOD PRESSURE: 55 MMHG | SYSTOLIC BLOOD PRESSURE: 115 MMHG | OXYGEN SATURATION: 100 %

## 2020-05-27 DIAGNOSIS — K74.60 LIVER CIRRHOSIS: ICD-10-CM

## 2020-05-27 PROCEDURE — 49083 US GUIDED PARACENTESIS INC IMAGING: ICD-10-PCS | Mod: ,,, | Performed by: RADIOLOGY

## 2020-05-27 PROCEDURE — 63600175 PHARM REV CODE 636 W HCPCS: Performed by: RADIOLOGY

## 2020-05-27 PROCEDURE — 49083 ABD PARACENTESIS W/IMAGING: CPT

## 2020-05-27 PROCEDURE — P9047 ALBUMIN (HUMAN), 25%, 50ML: HCPCS | Performed by: RADIOLOGY

## 2020-05-27 PROCEDURE — 49083 ABD PARACENTESIS W/IMAGING: CPT | Mod: ,,, | Performed by: RADIOLOGY

## 2020-05-27 RX ORDER — ALBUMIN HUMAN 250 G/1000ML
25 SOLUTION INTRAVENOUS
Status: DISCONTINUED | OUTPATIENT
Start: 2020-05-27 | End: 2020-05-28 | Stop reason: HOSPADM

## 2020-05-27 RX ADMIN — ALBUMIN (HUMAN) 25 G: 12.5 SOLUTION INTRAVENOUS at 01:05

## 2020-05-27 NOTE — NURSING
Ultrasound guided paracentesis performed by Dr. Terry; procedure explained and consent obtained by Dr. Terry 5.8 L removed- Patient received 25g albumin IV- VS remained stable for duration of procedure. IV d/c'd, with tip intact. Access site cleaned with peroxide, derma bond and steri-strips applied, then covered with sterile gauze and tape. Pt discharge home.

## 2020-06-09 ENCOUNTER — HOSPITAL ENCOUNTER (OUTPATIENT)
Dept: RADIOLOGY | Facility: HOSPITAL | Age: 70
Discharge: HOME | End: 2020-06-09
Payer: MEDICARE

## 2020-06-09 VITALS — OXYGEN SATURATION: 97 % | SYSTOLIC BLOOD PRESSURE: 108 MMHG | HEART RATE: 75 BPM | DIASTOLIC BLOOD PRESSURE: 54 MMHG

## 2020-06-09 DIAGNOSIS — K74.60 LIVER CIRRHOSIS: ICD-10-CM

## 2020-06-09 PROCEDURE — 49083 ABD PARACENTESIS W/IMAGING: CPT

## 2020-06-09 PROCEDURE — 49083 US GUIDED PARACENTESIS INC IMAGING: ICD-10-PCS | Mod: ,,, | Performed by: RADIOLOGY

## 2020-06-09 PROCEDURE — 63600175 PHARM REV CODE 636 W HCPCS: Performed by: RADIOLOGY

## 2020-06-09 PROCEDURE — 49083 ABD PARACENTESIS W/IMAGING: CPT | Mod: ,,, | Performed by: RADIOLOGY

## 2020-06-09 PROCEDURE — P9047 ALBUMIN (HUMAN), 25%, 50ML: HCPCS | Performed by: RADIOLOGY

## 2020-06-09 RX ORDER — ALBUMIN HUMAN 250 G/1000ML
25 SOLUTION INTRAVENOUS
Status: DISCONTINUED | OUTPATIENT
Start: 2020-06-09 | End: 2020-06-10 | Stop reason: HOSPADM

## 2020-06-09 RX ADMIN — ALBUMIN (HUMAN) 25 G: 12.5 SOLUTION INTRAVENOUS at 11:06

## 2020-06-09 NOTE — DISCHARGE SUMMARY
Radiology Discharge Summary      Hospital Course: No complications    Admit Date: 6/9/2020  Discharge Date: 06/09/2020     Instructions Given to Patient: Yes  Diet: Resume prior diet  Activity: activity as tolerated    Description of Condition on Discharge: Stable  Vital Signs (Most Recent): Pulse: 75 (06/09/20 1200)  BP: (!) 108/54 (06/09/20 1200)  SpO2: 97 % (06/09/20 1200)    Discharge Disposition: Home    Discharge Diagnosis: ascites sp US guided paracentesis     Follow-up: as per referring provider    @SIG@

## 2020-06-09 NOTE — NURSING
Ultrasound guided paracentesis performed by Dr. Colunga; procedure explained and consent obtained by Dr. Colunga. 6.5L removed- Patient received 50g albumin IV- VS remained stable for duration of procedure. IV d/c'd, with tip intact. Access site cleaned with peroxide, derma bond and steri-strips applied, then covered with sterile gauze and tape. Pt discharge home.

## 2020-06-24 ENCOUNTER — HOSPITAL ENCOUNTER (OUTPATIENT)
Dept: RADIOLOGY | Facility: HOSPITAL | Age: 70
Discharge: HOME | End: 2020-06-24
Payer: MEDICARE

## 2020-06-24 VITALS — SYSTOLIC BLOOD PRESSURE: 128 MMHG | HEART RATE: 83 BPM | OXYGEN SATURATION: 99 % | DIASTOLIC BLOOD PRESSURE: 56 MMHG

## 2020-06-24 DIAGNOSIS — K74.60 LIVER CIRRHOSIS: ICD-10-CM

## 2020-06-24 PROCEDURE — 49083 ABD PARACENTESIS W/IMAGING: CPT | Mod: ,,, | Performed by: RADIOLOGY

## 2020-06-24 PROCEDURE — 63600175 PHARM REV CODE 636 W HCPCS: Performed by: RADIOLOGY

## 2020-06-24 PROCEDURE — P9047 ALBUMIN (HUMAN), 25%, 50ML: HCPCS | Performed by: RADIOLOGY

## 2020-06-24 PROCEDURE — 49083 ABD PARACENTESIS W/IMAGING: CPT

## 2020-06-24 PROCEDURE — 49083 US GUIDED PARACENTESIS INC IMAGING: ICD-10-PCS | Mod: ,,, | Performed by: RADIOLOGY

## 2020-06-24 RX ORDER — ALBUMIN HUMAN 250 G/1000ML
25 SOLUTION INTRAVENOUS
Status: DISCONTINUED | OUTPATIENT
Start: 2020-06-24 | End: 2020-06-25 | Stop reason: HOSPADM

## 2020-06-24 RX ADMIN — ALBUMIN (HUMAN) 25 G: 12.5 SOLUTION INTRAVENOUS at 11:06

## 2020-06-24 NOTE — DISCHARGE SUMMARY
Radiology Discharge Summary      Hospital Course: No complications    Admit Date: 6/24/2020  Discharge Date: 06/24/2020     Instructions Given to Patient: Yes  Diet: Resume prior diet  Activity: activity as tolerated    Description of Condition on Discharge: Stable  Vital Signs (Most Recent): Pulse: 83 (06/24/20 1200)  BP: (!) 128/56 (06/24/20 1200)  SpO2: 99 % (06/24/20 1200)    Discharge Disposition: Home    Discharge Diagnosis: ascites     Follow-up: as per referring provider    @SIG@

## 2020-06-24 NOTE — NURSING
Ultrasound guided paracentesis performed by Dr. Colunga; procedure explained and consent obtained by Dr. Colunga. 6.5 L removed- Patient received 50g albumin IV- VS remained stable for duration of procedure.  IV d/c'd, with tip intact. Access site cleaned with peroxide, derma bond and steri-strips applied, then covered with sterile gauze and tape. Pt discharge home.

## 2020-06-30 DIAGNOSIS — K74.60 HEPATIC CIRRHOSIS, UNSPECIFIED HEPATIC CIRRHOSIS TYPE, UNSPECIFIED WHETHER ASCITES PRESENT: Primary | ICD-10-CM

## 2020-07-06 NOTE — ASSESSMENT & PLAN NOTE
Monitor BP.  Treat as follows:  Hypertension Medications at home            furosemide (LASIX) 40 MG tablet Take 80 mg by mouth once daily.    metoprolol succinate (TOPROL XL) 25 MG 24 hr tablet Take 25 mg by mouth once daily.    spironolactone (ALDACTONE) 100 MG tablet Take 200 mg by mouth once daily.      Hospital Medications             furosemide tablet 80 mg Starting on 7/27/2019. Take 2 tablets (80 mg total) by mouth once daily.    metoprolol succinate (TOPROL-XL) 24 hr tablet 25 mg Starting on 7/27/2019. Take 1 tablet (25 mg total) by mouth once daily.    spironolactone tablet 200 mg Starting on 7/27/2019. Take 2 tablets (200 mg total) by mouth once daily.          Adjust treatment when it becomes necessary.   Detail Level: Simple Price (Do Not Change): 0.00 Instructions: This plan will send the code FBSD to the PM system.  DO NOT or CHANGE the price.

## 2020-07-08 ENCOUNTER — HOSPITAL ENCOUNTER (OUTPATIENT)
Dept: RADIOLOGY | Facility: HOSPITAL | Age: 70
Discharge: HOME | End: 2020-07-08
Payer: MEDICARE

## 2020-07-08 VITALS
RESPIRATION RATE: 15 BRPM | OXYGEN SATURATION: 99 % | SYSTOLIC BLOOD PRESSURE: 133 MMHG | DIASTOLIC BLOOD PRESSURE: 61 MMHG | HEART RATE: 88 BPM

## 2020-07-08 DIAGNOSIS — K74.60 LIVER CIRRHOSIS: ICD-10-CM

## 2020-07-08 PROCEDURE — 49083 US GUIDED PARACENTESIS INC IMAGING: ICD-10-PCS | Mod: ,,, | Performed by: RADIOLOGY

## 2020-07-08 PROCEDURE — 49083 ABD PARACENTESIS W/IMAGING: CPT

## 2020-07-08 PROCEDURE — 63600175 PHARM REV CODE 636 W HCPCS: Performed by: RADIOLOGY

## 2020-07-08 PROCEDURE — P9047 ALBUMIN (HUMAN), 25%, 50ML: HCPCS | Performed by: RADIOLOGY

## 2020-07-08 PROCEDURE — 49083 ABD PARACENTESIS W/IMAGING: CPT | Mod: ,,, | Performed by: RADIOLOGY

## 2020-07-08 RX ORDER — ALBUMIN HUMAN 250 G/1000ML
25 SOLUTION INTRAVENOUS ONCE
Status: COMPLETED | OUTPATIENT
Start: 2020-07-08 | End: 2020-07-08

## 2020-07-08 RX ADMIN — ALBUMIN (HUMAN) 25 G: 12.5 SOLUTION INTRAVENOUS at 01:07

## 2020-07-22 ENCOUNTER — HOSPITAL ENCOUNTER (OUTPATIENT)
Dept: RADIOLOGY | Facility: HOSPITAL | Age: 70
Discharge: HOME | End: 2020-07-22
Payer: MEDICARE

## 2020-07-22 VITALS — DIASTOLIC BLOOD PRESSURE: 63 MMHG | OXYGEN SATURATION: 100 % | SYSTOLIC BLOOD PRESSURE: 140 MMHG | HEART RATE: 99 BPM

## 2020-07-22 DIAGNOSIS — K74.60 LIVER CIRRHOSIS: ICD-10-CM

## 2020-07-22 PROCEDURE — 49083 ABD PARACENTESIS W/IMAGING: CPT | Mod: ,,, | Performed by: RADIOLOGY

## 2020-07-22 PROCEDURE — 63600175 PHARM REV CODE 636 W HCPCS: Performed by: RADIOLOGY

## 2020-07-22 PROCEDURE — 49083 ABD PARACENTESIS W/IMAGING: CPT

## 2020-07-22 PROCEDURE — 49083 US GUIDED PARACENTESIS INC IMAGING: ICD-10-PCS | Mod: ,,, | Performed by: RADIOLOGY

## 2020-07-22 PROCEDURE — P9047 ALBUMIN (HUMAN), 25%, 50ML: HCPCS | Performed by: RADIOLOGY

## 2020-07-22 RX ORDER — ALBUMIN HUMAN 250 G/1000ML
25 SOLUTION INTRAVENOUS
Status: DISCONTINUED | OUTPATIENT
Start: 2020-07-22 | End: 2020-07-23 | Stop reason: HOSPADM

## 2020-07-22 RX ADMIN — ALBUMIN (HUMAN) 25 G: 12.5 SOLUTION INTRAVENOUS at 10:07

## 2020-07-22 NOTE — NURSING
Ultrasound guided paracentesis performed by Dr. Terry; procedure explained and consent obtained by Dr. Terry. 6.5 L removed- Patient received 25 g albumin IV- VS remained stable for duration of procedure. IV d/c'd, with tip intact. Access site cleaned with peroxide, derma bond and steri-strips applied, then covered with sterile gauze and tape. Pt discharge home.

## 2020-07-31 ENCOUNTER — HOSPITAL ENCOUNTER (OUTPATIENT)
Dept: RADIOLOGY | Facility: HOSPITAL | Age: 70
Discharge: HOME | End: 2020-07-31
Payer: MEDICARE

## 2020-07-31 VITALS — SYSTOLIC BLOOD PRESSURE: 113 MMHG | HEART RATE: 64 BPM | DIASTOLIC BLOOD PRESSURE: 52 MMHG

## 2020-07-31 DIAGNOSIS — K74.60 LIVER CIRRHOSIS: ICD-10-CM

## 2020-07-31 PROCEDURE — 49083 US GUIDED PARACENTESIS INC IMAGING: ICD-10-PCS | Mod: ,,, | Performed by: RADIOLOGY

## 2020-07-31 PROCEDURE — 49083 ABD PARACENTESIS W/IMAGING: CPT | Mod: ,,, | Performed by: RADIOLOGY

## 2020-07-31 PROCEDURE — 49083 ABD PARACENTESIS W/IMAGING: CPT

## 2020-07-31 NOTE — NURSING
Ultrasound guided paracentesis performed by Dr. Shetty; procedure explained and consent obtained by Dr. Shetty.4250 mLs removed-VS remained stable for duration of procedure.Access site cleaned with peroxide, derma bond and steri-strips applied, then covered with sterile gauze and tape. Pt discharge home.

## 2020-08-03 DIAGNOSIS — K74.60 LIVER CIRRHOSIS: Primary | ICD-10-CM

## 2020-08-03 DIAGNOSIS — K74.60 HEPATIC CIRRHOSIS, UNSPECIFIED HEPATIC CIRRHOSIS TYPE, UNSPECIFIED WHETHER ASCITES PRESENT: Primary | ICD-10-CM

## 2020-08-12 ENCOUNTER — HOSPITAL ENCOUNTER (OUTPATIENT)
Dept: RADIOLOGY | Facility: HOSPITAL | Age: 70
Discharge: HOME | End: 2020-08-12
Payer: MEDICARE

## 2020-08-12 VITALS — HEART RATE: 59 BPM | SYSTOLIC BLOOD PRESSURE: 113 MMHG | DIASTOLIC BLOOD PRESSURE: 54 MMHG

## 2020-08-12 DIAGNOSIS — K74.60 LIVER CIRRHOSIS: ICD-10-CM

## 2020-08-12 PROCEDURE — 49083 US GUIDED PARACENTESIS INC IMAGING: ICD-10-PCS | Mod: ,,, | Performed by: RADIOLOGY

## 2020-08-12 PROCEDURE — 49083 ABD PARACENTESIS W/IMAGING: CPT | Mod: ,,, | Performed by: RADIOLOGY

## 2020-08-12 PROCEDURE — 49083 ABD PARACENTESIS W/IMAGING: CPT

## 2020-08-26 ENCOUNTER — HOSPITAL ENCOUNTER (OUTPATIENT)
Dept: RADIOLOGY | Facility: HOSPITAL | Age: 70
Discharge: HOME | End: 2020-08-26
Attending: INTERNAL MEDICINE
Payer: MEDICARE

## 2020-08-26 VITALS — SYSTOLIC BLOOD PRESSURE: 109 MMHG | DIASTOLIC BLOOD PRESSURE: 55 MMHG | HEART RATE: 61 BPM

## 2020-08-26 DIAGNOSIS — K74.60 LIVER CIRRHOSIS: ICD-10-CM

## 2020-08-26 PROCEDURE — 49083 ABD PARACENTESIS W/IMAGING: CPT

## 2020-08-26 PROCEDURE — 63600175 PHARM REV CODE 636 W HCPCS: Performed by: RADIOLOGY

## 2020-08-26 PROCEDURE — P9047 ALBUMIN (HUMAN), 25%, 50ML: HCPCS | Performed by: RADIOLOGY

## 2020-08-26 PROCEDURE — 49083 ABD PARACENTESIS W/IMAGING: CPT | Mod: ,,, | Performed by: RADIOLOGY

## 2020-08-26 PROCEDURE — 49083 US GUIDED PARACENTESIS INC IMAGING: ICD-10-PCS | Mod: ,,, | Performed by: RADIOLOGY

## 2020-08-26 RX ORDER — ALBUMIN HUMAN 250 G/1000ML
25 SOLUTION INTRAVENOUS
Status: DISCONTINUED | OUTPATIENT
Start: 2020-08-26 | End: 2020-08-27 | Stop reason: HOSPADM

## 2020-08-26 RX ADMIN — ALBUMIN (HUMAN) 25 G: 12.5 SOLUTION INTRAVENOUS at 12:08

## 2020-08-26 NOTE — NURSING
Ultrasound guided paracentesis performed by Dr. Colunga; procedure explained and consent obtained by Radiologist. 6.3 L removed- Patient received 50 g albumin IV- VS remained stable for duration of procedure. IV d/c'd, with tip intact. Access site cleaned with peroxide, derma bond and steri-strips applied, then covered with sterile gauze and tape. Pt discharge home.

## 2020-09-08 NOTE — TELEPHONE ENCOUNTER
Mailed patient results as instructed.    Patient is here for evaluation of her throat.  She reports some trouble with hoarseness and at times throat irritation as well as tenderness in the upper neck.  She reports that the voice problems are worse when she uses her voice more often.  She has also been recently battling some trouble with her left eye she had to have cataract surgery.  She has been diagnosed with significant dryness of her eyes and has been started on several moisturizing eyedrops.  The patient reports to drinking a fair amount of water but not a lot during the day she still uses 50% caffeinated coffee during the morning hours.  She reports no dysphagia symptoms.  She continues to have though this globus sensation and was concerned about either breakthrough reflux or wanted to rule out any possible malignancy in her throat.  She is still using the omeprazole on a daily basis she takes in the morning prior to eating.    I have reviewed the patient's medications and allergies, past medical and surgical history.     ROS:  The patient denies any fevers or chills.  No vision changes.  no change in energy or new onset of fatigue.  Patient denies any major weight loss.  No dyspnea or chest pain, no new skin rashes or lesions.      This is a pleasant 72 year old, female in no acute distress, oriented and cooperative x3. Patient has normal mood and affect.  Normal facial skin color and turgor.  No masses or lesions of the facial skin.  Normal facial movement with no weaknesses or asymmetries. Voice demonstrates no real raspiness or hoarseness.  The external ears are normal, ear canals no obstructing cerumen, and the TMs are both intact and mobile.  No tenderness over the TMJ with normal mobility  No tenderness palpating the face over the maxilla.  Nasal exam shows mild septal deviation to her left side no other masses or lesions  The oral cavity shows dry mucous membranes but no other acute findings  The oropharynx shows cobblestoning along the  posterior wall with some dry mucous membranes  Mirror exam of the larynx was not possible   The neck was palpated, no adenopathies or masses in the lateral neck  Salivary glands were normal, symmetric and free of masses on palpation.   The thyroid was normal to palpation.      Mirror exam of the larynx and hypopharynx for evaluation of hypopharyngeal and laryngeal complaints was attempted which the patient did not tolerate because of gagging, preventing adequate exam of the hypopharynx and larynx.  I thus recommended flexible laryngoscopy.          Procedure:  Flexible Fiberoptic Nasolaryngoscopy    The patient's history raises questions about laryngopharyngeal abnormalities contributing to the above symptoms.  To evaluate for characteristic changes and alternate pathologies, fiberoptic examination of the nasopharynx, tongue base, larynx, and hypopharynx was necessary.  Prior to the exam, the procedure was discussed with the patient and verbal consent was obtained.  Topical anesthesia with 4% lidocaine and Afrin spray was applied to right nares with a syringe in atomizer    The scope was passed via the right nostril.  The nasopharynx was examined as well as the eustachian tube orifices, followed by the oropharynx, base of tongue, vallecula, epiglottis, pyriform sinuses, aryepiglottic folds, arytenoids, postcricoid region, false vocal folds, and true vocal folds.      Findings:    Nasal Passageway:  Clear mucus secretions but no mucopurulent drainage  Nasopharynx:  Secretions collecting but no other acute findings  Tongue base:  Normal symmetric lymphoid tissue  Supraglottis:  Normal epiglottis  Pyriform sinuses:  Pooling of thick mucus secretions  Postcricoid region and arytenoids:  No inflammation or signs of breakthrough reflux disease  True vocal folds:  Mobile, no lesions, nodules or ulceration, good approximation.     The patient tolerated the exam well with no complications.    Assessment and plan  Globus  sensation  Sicca laryngitis    I discussed the findings the patient that she does not show any evidence of breakthrough reflux disease that appears to be under excellent control.  Her findings are more the severe dryness and thick mucus collecting in the throat creating this irritation and globus sensation.  I do feel that this is also related to her recently diagnosed dry eyes and likely early Sjogren's syndrome.  I recommended she increase her water throughout the day and try to cut back on caffeine further.  I also discussed the benefits of running a humidifier in her bedroom and using the Biotene product line the mouthwash twice daily throat spray and lozenges as needed.  I answered any questions reassured her that there is no signs of any malignancy or signs of breakthrough reflux disease.

## 2020-09-09 ENCOUNTER — HOSPITAL ENCOUNTER (OUTPATIENT)
Dept: RADIOLOGY | Facility: HOSPITAL | Age: 70
Discharge: HOME | End: 2020-09-09
Attending: INTERNAL MEDICINE
Payer: MEDICARE

## 2020-09-09 VITALS — DIASTOLIC BLOOD PRESSURE: 68 MMHG | HEART RATE: 75 BPM | SYSTOLIC BLOOD PRESSURE: 143 MMHG

## 2020-09-09 DIAGNOSIS — K74.60 LIVER CIRRHOSIS: ICD-10-CM

## 2020-09-09 PROCEDURE — 49083 ABD PARACENTESIS W/IMAGING: CPT | Mod: ,,, | Performed by: RADIOLOGY

## 2020-09-09 PROCEDURE — 63600175 PHARM REV CODE 636 W HCPCS: Performed by: RADIOLOGY

## 2020-09-09 PROCEDURE — 49083 US GUIDED PARACENTESIS INC IMAGING: ICD-10-PCS | Mod: ,,, | Performed by: RADIOLOGY

## 2020-09-09 PROCEDURE — 49083 ABD PARACENTESIS W/IMAGING: CPT

## 2020-09-09 PROCEDURE — P9047 ALBUMIN (HUMAN), 25%, 50ML: HCPCS | Performed by: RADIOLOGY

## 2020-09-09 RX ORDER — ALBUMIN HUMAN 250 G/1000ML
25 SOLUTION INTRAVENOUS
Status: DISCONTINUED | OUTPATIENT
Start: 2020-09-09 | End: 2020-09-10 | Stop reason: HOSPADM

## 2020-09-09 RX ADMIN — ALBUMIN (HUMAN) 25 G: 12.5 SOLUTION INTRAVENOUS at 11:09

## 2020-09-09 NOTE — H&P
[facility]  History & Physical - Short Stay  Interventional Radiology    SUBJECTIVE:     Chief Complaint/Reason for Admission: ascites    History of Present Illness:  Sejal Matamoros is a 70 y.o. female with a history of ascites.      OBJECTIVE:     Vital Signs (Most Recent):  Pulse: 74 (09/09/20 1105)  BP: (!) 160/69 (09/09/20 1105)    Physical Exam:  Awake, alert and oriented    ASSESSMENT/PLAN:     Ascites.    Patient will undergo paracentesis.    Sedation Plan: lidocaine

## 2020-09-09 NOTE — NURSING
Ultrasound guided paracentesis performed by Dr. Hart; procedure explained and consent obtained by Dr. Hart. 9 L removed- Patient received 25 g albumin IV- VS remained stable for duration of procedure.  IV d/c'd, with tip intact. Access site cleaned with peroxide, derma bond and steri-strips applied, then covered with sterile gauze and tape. Pt discharge home.

## 2020-09-23 ENCOUNTER — HOSPITAL ENCOUNTER (OUTPATIENT)
Dept: RADIOLOGY | Facility: HOSPITAL | Age: 70
Discharge: HOME | End: 2020-09-23
Attending: INTERNAL MEDICINE
Payer: MEDICARE

## 2020-09-23 VITALS — HEART RATE: 69 BPM | SYSTOLIC BLOOD PRESSURE: 106 MMHG | DIASTOLIC BLOOD PRESSURE: 54 MMHG

## 2020-09-23 DIAGNOSIS — K74.60 LIVER CIRRHOSIS: ICD-10-CM

## 2020-09-23 PROCEDURE — 63600175 PHARM REV CODE 636 W HCPCS: Performed by: RADIOLOGY

## 2020-09-23 PROCEDURE — P9047 ALBUMIN (HUMAN), 25%, 50ML: HCPCS | Performed by: RADIOLOGY

## 2020-09-23 PROCEDURE — 49083 ABD PARACENTESIS W/IMAGING: CPT | Mod: ,,, | Performed by: RADIOLOGY

## 2020-09-23 PROCEDURE — 49083 ABD PARACENTESIS W/IMAGING: CPT

## 2020-09-23 PROCEDURE — 49083 US GUIDED PARACENTESIS INC IMAGING: ICD-10-PCS | Mod: ,,, | Performed by: RADIOLOGY

## 2020-09-23 RX ORDER — ALBUMIN HUMAN 250 G/1000ML
25 SOLUTION INTRAVENOUS
Status: DISCONTINUED | OUTPATIENT
Start: 2020-09-23 | End: 2020-09-24 | Stop reason: HOSPADM

## 2020-09-23 RX ADMIN — ALBUMIN (HUMAN) 25 G: 12.5 SOLUTION INTRAVENOUS at 09:09

## 2020-09-23 NOTE — NURSING
Ultrasound guided paracentesis performed by Dr. Terry; procedure explained and consent obtained by Dr. Terry. 6.3L removed- Patient received 25 g albumin IV- VS remained stable for duration of procedure. IV d/c'd, with tip intact. Access site cleaned with peroxide, derma bond and steri-strips applied, then covered with sterile gauze and tape. Pt discharge home.

## 2020-09-29 DIAGNOSIS — K74.60 HEPATIC CIRRHOSIS, UNSPECIFIED HEPATIC CIRRHOSIS TYPE, UNSPECIFIED WHETHER ASCITES PRESENT: Primary | ICD-10-CM

## 2020-10-07 ENCOUNTER — HOSPITAL ENCOUNTER (OUTPATIENT)
Dept: RADIOLOGY | Facility: HOSPITAL | Age: 70
Discharge: HOME | End: 2020-10-07
Attending: INTERNAL MEDICINE
Payer: MEDICARE

## 2020-10-07 VITALS — HEART RATE: 80 BPM | SYSTOLIC BLOOD PRESSURE: 113 MMHG | DIASTOLIC BLOOD PRESSURE: 56 MMHG

## 2020-10-07 DIAGNOSIS — K74.60 LIVER CIRRHOSIS: ICD-10-CM

## 2020-10-07 PROCEDURE — P9047 ALBUMIN (HUMAN), 25%, 50ML: HCPCS | Performed by: RADIOLOGY

## 2020-10-07 PROCEDURE — 49083 ABD PARACENTESIS W/IMAGING: CPT | Mod: ,,, | Performed by: RADIOLOGY

## 2020-10-07 PROCEDURE — 63600175 PHARM REV CODE 636 W HCPCS: Performed by: RADIOLOGY

## 2020-10-07 PROCEDURE — 49083 ABD PARACENTESIS W/IMAGING: CPT

## 2020-10-07 PROCEDURE — 49083 US GUIDED PARACENTESIS INC IMAGING: ICD-10-PCS | Mod: ,,, | Performed by: RADIOLOGY

## 2020-10-07 RX ORDER — ALBUMIN HUMAN 250 G/1000ML
25 SOLUTION INTRAVENOUS
Status: DISCONTINUED | OUTPATIENT
Start: 2020-10-07 | End: 2020-10-08 | Stop reason: HOSPADM

## 2020-10-07 RX ADMIN — ALBUMIN (HUMAN) 25 G: 12.5 SOLUTION INTRAVENOUS at 11:10

## 2020-10-07 NOTE — NURSING
Ultrasound guided paracentesis performed by Dr. Ross; procedure explained and consent obtained by Dr. Martinez. 5.7 L removed- Patient received 25 g albumin IV- VS remained stable for duration of procedure. IV d/c'd, with tip intact. Access site cleaned with peroxide, derma bond and steri-strips applied, then covered with sterile gauze and tape. Pt discharge home.

## 2020-10-21 ENCOUNTER — HOSPITAL ENCOUNTER (OUTPATIENT)
Dept: RADIOLOGY | Facility: HOSPITAL | Age: 70
Discharge: HOME | End: 2020-10-21
Attending: INTERNAL MEDICINE
Payer: MEDICARE

## 2020-10-21 VITALS — HEART RATE: 61 BPM | DIASTOLIC BLOOD PRESSURE: 60 MMHG | SYSTOLIC BLOOD PRESSURE: 112 MMHG

## 2020-10-21 DIAGNOSIS — K74.60 LIVER CIRRHOSIS: ICD-10-CM

## 2020-10-21 PROCEDURE — P9047 ALBUMIN (HUMAN), 25%, 50ML: HCPCS | Performed by: RADIOLOGY

## 2020-10-21 PROCEDURE — 49083 ABD PARACENTESIS W/IMAGING: CPT | Mod: ,,, | Performed by: RADIOLOGY

## 2020-10-21 PROCEDURE — 49083 ABD PARACENTESIS W/IMAGING: CPT

## 2020-10-21 PROCEDURE — 63600175 PHARM REV CODE 636 W HCPCS: Performed by: RADIOLOGY

## 2020-10-21 PROCEDURE — 49083 US GUIDED PARACENTESIS INC IMAGING: ICD-10-PCS | Mod: ,,, | Performed by: RADIOLOGY

## 2020-10-21 RX ORDER — ALBUMIN HUMAN 250 G/1000ML
25 SOLUTION INTRAVENOUS
Status: DISCONTINUED | OUTPATIENT
Start: 2020-10-21 | End: 2020-10-22 | Stop reason: HOSPADM

## 2020-10-21 RX ADMIN — ALBUMIN (HUMAN) 25 G: 12.5 SOLUTION INTRAVENOUS at 11:10

## 2020-10-21 NOTE — NURSING
Ultrasound guided paracentesis performed by Dr. Ross; procedure explained and consent obtained by Dr. Ross. 5050 mL removed- Patient received 25 galbumin IV- VS remained stable for duration of procedure. IV d/c'd, with tip intact. Access site cleaned with peroxide, derma bond and steri-strips applied, then covered with sterile gauze and tape. Pt discharge home.

## 2020-11-05 ENCOUNTER — HOSPITAL ENCOUNTER (OUTPATIENT)
Dept: RADIOLOGY | Facility: HOSPITAL | Age: 70
Discharge: HOME | End: 2020-11-05
Attending: INTERNAL MEDICINE
Payer: MEDICARE

## 2020-11-05 VITALS — SYSTOLIC BLOOD PRESSURE: 114 MMHG | HEART RATE: 65 BPM | DIASTOLIC BLOOD PRESSURE: 57 MMHG

## 2020-11-05 DIAGNOSIS — K74.60 LIVER CIRRHOSIS: ICD-10-CM

## 2020-11-05 PROCEDURE — 49083 ABD PARACENTESIS W/IMAGING: CPT

## 2020-11-05 PROCEDURE — P9047 ALBUMIN (HUMAN), 25%, 50ML: HCPCS | Performed by: RADIOLOGY

## 2020-11-05 PROCEDURE — 49083 ABD PARACENTESIS W/IMAGING: CPT | Mod: ,,, | Performed by: RADIOLOGY

## 2020-11-05 PROCEDURE — 63600175 PHARM REV CODE 636 W HCPCS: Performed by: RADIOLOGY

## 2020-11-05 PROCEDURE — 49083 US GUIDED PARACENTESIS INC IMAGING: ICD-10-PCS | Mod: ,,, | Performed by: RADIOLOGY

## 2020-11-05 RX ORDER — ALBUMIN HUMAN 250 G/1000ML
25 SOLUTION INTRAVENOUS
Status: DISCONTINUED | OUTPATIENT
Start: 2020-11-05 | End: 2020-11-06 | Stop reason: HOSPADM

## 2020-11-05 RX ADMIN — ALBUMIN (HUMAN) 25 G: 0.25 INJECTION, SOLUTION INTRAVENOUS at 02:11

## 2020-11-05 NOTE — NURSING
Ultrasound guided paracentesis performed by Dr. Hart; procedure explained and consent obtained by Dr. Hart. 6.4 L removed- Patient received 25 g albumin IV- VS remained stable for duration of procedure. IV d/c'd, with tip intact. Access site cleaned with peroxide, derma bond and steri-strips applied, then covered with sterile gauze and tape. Pt discharge home.

## 2020-11-09 DIAGNOSIS — K74.60 HEPATIC CIRRHOSIS, UNSPECIFIED HEPATIC CIRRHOSIS TYPE, UNSPECIFIED WHETHER ASCITES PRESENT: Primary | ICD-10-CM

## 2020-11-18 ENCOUNTER — HOSPITAL ENCOUNTER (OUTPATIENT)
Dept: RADIOLOGY | Facility: HOSPITAL | Age: 70
Discharge: HOME | End: 2020-11-18
Attending: INTERNAL MEDICINE
Payer: MEDICARE

## 2020-11-18 VITALS — HEART RATE: 68 BPM | DIASTOLIC BLOOD PRESSURE: 58 MMHG | OXYGEN SATURATION: 99 % | SYSTOLIC BLOOD PRESSURE: 116 MMHG

## 2020-11-18 DIAGNOSIS — K74.60 LIVER CIRRHOSIS: ICD-10-CM

## 2020-11-18 PROCEDURE — 49083 ABD PARACENTESIS W/IMAGING: CPT

## 2020-11-18 PROCEDURE — P9047 ALBUMIN (HUMAN), 25%, 50ML: HCPCS | Performed by: RADIOLOGY

## 2020-11-18 PROCEDURE — 49083 ABD PARACENTESIS W/IMAGING: CPT | Mod: ,,, | Performed by: RADIOLOGY

## 2020-11-18 PROCEDURE — 63600175 PHARM REV CODE 636 W HCPCS: Performed by: RADIOLOGY

## 2020-11-18 PROCEDURE — 49083 US GUIDED PARACENTESIS INC IMAGING: ICD-10-PCS | Mod: ,,, | Performed by: RADIOLOGY

## 2020-11-18 RX ORDER — ALBUMIN HUMAN 250 G/1000ML
25 SOLUTION INTRAVENOUS
Status: DISCONTINUED | OUTPATIENT
Start: 2020-11-18 | End: 2020-11-19 | Stop reason: HOSPADM

## 2020-11-18 RX ADMIN — ALBUMIN (HUMAN) 25 G: 12.5 SOLUTION INTRAVENOUS at 01:11

## 2020-11-18 NOTE — NURSING
Ultrasound guided paracentesis performed by Dr. Shetty; procedure explained and consent obtained by Dr. Shetty. 5.8L removed- Patient received 25  g albumin IV- VS remained stable for duration of procedure. IV d/c'd, with tip intact. Access site cleaned with peroxide, derma bond and steri-strips applied, then covered with sterile gauze and tape. Pt discharge home.

## 2020-12-02 NOTE — NURSING
Ultrasound guided paracentesis performed by Dr. Dia; procedure explained and consent obtained by Dr. Dia. 5.4 L removed- Patient received 25 g albumin IV- VS remained stable for duration of procedure. IV d/c'd, with tip intact. Access site cleaned with peroxide, derma bond and steri-strips applied, then covered with sterile gauze and tape. Pt discharge home.

## 2020-12-16 NOTE — NURSING
Ultrasound guided paracentesis performed by Dr. Chanel; procedure explained and consent obtained by Dr. Shetty. 5.7 L removed- Patient received 25 g albumin IV- VS remained stable for duration of procedure.  IV d/c'd, with tip intact. Access site cleaned with peroxide, derma bond and steri-strips applied, then covered with sterile gauze and tape. Pt discharge home.

## 2020-12-30 NOTE — NURSING
Ultrasound guided paracentesis performed by Dr. Shetty; procedure explained and consent obtained by Dr. Shetty. 4.5 L removed- VS remained stable for duration of procedure. Access site cleaned with peroxide, derma bond and steri-strips applied, then covered with sterile gauze and tape. Pt discharge home.

## 2021-01-01 ENCOUNTER — HOSPITAL ENCOUNTER (OUTPATIENT)
Dept: RADIOLOGY | Facility: HOSPITAL | Age: 71
Discharge: HOME | End: 2021-10-28
Attending: INTERNAL MEDICINE
Payer: MEDICARE

## 2021-01-01 ENCOUNTER — TELEPHONE (OUTPATIENT)
Dept: RADIOLOGY | Facility: HOSPITAL | Age: 71
End: 2021-01-01
Payer: MEDICARE

## 2021-01-01 ENCOUNTER — TELEPHONE (OUTPATIENT)
Dept: RADIOLOGY | Facility: HOSPITAL | Age: 71
End: 2021-01-01

## 2021-01-01 ENCOUNTER — HOSPITAL ENCOUNTER (OUTPATIENT)
Dept: RADIOLOGY | Facility: HOSPITAL | Age: 71
Discharge: HOME | End: 2021-05-05
Attending: INTERNAL MEDICINE
Payer: MEDICARE

## 2021-01-01 ENCOUNTER — HOSPITAL ENCOUNTER (OUTPATIENT)
Dept: RADIOLOGY | Facility: HOSPITAL | Age: 71
Discharge: HOME | End: 2021-09-08
Attending: INTERNAL MEDICINE
Payer: MEDICARE

## 2021-01-01 ENCOUNTER — HOSPITAL ENCOUNTER (OUTPATIENT)
Dept: RADIOLOGY | Facility: HOSPITAL | Age: 71
Discharge: HOME | End: 2021-10-21
Attending: INTERNAL MEDICINE
Payer: MEDICARE

## 2021-01-01 ENCOUNTER — HOSPITAL ENCOUNTER (OUTPATIENT)
Dept: RADIOLOGY | Facility: HOSPITAL | Age: 71
Discharge: HOME | End: 2021-06-16
Attending: INTERNAL MEDICINE
Payer: MEDICARE

## 2021-01-01 ENCOUNTER — HOSPITAL ENCOUNTER (OUTPATIENT)
Dept: RADIOLOGY | Facility: HOSPITAL | Age: 71
Discharge: HOME | End: 2021-08-11
Attending: INTERNAL MEDICINE
Payer: MEDICARE

## 2021-01-01 ENCOUNTER — HOSPITAL ENCOUNTER (OUTPATIENT)
Dept: RADIOLOGY | Facility: HOSPITAL | Age: 71
Discharge: HOME | End: 2021-02-10
Attending: INTERNAL MEDICINE
Payer: MEDICARE

## 2021-01-01 ENCOUNTER — HOSPITAL ENCOUNTER (OUTPATIENT)
Dept: RADIOLOGY | Facility: HOSPITAL | Age: 71
Discharge: HOME | End: 2021-03-24
Attending: INTERNAL MEDICINE
Payer: MEDICARE

## 2021-01-01 ENCOUNTER — HOSPITAL ENCOUNTER (OUTPATIENT)
Dept: RADIOLOGY | Facility: HOSPITAL | Age: 71
Discharge: HOME | End: 2021-08-25
Attending: INTERNAL MEDICINE
Payer: MEDICARE

## 2021-01-01 ENCOUNTER — HOSPITAL ENCOUNTER (OUTPATIENT)
Facility: HOSPITAL | Age: 71
Discharge: HOME HEALTH | End: 2021-09-23
Attending: EMERGENCY MEDICINE | Admitting: INTERNAL MEDICINE
Payer: MEDICARE

## 2021-01-01 ENCOUNTER — HOSPITAL ENCOUNTER (OUTPATIENT)
Dept: RADIOLOGY | Facility: HOSPITAL | Age: 71
Discharge: HOME | End: 2021-01-27
Attending: INTERNAL MEDICINE
Payer: MEDICARE

## 2021-01-01 ENCOUNTER — EXTERNAL HOME HEALTH (OUTPATIENT)
Dept: HOME HEALTH SERVICES | Facility: HOSPITAL | Age: 71
End: 2021-01-01
Payer: MEDICARE

## 2021-01-01 ENCOUNTER — HOSPITAL ENCOUNTER (OUTPATIENT)
Dept: RADIOLOGY | Facility: HOSPITAL | Age: 71
Discharge: HOME | End: 2021-07-14
Attending: INTERNAL MEDICINE
Payer: MEDICARE

## 2021-01-01 ENCOUNTER — HOSPITAL ENCOUNTER (OUTPATIENT)
Dept: RADIOLOGY | Facility: HOSPITAL | Age: 71
Discharge: HOME | End: 2021-01-13
Attending: INTERNAL MEDICINE
Payer: MEDICARE

## 2021-01-01 ENCOUNTER — HOSPITAL ENCOUNTER (OUTPATIENT)
Dept: RADIOLOGY | Facility: HOSPITAL | Age: 71
Discharge: HOME | End: 2021-07-28
Attending: INTERNAL MEDICINE
Payer: MEDICARE

## 2021-01-01 ENCOUNTER — TELEPHONE (OUTPATIENT)
Dept: HEMATOLOGY/ONCOLOGY | Facility: CLINIC | Age: 71
End: 2021-01-01

## 2021-01-01 ENCOUNTER — HOSPITAL ENCOUNTER (OUTPATIENT)
Dept: RADIOLOGY | Facility: HOSPITAL | Age: 71
Discharge: HOME | End: 2021-05-19
Attending: INTERNAL MEDICINE
Payer: MEDICARE

## 2021-01-01 ENCOUNTER — HOSPITAL ENCOUNTER (OUTPATIENT)
Dept: RADIOLOGY | Facility: HOSPITAL | Age: 71
Discharge: HOME | End: 2021-04-21
Attending: INTERNAL MEDICINE
Payer: MEDICARE

## 2021-01-01 ENCOUNTER — HOSPITAL ENCOUNTER (OUTPATIENT)
Dept: RADIOLOGY | Facility: HOSPITAL | Age: 71
Discharge: HOME | End: 2021-02-24
Attending: INTERNAL MEDICINE
Payer: MEDICARE

## 2021-01-01 ENCOUNTER — HOSPITAL ENCOUNTER (OUTPATIENT)
Dept: RADIOLOGY | Facility: HOSPITAL | Age: 71
Discharge: HOME | End: 2021-10-07
Attending: INTERNAL MEDICINE
Payer: MEDICARE

## 2021-01-01 ENCOUNTER — HOSPITAL ENCOUNTER (OUTPATIENT)
Dept: RADIOLOGY | Facility: HOSPITAL | Age: 71
Discharge: HOME | End: 2021-03-10
Attending: INTERNAL MEDICINE
Payer: MEDICARE

## 2021-01-01 ENCOUNTER — TELEPHONE (OUTPATIENT)
Dept: MEDSURG UNIT | Facility: HOSPITAL | Age: 71
End: 2021-01-01

## 2021-01-01 ENCOUNTER — HOSPITAL ENCOUNTER (OUTPATIENT)
Dept: RADIOLOGY | Facility: HOSPITAL | Age: 71
Discharge: HOME | End: 2021-06-02
Attending: INTERNAL MEDICINE
Payer: MEDICARE

## 2021-01-01 ENCOUNTER — HOSPITAL ENCOUNTER (OUTPATIENT)
Dept: RADIOLOGY | Facility: HOSPITAL | Age: 71
Discharge: HOME | End: 2021-06-30
Attending: INTERNAL MEDICINE
Payer: MEDICARE

## 2021-01-01 ENCOUNTER — HOSPITAL ENCOUNTER (OUTPATIENT)
Dept: RADIOLOGY | Facility: HOSPITAL | Age: 71
Discharge: HOME | End: 2021-04-07
Attending: INTERNAL MEDICINE
Payer: MEDICARE

## 2021-01-01 VITALS — DIASTOLIC BLOOD PRESSURE: 55 MMHG | SYSTOLIC BLOOD PRESSURE: 118 MMHG | HEART RATE: 65 BPM

## 2021-01-01 VITALS — HEART RATE: 64 BPM | SYSTOLIC BLOOD PRESSURE: 108 MMHG | DIASTOLIC BLOOD PRESSURE: 53 MMHG

## 2021-01-01 VITALS — HEART RATE: 62 BPM | DIASTOLIC BLOOD PRESSURE: 58 MMHG | SYSTOLIC BLOOD PRESSURE: 120 MMHG

## 2021-01-01 VITALS — SYSTOLIC BLOOD PRESSURE: 109 MMHG | OXYGEN SATURATION: 99 % | HEART RATE: 72 BPM | DIASTOLIC BLOOD PRESSURE: 53 MMHG

## 2021-01-01 VITALS
OXYGEN SATURATION: 98 % | HEIGHT: 62 IN | BODY MASS INDEX: 44.16 KG/M2 | DIASTOLIC BLOOD PRESSURE: 59 MMHG | RESPIRATION RATE: 18 BRPM | WEIGHT: 240 LBS | TEMPERATURE: 98 F | SYSTOLIC BLOOD PRESSURE: 128 MMHG | HEART RATE: 82 BPM

## 2021-01-01 VITALS — DIASTOLIC BLOOD PRESSURE: 64 MMHG | HEART RATE: 70 BPM | SYSTOLIC BLOOD PRESSURE: 137 MMHG

## 2021-01-01 VITALS
SYSTOLIC BLOOD PRESSURE: 131 MMHG | RESPIRATION RATE: 18 BRPM | TEMPERATURE: 97 F | HEIGHT: 62 IN | HEART RATE: 79 BPM | WEIGHT: 247.38 LBS | BODY MASS INDEX: 45.52 KG/M2 | DIASTOLIC BLOOD PRESSURE: 62 MMHG | OXYGEN SATURATION: 98 %

## 2021-01-01 VITALS — HEART RATE: 66 BPM | SYSTOLIC BLOOD PRESSURE: 117 MMHG | DIASTOLIC BLOOD PRESSURE: 57 MMHG

## 2021-01-01 VITALS
DIASTOLIC BLOOD PRESSURE: 55 MMHG | SYSTOLIC BLOOD PRESSURE: 148 MMHG | DIASTOLIC BLOOD PRESSURE: 65 MMHG | SYSTOLIC BLOOD PRESSURE: 140 MMHG | HEART RATE: 79 BPM

## 2021-01-01 VITALS — DIASTOLIC BLOOD PRESSURE: 56 MMHG | SYSTOLIC BLOOD PRESSURE: 109 MMHG | HEART RATE: 61 BPM

## 2021-01-01 VITALS
HEART RATE: 84 BPM | DIASTOLIC BLOOD PRESSURE: 60 MMHG | SYSTOLIC BLOOD PRESSURE: 131 MMHG | DIASTOLIC BLOOD PRESSURE: 61 MMHG | HEART RATE: 83 BPM | SYSTOLIC BLOOD PRESSURE: 125 MMHG

## 2021-01-01 VITALS — HEART RATE: 68 BPM | DIASTOLIC BLOOD PRESSURE: 54 MMHG | SYSTOLIC BLOOD PRESSURE: 114 MMHG

## 2021-01-01 VITALS
OXYGEN SATURATION: 99 % | RESPIRATION RATE: 16 BRPM | DIASTOLIC BLOOD PRESSURE: 55 MMHG | HEART RATE: 74 BPM | SYSTOLIC BLOOD PRESSURE: 129 MMHG

## 2021-01-01 VITALS — SYSTOLIC BLOOD PRESSURE: 114 MMHG | HEART RATE: 66 BPM | DIASTOLIC BLOOD PRESSURE: 57 MMHG

## 2021-01-01 VITALS — HEART RATE: 85 BPM | DIASTOLIC BLOOD PRESSURE: 60 MMHG | SYSTOLIC BLOOD PRESSURE: 134 MMHG

## 2021-01-01 VITALS — HEART RATE: 53 BPM | SYSTOLIC BLOOD PRESSURE: 126 MMHG | DIASTOLIC BLOOD PRESSURE: 59 MMHG

## 2021-01-01 VITALS
DIASTOLIC BLOOD PRESSURE: 54 MMHG | SYSTOLIC BLOOD PRESSURE: 111 MMHG | HEART RATE: 59 BPM | SYSTOLIC BLOOD PRESSURE: 124 MMHG | DIASTOLIC BLOOD PRESSURE: 58 MMHG | HEART RATE: 65 BPM

## 2021-01-01 VITALS — SYSTOLIC BLOOD PRESSURE: 135 MMHG | DIASTOLIC BLOOD PRESSURE: 64 MMHG | HEART RATE: 60 BPM

## 2021-01-01 VITALS — SYSTOLIC BLOOD PRESSURE: 127 MMHG | HEART RATE: 61 BPM | DIASTOLIC BLOOD PRESSURE: 59 MMHG

## 2021-01-01 DIAGNOSIS — K74.60 UNSPECIFIED CIRRHOSIS OF LIVER: ICD-10-CM

## 2021-01-01 DIAGNOSIS — K74.60 CIRRHOSIS OF LIVER: ICD-10-CM

## 2021-01-01 DIAGNOSIS — R07.9 CHEST PAIN: ICD-10-CM

## 2021-01-01 DIAGNOSIS — R18.8 CIRRHOSIS OF LIVER WITH ASCITES, UNSPECIFIED HEPATIC CIRRHOSIS TYPE: Primary | ICD-10-CM

## 2021-01-01 DIAGNOSIS — K74.60 LIVER CIRRHOSIS: ICD-10-CM

## 2021-01-01 DIAGNOSIS — K74.60 CIRRHOSIS OF LIVER WITH ASCITES, UNSPECIFIED HEPATIC CIRRHOSIS TYPE: ICD-10-CM

## 2021-01-01 DIAGNOSIS — R18.8 CIRRHOSIS OF LIVER WITH ASCITES, UNSPECIFIED HEPATIC CIRRHOSIS TYPE: ICD-10-CM

## 2021-01-01 DIAGNOSIS — R18.8 ASCITES: ICD-10-CM

## 2021-01-01 DIAGNOSIS — K74.60 HEPATIC CIRRHOSIS, UNSPECIFIED HEPATIC CIRRHOSIS TYPE, UNSPECIFIED WHETHER ASCITES PRESENT: Primary | ICD-10-CM

## 2021-01-01 DIAGNOSIS — K76.82 HEPATIC ENCEPHALOPATHY: ICD-10-CM

## 2021-01-01 DIAGNOSIS — K74.60 CIRRHOSIS OF LIVER WITH ASCITES, UNSPECIFIED HEPATIC CIRRHOSIS TYPE: Primary | ICD-10-CM

## 2021-01-01 DIAGNOSIS — R18.8 OTHER ASCITES: Primary | ICD-10-CM

## 2021-01-01 LAB
ABO + RH BLD: NORMAL
ALBUMIN SERPL BCP-MCNC: 1.8 G/DL (ref 3.5–5.2)
ALBUMIN SERPL BCP-MCNC: 2.1 G/DL (ref 3.5–5.2)
ALP SERPL-CCNC: 188 U/L (ref 55–135)
ALP SERPL-CCNC: 220 U/L (ref 55–135)
ALT SERPL W/O P-5'-P-CCNC: 24 U/L (ref 10–44)
ALT SERPL W/O P-5'-P-CCNC: 31 U/L (ref 10–44)
AMMONIA PLAS-SCNC: 50 UMOL/L (ref 10–50)
AMMONIA PLAS-SCNC: 61 UMOL/L (ref 10–50)
ANION GAP SERPL CALC-SCNC: 10 MMOL/L (ref 8–16)
ANION GAP SERPL CALC-SCNC: 4 MMOL/L (ref 8–16)
APTT PPP: 37 SEC (ref 23.3–35.1)
AST SERPL-CCNC: 38 U/L (ref 10–40)
AST SERPL-CCNC: 44 U/L (ref 10–40)
BASOPHILS # BLD AUTO: 0.03 K/UL (ref 0–0.2)
BASOPHILS # BLD AUTO: 0.05 K/UL (ref 0–0.2)
BASOPHILS # BLD AUTO: 0.07 K/UL (ref 0–0.2)
BASOPHILS NFR BLD: 0.9 % (ref 0–1.9)
BASOPHILS NFR BLD: 1.3 % (ref 0–1.9)
BASOPHILS NFR BLD: 1.4 % (ref 0–1.9)
BILIRUB SERPL-MCNC: 1.8 MG/DL (ref 0.1–1)
BILIRUB SERPL-MCNC: 2.2 MG/DL (ref 0.1–1)
BILIRUB UR QL STRIP: NEGATIVE
BLD GP AB SCN CELLS X3 SERPL QL: NORMAL
BUN SERPL-MCNC: 25 MG/DL (ref 8–23)
BUN SERPL-MCNC: 25 MG/DL (ref 8–23)
CALCIUM SERPL-MCNC: 8.4 MG/DL (ref 8.7–10.5)
CALCIUM SERPL-MCNC: 8.7 MG/DL (ref 8.7–10.5)
CHLORIDE SERPL-SCNC: 102 MMOL/L (ref 95–110)
CHLORIDE SERPL-SCNC: 105 MMOL/L (ref 95–110)
CLARITY UR: ABNORMAL
CO2 SERPL-SCNC: 24 MMOL/L (ref 23–29)
CO2 SERPL-SCNC: 29 MMOL/L (ref 23–29)
COLOR UR: YELLOW
CREAT SERPL-MCNC: 1.4 MG/DL (ref 0.5–1.4)
CREAT SERPL-MCNC: 1.5 MG/DL (ref 0.5–1.4)
DIFFERENTIAL METHOD: ABNORMAL
EOSINOPHIL # BLD AUTO: 0.1 K/UL (ref 0–0.5)
EOSINOPHIL # BLD AUTO: 0.1 K/UL (ref 0–0.5)
EOSINOPHIL # BLD AUTO: 0.2 K/UL (ref 0–0.5)
EOSINOPHIL NFR BLD: 3 % (ref 0–8)
EOSINOPHIL NFR BLD: 3.4 % (ref 0–8)
EOSINOPHIL NFR BLD: 3.7 % (ref 0–8)
ERYTHROCYTE [DISTWIDTH] IN BLOOD BY AUTOMATED COUNT: 15.2 % (ref 11.5–14.5)
ERYTHROCYTE [DISTWIDTH] IN BLOOD BY AUTOMATED COUNT: 15.4 % (ref 11.5–14.5)
ERYTHROCYTE [DISTWIDTH] IN BLOOD BY AUTOMATED COUNT: 16.5 % (ref 11.5–14.5)
EST. GFR  (AFRICAN AMERICAN): 40 ML/MIN/1.73 M^2
EST. GFR  (AFRICAN AMERICAN): 44 ML/MIN/1.73 M^2
EST. GFR  (NON AFRICAN AMERICAN): 35 ML/MIN/1.73 M^2
EST. GFR  (NON AFRICAN AMERICAN): 38 ML/MIN/1.73 M^2
GLUCOSE SERPL-MCNC: 122 MG/DL (ref 70–110)
GLUCOSE SERPL-MCNC: 194 MG/DL (ref 70–110)
GLUCOSE SERPL-MCNC: 260 MG/DL (ref 70–110)
GLUCOSE UR QL STRIP: ABNORMAL
HCT VFR BLD AUTO: 32.9 % (ref 37–48.5)
HCT VFR BLD AUTO: 36.7 % (ref 37–48.5)
HCT VFR BLD AUTO: 46.3 % (ref 37–48.5)
HGB BLD-MCNC: 11.1 G/DL (ref 12–16)
HGB BLD-MCNC: 12.4 G/DL (ref 12–16)
HGB BLD-MCNC: 16 G/DL (ref 12–16)
HGB UR QL STRIP: NEGATIVE
IMM GRANULOCYTES # BLD AUTO: 0.01 K/UL (ref 0–0.04)
IMM GRANULOCYTES # BLD AUTO: 0.01 K/UL (ref 0–0.04)
IMM GRANULOCYTES # BLD AUTO: 0.03 K/UL (ref 0–0.04)
IMM GRANULOCYTES NFR BLD AUTO: 0.3 % (ref 0–0.5)
IMM GRANULOCYTES NFR BLD AUTO: 0.3 % (ref 0–0.5)
IMM GRANULOCYTES NFR BLD AUTO: 0.6 % (ref 0–0.5)
INR PPP: 1.1
INR PPP: 1.1 (ref 0.8–1.2)
INR PPP: 1.1 (ref 0.8–1.2)
KETONES UR QL STRIP: NEGATIVE
LEUKOCYTE ESTERASE UR QL STRIP: NEGATIVE
LIPASE SERPL-CCNC: 16 U/L (ref 4–60)
LYMPHOCYTES # BLD AUTO: 0.9 K/UL (ref 1–4.8)
LYMPHOCYTES # BLD AUTO: 1 K/UL (ref 1–4.8)
LYMPHOCYTES # BLD AUTO: 1.3 K/UL (ref 1–4.8)
LYMPHOCYTES NFR BLD: 25.1 % (ref 18–48)
LYMPHOCYTES NFR BLD: 25.8 % (ref 18–48)
LYMPHOCYTES NFR BLD: 28.8 % (ref 18–48)
MAGNESIUM SERPL-MCNC: 1.9 MG/DL (ref 1.6–2.6)
MAGNESIUM SERPL-MCNC: 2 MG/DL (ref 1.6–2.6)
MCH RBC QN AUTO: 35.7 PG (ref 27–31)
MCH RBC QN AUTO: 36.1 PG (ref 27–31)
MCH RBC QN AUTO: 36.2 PG (ref 27–31)
MCHC RBC AUTO-ENTMCNC: 33.7 G/DL (ref 32–36)
MCHC RBC AUTO-ENTMCNC: 33.8 G/DL (ref 32–36)
MCHC RBC AUTO-ENTMCNC: 34.6 G/DL (ref 32–36)
MCV RBC AUTO: 105 FL (ref 82–98)
MCV RBC AUTO: 106 FL (ref 82–98)
MCV RBC AUTO: 107 FL (ref 82–98)
MONOCYTES # BLD AUTO: 0.5 K/UL (ref 0.3–1)
MONOCYTES # BLD AUTO: 0.5 K/UL (ref 0.3–1)
MONOCYTES # BLD AUTO: 0.7 K/UL (ref 0.3–1)
MONOCYTES NFR BLD: 13.7 % (ref 4–15)
MONOCYTES NFR BLD: 14.2 % (ref 4–15)
MONOCYTES NFR BLD: 15.6 % (ref 4–15)
NEUTROPHILS # BLD AUTO: 1.7 K/UL (ref 1.8–7.7)
NEUTROPHILS # BLD AUTO: 2.2 K/UL (ref 1.8–7.7)
NEUTROPHILS # BLD AUTO: 2.6 K/UL (ref 1.8–7.7)
NEUTROPHILS NFR BLD: 51 % (ref 38–73)
NEUTROPHILS NFR BLD: 54.3 % (ref 38–73)
NEUTROPHILS NFR BLD: 56.6 % (ref 38–73)
NITRITE UR QL STRIP: NEGATIVE
NRBC BLD-RTO: 0 /100 WBC
PH UR STRIP: 6 [PH] (ref 5–8)
PHOSPHATE SERPL-MCNC: 3.5 MG/DL (ref 2.7–4.5)
PLATELET # BLD AUTO: 129 K/UL (ref 150–450)
PLATELET # BLD AUTO: 154 K/UL (ref 150–450)
PLATELET # BLD AUTO: 164 K/UL (ref 150–450)
PMV BLD AUTO: 10.7 FL (ref 9.2–12.9)
PMV BLD AUTO: 11.3 FL (ref 9.2–12.9)
PMV BLD AUTO: 12.4 FL (ref 9.2–12.9)
POCT GLUCOSE: 102 MG/DL (ref 70–110)
POCT GLUCOSE: 112 MG/DL (ref 70–110)
POCT GLUCOSE: 197 MG/DL (ref 70–110)
POCT GLUCOSE: 225 MG/DL (ref 70–110)
POCT GLUCOSE: 250 MG/DL (ref 70–110)
POCT GLUCOSE: 52 MG/DL (ref 70–110)
POCT GLUCOSE: 59 MG/DL (ref 70–110)
POCT GLUCOSE: 62 MG/DL (ref 70–110)
POTASSIUM SERPL-SCNC: 3.9 MMOL/L (ref 3.5–5.1)
POTASSIUM SERPL-SCNC: 4.6 MMOL/L (ref 3.5–5.1)
PROT SERPL-MCNC: 5.8 G/DL (ref 6–8.4)
PROT SERPL-MCNC: 6.8 G/DL (ref 6–8.4)
PROT UR QL STRIP: ABNORMAL
PROTHROMBIN TIME: 11.3 SEC (ref 9–12.5)
PROTHROMBIN TIME: 11.3 SEC (ref 9–12.5)
PROTHROMBIN TIME: 13.6 SEC (ref 11.4–13.7)
RBC # BLD AUTO: 3.07 M/UL (ref 4–5.4)
RBC # BLD AUTO: 3.47 M/UL (ref 4–5.4)
RBC # BLD AUTO: 4.43 M/UL (ref 4–5.4)
SARS-COV-2 RDRP RESP QL NAA+PROBE: NEGATIVE
SARS-COV-2 RDRP RESP QL NAA+PROBE: NEGATIVE
SODIUM SERPL-SCNC: 136 MMOL/L (ref 136–145)
SODIUM SERPL-SCNC: 138 MMOL/L (ref 136–145)
SP GR UR STRIP: 1.02 (ref 1–1.03)
TSH SERPL DL<=0.005 MIU/L-ACNC: 1.3 UIU/ML (ref 0.4–4)
URN SPEC COLLECT METH UR: ABNORMAL
UROBILINOGEN UR STRIP-ACNC: ABNORMAL EU/DL
WBC # BLD AUTO: 3.26 K/UL (ref 3.9–12.7)
WBC # BLD AUTO: 3.95 K/UL (ref 3.9–12.7)
WBC # BLD AUTO: 4.85 K/UL (ref 3.9–12.7)

## 2021-01-01 PROCEDURE — C1729 CATH, DRAINAGE: HCPCS

## 2021-01-01 PROCEDURE — 49083 ABD PARACENTESIS W/IMAGING: CPT

## 2021-01-01 PROCEDURE — 81003 URINALYSIS AUTO W/O SCOPE: CPT | Performed by: EMERGENCY MEDICINE

## 2021-01-01 PROCEDURE — P9047 ALBUMIN (HUMAN), 25%, 50ML: HCPCS | Performed by: RADIOLOGY

## 2021-01-01 PROCEDURE — U0002 COVID-19 LAB TEST NON-CDC: HCPCS | Performed by: RADIOLOGY

## 2021-01-01 PROCEDURE — 49083 US GUIDED PARACENTESIS INC IMAGING: ICD-10-PCS | Mod: ,,, | Performed by: RADIOLOGY

## 2021-01-01 PROCEDURE — 82140 ASSAY OF AMMONIA: CPT | Performed by: EMERGENCY MEDICINE

## 2021-01-01 PROCEDURE — 85610 PROTHROMBIN TIME: CPT | Performed by: NURSE PRACTITIONER

## 2021-01-01 PROCEDURE — 80053 COMPREHEN METABOLIC PANEL: CPT | Performed by: NURSE PRACTITIONER

## 2021-01-01 PROCEDURE — 49083 ABD PARACENTESIS W/IMAGING: CPT | Mod: ,,, | Performed by: RADIOLOGY

## 2021-01-01 PROCEDURE — G0008 ADMIN INFLUENZA VIRUS VAC: HCPCS | Performed by: INTERNAL MEDICINE

## 2021-01-01 PROCEDURE — 36415 COLL VENOUS BLD VENIPUNCTURE: CPT | Performed by: NURSE PRACTITIONER

## 2021-01-01 PROCEDURE — 63600175 PHARM REV CODE 636 W HCPCS: Performed by: RADIOLOGY

## 2021-01-01 PROCEDURE — 96372 THER/PROPH/DIAG INJ SC/IM: CPT | Mod: 59

## 2021-01-01 PROCEDURE — 86900 BLOOD TYPING SEROLOGIC ABO: CPT | Performed by: EMERGENCY MEDICINE

## 2021-01-01 PROCEDURE — 99285 EMERGENCY DEPT VISIT HI MDM: CPT | Mod: 25

## 2021-01-01 PROCEDURE — 63600175 PHARM REV CODE 636 W HCPCS: Performed by: INTERNAL MEDICINE

## 2021-01-01 PROCEDURE — 85025 COMPLETE CBC W/AUTO DIFF WBC: CPT | Performed by: NURSE PRACTITIONER

## 2021-01-01 PROCEDURE — 99152 MOD SED SAME PHYS/QHP 5/>YRS: CPT

## 2021-01-01 PROCEDURE — 83735 ASSAY OF MAGNESIUM: CPT | Performed by: NURSE PRACTITIONER

## 2021-01-01 PROCEDURE — 25000003 PHARM REV CODE 250: Performed by: NURSE PRACTITIONER

## 2021-01-01 PROCEDURE — 85025 COMPLETE CBC W/AUTO DIFF WBC: CPT | Performed by: RADIOLOGY

## 2021-01-01 PROCEDURE — G0378 HOSPITAL OBSERVATION PER HR: HCPCS

## 2021-01-01 PROCEDURE — 25000003 PHARM REV CODE 250: Performed by: RADIOLOGY

## 2021-01-01 PROCEDURE — 85025 COMPLETE CBC W/AUTO DIFF WBC: CPT | Performed by: EMERGENCY MEDICINE

## 2021-01-01 PROCEDURE — 25000003 PHARM REV CODE 250: Performed by: INTERNAL MEDICINE

## 2021-01-01 PROCEDURE — 80053 COMPREHEN METABOLIC PANEL: CPT | Performed by: EMERGENCY MEDICINE

## 2021-01-01 PROCEDURE — 83735 ASSAY OF MAGNESIUM: CPT | Performed by: EMERGENCY MEDICINE

## 2021-01-01 PROCEDURE — A4550 SURGICAL TRAYS: HCPCS

## 2021-01-01 PROCEDURE — A7048 VACUUM DRAIN BOTTLE/TUBE KIT: HCPCS

## 2021-01-01 PROCEDURE — 97165 OT EVAL LOW COMPLEX 30 MIN: CPT

## 2021-01-01 PROCEDURE — 84100 ASSAY OF PHOSPHORUS: CPT | Performed by: EMERGENCY MEDICINE

## 2021-01-01 PROCEDURE — 63600175 PHARM REV CODE 636 W HCPCS: Performed by: NURSE PRACTITIONER

## 2021-01-01 PROCEDURE — 85610 PROTHROMBIN TIME: CPT | Performed by: EMERGENCY MEDICINE

## 2021-01-01 PROCEDURE — 90694 VACC AIIV4 NO PRSRV 0.5ML IM: CPT | Performed by: INTERNAL MEDICINE

## 2021-01-01 PROCEDURE — 85610 PROTHROMBIN TIME: CPT | Performed by: RADIOLOGY

## 2021-01-01 PROCEDURE — C9399 UNCLASSIFIED DRUGS OR BIOLOG: HCPCS | Performed by: NURSE PRACTITIONER

## 2021-01-01 PROCEDURE — 90471 IMMUNIZATION ADMIN: CPT | Performed by: INTERNAL MEDICINE

## 2021-01-01 PROCEDURE — 83690 ASSAY OF LIPASE: CPT | Performed by: EMERGENCY MEDICINE

## 2021-01-01 PROCEDURE — U0002 COVID-19 LAB TEST NON-CDC: HCPCS | Performed by: EMERGENCY MEDICINE

## 2021-01-01 PROCEDURE — 84443 ASSAY THYROID STIM HORMONE: CPT | Performed by: EMERGENCY MEDICINE

## 2021-01-01 PROCEDURE — 97162 PT EVAL MOD COMPLEX 30 MIN: CPT

## 2021-01-01 PROCEDURE — 76942 ECHO GUIDE FOR BIOPSY: CPT | Mod: TC,59

## 2021-01-01 PROCEDURE — 97116 GAIT TRAINING THERAPY: CPT

## 2021-01-01 PROCEDURE — 82140 ASSAY OF AMMONIA: CPT | Performed by: NURSE PRACTITIONER

## 2021-01-01 PROCEDURE — 85730 THROMBOPLASTIN TIME PARTIAL: CPT | Performed by: RADIOLOGY

## 2021-01-01 PROCEDURE — 96372 THER/PROPH/DIAG INJ SC/IM: CPT

## 2021-01-01 RX ORDER — ALBUMIN HUMAN 250 G/1000ML
25 SOLUTION INTRAVENOUS
Status: DISCONTINUED | OUTPATIENT
Start: 2021-01-01 | End: 2021-01-01 | Stop reason: HOSPADM

## 2021-01-01 RX ORDER — ALBUMIN HUMAN 250 G/1000ML
25 SOLUTION INTRAVENOUS
Status: DISCONTINUED | OUTPATIENT
Start: 2021-01-01 | End: 2021-01-01

## 2021-01-01 RX ORDER — ALBUMIN HUMAN 250 G/1000ML
25 SOLUTION INTRAVENOUS ONCE
Status: DISCONTINUED | OUTPATIENT
Start: 2021-01-01 | End: 2021-01-01 | Stop reason: HOSPADM

## 2021-01-01 RX ORDER — ALBUMIN HUMAN 250 G/1000ML
25 SOLUTION INTRAVENOUS ONCE
Status: COMPLETED | OUTPATIENT
Start: 2021-01-01 | End: 2021-01-01

## 2021-01-01 RX ORDER — MIDAZOLAM HYDROCHLORIDE 1 MG/ML
INJECTION INTRAMUSCULAR; INTRAVENOUS CODE/TRAUMA/SEDATION MEDICATION
Status: COMPLETED | OUTPATIENT
Start: 2021-01-01 | End: 2021-01-01

## 2021-01-01 RX ORDER — FENTANYL CITRATE 50 UG/ML
INJECTION, SOLUTION INTRAMUSCULAR; INTRAVENOUS CODE/TRAUMA/SEDATION MEDICATION
Status: COMPLETED | OUTPATIENT
Start: 2021-01-01 | End: 2021-01-01

## 2021-01-01 RX ORDER — GLUCAGON 1 MG
1 KIT INJECTION
Status: DISCONTINUED | OUTPATIENT
Start: 2021-01-01 | End: 2021-01-01 | Stop reason: HOSPADM

## 2021-01-01 RX ORDER — SODIUM CHLORIDE 9 MG/ML
INJECTION, SOLUTION INTRAVENOUS
Status: COMPLETED | OUTPATIENT
Start: 2021-01-01 | End: 2021-01-01

## 2021-01-01 RX ORDER — ONDANSETRON 2 MG/ML
4 INJECTION INTRAMUSCULAR; INTRAVENOUS EVERY 6 HOURS PRN
Status: DISCONTINUED | OUTPATIENT
Start: 2021-01-01 | End: 2021-01-01 | Stop reason: HOSPADM

## 2021-01-01 RX ORDER — IBUPROFEN 200 MG
16 TABLET ORAL
Status: DISCONTINUED | OUTPATIENT
Start: 2021-01-01 | End: 2021-01-01 | Stop reason: HOSPADM

## 2021-01-01 RX ORDER — GABAPENTIN 300 MG/1
600 CAPSULE ORAL 2 TIMES DAILY
Status: DISCONTINUED | OUTPATIENT
Start: 2021-01-01 | End: 2021-01-01 | Stop reason: HOSPADM

## 2021-01-01 RX ORDER — FERROUS GLUCONATE 324(38)MG
324 TABLET ORAL
Status: DISCONTINUED | OUTPATIENT
Start: 2021-01-01 | End: 2021-01-01 | Stop reason: HOSPADM

## 2021-01-01 RX ORDER — SODIUM,POTASSIUM PHOSPHATES 280-250MG
2 POWDER IN PACKET (EA) ORAL
Status: DISCONTINUED | OUTPATIENT
Start: 2021-01-01 | End: 2021-01-01 | Stop reason: HOSPADM

## 2021-01-01 RX ORDER — ALBUMIN HUMAN 250 G/1000ML
12.5 SOLUTION INTRAVENOUS ONCE
Status: COMPLETED | OUTPATIENT
Start: 2021-01-01 | End: 2021-01-01

## 2021-01-01 RX ORDER — SODIUM CHLORIDE 0.9 % (FLUSH) 0.9 %
10 SYRINGE (ML) INJECTION EVERY 12 HOURS PRN
Status: DISCONTINUED | OUTPATIENT
Start: 2021-01-01 | End: 2021-01-01 | Stop reason: HOSPADM

## 2021-01-01 RX ORDER — LIDOCAINE HYDROCHLORIDE 10 MG/ML
10 INJECTION INFILTRATION; PERINEURAL ONCE
Status: COMPLETED | OUTPATIENT
Start: 2021-01-01 | End: 2021-01-01

## 2021-01-01 RX ORDER — LACTULOSE 10 G/15ML
20 SOLUTION ORAL 3 TIMES DAILY
Status: DISCONTINUED | OUTPATIENT
Start: 2021-01-01 | End: 2021-01-01 | Stop reason: HOSPADM

## 2021-01-01 RX ORDER — CYANOCOBALAMIN (VITAMIN B-12) 250 MCG
500 TABLET ORAL DAILY
Status: DISCONTINUED | OUTPATIENT
Start: 2021-01-01 | End: 2021-01-01 | Stop reason: HOSPADM

## 2021-01-01 RX ORDER — TRAMADOL HYDROCHLORIDE 50 MG/1
50 TABLET ORAL
COMMUNITY

## 2021-01-01 RX ORDER — ALBUMIN HUMAN 250 G/1000ML
25 SOLUTION INTRAVENOUS ONCE
Status: DISCONTINUED | OUTPATIENT
Start: 2021-01-01 | End: 2021-01-01

## 2021-01-01 RX ORDER — IBUPROFEN 200 MG
24 TABLET ORAL
Status: DISCONTINUED | OUTPATIENT
Start: 2021-01-01 | End: 2021-01-01 | Stop reason: HOSPADM

## 2021-01-01 RX ORDER — CYCLOBENZAPRINE HCL 10 MG
10 TABLET ORAL DAILY PRN
COMMUNITY

## 2021-01-01 RX ORDER — INSULIN ASPART 100 [IU]/ML
1-10 INJECTION, SOLUTION INTRAVENOUS; SUBCUTANEOUS
Status: DISCONTINUED | OUTPATIENT
Start: 2021-01-01 | End: 2021-01-01 | Stop reason: HOSPADM

## 2021-01-01 RX ORDER — INSULIN ASPART 100 [IU]/ML
25 INJECTION, SOLUTION INTRAVENOUS; SUBCUTANEOUS NIGHTLY
Status: DISCONTINUED | OUTPATIENT
Start: 2021-01-01 | End: 2021-01-01 | Stop reason: HOSPADM

## 2021-01-01 RX ORDER — ESCITALOPRAM OXALATE 10 MG/1
20 TABLET ORAL DAILY
Status: DISCONTINUED | OUTPATIENT
Start: 2021-01-01 | End: 2021-01-01 | Stop reason: HOSPADM

## 2021-01-01 RX ORDER — LANOLIN ALCOHOL/MO/W.PET/CERES
800 CREAM (GRAM) TOPICAL
Status: DISCONTINUED | OUTPATIENT
Start: 2021-01-01 | End: 2021-01-01 | Stop reason: HOSPADM

## 2021-01-01 RX ADMIN — ALBUMIN (HUMAN) 25 G: 12.5 SOLUTION INTRAVENOUS at 11:07

## 2021-01-01 RX ADMIN — ALBUMIN (HUMAN) 25 G: 12.5 SOLUTION INTRAVENOUS at 11:08

## 2021-01-01 RX ADMIN — ALBUMIN (HUMAN) 25 G: 12.5 SOLUTION INTRAVENOUS at 03:10

## 2021-01-01 RX ADMIN — ALBUMIN (HUMAN) 25 G: 12.5 SOLUTION INTRAVENOUS at 12:06

## 2021-01-01 RX ADMIN — ALBUMIN (HUMAN) 25 G: 12.5 SOLUTION INTRAVENOUS at 11:06

## 2021-01-01 RX ADMIN — ALBUMIN (HUMAN) 25 G: 0.25 INJECTION, SOLUTION INTRAVENOUS at 11:01

## 2021-01-01 RX ADMIN — INSULIN ASPART 4 UNITS: 100 INJECTION, SOLUTION INTRAVENOUS; SUBCUTANEOUS at 12:09

## 2021-01-01 RX ADMIN — Medication 500 MCG: at 08:09

## 2021-01-01 RX ADMIN — ALBUMIN (HUMAN) 25 G: 12.5 SOLUTION INTRAVENOUS at 11:04

## 2021-01-01 RX ADMIN — SODIUM CHLORIDE 250 ML/HR: 9 INJECTION, SOLUTION INTRAVENOUS at 11:10

## 2021-01-01 RX ADMIN — ALBUMIN (HUMAN) 25 G: 0.25 INJECTION, SOLUTION INTRAVENOUS at 12:01

## 2021-01-01 RX ADMIN — INFLUENZA VACCINE, ADJUVANTED 0.5 ML: 15; 15; 15; 15 INJECTION, SUSPENSION INTRAMUSCULAR at 04:09

## 2021-01-01 RX ADMIN — FERROUS GLUCONATE 324 MG: 324 TABLET ORAL at 08:09

## 2021-01-01 RX ADMIN — ALBUMIN (HUMAN) 25 G: 12.5 SOLUTION INTRAVENOUS at 12:05

## 2021-01-01 RX ADMIN — LACTULOSE 20 G: 10 SOLUTION ORAL at 04:09

## 2021-01-01 RX ADMIN — LIDOCAINE HYDROCHLORIDE 10 ML: 10 INJECTION, SOLUTION INFILTRATION; PERINEURAL at 11:10

## 2021-01-01 RX ADMIN — ALBUMIN (HUMAN) 25 G: 12.5 SOLUTION INTRAVENOUS at 12:04

## 2021-01-01 RX ADMIN — MIDAZOLAM HYDROCHLORIDE 1 MG: 1 INJECTION, SOLUTION INTRAMUSCULAR; INTRAVENOUS at 11:10

## 2021-01-01 RX ADMIN — GABAPENTIN 600 MG: 300 CAPSULE ORAL at 08:09

## 2021-01-01 RX ADMIN — FENTANYL CITRATE 50 MCG: 50 INJECTION INTRAMUSCULAR; INTRAVENOUS at 11:10

## 2021-01-01 RX ADMIN — ALBUMIN (HUMAN) 25 G: 12.5 SOLUTION INTRAVENOUS at 12:08

## 2021-01-01 RX ADMIN — INSULIN DETEMIR 80 UNITS: 100 INJECTION, SOLUTION SUBCUTANEOUS at 08:09

## 2021-01-01 RX ADMIN — LACTULOSE 20 G: 10 SOLUTION ORAL at 08:09

## 2021-01-01 RX ADMIN — ALBUMIN (HUMAN) 25 G: 0.25 INJECTION, SOLUTION INTRAVENOUS at 12:02

## 2021-01-01 RX ADMIN — ALBUMIN (HUMAN) 25 G: 0.25 INJECTION, SOLUTION INTRAVENOUS at 01:02

## 2021-01-01 RX ADMIN — ALBUMIN (HUMAN) 25 G: 12.5 SOLUTION INTRAVENOUS at 11:05

## 2021-01-01 RX ADMIN — ESCITALOPRAM OXALATE 20 MG: 10 TABLET ORAL at 08:09

## 2021-01-11 NOTE — PROCEDURES
Radiology Post-Procedure Note    Pre Op Diagnosis: Ascites  Post Op Diagnosis: Same    Procedure: Paracentesis    Procedure performed by: MD Enmanuel    Written Informed Consent Obtained: Yes  Specimen Removed: YES 10 cc  Estimated Blood Loss: Minimal    Findings:   Successful paracentesis.  Albumin administered PRN per protocol.    Patient tolerated procedure well.    Arleth Singer  Diagnostic and interventional radiology  [phone]     
(3) occasionally moist

## 2021-01-22 NOTE — NURSING
Ultrasound guided paracentesis performed by Dr. Terry; procedure explained and consent obtained by Dr. Terry.3.8 L removed- VS remained stable for duration of procedure. Access site cleaned with peroxide, derma bond and steri-strips applied, then covered with sterile gauze and tape. Pt discharge home.    PRINCIPAL DISCHARGE DIAGNOSIS  Diagnosis: Iron deficiency anemia  Assessment and Plan of Treatment: You came to the hospital due to low hemoglobin, for which you receiveved blood transfusions and iron infusions. You had an EGD and colonoscopy to evaluate for a source of gastrointestinal bleeding that could be causing your significant anemia. Your EGD found nodules which were biopsied and your colonoscopy found a polyp, which was removed. You will need to follow up with Dr. Caballero, the GI doctor for a repeat colonoscopy. Please make an appointment to see Dr. Caballero in the next 2-3 weeks.  Please follow up with your hematologist for repeat bloodwork and iron infusions.   Please seek emergent medical attention if you have shortness of breath, dizziness, weaknes, you notice dark or bloody stools, bloody vomit or bleeding from your rectum.      SECONDARY DISCHARGE DIAGNOSES  Diagnosis: HTN (hypertension)  Assessment and Plan of Treatment: HTN (hypertension)  Please resume your blood pressure medications    Diagnosis: Chronic kidney disease  Assessment and Plan of Treatment: Chronic kidney disease     PRINCIPAL DISCHARGE DIAGNOSIS  Diagnosis: Iron deficiency anemia  Assessment and Plan of Treatment: You came to the hospital due to low hemoglobin, for which you receiveved blood transfusions and iron infusions. You had an EGD and colonoscopy to evaluate for a source of gastrointestinal bleeding that could be causing your significant anemia. Your EGD found nodules which were biopsied and your colonoscopy found a polyp, which was removed and sent for biopsy. You will need to follow up with Dr. Caballero, the gastroenterology specialist for a repeat colonoscopy. Please make an appointment to see Dr. Caballero in the next 2-3 weeks.  Please follow up with your hematologist for repeat bloodwork and iron infusions within the next week.   Please seek emergent medical attention if you have shortness of breath, dizziness, weaknes, you notice dark or bloody stools, bloody vomit or bleeding from your rectum.      SECONDARY DISCHARGE DIAGNOSES  Diagnosis: HTN (hypertension)  Assessment and Plan of Treatment: HTN (hypertension)  Please resume your prescribed blood pressure medications and follow up with your primary care provider for further management.      Diagnosis: Chronic kidney disease  Assessment and Plan of Treatment: Chronic kidney disease  Your creatinine was elevated to 2.62 while you were in the hospital. You have known kidney disease, please follow up with your nephrologist for further monitoring of your kidney function.

## 2021-02-05 NOTE — PATIENT INSTRUCTIONS
1. Start Lactulose and adjust dosing to obtain 3-4 soft,/oatmeal like bowel movements daily to treat hepatic encephalopathy (confusion due to high ammonia level)  2. Start lactulose 10 ml twice daily for now. After a few days, adjust the dose you take up or down to have 3-4 bowel movements daily   3. I will call you in something to try for the itching    Because you have cirrhosis, it is important to attend clinic visits every 6 months with an Ultrasound and blood tests every 6 months to screen for liver cancer (you are at risk of developing liver cancer due to scar tissue in the liver)    Signs and symptoms of worsening liver disease include jaundice, fluid in the belly (ascites), and confusion/disorientation/slowed thought processes due to hepatic encephalopathy (toxins building up because of liver problems).   You should seek medical attention if any of these things occur.    Also, possible bleeding from esophageal varices (blood vessels in the stomach and foodpipe can burst and cause fatal bleeding).  Therefore, if you have symptoms of vomiting blood, blood in your stool, dark or black stools or vomiting coffee ground vomit, YOU SHOULD GO TO THE EMERGENCY ROOM IMMEDIATELY.     Cirrhosis can increase the risk of liver cancer, liver failure, and death. However, we will watch your liver function score (MELD score) closely with each clinic visit. A normal MELD score is 6, highest is 40. Your last one was an 10. We will check this with every clinic visit. A MELD 15 or higher is when we start to consider transplant because MELD 15 or higher indicates that the liver is not functioning as well     Cirrhosis Counseling  - NO alcohol use (includes beer, wine, and/or liquor)  --  take your lactulose and adjust dosing to obtain 3-4 soft,/oatmeal like bowel movements daily to treat hepatic encephalopathy (confusion due to high ammonia level)  --  take your rifaximin twice daily for treatment of hepatic encephalopathy  Patient here for UVB treatment. Tolerating treatment well with no signs of burning. Increased dose per protocol. (confusion due to high ammonia level)  - avoid non-steroidal anti-inflammatory drugs (NSAIDs) such as ibuprofen, Motrin, naprosyn, Alleve due to the risk of kidney damage  - can take acetaminophen (Tylenol), no more than 2000 mg per day  - low sodium (salt) 2 gram per day diet  - high protein diet: 130 grams per day to prevent muscle mass loss. Drink at least 1 protein shake daily (Premier Protein is best option because it is very high protein and low sugar). Ok to use this as nighttime snack to fit it in   - resistance exercises for muscle strength  - avoid raw seafoods due to the risk of fatal Vibrio vulnificus infection  - ultrasound of the liver every 6 months for liver cancer screening (you are at risk of developing liver cancer due to scar tissue in the liver)  - Upper endoscopy every 1-2 years to screen for varices in the stomach and foodpipe which can burst and cause fatal bleeding

## 2021-09-22 PROBLEM — N18.30 CKD (CHRONIC KIDNEY DISEASE) STAGE 3, GFR 30-59 ML/MIN: Status: ACTIVE | Noted: 2021-01-01

## 2021-09-22 PROBLEM — R18.8 ASCITES: Status: ACTIVE | Noted: 2018-12-26

## 2022-02-21 ENCOUNTER — TELEPHONE (OUTPATIENT)
Dept: HEMATOLOGY/ONCOLOGY | Facility: CLINIC | Age: 72
End: 2022-02-21
Payer: MEDICARE

## 2022-06-01 NOTE — NURSING
Ultrasound guided paracentesis performed by Dr. Ross- 4.3 L removed- Patient received 25g albumin IV- VS remained stable for duration of procedure- specimens sent off to lab per order-  IV d/c'd, with tip intact. Access site cleaned with peroxide, derma bond and steri-strips applied, then covered with sterile gauze and tape. Pt discharge home.   
done

## 2023-05-15 NOTE — NURSING
BS 82 Dr Crook notified ok for discharge.  Pt discharged to home via w/c with nurse and s/o.   verbal cues/nonverbal cues (demo/gestures)/2 person assist

## 2024-11-28 NOTE — PROGRESS NOTES
04/06/19 1101   PRE-TX-O2   O2 Device (Oxygen Therapy) room air   SpO2 95 %     
FAMILY HISTORY:  No pertinent family history in first degree relatives

## (undated) DEVICE — PATCH CELOX VASCULAR HEMOSTATIC

## (undated) DEVICE — Device

## (undated) DEVICE — SHEATH PINNACLE 6FRX10CM W/GUIDEWIRE